# Patient Record
Sex: FEMALE | Race: WHITE | NOT HISPANIC OR LATINO | Employment: OTHER | URBAN - METROPOLITAN AREA
[De-identification: names, ages, dates, MRNs, and addresses within clinical notes are randomized per-mention and may not be internally consistent; named-entity substitution may affect disease eponyms.]

---

## 2017-01-26 ENCOUNTER — HOSPITAL ENCOUNTER (OUTPATIENT)
Dept: CT IMAGING | Facility: CLINIC | Age: 82
Discharge: HOME | End: 2017-01-26
Attending: INTERNAL MEDICINE | Admitting: INTERNAL MEDICINE
Payer: MEDICARE

## 2017-01-26 DIAGNOSIS — R91.1 PULMONARY NODULE: ICD-10-CM

## 2017-01-26 PROCEDURE — 71250 CT THORAX DX C-: CPT

## 2017-02-03 ENCOUNTER — TRANSFERRED RECORDS (OUTPATIENT)
Dept: HEALTH INFORMATION MANAGEMENT | Facility: CLINIC | Age: 82
End: 2017-02-03

## 2017-03-10 ENCOUNTER — OFFICE VISIT (OUTPATIENT)
Dept: INTERNAL MEDICINE | Facility: CLINIC | Age: 82
End: 2017-03-10
Payer: COMMERCIAL

## 2017-03-10 ENCOUNTER — TELEPHONE (OUTPATIENT)
Dept: INTERNAL MEDICINE | Facility: CLINIC | Age: 82
End: 2017-03-10

## 2017-03-10 VITALS
RESPIRATION RATE: 14 BRPM | TEMPERATURE: 98.9 F | OXYGEN SATURATION: 95 % | HEART RATE: 87 BPM | HEIGHT: 62 IN | SYSTOLIC BLOOD PRESSURE: 130 MMHG | DIASTOLIC BLOOD PRESSURE: 68 MMHG | WEIGHT: 147 LBS | BODY MASS INDEX: 27.05 KG/M2

## 2017-03-10 DIAGNOSIS — I10 ESSENTIAL HYPERTENSION: ICD-10-CM

## 2017-03-10 DIAGNOSIS — R39.15 URINARY URGENCY: ICD-10-CM

## 2017-03-10 DIAGNOSIS — E78.5 HYPERLIPIDEMIA LDL GOAL <130: ICD-10-CM

## 2017-03-10 DIAGNOSIS — Z00.00 ROUTINE GENERAL MEDICAL EXAMINATION AT A HEALTH CARE FACILITY: Primary | ICD-10-CM

## 2017-03-10 DIAGNOSIS — R32 URINARY INCONTINENCE, UNSPECIFIED TYPE: ICD-10-CM

## 2017-03-10 DIAGNOSIS — Z12.31 VISIT FOR SCREENING MAMMOGRAM: ICD-10-CM

## 2017-03-10 DIAGNOSIS — K27.9 PEPTIC ULCER: ICD-10-CM

## 2017-03-10 LAB
ALBUMIN UR-MCNC: NEGATIVE MG/DL
APPEARANCE UR: CLEAR
BILIRUB UR QL STRIP: NEGATIVE
COLOR UR AUTO: YELLOW
ERYTHROCYTE [DISTWIDTH] IN BLOOD BY AUTOMATED COUNT: 14.4 % (ref 10–15)
GLUCOSE UR STRIP-MCNC: NEGATIVE MG/DL
HCT VFR BLD AUTO: 44 % (ref 35–47)
HGB BLD-MCNC: 14.8 G/DL (ref 11.7–15.7)
HGB UR QL STRIP: NEGATIVE
KETONES UR STRIP-MCNC: NEGATIVE MG/DL
LEUKOCYTE ESTERASE UR QL STRIP: NEGATIVE
MCH RBC QN AUTO: 28.6 PG (ref 26.5–33)
MCHC RBC AUTO-ENTMCNC: 33.6 G/DL (ref 31.5–36.5)
MCV RBC AUTO: 85 FL (ref 78–100)
NITRATE UR QL: NEGATIVE
PH UR STRIP: 6.5 PH (ref 5–7)
PLATELET # BLD AUTO: 234 10E9/L (ref 150–450)
RBC # BLD AUTO: 5.18 10E12/L (ref 3.8–5.2)
SP GR UR STRIP: 1.01 (ref 1–1.03)
URN SPEC COLLECT METH UR: NORMAL
UROBILINOGEN UR STRIP-ACNC: 0.2 EU/DL (ref 0.2–1)
WBC # BLD AUTO: 5.5 10E9/L (ref 4–11)

## 2017-03-10 PROCEDURE — 85027 COMPLETE CBC AUTOMATED: CPT | Performed by: INTERNAL MEDICINE

## 2017-03-10 PROCEDURE — G0438 PPPS, INITIAL VISIT: HCPCS | Performed by: INTERNAL MEDICINE

## 2017-03-10 PROCEDURE — 80053 COMPREHEN METABOLIC PANEL: CPT | Performed by: INTERNAL MEDICINE

## 2017-03-10 PROCEDURE — 84443 ASSAY THYROID STIM HORMONE: CPT | Performed by: INTERNAL MEDICINE

## 2017-03-10 PROCEDURE — 36415 COLL VENOUS BLD VENIPUNCTURE: CPT | Performed by: INTERNAL MEDICINE

## 2017-03-10 PROCEDURE — 81003 URINALYSIS AUTO W/O SCOPE: CPT | Performed by: INTERNAL MEDICINE

## 2017-03-10 PROCEDURE — 80061 LIPID PANEL: CPT | Performed by: INTERNAL MEDICINE

## 2017-03-10 RX ORDER — AMLODIPINE BESYLATE 10 MG/1
10 TABLET ORAL DAILY
Qty: 90 TABLET | Refills: 3 | Status: SHIPPED | OUTPATIENT
Start: 2017-03-10 | End: 2017-04-03

## 2017-03-10 RX ORDER — SOLIFENACIN SUCCINATE 10 MG/1
10 TABLET, FILM COATED ORAL DAILY
Qty: 90 TABLET | Refills: 3 | Status: SHIPPED | OUTPATIENT
Start: 2017-03-10 | End: 2018-04-10

## 2017-03-10 RX ORDER — OMEPRAZOLE 40 MG/1
CAPSULE, DELAYED RELEASE ORAL
Qty: 90 CAPSULE | Refills: 3 | Status: SHIPPED | OUTPATIENT
Start: 2017-03-10 | End: 2017-09-06

## 2017-03-10 NOTE — PATIENT INSTRUCTIONS

## 2017-03-10 NOTE — MR AVS SNAPSHOT
After Visit Summary   3/10/2017    Gifty Villa    MRN: [id number]           Patient Information     Date Of Birth          1935        Visit Information        Provider Department      3/10/2017 8:20 AM Jun Tellez MD [facility]        Today's Diagnoses     Routine general medical examination at a health care facility    -  1    Essential hypertension        Hyperlipidemia LDL goal <130        Peptic ulcer        Urinary incontinence, unspecified type        Urinary urgency        Visit for screening mammogram          Care Instructions      Preventive Health Recommendations    Female Ages 65 +    Yearly exam:     See your health care provider every year in order to  o Review health changes.   o Discuss preventive care.    o Review your medicines if your doctor has prescribed any.      You no longer need a yearly Pap test unless you've had an abnormal Pap test in the past 10 years. If you have vaginal symptoms, such as bleeding or discharge, be sure to talk with your provider about a Pap test.      Every 1 to 2 years, have a mammogram.  If you are over 69, talk with your health care provider about whether or not you want to continue having screening mammograms.      Every 10 years, have a colonoscopy. Or, have a yearly FIT test (stool test). These exams will check for colon cancer.       Have a cholesterol test every 5 years, or more often if your doctor advises it.       Have a diabetes test (fasting glucose) every three years. If you are at risk for diabetes, you should have this test more often.       At age 65, have a bone density scan (DEXA) to check for osteoporosis (brittle bone disease).    Shots:    Get a flu shot each year.    Get a tetanus shot every 10 years.    Talk to your doctor about your pneumonia vaccines. There are now two you should receive - Pneumovax (PPSV 23) and Prevnar (PCV 13).    Talk to your doctor about the shingles vaccine.    Talk to your  doctor about the hepatitis B vaccine.    Nutrition:     Eat at least 5 servings of fruits and vegetables each day.      Eat whole-grain bread, whole-wheat pasta and brown rice instead of white grains and rice.      Talk to your provider about Calcium and Vitamin D.     Lifestyle    Exercise at least 150 minutes a week (30 minutes a day, 5 days a week). This will help you control your weight and prevent disease.      Limit alcohol to one drink per day.      No smoking.       Wear sunscreen to prevent skin cancer.       See your dentist twice a year for an exam and cleaning.      See your eye doctor every 1 to 2 years to screen for conditions such as glaucoma, macular degeneration and cataracts.      Everything looks fine!    Refills of medications have been faxed to your pharmacy.     I'll get back to you with lab results soon, especially if there is anything of concern.      See you in a year, sooner if problems.          Follow-ups after your visit        Future tests that were ordered for you today     Open Future Orders        Priority Expected Expires Ordered    MA Screening Digital Bilateral Routine  3/10/2018 3/10/2017            Who to contact     If you have questions or need follow up information about today's clinic visit or your schedule please contact [facility] directly at [phone].  Normal or non-critical lab and imaging results will be communicated to you by MyChart, letter or phone within 4 business days after the clinic has received the results. If you do not hear from us within 7 days, please contact the clinic through MyChart or phone. If you have a critical or abnormal lab result, we will notify you by phone as soon as possible.  Submit refill requests through [employer] or call your pharmacy and they will forward the refill request to us. Please allow 3 business days for your refill to be completed.          Additional Information About Your Visit        MyChart Information      "Science lets you send messages to your doctor, view your test results, renew your prescriptions, schedule appointments and more. To sign up, go to [email or web address] . Click on \"Log in\" on the left side of the screen, which will take you to the Welcome page. Then click on \"Sign up Now\" on the right side of the page.     You will be asked to enter the access code listed below, as well as some personal information. Please follow the directions to create your username and password.     Your access code is: [email or web address]  Expires: 2017  9:09 AM     Your access code will  in 90 days. If you need help or a new code, please call your [facility] clinic or [phone].        Care EveryWhere ID     This is your Care EveryWhere ID. This could be used by other organizations to access your [facility] medical records  [id number]        Your Vitals Were     Pulse Temperature Respirations Height Pulse Oximetry Breastfeeding?    87 98.9  F (37.2  C) (Oral) 14 5' 2\" (1.575 m) 95% No    BMI (Body Mass Index)                   26.89 kg/m2            Blood Pressure from Last 3 Encounters:   03/10/17 130/68   16 130/78   16 120/66    Weight from Last 3 Encounters:   03/10/17 147 lb (66.7 kg)   16 141 lb (64 kg)   16 138 lb (62.6 kg)              We Performed the Following     CBC with platelets     Comprehensive metabolic panel     Lipid panel reflex to direct LDL     TSH with free T4 reflex     UA reflex to Microscopic          Today's Medication Changes          These changes are accurate as of: 3/10/17  9:09 AM.  If you have any questions, ask your nurse or doctor.               Start taking these medicines.        Dose/Directions    solifenacin 10 MG tablet   Commonly known as:  VESICARE   Used for:  Urinary urgency   Started by:  Jun Tellez MD        Dose:  10 mg   Take 1 tablet (10 mg) by mouth daily   Quantity:  90 tablet   Refills:  3         These medicines have changed or have updated " prescriptions.        Dose/Directions    * aspirin 81 MG tablet   This may have changed:  Another medication with the same name was added. Make sure you understand how and when to take each.   Used for:  Unspecified essential hypertension   Changed by:  Jun Tellez MD        ONE DAILY   Quantity:  0   Refills:  0       * aspirin 81 MG tablet   This may have changed:  You were already taking a medication with the same name, and this prescription was added. Make sure you understand how and when to take each.   Used for:  Essential hypertension   Changed by:  Jun Tellez MD        Dose:  81 mg   Take 1 tablet (81 mg) by mouth daily   Quantity:  30 tablet   Refills:  0       * Notice:  This list has 2 medication(s) that are the same as other medications prescribed for you. Read the directions carefully, and ask your doctor or other care provider to review them with you.         Where to get your medicines      These medications were sent to [place] [address] [address] [facility] [facility] [address] [facility] [address]  [address] [facility] [address] [facility] [address]     Phone:  [phone]     amLODIPine 10 MG tablet    omeprazole 40 MG capsule    solifenacin 10 MG tablet         Some of these will need a paper prescription and others can be bought over the counter.  Ask your nurse if you have questions.     You don't need a prescription for these medications     aspirin 81 MG tablet                Primary Care Provider Office Phone # Fax #    Jun Tellez [phone]       [facility] [address] [facility] [address]  [facility] [address]        Thank you!     Thank you for choosing [facility]  for your care. Our goal is always to provide you with excellent care. Hearing back from our patients is one way we can continue to improve our services. Please take a few minutes to complete the written survey that you may receive in the mail after your visit with us. Thank you!             Your Updated  Medication List - Protect others around you: Learn how to safely use, store and throw away your medicines at www.disposemymeds.org.          This list is accurate as of: 3/10/17  9:09 AM.  Always use your most recent med list.                   Brand Name Dispense Instructions for use    albuterol 108 (90 BASE) MCG/ACT Inhaler    albuterol    1 Inhaler    Inhale 2 puffs into the lungs every 4 hours as needed for shortness of breath / dyspnea or wheezing       amLODIPine 10 MG tablet    NORVASC    90 tablet    Take 1 tablet (10 mg) by mouth daily       * aspirin 81 MG tablet     0    ONE DAILY       * aspirin 81 MG tablet     30 tablet    Take 1 tablet (81 mg) by mouth daily       DAILY MULTIVITAMIN PO      Reported on 3/10/2017       hydrOXYzine 25 MG tablet    ATARAX    60 tablet    Take 1-2 tablets (25-50 mg) by mouth every 6 hours as needed for itching       omeprazole 40 MG capsule    priLOSEC    90 capsule    Take 30-60 minutes before a meal.  Take twice daily for 4 weeks and then once daily       solifenacin 10 MG tablet    VESICARE    90 tablet    Take 1 tablet (10 mg) by mouth daily       * Notice:  This list has 2 medication(s) that are the same as other medications prescribed for you. Read the directions carefully, and ask your doctor or other care provider to review them with you.

## 2017-03-10 NOTE — NURSING NOTE
"Chief Complaint   Patient presents with     Medicare Visit       Initial /80 (BP Location: Right arm, Patient Position: Chair, Cuff Size: Adult Large)  Pulse 87  Temp 98.9  F (37.2  C) (Oral)  Resp 14  Ht 5' 2\" (1.575 m)  Wt 147 lb (66.7 kg)  SpO2 95%  Breastfeeding? No  BMI 26.89 kg/m2 Estimated body mass index is 26.89 kg/(m^2) as calculated from the following:    Height as of this encounter: 5' 2\" (1.575 m).    Weight as of this encounter: 147 lb (66.7 kg).  Medication Reconciliation: complete   Filiberto NOVAK      "

## 2017-03-10 NOTE — PROGRESS NOTES
SUBJECTIVE:                                                            Gifty Villa is a 82 year old female who presents for Preventive Visit.    Endoscopy:   Patient had endoscopy done about 9-10 months ago. 2 ulcers were found and she was given medication.     Medications;  She forgot to take her BP medication this A.M.     Musculoskeletal:   Patient c/o arthralgia in right hip. She has been going to orthology. Patient c/o occasional weakness in right leg. She notes the pain has improved and she is now able to walk without a cane.     Weight management:   Gifty has been following the medifast diet for the past year or so. She has successfully lost some weight on this diet.     :  Patient c/o leakage. She denies any dysuria,     Specialists:   Patient sees pulmonary specialist 1x/year.     Are you in the first 12 months of your Medicare Part B coverage?  No    Healthy Habits:    Do you get at least three servings of calcium containing foods daily (dairy, green leafy vegetables, etc.)? yes    Amount of exercise or daily activities, outside of work: daily activities    Problems taking medications regularly No    Medication side effects: No    Have you had an eye exam in the past two years? yes    Do you see a dentist twice per year? yes    Do you have sleep apnea, excessive snoring or daytime drowsiness?no    COGNITIVE SCREEN  1) Repeat 3 items (Banana, Sunrise, Chair)    2) Clock draw: NORMAL  3) 3 item recall:   Recalls 3 objects  Results: 3 items recalled: COGNITIVE IMPAIRMENT LESS LIKELY    Mini-CogTM Copyright S Carrie. Licensed by the author for use in [facility]; reprinted with permission (tree@.[facility]). All rights reserved.        Reviewed and updated as needed this visit by clinical staff  Tobacco  Allergies  Meds  Med Hx  Surg Hx  Fam Hx  Soc Hx        Reviewed and updated as needed this visit by Provider        Social History   Substance Use Topics     Smoking status: Former  Smoker     Quit date: 7/6/1988     Smokeless tobacco: Never Used      Comment: Quit in 1988     Alcohol use No       The patient does not drink >3 drinks per day nor >7 drinks per week.    Today's PHQ-2 Score:   PHQ-2 ( 1999 Pfizer) 9/30/2014 12/30/2013   Q1: Little interest or pleasure in doing things 0 0   Q2: Feeling down, depressed or hopeless 0 0   PHQ-2 Score 0 0       Do you feel safe in your environment - Yes    Do you have a Health Care Directive?: Yes: Advance Directive has been received and scanned.    Current providers sharing in care for this patient include:   Patient Care Team:  Jun Tellez MD as PCP - General  Devika Gray RN as       Hearing impairment: No    Ability to successfully perform activities of daily living: Yes, no assistance needed     Fall risk:  Fallen 2 or more times in the past year?: No  Any fall with injury in the past year?: No    Home safety:  none identified    The following health maintenance items are reviewed in Epic and correct as of today:  Health Maintenance   Topic Date Due     ADVANCE DIRECTIVE PLANNING Q5 YRS (NO INBASKET)  01/04/2017     FALL RISK ASSESSMENT  05/24/2017     TETANUS Q10 YR  08/17/2017     INFLUENZA VACCINE (SYSTEM ASSIGNED)  09/01/2017     DEXA SCAN SCREENING (SYSTEM ASSIGNED)  Completed     PNEUMOCOCCAL  Completed     Mammogram Screening: Patient over age 75, has elected to continue with mammography screening.     ROS:  C: NEGATIVE for fever, chills, difficulty sleeping, fatigue   I: NEGATIVE for worrisome rashes, moles or lesions  E: NEGATIVE for vision changes or irritation  E/M: NEGATIVE for ear, mouth and throat problems  R: NEGATIVE for significant cough or SOB  CV: NEGATIVE for chest pain, palpitations or peripheral edema  GI: NEGATIVE for hematochezia, abdominal pain, heartburn, or change in bowel habits  : POSITIVE for leakage NEGATIVE for frequency, dysuria, or hematuria  M: NEGATIVE for significant arthralgias or  "myalgia  N: NEGATIVE for weakness, dizziness or paresthesias, severe and/or frequent headaches, abrupt changes in speech  E: NEGATIVE for temperature intolerance, skin/hair changes, unusual thirstiness   H: NEGATIVE for bleeding or bruising problems  P: NEGATIVE for changes in mood or affect    Problem list, Medication list, Allergies, and Medical/Social/Surgical histories reviewed in [facility] and updated as appropriate.  BP Readings from Last 3 Encounters:   03/10/17 142/80   11/28/16 130/78   09/23/16 120/66    Wt Readings from Last 3 Encounters:   03/10/17 66.7 kg (147 lb)   11/28/16 64 kg (141 lb)   09/23/16 62.6 kg (138 lb)          Past/recent records reviewed and discussed for --   Colonoscopy- Patient does not need anymore  Mammogram- Needs to have one done this year. She has them done yearly.   Medications     OBJECTIVE:                                                            /68 (BP Location: Right arm, Patient Position: Chair, Cuff Size: Adult Large)  Pulse 87  Temp 98.9  F (37.2  C) (Oral)  Resp 14  Ht 1.575 m (5' 2\")  Wt 66.7 kg (147 lb)  SpO2 95%  Breastfeeding? No  BMI 26.89 kg/m2 Estimated body mass index is 26.89 kg/(m^2) as calculated from the following:    Height as of this encounter: 5' 2\" (1.575 m).    Weight as of this encounter: 147 lb (66.7 kg).  EXAM:   GENERAL APPEARANCE: healthy, alert and no distress  EYES: Eyes grossly normal to inspection, PERRL and conjunctivae and sclerae normal  HENT: ear canals and TM's normal, nose and mouth without ulcers or lesions, oropharynx clear and oral mucous membranes moist  NECK: no adenopathy, no asymmetry, masses, or scars and thyroid normal to palpation  RESP: lungs clear to auscultation - no rales, rhonchi or wheezes  CV: regular rate and rhythm, normal S1 S2, no S3 or S4, no murmur, click or rub, no peripheral edema and peripheral pulses strong  ABDOMEN: soft, nontender, no hepatosplenomegaly, no masses and bowel sounds normal  MS: no " musculoskeletal defects are noted and gait is age appropriate without ataxia  NEURO: Normal strength and tone, sensory exam grossly normal, mentation intact and speech normal  PSYCH: mentation appears normal and affect normal/bright    ASSESSMENT / PLAN:                                                            (Z00.00) Routine general medical examination at a health care facility  (primary encounter diagnosis)  Comment: Stable health. See epic orders.  Plan: Comprehensive metabolic panel, Lipid panel         reflex to direct LDL, TSH with free T4 reflex,         CBC with platelets, UA reflex to Microscopic            (I10) Essential hypertension  Comment: BP of 130/68 at target. Continue current meds.  Plan: amLODIPine (NORVASC) 10 MG tablet, aspirin 81         MG tablet            (E78.5) Hyperlipidemia LDL goal <130  Comment: Update lipids  Plan: Comprehensive metabolic panel, Lipid panel         reflex to direct LDL            (K27.9) Peptic ulcer  Comment: Controlled. Continue current meds  Plan: omeprazole (PRILOSEC) 40 MG capsule            (R32) Urinary incontinence, unspecified type  Plan: UA reflex to Microscopic            (R39.15) Urinary urgency  Comment: Take medication as directed  Plan: solifenacin (VESICARE) 10 MG tablet          (Z12.31) Visit for screening mammogram  Plan: MA Screening Digital Bilateral          Patient Instructions     Preventive Health Recommendations    Female Ages 65 +    Yearly exam:     See your health care provider every year in order to  o Review health changes.   o Discuss preventive care.    o Review your medicines if your doctor has prescribed any.      You no longer need a yearly Pap test unless you've had an abnormal Pap test in the past 10 years. If you have vaginal symptoms, such as bleeding or discharge, be sure to talk with your provider about a Pap test.      Every 1 to 2 years, have a mammogram.  If you are over 69, talk with your health care provider about  whether or not you want to continue having screening mammograms.      Every 10 years, have a colonoscopy. Or, have a yearly FIT test (stool test). These exams will check for colon cancer.       Have a cholesterol test every 5 years, or more often if your doctor advises it.       Have a diabetes test (fasting glucose) every three years. If you are at risk for diabetes, you should have this test more often.       At age 65, have a bone density scan (DEXA) to check for osteoporosis (brittle bone disease).    Shots:    Get a flu shot each year.    Get a tetanus shot every 10 years.    Talk to your doctor about your pneumonia vaccines. There are now two you should receive - Pneumovax (PPSV 23) and Prevnar (PCV 13).    Talk to your doctor about the shingles vaccine.    Talk to your doctor about the hepatitis B vaccine.    Nutrition:     Eat at least 5 servings of fruits and vegetables each day.      Eat whole-grain bread, whole-wheat pasta and brown rice instead of white grains and rice.      Talk to your provider about Calcium and Vitamin D.     Lifestyle    Exercise at least 150 minutes a week (30 minutes a day, 5 days a week). This will help you control your weight and prevent disease.      Limit alcohol to one drink per day.      No smoking.       Wear sunscreen to prevent skin cancer.       See your dentist twice a year for an exam and cleaning.      See your eye doctor every 1 to 2 years to screen for conditions such as glaucoma, macular degeneration and cataracts.    Everything looks fine!    Refills of medications have been faxed to your pharmacy.     I'll get back to you with lab results soon, especially if there is anything of concern.      See you in a year, sooner if problems.      End of Life Planning:  Patient currently has an advanced directive: Yes.  Practitioner is supportive of decision.    COUNSELING:  Reviewed preventive health counseling, as reflected in patient instructions       Regular exercise      "  Healthy diet/nutrition    Estimated body mass index is 26.89 kg/(m^2) as calculated from the following:    Height as of this encounter: 5' 2\" (1.575 m).    Weight as of this encounter: 147 lb (66.7 kg).     reports that she quit smoking about 28 years ago. She has never used smokeless tobacco.    Appropriate preventive services were discussed with this patient, including applicable screening as appropriate for cardiovascular disease, diabetes, osteopenia/osteoporosis, and glaucoma.  As appropriate for age/gender, discussed screening for colorectal cancer, prostate cancer, breast cancer, and cervical cancer. Checklist reviewing preventive services available has been given to the patient.    Reviewed patients plan of care and provided an AVS. The Basic Care Plan (routine screening as documented in Health Maintenance) for Gifty meets the Care Plan requirement. This Care Plan has been established and reviewed with the Patient.    Counseling Resources:  ATP IV Guidelines  Pooled Cohorts Equation Calculator  Breast Cancer Risk Calculator  FRAX Risk Assessment  ICSI Preventive Guidelines  Dietary Guidelines for Americans, 2010  [employer] MyPlate  ASA Prophylaxis  Lung CA Screening    Jun Tellez MD  [facility]    This document serves as a record of the services and decisions personally performed and made by Jun Tellez MD. It was created on their behalf by Karin Johnston, a trained medical scribe. The creation of this document is based the provider's statements to the medical scribe.  Karin Johnston March 10, 2017 8:52 AM    "

## 2017-03-11 LAB
ALBUMIN SERPL-MCNC: 4.1 G/DL (ref 3.4–5)
ALP SERPL-CCNC: 101 U/L (ref 40–150)
ALT SERPL W P-5'-P-CCNC: 33 U/L (ref 0–50)
ANION GAP SERPL CALCULATED.3IONS-SCNC: 8 MMOL/L (ref 3–14)
AST SERPL W P-5'-P-CCNC: 22 U/L (ref 0–45)
BILIRUB SERPL-MCNC: 0.3 MG/DL (ref 0.2–1.3)
BUN SERPL-MCNC: 15 MG/DL (ref 7–30)
CALCIUM SERPL-MCNC: 9.4 MG/DL (ref 8.5–10.1)
CHLORIDE SERPL-SCNC: 104 MMOL/L (ref 94–109)
CHOLEST SERPL-MCNC: 219 MG/DL
CO2 SERPL-SCNC: 28 MMOL/L (ref 20–32)
CREAT SERPL-MCNC: 0.66 MG/DL (ref 0.52–1.04)
GFR SERPL CREATININE-BSD FRML MDRD: 85 ML/MIN/1.7M2
GLUCOSE SERPL-MCNC: 91 MG/DL (ref 70–99)
HDLC SERPL-MCNC: 60 MG/DL
LDLC SERPL CALC-MCNC: 141 MG/DL
NONHDLC SERPL-MCNC: 159 MG/DL
POTASSIUM SERPL-SCNC: 4.5 MMOL/L (ref 3.4–5.3)
PROT SERPL-MCNC: 7.7 G/DL (ref 6.8–8.8)
SODIUM SERPL-SCNC: 140 MMOL/L (ref 133–144)
TRIGL SERPL-MCNC: 92 MG/DL
TSH SERPL DL<=0.005 MIU/L-ACNC: 2 MU/L (ref 0.4–4)

## 2017-03-13 ENCOUNTER — HOSPITAL ENCOUNTER (OUTPATIENT)
Dept: MAMMOGRAPHY | Facility: CLINIC | Age: 82
Discharge: HOME | End: 2017-03-13
Attending: INTERNAL MEDICINE | Admitting: INTERNAL MEDICINE
Payer: MEDICARE

## 2017-03-13 DIAGNOSIS — Z12.31 VISIT FOR SCREENING MAMMOGRAM: ICD-10-CM

## 2017-03-13 PROCEDURE — G0202 SCR MAMMO BI INCL CAD: HCPCS

## 2017-03-14 ENCOUNTER — HOSPITAL ENCOUNTER (EMERGENCY)
Facility: CLINIC | Age: 82
Discharge: HOME | End: 2017-03-14
Attending: EMERGENCY MEDICINE | Admitting: EMERGENCY MEDICINE
Payer: MEDICARE

## 2017-03-14 ENCOUNTER — APPOINTMENT (OUTPATIENT)
Dept: GENERAL RADIOLOGY | Facility: CLINIC | Age: 82
End: 2017-03-14
Attending: EMERGENCY MEDICINE
Payer: MEDICARE

## 2017-03-14 ENCOUNTER — APPOINTMENT (OUTPATIENT)
Dept: CT IMAGING | Facility: CLINIC | Age: 82
End: 2017-03-14
Attending: EMERGENCY MEDICINE
Payer: MEDICARE

## 2017-03-14 VITALS
OXYGEN SATURATION: 96 % | WEIGHT: 147.05 LBS | HEART RATE: 91 BPM | RESPIRATION RATE: 16 BRPM | TEMPERATURE: 98 F | SYSTOLIC BLOOD PRESSURE: 169 MMHG | DIASTOLIC BLOOD PRESSURE: 85 MMHG | BODY MASS INDEX: 26.9 KG/M2
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
## 2017-03-14 DIAGNOSIS — S20.211A BRUISED RIB, RIGHT, INITIAL ENCOUNTER: ICD-10-CM

## 2017-03-14 DIAGNOSIS — S09.90XA HEAD INJURY, INITIAL ENCOUNTER: ICD-10-CM

## 2017-03-14 PROCEDURE — 70450 CT HEAD/BRAIN W/O DYE: CPT

## 2017-03-14 PROCEDURE — 99284 EMERGENCY DEPT VISIT MOD MDM: CPT | Mod: 25

## 2017-03-14 PROCEDURE — 71101 X-RAY EXAM UNILAT RIBS/CHEST: CPT | Mod: RT

## 2017-03-14 RX ORDER — OXYCODONE HYDROCHLORIDE 5 MG/1
2.5-5 TABLET ORAL EVERY 6 HOURS PRN
Qty: 20 TABLET | Refills: 0 | Status: SHIPPED | OUTPATIENT
Start: 2017-03-14 | End: 2017-09-02

## 2017-03-14 ASSESSMENT — ENCOUNTER SYMPTOMS
CONFUSION: 0
HEADACHES: 0
BACK PAIN: 0
NECK PAIN: 0
VOMITING: 0
ABDOMINAL PAIN: 0

## 2017-03-14 NOTE — ED PROVIDER NOTES
History     Chief Complaint:  Fall    HPI   Gifty Villa is a 82 year old female with COPD, hypertension and hyperlipidemia who presents to the emergency department today for evaluation of a fall. The patient fell in her garage about 30 minutes ago. She notes that she stepped out of place and fell. She states that she hit her right chest first and then her head but her glasses did not come off or crack and she did not lose consciousness so she does not believe that she hit her head with much force. She is here in the ED mostly because her chest hurts when taking a deep breath. She is not on any blood thinners. She has no headache, neck pain, vision changes, vomiting or confusion. No pain in her abdominal pain, back pain or pain in her arms or legs. She has not taken any pain medications yet. Of note, patient states that she had a complete physical this past Thursday and everything looked good.  She is leaving out of town in two days.    Allergies:  No Known Drug Allergies     Medications:    Amlodipine  Omeprazole  Vesicare  Aspirin   Atarax  Albuterol   Multivitamin     Past Medical History:    Arthritis   Anxiety   Backache  COPD  Depression   Essential hypertension, benign   Frequency urgency syndrome  Hyperlipidemia  RLQ abdominal mass  Uncomplicated asthma    Past Surgical History:    C total hip arthroplasty, hip replacement, total, right (1995)   C spine fusion, anterior, 3 segments (1992)   C vaginal hysterectomy (1978)   Arthroplasty patello-femoral (knee) (2010)   Hysterectomy  Whick teeth   Excise ganglion wrist, left (2015)   Egd (2016 x2)   Colonoscopy (2003, 2014)    Family History:    Mother - Alzheimer Disease  Father - Alzheimer Disease, CAD  Brother - Alzheimer Disease   Maternal Aunt - Cancer    Social History:  The patient was accompanied to the ED by her daughter.  Smoking Status: Former Smoker, Quit in 1988  Smokeless Tobacco: Negative  Alcohol Use: Negative  Marital Status:    [5]     Review of Systems   Eyes: Negative for visual disturbance.   Gastrointestinal: Negative for abdominal pain and vomiting.   Musculoskeletal: Negative for back pain and neck pain.        Chest wall pain   Neurological: Negative for headaches.   Psychiatric/Behavioral: Negative for confusion.   All other systems reviewed and are negative.    Physical Exam   Vitals:  Patient Vitals for the past 24 hrs:   BP Temp Pulse Resp SpO2 Weight   03/14/17 1139 169/85 98  F (36.7  C) 91 16 96 % 66.7 kg (147 lb 0.8 oz)        Physical Exam  Head:  The scalp, face, and head appear normal without trauma  Eyes:  Sclera white; Pupils are equal and round  ENT:    External ears normal.  No hemotympanum.    Neck:  No midline tenderness or pain with full ROM.  CV:  Regular rate and rhythm  Chest Wall: Focal tenderness just under medial R breast.  No abrasion or eccymosis  Resp:  Breath sounds clear and equal bilaterally    Non-labored, no retractions or accessory muscle use  GI:  Abdomen is soft, non-tender, non-distended    No rebound tenderness or peritoneal features  Back:  No midline tenderness or step-off  MS:  No deformity    Normal motor assessment of all extremities.  Skin:  No rash or lesions noted.  Neuro:  Speech is normal and fluent. No apparent deficit.    Strength 5/5 x4.  Sensation intact x4.      Cranial nerves intact by examination.    GCS: 15      Emergency Department Course     Imaging:  Radiology findings were communicated with the patient who voiced understanding of the findings.    Ribs XR, unilat 3 views + PA chest, right:   IMPRESSION: Negative.  Reading per radiology    Head CT w/o contrast:   IMPRESSION: Diffuse cerebral volume loss and cerebral white matter  changes consistent with chronic small vessel ischemic disease. No  evidence for acute intracranial pathology.   Reading per radiology    Emergency Department Course:  Nursing notes and vitals reviewed.  I performed an exam of the patient as documented  above.   The patient was sent for an x-ray and CT while in the emergency department, results above.   At 1251 the patient was rechecked and was updated on the results of her imaging studies.   I discussed the treatment plan with the patient. They expressed understanding of this plan and consented to discharge. They will be discharged home with instructions for care and follow up. In addition, the patient will return to the emergency department if their symptoms persist, worsen, if new symptoms arise or if there is any concern.  All questions were answered.  I personally reviewed the iamging results with the patient and answered all related questions prior to discharge.    Impression & Plan      Medical Decision Making:  Gifty Villa is a 82 year old female who presents for evaluation after a mechanical fall with resulting head injury and chest injury. Differential includes bruise, broken rib, pneumothorax, pulmonary contusion, hemothorax and intracranial bleed due to age as her primary risk factor. Imaging was obtained with no acute abnormalities. We discussed management of bruised ribs as well as deep breathing with pain control. She feels like symptoms are controlled now and is hesitant to take narcotics due to the history of constipation. A limited quantity will be prescribed and she will preferentially be using Ibuprofen and Tylenol and will start over the counter stool softeners if needed.     Diagnosis:    ICD-10-CM    1. Head injury, initial encounter S09.90XA    2. Bruised rib, right, initial encounter S20.211A    3. Fall, initial encounter W19.XXXA      Disposition:   Discharge to home    Discharge Medications:  New Prescriptions    OXYCODONE (ROXICODONE) 5 MG IR TABLET    Take 0.5-1 tablets (2.5-5 mg) by mouth every 6 hours as needed for pain     Scribe Disclosure:  Venita DAVIS, am serving as a scribe at 11:33 AM on 3/14/2017 to document services personally performed by Tiera Griffiths,  MD, based on my observations and the provider's statements to me.    3/14/2017   [facility] EMERGENCY DEPARTMENT       Tiera Griffiths MD  03/16/17 0987

## 2017-03-14 NOTE — DISCHARGE INSTRUCTIONS
Prescription strength dosing instructions:  - 600mg ibuprofen (motrin, advil) every 8 hours and/or  - 650mg acetaminophen (tylenol) every 4 hours or 1000mg every 6 hours (maximum of 4000mg in 24 hours).  Acetaminophen is often in combination over the counter and prescription pain medications.  Be sure to include this in daily total.  - These work in different ways and are safe to take together    Discharge Instructions  Head Injury    You have been seen today for a head injury. You were checked for serious problems, like bleeding on the brain, but these problems cannot always be found right away.    Return to the Emergency Department if:    You are confused, have amnesia, or you are not acting right.    Your headache gets worse or you start to have a really bad headache even with your recommended treatment plan.    You vomit more than once.    You have a convulsion or seizure.    You have trouble walking.    You have weakness or paralysis in an arm or a leg.    You have blood or fluid coming from your ears or nose.    You have new symptoms or anything that worries you.    Sleeping:  It is okay for you to sleep, but someone should wake you up as instructed by your doctor, and someone should check on you at your usual time to wake up.     Activity:    Do not drive for at least 24 hours.    Do not drive if you have dizzy spells or trouble concentrating, or remembering things.    Do not return to any contact sports until cleared by your regular doctor.     Follow-up:  It is very important that you make an appointment with your clinic and go to the appointment.  If you do not follow-up with your regular doctor, it may result in missing an important development which could result in permanent injury or disability and/or lasting pain.  If there is any problem keeping your appointment, call your doctor or return to the Emergency Department.    MORE INFORMATION:    Concussion:  A concussion is a minor head injury that may  cause temporary problems with the way your brain works.  Some symptoms include:  confusion, amnesia, nausea and vomiting, dizziness, fatigue, memory or concentration problems, irritability and sleep problems.    CT Scans: Your evaluation today may have included a CT scan (CAT scan) to look for things like bleeding or a skull fracture (break).  CT scans involve radiation and too many CT scans can cause serious health problems like cancer, especially in children.  Because of this, your doctor may not have ordered a CT scan today if they think you are at low risk for a serious or life threatening problem.    If you were given a prescription for medicine here today, be sure to read all of the information (including the package insert) that comes with your prescription.  This will include important information about the medicine, its side effects, and any warnings that you need to know about.  The pharmacist who fills the prescription can provide more information and answer questions you may have about the medicine.  If you have questions or concerns that the pharmacist cannot address, please call or return to the Emergency Department.     Opioid Medication Information    Pain medications are among the most commonly prescribed medicines, so we are including this information for all our patients. If you did not receive pain medication or get a prescription for pain medicine, you can ignore it.     You may have been given a prescription for an opioid (narcotic) pain medicine and/or have received a pain medicine while here in the Emergency Department. These medicines can make you drowsy or impaired. You must not drive, operate dangerous equipment, or engage in any other dangerous activities while taking these medications. If you drive while taking these medications, you could be arrested for DUI, or driving under the influence. Do not drink any alcohol while you are taking these medications.     Opioid pain medications can  cause addiction. If you have a history of chemical dependency of any type, you are at a higher risk of becoming addicted to pain medications.  Only take these prescribed medications to treat your pain when all other options have been tried. Take it for as short a time and as few doses as possible. Store your pain pills in a secure place, as they are frequently stolen and provide a dangerous opportunity for children or visitors in your house to start abusing these powerful medications. We will not replace any lost or stolen medicine.  As soon as your pain is better, you should flush all your remaining medication.     Many prescription pain medications contain Tylenol  (acetaminophen), including Vicodin , Tylenol #3 , Norco , Lortab , and Percocet .  You should not take any extra pills of Tylenol  if you are using these prescription medications or you can get very sick.  Do not ever take more than 3000 mg of acetaminophen in any 24 hour period.    All opioids tend to cause constipation. Drink plenty of water and eat foods that have a lot of fiber, such as fruits, vegetables, prune juice, apple juice and high fiber cereal.  Take a laxative if you don t move your bowels at least every other day. Miralax , Milk of Magnesia, Colace , or Senna  can be used to keep you regular.      Remember that you can always come back to the Emergency Department if you are not able to see your regular doctor in the amount of time listed above, if you get any new symptoms, or if there is anything that worries you.

## 2017-03-14 NOTE — ED AVS SNAPSHOT
[facility] Emergency Department    [address]    [facility] [address]    Phone:  [phone]    Fax:  [phone]                                       Gifty Villa   MRN: [id number]    Department:  [facility] Emergency Department   Date of Visit:  3/14/2017           Patient Information     Date Of Birth          1935        Your diagnoses for this visit were:     Head injury, initial encounter     Bruised rib, right, initial encounter     Fall, initial encounter        You were seen by Tiera Griffiths MD.      Follow-up Information     Follow up with [facility] Emergency Department.    Specialty:  EMERGENCY MEDICINE    Why:  As needed, If symptoms worsen    Contact information:    [address]  [facility] [address] [phone]        Follow up with Jun Tellez MD.    Specialty:  Internal Medicine    Why:  2-4 weeks to recheck blood pressure    Contact information:    [facility]  [address]  [facility] [address]  [phone]          Discharge Instructions       Prescription strength dosing instructions:  - 600mg ibuprofen (motrin, advil) every 8 hours and/or  - 650mg acetaminophen (tylenol) every 4 hours or 1000mg every 6 hours (maximum of 4000mg in 24 hours).  Acetaminophen is often in combination over the counter and prescription pain medications.  Be sure to include this in daily total.  - These work in different ways and are safe to take together    Discharge Instructions  Head Injury    You have been seen today for a head injury. You were checked for serious problems, like bleeding on the brain, but these problems cannot always be found right away.    Return to the Emergency Department if:    You are confused, have amnesia, or you are not acting right.    Your headache gets worse or you start to have a really bad headache even with your recommended treatment plan.    You vomit more  than once.    You have a convulsion or seizure.    You have trouble walking.    You have weakness or paralysis in an arm or a leg.    You have blood or fluid coming from your ears or nose.    You have new symptoms or anything that worries you.    Sleeping:  It is okay for you to sleep, but someone should wake you up as instructed by your doctor, and someone should check on you at your usual time to wake up.     Activity:    Do not drive for at least 24 hours.    Do not drive if you have dizzy spells or trouble concentrating, or remembering things.    Do not return to any contact sports until cleared by your regular doctor.     Follow-up:  It is very important that you make an appointment with your clinic and go to the appointment.  If you do not follow-up with your regular doctor, it may result in missing an important development which could result in permanent injury or disability and/or lasting pain.  If there is any problem keeping your appointment, call your doctor or return to the Emergency Department.    MORE INFORMATION:    Concussion:  A concussion is a minor head injury that may cause temporary problems with the way your brain works.  Some symptoms include:  confusion, amnesia, nausea and vomiting, dizziness, fatigue, memory or concentration problems, irritability and sleep problems.    CT Scans: Your evaluation today may have included a CT scan (CAT scan) to look for things like bleeding or a skull fracture (break).  CT scans involve radiation and too many CT scans can cause serious health problems like cancer, especially in children.  Because of this, your doctor may not have ordered a CT scan today if they think you are at low risk for a serious or life threatening problem.    If you were given a prescription for medicine here today, be sure to read all of the information (including the package insert) that comes with your prescription.  This will include important information about the medicine, its  side effects, and any warnings that you need to know about.  The pharmacist who fills the prescription can provide more information and answer questions you may have about the medicine.  If you have questions or concerns that the pharmacist cannot address, please call or return to the Emergency Department.     Opioid Medication Information    Pain medications are among the most commonly prescribed medicines, so we are including this information for all our patients. If you did not receive pain medication or get a prescription for pain medicine, you can ignore it.     You may have been given a prescription for an opioid (narcotic) pain medicine and/or have received a pain medicine while here in the Emergency Department. These medicines can make you drowsy or impaired. You must not drive, operate dangerous equipment, or engage in any other dangerous activities while taking these medications. If you drive while taking these medications, you could be arrested for DUI, or driving under the influence. Do not drink any alcohol while you are taking these medications.     Opioid pain medications can cause addiction. If you have a history of chemical dependency of any type, you are at a higher risk of becoming addicted to pain medications.  Only take these prescribed medications to treat your pain when all other options have been tried. Take it for as short a time and as few doses as possible. Store your pain pills in a secure place, as they are frequently stolen and provide a dangerous opportunity for children or visitors in your house to start abusing these powerful medications. We will not replace any lost or stolen medicine.  As soon as your pain is better, you should flush all your remaining medication.     Many prescription pain medications contain Tylenol  (acetaminophen), including Vicodin , Tylenol #3 , Norco , Lortab , and Percocet .  You should not take any extra pills of Tylenol  if you are using these  prescription medications or you can get very sick.  Do not ever take more than 3000 mg of acetaminophen in any 24 hour period.    All opioids tend to cause constipation. Drink plenty of water and eat foods that have a lot of fiber, such as fruits, vegetables, prune juice, apple juice and high fiber cereal.  Take a laxative if you don t move your bowels at least every other day. Miralax , Milk of Magnesia, Colace , or Senna  can be used to keep you regular.      Remember that you can always come back to the Emergency Department if you are not able to see your regular doctor in the amount of time listed above, if you get any new symptoms, or if there is anything that worries you.            Discharge References/Attachments     RIB CONTUSION OR MINOR FRACTURE (ENGLISH)      24 Hour Appointment Hotline       To make an appointment at any [facility] clinic, call [phone] ([phone]). If you don't have a family doctor or clinic, we will help you find one. [facility] clinics are conveniently located to serve the needs of you and your family.             Review of your medicines      START taking        Dose / Directions Last dose taken    oxyCODONE 5 MG IR tablet   Commonly known as:  ROXICODONE   Dose:  2.5-5 mg   Quantity:  20 tablet        Take 0.5-1 tablets (2.5-5 mg) by mouth every 6 hours as needed for pain   Refills:  0          Our records show that you are taking the medicines listed below. If these are incorrect, please call your family doctor or clinic.        Dose / Directions Last dose taken    albuterol 108 (90 BASE) MCG/ACT Inhaler   Commonly known as:  albuterol   Dose:  2 puff   Quantity:  1 Inhaler        Inhale 2 puffs into the lungs every 4 hours as needed for shortness of breath / dyspnea or wheezing   Refills:  5        amLODIPine 10 MG tablet   Commonly known as:  NORVASC   Dose:  10 mg   Quantity:  90 tablet        Take 1 tablet (10 mg) by mouth daily   Refills:  3        * aspirin 81 MG  tablet   Quantity:  0        ONE DAILY   Refills:  0        * aspirin 81 MG tablet   Dose:  81 mg   Quantity:  30 tablet        Take 1 tablet (81 mg) by mouth daily   Refills:  0        DAILY MULTIVITAMIN PO        Reported on 3/10/2017   Refills:  0        hydrOXYzine 25 MG tablet   Commonly known as:  ATARAX   Dose:  25-50 mg   Quantity:  60 tablet        Take 1-2 tablets (25-50 mg) by mouth every 6 hours as needed for itching   Refills:  1        omeprazole 40 MG capsule   Commonly known as:  priLOSEC   Quantity:  90 capsule        Take 30-60 minutes before a meal.  Take twice daily for 4 weeks and then once daily   Refills:  3        solifenacin 10 MG tablet   Commonly known as:  VESICARE   Dose:  10 mg   Quantity:  90 tablet        Take 1 tablet (10 mg) by mouth daily   Refills:  3        * Notice:  This list has 2 medication(s) that are the same as other medications prescribed for you. Read the directions carefully, and ask your doctor or other care provider to review them with you.            Prescriptions were sent or printed at these locations (1 Prescription)                   Other Prescriptions                Printed at Department/Unit printer (1 of 1)         oxyCODONE (ROXICODONE) 5 MG IR tablet                Procedures and tests performed during your visit     Head CT w/o contrast    Ribs XR, unilat 3 views + PA chest, right      Orders Needing Specimen Collection     None      Pending Results     No orders found from 3/12/2017 to 3/15/2017.            Pending Culture Results     No orders found from 3/12/2017 to 3/15/2017.             Test Results from your hospital stay     3/14/2017  1:08 PM - Interface, Radiant Ib      Narrative     CT OF THE HEAD WITHOUT CONTRAST March 14, 2017 12:27 PM     HISTORY: Fall from standing, right frontal head injury.    TECHNIQUE: 5 mm thick axial CT images of the head were acquired  without IV contrast material. Radiation dose for this scan was reduced  using  automated exposure control, adjustment of the mA and/or kV  according to patient size, or iterative reconstruction technique.    COMPARISON: None.    FINDINGS:  There is mild diffuse cerebral volume loss. There are  subtle patchy areas of decreased density in the cerebral white matter  bilaterally that are consistent with sequela of chronic small vessel  ischemic disease.     The ventricles and basal cisterns are within normal limits in  configuration given the degree of cerebral volume loss. There is no  midline shift. There are no extra-axial fluid collections.     No intracranial hemorrhage, mass or recent infarct.    The visualized paranasal sinuses are well-aerated. There is no  mastoiditis. There are no fractures of the visualized bones.         Impression     IMPRESSION: Diffuse cerebral volume loss and cerebral white matter  changes consistent with chronic small vessel ischemic disease. No  evidence for acute intracranial pathology.           RANDY ADAMS MD         3/14/2017 12:43 PM - Interface, Radiant Ib      Narrative     XR RIBS & CHEST RT 3VW 3/14/2017 12:42 PM    HISTORY: pain after fall        Impression     IMPRESSION: Negative.    CANDIS HALL MD                Clinical Quality Measure: Blood Pressure Screening     Your blood pressure was checked while you were in the emergency department today. The last reading we obtained was  BP: 169/85 . Please read the guidelines below about what these numbers mean and what you should do about them.  If your systolic blood pressure (the top number) is less than 120 and your diastolic blood pressure (the bottom number) is less than 80, then your blood pressure is normal. There is nothing more that you need to do about it.  If your systolic blood pressure (the top number) is 120-139 or your diastolic blood pressure (the bottom number) is 80-89, your blood pressure may be higher than it should be. You should have your blood pressure rechecked within a year  "by a primary care provider.  If your systolic blood pressure (the top number) is 140 or greater or your diastolic blood pressure (the bottom number) is 90 or greater, you may have high blood pressure. High blood pressure is treatable, but if left untreated over time it can put you at risk for heart attack, stroke, or kidney failure. You should have your blood pressure rechecked by a primary care provider within the next 4 weeks.  If your provider in the emergency department today gave you specific instructions to follow-up with your doctor or provider even sooner than that, you should follow that instruction and not wait for up to 4 weeks for your follow-up visit.        Thank you for choosing [facility]       Thank you for choosing [facility] for your care. Our goal is always to provide you with excellent care. Hearing back from our patients is one way we can continue to improve our services. Please take a few minutes to complete the written survey that you may receive in the mail after you visit with us. Thank you!        [employer] Information     [employer] lets you send messages to your doctor, view your test results, renew your prescriptions, schedule appointments and more. To sign up, go to [email or web address] . Click on \"Log in\" on the left side of the screen, which will take you to the Welcome page. Then click on \"Sign up Now\" on the right side of the page.     You will be asked to enter the access code listed below, as well as some personal information. Please follow the directions to create your username and password.     Your access code is: [email or web address]  Expires: 2017 10:09 AM     Your access code will  in 90 days. If you need help or a new code, please call your [facility] clinic or [phone].        Care EveryWhere ID     This is your Care EveryWhere ID. This could be used by other organizations to access your [facility] medical records  [id number]        After Visit Summary       This is your " record. Keep this with you and show to your community pharmacist(s) and doctor(s) at your next visit.

## 2017-03-14 NOTE — ED AVS SNAPSHOT
[facility] Emergency Department    [address]    [facility] [address]    Phone:  [phone]    Fax:  [phone]                                       Gifty Villa   MRN: [id number]    Department:  [facility] Emergency Department   Date of Visit:  3/14/2017           After Visit Summary Signature Page     I have received my discharge instructions, and my questions have been answered. I have discussed any challenges I see with this plan with the nurse or doctor.    ..........................................................................................................................................  Patient/Patient Representative Signature      ..........................................................................................................................................  Patient Representative Print Name and Relationship to Patient    ..................................................               ................................................  Date                                            Time    ..........................................................................................................................................  Reviewed by Signature/Title    ...................................................              ..............................................  Date                                                            Time

## 2017-03-14 NOTE — ED NOTES
Patient arrives after a fall about 1100. Patient reports she mis stepped and fell in her garage striking the right side of her face and chest on the cement. No loss of consciousness. Rates pain at 8 in right breast. Hurts with deep breath.

## 2017-04-03 DIAGNOSIS — I10 ESSENTIAL HYPERTENSION: ICD-10-CM

## 2017-04-03 RX ORDER — AMLODIPINE BESYLATE 10 MG/1
10 TABLET ORAL DAILY
Qty: 90 TABLET | Refills: 0 | Status: SHIPPED | OUTPATIENT
Start: 2017-04-03 | End: 2017-12-19

## 2017-04-08 ENCOUNTER — APPOINTMENT (OUTPATIENT)
Dept: GENERAL RADIOLOGY | Facility: CLINIC | Age: 82
End: 2017-04-08
Attending: EMERGENCY MEDICINE
Payer: MEDICARE

## 2017-04-08 ENCOUNTER — HOSPITAL ENCOUNTER (EMERGENCY)
Facility: CLINIC | Age: 82
Discharge: HOME | End: 2017-04-08
Attending: EMERGENCY MEDICINE | Admitting: EMERGENCY MEDICINE
Payer: MEDICARE

## 2017-04-08 VITALS
TEMPERATURE: 97.7 F | HEART RATE: 86 BPM | HEIGHT: 62 IN | DIASTOLIC BLOOD PRESSURE: 96 MMHG | BODY MASS INDEX: 24.84 KG/M2 | RESPIRATION RATE: 18 BRPM | WEIGHT: 135 LBS | SYSTOLIC BLOOD PRESSURE: 172 MMHG | OXYGEN SATURATION: 95 %

## 2017-04-08 DIAGNOSIS — I10 ESSENTIAL HYPERTENSION: ICD-10-CM

## 2017-04-08 DIAGNOSIS — J44.1 COPD EXACERBATION (H): ICD-10-CM

## 2017-04-08 LAB
ANION GAP SERPL CALCULATED.3IONS-SCNC: 9 MMOL/L (ref 3–14)
BUN SERPL-MCNC: 9 MG/DL (ref 7–30)
CALCIUM SERPL-MCNC: 8.6 MG/DL (ref 8.5–10.1)
CHLORIDE SERPL-SCNC: 102 MMOL/L (ref 94–109)
CO2 SERPL-SCNC: 26 MMOL/L (ref 20–32)
CREAT SERPL-MCNC: 0.65 MG/DL (ref 0.52–1.04)
ERYTHROCYTE [DISTWIDTH] IN BLOOD BY AUTOMATED COUNT: 14 % (ref 10–15)
GFR SERPL CREATININE-BSD FRML MDRD: 88 ML/MIN/1.7M2
GLUCOSE SERPL-MCNC: 100 MG/DL (ref 70–99)
HCT VFR BLD AUTO: 44.1 % (ref 35–47)
HGB BLD-MCNC: 14.7 G/DL (ref 11.7–15.7)
MCH RBC QN AUTO: 28.3 PG (ref 26.5–33)
MCHC RBC AUTO-ENTMCNC: 33.3 G/DL (ref 31.5–36.5)
MCV RBC AUTO: 85 FL (ref 78–100)
PLATELET # BLD AUTO: 242 10E9/L (ref 150–450)
POTASSIUM SERPL-SCNC: 3.7 MMOL/L (ref 3.4–5.3)
RBC # BLD AUTO: 5.2 10E12/L (ref 3.8–5.2)
SODIUM SERPL-SCNC: 137 MMOL/L (ref 133–144)
TROPONIN I SERPL-MCNC: NORMAL UG/L (ref 0–0.04)
WBC # BLD AUTO: 5.6 10E9/L (ref 4–11)

## 2017-04-08 PROCEDURE — 99285 EMERGENCY DEPT VISIT HI MDM: CPT | Mod: 25

## 2017-04-08 PROCEDURE — 93005 ELECTROCARDIOGRAM TRACING: CPT

## 2017-04-08 PROCEDURE — 40000275 ZZH STATISTIC RCP TIME EA 10 MIN

## 2017-04-08 PROCEDURE — 94640 AIRWAY INHALATION TREATMENT: CPT

## 2017-04-08 PROCEDURE — 84484 ASSAY OF TROPONIN QUANT: CPT | Performed by: EMERGENCY MEDICINE

## 2017-04-08 PROCEDURE — 85027 COMPLETE CBC AUTOMATED: CPT | Performed by: EMERGENCY MEDICINE

## 2017-04-08 PROCEDURE — 25000125 ZZHC RX 250: Performed by: EMERGENCY MEDICINE

## 2017-04-08 PROCEDURE — 80048 BASIC METABOLIC PNL TOTAL CA: CPT | Performed by: EMERGENCY MEDICINE

## 2017-04-08 PROCEDURE — 71020 XR CHEST 2 VW: CPT

## 2017-04-08 RX ORDER — IPRATROPIUM BROMIDE AND ALBUTEROL SULFATE 2.5; .5 MG/3ML; MG/3ML
3 SOLUTION RESPIRATORY (INHALATION) ONCE
Status: COMPLETED | OUTPATIENT
Start: 2017-04-08 | End: 2017-04-08

## 2017-04-08 RX ORDER — DOXYCYCLINE 100 MG/1
100 CAPSULE ORAL 2 TIMES DAILY
Qty: 14 CAPSULE | Refills: 0 | Status: SHIPPED | OUTPATIENT
Start: 2017-04-08 | End: 2017-04-15

## 2017-04-08 RX ORDER — PREDNISONE 20 MG/1
TABLET ORAL
Qty: 10 TABLET | Refills: 0 | Status: SHIPPED | OUTPATIENT
Start: 2017-04-09 | End: 2017-09-02

## 2017-04-08 RX ORDER — PREDNISONE 20 MG/1
40 TABLET ORAL ONCE
Status: COMPLETED | OUTPATIENT
Start: 2017-04-08 | End: 2017-04-08

## 2017-04-08 RX ADMIN — PREDNISONE 40 MG: 20 TABLET ORAL at 12:24

## 2017-04-08 RX ADMIN — IPRATROPIUM BROMIDE AND ALBUTEROL SULFATE 3 ML: .5; 3 SOLUTION RESPIRATORY (INHALATION) at 11:13

## 2017-04-08 ASSESSMENT — ENCOUNTER SYMPTOMS
SHORTNESS OF BREATH: 1
RHINORRHEA: 0
COUGH: 1
DIARRHEA: 0
FEVER: 0
VOMITING: 0

## 2017-04-08 NOTE — ED PROVIDER NOTES
History     Chief Complaint:  Shortness of Breath    HPI   Gifty Villa is a 82 year old female with a history of COPD and asthma who presents to the emergency department today for evaluation of shortness of breath. The patient is having trouble breathing lately with a cough for the last few weeks. The cough is more dry. Her symptoms are worse at night. She notes stairs and other activities make the breathing worse. The patient is not on oxygen at home but has been using her inhaler. She has never been hospitalized for her COPD. She denies any chest pain or palpitations. The patient denies any leg swelling, vomiting, diarrhea, fever, rhinorrhea, or congestion. There is no history of heart disease.     Allergies:  No Known Drug Allergies      Medications:    Amlodipine  Omeprazole  Vesicare  Aspirin   Atarax  Albuterol   Multivitamin      Past Medical History:    Arthritis   Anxiety   Backache  COPD  Depression   Essential hypertension, benign   Frequency urgency syndrome  Hyperlipidemia  RLQ abdominal mass  Uncomplicated asthma     Past Surgical History:   C total hip arthroplasty, hip replacement, total, right (1995)   C spine fusion, anterior, 3 segments (1992)   C vaginal hysterectomy (1978)   Arthroplasty patello-femoral (knee) (2010)   Hysterectomy  [place] teeth   Excise ganglion wrist, left (2015)   Egd (2016 x2)   Colonoscopy (2003, 2014)     Family History:   Mother - Alzheimer Disease  Father - Alzheimer Disease, CAD  Brother - Alzheimer Disease   Maternal Aunt - Cancer     Social History:  The patient was accompanied to the ED by her daughter.  Smoking Status: Former Smoker, Quit in 1988  Smokeless Tobacco: Negative  Alcohol Use: Negative  Marital Status:  [5]     Review of Systems   Constitutional: Negative for fever.   HENT: Negative for congestion and rhinorrhea.    Respiratory: Positive for cough and shortness of breath.    Cardiovascular: Negative for leg swelling.   Gastrointestinal:  "Negative for diarrhea and vomiting.   All other systems reviewed and are negative.    Physical Exam   Vitals:  Patient Vitals for the past 24 hrs:   BP Temp Temp src Pulse Resp SpO2 Height Weight   04/08/17 1113 - - - - - 99 % - -   04/08/17 1050 (!) 207/100 97.7  F (36.5  C) Oral 86 18 98 % 1.575 m (5' 2\") 61.2 kg (135 lb)      Physical Exam    General:   Pleasant, age appropriate.  HEENT:    Oropharynx is moist, without lesions or trismus.  Eyes:    Conjunctiva normal  Neck:    Supple, no meningismus.     CV:     Regular rate and rhythm.      No murmurs, rubs or gallops.       No JVD or unilateral leg swelling.       2+ radial pulses bilateral.       No lower extremity edema.  PULM:    Mild expiratory wheezing at right > left base      No respiratory distress.      Good air exchange.     No rales     No stridor.  ABD:    Soft, non-tender, non-distended.       No pulsatile masses.       No rebound, guarding or rigidity.  MSK:     No gross deformity to all four extremities.   LYMPH:   No cervical lymphadenopathy.  NEURO:   Alert and oriented x 3.      Good muscle tone, no atrophy  Skin:    Warm, dry and intact.    Psych:    Mood is good and affect is appropriate.        Emergency Department Course     ECG:  ECG taken at 1144, ECG read at 1148  Normal sinus rhythm  Septal infarct, age undetermined  Abnormal ECG  Rate 76 bpm. LA interval 186. QRS duration 84. QT/QTc 416/468. P-R-T axes 63 72 70.     Imaging:  Radiology findings were communicated with the patient who voiced understanding of the findings.    Chest XR,  PA & LAT   IMPRESSION:  Unremarkable chest. I see no definite change since the   previous examination.       TAHIR DUGGAN MD     Laboratory:  Laboratory findings were communicated with the patient who voiced understanding of the findings.    CBC: WBC 5.6, HGB 14.7,   BMP: Glucose: 100 (H) o/w WNL (Creatinine 0.65)  Troponin (Collected 1114): <0.015     Interventions:  1113 Duoneb 3 ml " nebulization      Emergency Department Course:  Nursing notes and vitals reviewed.  I performed an exam of the patient as documented above.   IV was inserted and blood was drawn for laboratory testing, results above.  The patient was sent for imaging per above while in the emergency department, results above.    I discussed the treatment plan with the patient. They expressed understanding of this plan and consented to discharge. They will be discharged home with instructions for care and follow up. In addition, the patient will return to the emergency department if their symptoms persist, worsen, if new symptoms arise or if there is any concern.  All questions were answered.   I personally reviewed the laboratory and imaging results with the patient and answered all related questions prior to discharge.    Impression & Plan      Medical Decision Making:  Gifty Villa is a 82 year old female with a history of COPD who presents to the emergency department with a two week history of difficulty breathing. On examination, she appears well with no respiratory distress. She has mild bronchospasm which was resolved with bronchodilators and steroids in the ED. Clinical history and examination is consistent with COPD exacerbation. She has no evidence of ACS, myocardial infarction, heart failure, or clinical concerns of PE to count for her symptoms. Patient is safe for discharge to home. Short course of steroids and Doxycycline, continued use of bronchodilators. Follow up with PCP in 2-3 days to ensure improvement.      Diagnosis:    ICD-10-CM    1. COPD exacerbation (H) J44.1    2. Essential hypertension I10      Disposition:   Discharge    Discharge Medications:  New Prescriptions    DOXYCYCLINE (VIBRAMYCIN) 100 MG CAPSULE    Take 1 capsule (100 mg) by mouth 2 times daily for 7 days    PREDNISONE (DELTASONE) 20 MG TABLET    Take two tablets (= 40mg) each day for 5 (five) days     Scribe Disclosure:  Jono DAVIS, am  serving as a scribe at 11:02 AM on 4/8/2017 to document services personally performed by Garry Danielson MD, based on my observations and the provider's statements to me.   4/8/2017   [facility] EMERGENCY DEPARTMENT       Garry Danielson MD  04/08/17 1236

## 2017-04-08 NOTE — ED AVS SNAPSHOT
[facility] Emergency Department    [address]    [facility] [address]    Phone:  [phone]    Fax:  [phone]                                       Gifty Villa   MRN: [id number]    Department:  [facility] Emergency Department   Date of Visit:  4/8/2017           Patient Information     Date Of Birth          1935        Your diagnoses for this visit were:     COPD exacerbation (H)     Essential hypertension        You were seen by Garry Danielson MD.      Follow-up Information     Follow up with Jun Tellez MD. Schedule an appointment as soon as possible for a visit in 3 days.    Specialty:  Internal Medicine    Contact information:    [facility]  [address]  [facility] [address]  [phone]          Discharge Instructions       Discharge Instructions  Hypertension - High Blood Pressure    During you visit to the Emergency Department, your blood pressure was higher than the recommended blood pressure.  This may be related to stress, pain, medication or other temporary conditions. In these cases, your blood pressure may return to normal on its own. If you have a history of high blood pressure, you may need to have your doctor adjust your medications. Sometimes, your high measurement here may indicate that you have developed high blood pressure that will stay high unless it is treated. Sudden very high blood pressure can cause problems, but usually high blood pressure causes problems over months to years.      Blood pressure is almost never lowered in the Emergency Department, because studies have shown that lowering blood pressure too quickly is much more dangerous than leaving it alone.    You need to follow up with your doctor in 1-3 days to get your blood pressure rechecked.     Return to the Emergency Department if you start to have:    A severe headache.    Chest pain.    Shortness of breath.    Weakness or numbness  that affects one part of the body.    Confusion.    Vision changes.    Significant swelling of legs and/or eyes.    A reaction to any medication started in the Emergency Department.    What can I do to help myself?    Avoid alcohol.    Take any blood pressure medicine that you are prescribed.    Get a good night s sleep.    Lower your salt intake.    Exercise.    Lose weight.    Manage stress.    If blood pressure medication was started in the Emergency Department:    The medicine may not have an immediate effect. The body and brain determine what blood pressure you have. The medicine s job is to retrain the body s  thermostat  to a lower blood pressure.    You will need to follow up with your doctor to see how this medicine is working for you.  If you were given a prescription for medicine here today, be sure to read all of the information (including the package insert) that comes with your prescription.  This will include important information about the medicine, its side effects, and any warnings that you need to know about.  The pharmacist who fills the prescription can provide more information and answer questions you may have about the medicine.  If you have questions or concerns that the pharmacist cannot address, please call or return to the Emergency Department.       Discharge References/Attachments     CHRONIC OBSTRUCTIVE PULMONARY DISEASE (COPD), DISCHARGE INSTRUCTIONS (ENGLISH)      24 Hour Appointment Hotline       To make an appointment at any [facility] clinic, call [phone] ([phone]). If you don't have a family doctor or clinic, we will help you find one. [facility] clinics are conveniently located to serve the needs of you and your family.             Review of your medicines      START taking        Dose / Directions Last dose taken    doxycycline 100 MG capsule   Commonly known as:  VIBRAMYCIN   Dose:  100 mg   Quantity:  14 capsule        Take 1 capsule (100 mg) by mouth 2 times daily  for 7 days   Refills:  0        predniSONE 20 MG tablet   Commonly known as:  DELTASONE   Quantity:  10 tablet   Start taking on:  4/9/2017        Take two tablets (= 40mg) each day for 5 (five) days   Refills:  0          Our records show that you are taking the medicines listed below. If these are incorrect, please call your family doctor or clinic.        Dose / Directions Last dose taken    albuterol 108 (90 BASE) MCG/ACT Inhaler   Commonly known as:  albuterol   Dose:  2 puff   Quantity:  1 Inhaler        Inhale 2 puffs into the lungs every 4 hours as needed for shortness of breath / dyspnea or wheezing   Refills:  5        amLODIPine 10 MG tablet   Commonly known as:  NORVASC   Dose:  10 mg   Quantity:  90 tablet        Take 1 tablet (10 mg) by mouth daily   Refills:  0        * aspirin 81 MG tablet   Quantity:  0        ONE DAILY   Refills:  0        * aspirin 81 MG tablet   Dose:  81 mg   Quantity:  30 tablet        Take 1 tablet (81 mg) by mouth daily   Refills:  0        DAILY MULTIVITAMIN PO        Reported on 3/10/2017   Refills:  0        hydrOXYzine 25 MG tablet   Commonly known as:  ATARAX   Dose:  25-50 mg   Quantity:  60 tablet        Take 1-2 tablets (25-50 mg) by mouth every 6 hours as needed for itching   Refills:  1        omeprazole 40 MG capsule   Commonly known as:  priLOSEC   Quantity:  90 capsule        Take 30-60 minutes before a meal.  Take twice daily for 4 weeks and then once daily   Refills:  3        oxyCODONE 5 MG IR tablet   Commonly known as:  ROXICODONE   Dose:  2.5-5 mg   Quantity:  20 tablet        Take 0.5-1 tablets (2.5-5 mg) by mouth every 6 hours as needed for pain   Refills:  0        solifenacin 10 MG tablet   Commonly known as:  VESICARE   Dose:  10 mg   Quantity:  90 tablet        Take 1 tablet (10 mg) by mouth daily   Refills:  3        * Notice:  This list has 2 medication(s) that are the same as other medications prescribed for you. Read the directions carefully, and  ask your doctor or other care provider to review them with you.            Prescriptions were sent or printed at these locations (2 Prescriptions)                   Other Prescriptions                Printed at Department/Unit printer (2 of 2)         predniSONE (DELTASONE) 20 MG tablet               doxycycline (VIBRAMYCIN) 100 MG capsule                Procedures and tests performed during your visit     Basic metabolic panel (BMP)    CBC (platelets, no diff)    Chest XR,  PA & LAT    EKG 12 lead    Peripheral IV catheter    Troponin I (now)      Orders Needing Specimen Collection     None      Pending Results     No orders found from 4/6/2017 to 4/9/2017.            Pending Culture Results     No orders found from 4/6/2017 to 4/9/2017.            Test Results From Your Hospital Stay        4/8/2017 11:24 AM      Component Results     Component Value Ref Range & Units Status    WBC 5.6 4.0 - 11.0 10e9/L Final    RBC Count 5.20 3.8 - 5.2 10e12/L Final    Hemoglobin 14.7 11.7 - 15.7 g/dL Final    Hematocrit 44.1 35.0 - 47.0 % Final    MCV 85 78 - 100 fl Final    MCH 28.3 26.5 - 33.0 pg Final    MCHC 33.3 31.5 - 36.5 g/dL Final    RDW 14.0 10.0 - 15.0 % Final    Platelet Count 242 150 - 450 10e9/L Final         4/8/2017 11:43 AM      Component Results     Component Value Ref Range & Units Status    Sodium 137 133 - 144 mmol/L Final    Potassium 3.7 3.4 - 5.3 mmol/L Final    Chloride 102 94 - 109 mmol/L Final    Carbon Dioxide 26 20 - 32 mmol/L Final    Anion Gap 9 3 - 14 mmol/L Final    Glucose 100 (H) 70 - 99 mg/dL Final    Urea Nitrogen 9 7 - 30 mg/dL Final    Creatinine 0.65 0.52 - 1.04 mg/dL Final    GFR Estimate 88 >60 mL/min/1.7m2 Final    Non  GFR Calc    GFR Estimate If Black >90   GFR Calc   >60 mL/min/1.7m2 Final    Calcium 8.6 8.5 - 10.1 mg/dL Final         4/8/2017 11:43 AM      Component Results     Component Value Ref Range & Units Status    Troponin I ES  0.000 - 0.045  ug/L Final    <0.015  The 99th percentile for upper reference range is 0.045 ug/L.  Troponin values in   the range of 0.045 - 0.120 ug/L may be associated with risks of adverse   clinical events.           4/8/2017 11:53 AM      Narrative     CHEST TWO VIEWS   4/8/2017 11:39 AM    HISTORY:  Dyspnea.    COMPARISON:  3/14/2017    FINDINGS:  The heart size is normal. No mediastinal pathology is seen.  The lungs are clear. The pulmonary vasculature is normal. No  pneumothorax or pleural effusion is seen.         Impression     IMPRESSION:  Unremarkable chest. I see no definite change since the  previous examination.     TAHIR DUGGAN MD                Clinical Quality Measure: Blood Pressure Screening     Your blood pressure was checked while you were in the emergency department today. The last reading we obtained was  BP: (!) 207/100 . Please read the guidelines below about what these numbers mean and what you should do about them.  If your systolic blood pressure (the top number) is less than 120 and your diastolic blood pressure (the bottom number) is less than 80, then your blood pressure is normal. There is nothing more that you need to do about it.  If your systolic blood pressure (the top number) is 120-139 or your diastolic blood pressure (the bottom number) is 80-89, your blood pressure may be higher than it should be. You should have your blood pressure rechecked within a year by a primary care provider.  If your systolic blood pressure (the top number) is 140 or greater or your diastolic blood pressure (the bottom number) is 90 or greater, you may have high blood pressure. High blood pressure is treatable, but if left untreated over time it can put you at risk for heart attack, stroke, or kidney failure. You should have your blood pressure rechecked by a primary care provider within the next 4 weeks.  If your provider in the emergency department today gave you specific instructions to follow-up with  "your doctor or provider even sooner than that, you should follow that instruction and not wait for up to 4 weeks for your follow-up visit.        Thank you for choosing [facility]       Thank you for choosing [facility] for your care. Our goal is always to provide you with excellent care. Hearing back from our patients is one way we can continue to improve our services. Please take a few minutes to complete the written survey that you may receive in the mail after you visit with us. Thank you!        [facility] [employer] Information     [employer] lets you send messages to your doctor, view your test results, renew your prescriptions, schedule appointments and more. To sign up, go to [email or web address] . Click on \"Log in\" on the left side of the screen, which will take you to the Welcome page. Then click on \"Sign up Now\" on the right side of the page.     You will be asked to enter the access code listed below, as well as some personal information. Please follow the directions to create your username and password.     Your access code is: [email or web address]  Expires: 2017 10:09 AM     Your access code will  in 90 days. If you need help or a new code, please call your [facility] clinic or [phone].        Care EveryWhere ID     This is your Care EveryWhere ID. This could be used by other organizations to access your [facility] medical records  [id number]        After Visit Summary       This is your record. Keep this with you and show to your community pharmacist(s) and doctor(s) at your next visit.                  "

## 2017-04-08 NOTE — ED AVS SNAPSHOT
[facility] Emergency Department    [address]    [facility] [address]    Phone:  [phone]    Fax:  [phone]                                       Gifty Villa   MRN: [id number]    Department:  [facility] Emergency Department   Date of Visit:  4/8/2017           After Visit Summary Signature Page     I have received my discharge instructions, and my questions have been answered. I have discussed any challenges I see with this plan with the nurse or doctor.    ..........................................................................................................................................  Patient/Patient Representative Signature      ..........................................................................................................................................  Patient Representative Print Name and Relationship to Patient    ..................................................               ................................................  Date                                            Time    ..........................................................................................................................................  Reviewed by Signature/Title    ...................................................              ..............................................  Date                                                            Time

## 2017-04-09 LAB — INTERPRETATION ECG - MUSE: NORMAL

## 2017-04-11 ENCOUNTER — TELEPHONE (OUTPATIENT)
Dept: INTERNAL MEDICINE | Facility: CLINIC | Age: 82
End: 2017-04-11

## 2017-04-11 ENCOUNTER — OFFICE VISIT (OUTPATIENT)
Dept: INTERNAL MEDICINE | Facility: CLINIC | Age: 82
End: 2017-04-11
Payer: COMMERCIAL

## 2017-04-11 VITALS
BODY MASS INDEX: 26.05 KG/M2 | SYSTOLIC BLOOD PRESSURE: 144 MMHG | TEMPERATURE: 98.5 F | DIASTOLIC BLOOD PRESSURE: 74 MMHG | HEART RATE: 85 BPM | WEIGHT: 147 LBS | HEIGHT: 63 IN | RESPIRATION RATE: 14 BRPM | OXYGEN SATURATION: 95 %

## 2017-04-11 DIAGNOSIS — N39.41 URGE INCONTINENCE OF URINE: ICD-10-CM

## 2017-04-11 DIAGNOSIS — J44.9 CHRONIC OBSTRUCTIVE PULMONARY DISEASE, UNSPECIFIED COPD TYPE (H): ICD-10-CM

## 2017-04-11 DIAGNOSIS — I10 ESSENTIAL HYPERTENSION: ICD-10-CM

## 2017-04-11 DIAGNOSIS — R06.00 DYSPNEA, UNSPECIFIED TYPE: Primary | ICD-10-CM

## 2017-04-11 PROCEDURE — 99214 OFFICE O/P EST MOD 30 MIN: CPT | Performed by: INTERNAL MEDICINE

## 2017-04-11 RX ORDER — ALBUTEROL SULFATE 90 UG/1
2 AEROSOL, METERED RESPIRATORY (INHALATION) PRN
COMMUNITY
End: 2019-05-29

## 2017-04-11 RX ORDER — PREDNISONE 10 MG/1
TABLET ORAL
Qty: 30 TABLET | Refills: 1 | Status: SHIPPED | OUTPATIENT
Start: 2017-04-11 | End: 2017-09-02

## 2017-04-11 NOTE — TELEPHONE ENCOUNTER
Cindi with [facility] left voice message requesting clarification on prescription.     Contacted [facility]. They need more specific directions for Prednisone, current order states Take as directed. Reduce dose by 10 mg every 2-3 days until discontinued.     Visit note is not specific regarding dosing, but does appear pt was started on 40mg daily in ER. Sent to provider to review - please sent updated order to pharmacy.

## 2017-04-11 NOTE — PATIENT INSTRUCTIONS
I suspect that this was a flare of COPD, perhaps due to infection or allergies.   Okay to stop the prednisone after five days.   If breathing/coughing abruptly worsens again, may taper down on prednisone over several days (eg, 30 mg/day for 2 days, 20 mg/day for 2 days, 10 mg/day for 2 days, then stop).     Okay to take over the counter Claritin, Zyrtec or Allegra. (generic equivalents of any of these are less expensive).     Some options are provided for alternative pulmonary specialists/locations.     See me back in 2-3 months.

## 2017-04-11 NOTE — NURSING NOTE
"Chief Complaint   Patient presents with     ER F/U       Initial /74 (BP Location: Left arm, Patient Position: Chair, Cuff Size: Adult Regular)  Pulse 85  Temp 98.5  F (36.9  C) (Oral)  Resp 14  Ht 5' 3\" (1.6 m)  Wt 147 lb (66.7 kg)  SpO2 95%  Breastfeeding? No  BMI 26.04 kg/m2 Estimated body mass index is 26.04 kg/(m^2) as calculated from the following:    Height as of this encounter: 5' 3\" (1.6 m).    Weight as of this encounter: 147 lb (66.7 kg).  Medication Reconciliation: complete   Filiberto NOVAK      "

## 2017-04-11 NOTE — PROGRESS NOTES
SUBJECTIVE:                                                    Gifty Villa is a 82 year old female who presents to clinic today with her daughter in-law for the following health issues:  Dyspnea, cough, urinary urgency.     ED/UC Followup:    Facility:  [facility] ED  Date of visit: 04/08/2017  Reason for visit: COPD  Current Status: Improved     ER follow up:  Patient notes she has allergies to hayfever and other seasonal allergies.   She does not have a nebulizer at home     She notes improvement in symptoms since starting the prednisone.   She denies ever having symptoms of fever, rhinitis or sinus congestion. Patient adds her symptoms are exacerbated when lying down. A week ago she was having trouble sleeping at night but notes this has improved since.   Patient plans to start taking daily antihistamine.     Medications: Patient notes Vesicare is not covered with her insurance and costs about $375/month. She has tried other supplemental medications and experienced side effects or no effect in these. Vesicare works best for her and she experiences little to no side effects on this medication.   She has documented lack of efficacy and intolerance with past use of Detrol LA and with oxybutynin patch.     Problem list and histories reviewed & adjusted, as indicated.  Additional history: as documented    Reviewed and updated as needed this visit by clinical staff  Tobacco  Allergies  Meds  Med Hx  Surg Hx  Fam Hx  Soc Hx      Reviewed and updated as needed this visit by Provider    ROS:  C: NEGATIVE for fever   E/M: NEGATIVE for ear, mouth and throat problems  R: POSITIVE for significant cough and SOB (improving)   CV: NEGATIVE for chest pain, palpitations or peripheral edema  : POSITIVE for leakage NEGATIVE for frequency, dysuria, or hematuria  N: NEGATIVE for weakness, dizziness or paresthesias (no)    OBJECTIVE:                                                    /74 (BP Location: Left  "arm, Patient Position: Chair, Cuff Size: Adult Regular)  Pulse 85  Temp 98.5  F (36.9  C) (Oral)  Resp 14  Ht 1.6 m (5' 3\")  Wt 66.7 kg (147 lb)  SpO2 95%  Breastfeeding? No  BMI 26.04 kg/m2  Body mass index is 26.04 kg/(m^2).  GENERAL: healthy, alert and no distress  EYES: Eyes grossly normal to inspection, PERRL and conjunctivae and sclerae normal  RESP: faint expiratory wheezes, no crackles  CV: regular rate and rhythm, normal S1 S2, no S3 or S4, no murmur, click or rub, no peripheral edema and peripheral pulses strong  NEURO: Normal strength and tone, mentation intact and speech normal  PSYCH: mentation appears normal, affect normal/bright         ASSESSMENT/PLAN:                                                      (R06.00) Dyspnea, unspecified type  (primary encounter diagnosis)  Comment: Stable. Improved with recent antibiotics/prednisone. F/u with pulmonary as advised. Discussed extension of use of prednisone if breathing acutely worsens with abrupt discontinuation.   Plan: PULMONARY MEDICINE REFERRAL           (J44.9) Chronic obstructive pulmonary disease, unspecified COPD type (H)  Comment: Stable. F/u with pulmonary as advised. Take medication as directed  Plan: PULMONARY MEDICINE REFERRAL, predniSONE         (DELTASONE) 10 MG tablet            (N39.41) Urge incontinence of urine  Comment: solifenacin (VESICARE) 10 MG tablet  Plan: Will pursue PA for Vesicare.       (I10) Essential hypertension  BP somewhat elevated today. No medication changes. F/u in 2-3 months to recheck.     Patient Instructions   I suspect that this was a flare of COPD, perhaps due to infection or allergies.   Okay to stop the prednisone after five days.   If breathing/coughing abruptly worsens again, may taper down on prednisone over several days (eg, 30 mg/day for 2 days, 20 mg/day for 2 days, 10 mg/day for 2 days, then stop).     Okay to take over the counter Claritin, Zyrtec or Allegra. (generic equivalents of any of " these are less expensive).     Some options are provided for alternative pulmonary specialists/locations.     See me back in 2-3 months.     Jun Tellez MD  [facility]    This document serves as a record of the services and decisions personally performed and made by Jun Tellez MD. It was created on their behalf by Karin Johnston, a trained medical scribe. The creation of this document is based the provider's statements to the medical scribe.  Karin Johnston April 11, 2017 11:19 AM

## 2017-04-11 NOTE — MR AVS SNAPSHOT
After Visit Summary   4/11/2017    Gifty Villa    MRN: [id number]           Patient Information     Date Of Birth          1935        Visit Information        Provider Department      4/11/2017 11:00 AM Jun Tellez MD [facility]        Today's Diagnoses     Dyspnea, unspecified type    -  1    Chronic obstructive pulmonary disease, unspecified COPD type (H)        Urge incontinence of urine          Care Instructions    I suspect that this was a flare of COPD, perhaps due to infection or allergies.   Okay to stop the prednisone after five days.   If breathing/coughing abruptly worsens again, may taper down on prednisone over several days (eg, 30 mg/day for 2 days, 20 mg/day for 2 days, 10 mg/day for 2 days, then stop).     Okay to take over the counter Claritin, Zyrtec or Allegra. (generic equivalents of any of these are less expensive).     Some options are provided for alternative pulmonary specialists/locations.     See me back in 2-3 months.               Follow-ups after your visit        Additional Services     PULMONARY MEDICINE REFERRAL       Your provider has referred you to: [facility] [phone]   [email or web address]  [facility] [phone]   [email or web address]  [facility]: [facility] [phone]   [email or web address]  [facility] [phone]   [email or web address]    Please be aware that coverage of these services is subject to the terms and limitations of your health insurance plan.  Call member services at your health plan with any benefit or coverage questions.      Please bring the following with you to your appointment:    (1) Any X-Rays, CTs or MRIs which have been performed.  Contact the facility where they were done to arrange for   "prior to your scheduled appointment.    (2) List of current medications   (3) This referral request   (4) Any documents/labs given to you for this referral                  Who to contact     If you have questions or need follow up information about today's clinic visit or your schedule please contact [facility] directly at [phone].  Normal or non-critical lab and imaging results will be communicated to you by MyChart, letter or phone within 4 business days after the clinic has received the results. If you do not hear from us within 7 days, please contact the clinic through [employer] or phone. If you have a critical or abnormal lab result, we will notify you by phone as soon as possible.  Submit refill requests through [employer] or call your pharmacy and they will forward the refill request to us. Please allow 3 business days for your refill to be completed.          Additional Information About Your Visit        MyChart Information     [employer] lets you send messages to your doctor, view your test results, renew your prescriptions, schedule appointments and more. To sign up, go to [email or web address] . Click on \"Log in\" on the left side of the screen, which will take you to the Welcome page. Then click on \"Sign up Now\" on the right side of the page.     You will be asked to enter the access code listed below, as well as some personal information. Please follow the directions to create your username and password.     Your access code is: [email or web address]  Expires: 2017 10:09 AM     Your access code will  in 90 days. If you need help or a new code, please call your [facility] or [phone].        Care EveryWhere ID     This is your Care EveryWhere ID. This could be used by other organizations to access your [facility] medical records  [id number]        Your Vitals Were     Pulse Temperature Respirations Height Pulse Oximetry Breastfeeding?    85 98.5  F (36.9  C) (Oral) 14 5' 3\" " (1.6 m) 95% No    BMI (Body Mass Index)                   26.04 kg/m2            Blood Pressure from Last 3 Encounters:   04/11/17 144/74   04/08/17 (!) 172/96   03/14/17 169/85    Weight from Last 3 Encounters:   04/11/17 147 lb (66.7 kg)   04/08/17 135 lb (61.2 kg)   03/14/17 147 lb 0.8 oz (66.7 kg)              We Performed the Following     PULMONARY MEDICINE REFERRAL          Today's Medication Changes          These changes are accurate as of: 4/11/17 11:51 AM.  If you have any questions, ask your nurse or doctor.               These medicines have changed or have updated prescriptions.        Dose/Directions    * predniSONE 20 MG tablet   Commonly known as:  DELTASONE   This may have changed:  Another medication with the same name was added. Make sure you understand how and when to take each.        Take two tablets (= 40mg) each day for 5 (five) days   Quantity:  10 tablet   Refills:  0       * predniSONE 10 MG tablet   Commonly known as:  DELTASONE   This may have changed:  You were already taking a medication with the same name, and this prescription was added. Make sure you understand how and when to take each.   Used for:  Chronic obstructive pulmonary disease, unspecified COPD type (H)   Changed by:  Jun Tellez MD        Take as directed. Reduce dose by 10 mg every 2-3 days until discontinued.   Quantity:  30 tablet   Refills:  1       solifenacin 10 MG tablet   Commonly known as:  VESICARE   This may have changed:  additional instructions   Used for:  Urinary urgency        Dose:  10 mg   Take 1 tablet (10 mg) by mouth daily   Quantity:  90 tablet   Refills:  3       * Notice:  This list has 2 medication(s) that are the same as other medications prescribed for you. Read the directions carefully, and ask your doctor or other care provider to review them with you.         Where to get your medicines      These medications were sent to [facility] [address] [place] [address] [address] [facility]  [address] [facility] [address]     Phone:  [phone]     predniSONE 10 MG tablet                Primary Care Provider Office Phone # Fax #    Jun Tellez [phone]       [facility] [address]  [facility] [address]        Thank you!     Thank you for choosing [facility]  for your care. Our goal is always to provide you with excellent care. Hearing back from our patients is one way we can continue to improve our services. Please take a few minutes to complete the written survey that you may receive in the mail after your visit with us. Thank you!             Your Updated Medication List - Protect others around you: Learn how to safely use, store and throw away your medicines at www.disposemymeds.org.          This list is accurate as of: 4/11/17 11:51 AM.  Always use your most recent med list.                   Brand Name Dispense Instructions for use    albuterol 108 (90 BASE) MCG/ACT Inhaler   Generic drug:  albuterol      Inhale 2 puffs into the lungs as needed for shortness of breath / dyspnea or wheezing       amLODIPine 10 MG tablet    NORVASC    90 tablet    Take 1 tablet (10 mg) by mouth daily       aspirin 81 MG tablet     0    ONE DAILY       DAILY MULTIVITAMIN PO      Reported on 3/10/2017       doxycycline 100 MG capsule    VIBRAMYCIN    14 capsule    Take 1 capsule (100 mg) by mouth 2 times daily for 7 days       hydrOXYzine 25 MG tablet    ATARAX    60 tablet    Take 1-2 tablets (25-50 mg) by mouth every 6 hours as needed for itching       omeprazole 40 MG capsule    priLOSEC    90 capsule    Take 30-60 minutes before a meal.  Take twice daily for 4 weeks and then once daily       oxyCODONE 5 MG IR tablet    ROXICODONE    20 tablet    Take 0.5-1 tablets (2.5-5 mg) by mouth every 6 hours as needed for pain       * predniSONE 20 MG tablet    DELTASONE    10 tablet    Take two tablets (= 40mg) each day for 5 (five) days       * predniSONE  10 MG tablet    DELTASONE    30 tablet    Take as directed. Reduce dose by 10 mg every 2-3 days until discontinued.       solifenacin 10 MG tablet    VESICARE    90 tablet    Take 1 tablet (10 mg) by mouth daily       STIOLTO RESPIMAT IN      Inhale into the lungs daily       * Notice:  This list has 2 medication(s) that are the same as other medications prescribed for you. Read the directions carefully, and ask your doctor or other care provider to review them with you.

## 2017-04-12 NOTE — TELEPHONE ENCOUNTER
The prednisone 10 mg pills do not need to be taken if breathing remains fine after completion of the short course of prednisone prescribed in the ER.   This new prescription and directions were only to be used if she notes significantly worsening dyspnea with abrupt discontinuation of the recent short course of prednisone.   If she is doing well, she does not need to take this new prescription at all.     Please advise pharmacist.

## 2017-04-12 NOTE — TELEPHONE ENCOUNTER
Called [facility] and spoke with Liya. informed of message from provider below.

## 2017-04-27 ENCOUNTER — TRANSFERRED RECORDS (OUTPATIENT)
Dept: HEALTH INFORMATION MANAGEMENT | Facility: CLINIC | Age: 82
End: 2017-04-27

## 2017-05-01 ENCOUNTER — TELEPHONE (OUTPATIENT)
Dept: INTERNAL MEDICINE | Facility: CLINIC | Age: 82
End: 2017-05-01

## 2017-05-01 NOTE — TELEPHONE ENCOUNTER
Please reference Prior Auth request encounter dated 3/10/17.     I called patient and asked if she was able to check with insurance to find a formulary since we RCVD another PA request for her Vesicare 10mg tabs. Patient said her Daughter-in-Law was the one who took care of this and she will have her call us back with an update. I asked she call and ask to speak to 's nurse.    I will keep PA info at my desk until we hear back from Daughter-in-Law.

## 2017-05-02 NOTE — TELEPHONE ENCOUNTER
"Daughter in law calling back.  Did not check with insur for alternative coverage.  Asking PA be initiated.    States pt has tried an OTC patch--unsure of name (Oxytrol?) but didn't work and made pt feel \"weird\".  And has tried Tolterodine 4mg which wasn't effective; and Detrol LA (per OV dictation 6-24-10--\"She requests a refill of Detrol LA, but also wonders whether something else might work better\").    PA call [phone]  Pt id [id number]    Spoke with Crystal DE LA PAZ @ [employer], will submit PA request and should hear response within 24-48 hrs.  "

## 2017-05-03 NOTE — TELEPHONE ENCOUNTER
Approval faxed over. Effective 02/01/2017 through 05/02/2018.    Left message w/ pharmacy ([facility]) to let them know approval status and to contact patient.     Approval Sent for scanning.

## 2017-06-12 ENCOUNTER — HOSPITAL ENCOUNTER (OUTPATIENT)
Dept: CARDIAC REHAB | Facility: CLINIC | Age: 82
End: 2017-06-12
Attending: INTERNAL MEDICINE
Payer: MEDICARE

## 2017-06-12 ASSESSMENT — PULMONARY FUNCTION TESTS
FEV1 (%PREDICTED): 58
FVC: 2.28
FEV1: 1
FEV1/FVC: 74
FVC_PERCENT_PREDICTED: 99

## 2017-06-12 ASSESSMENT — 6 MINUTE WALK TEST (6MWT)
GENDER SELECTION: FEMALE
TOTAL DISTANCE WALKED: 310.97
FEMALE CALC: 380.59
MALE CALC: 360.82

## 2017-06-12 ASSESSMENT — ACTIVITIES OF DAILY LIVING (ADL): ADL_LIMITATIONS: STAIR CLIMBING

## 2017-06-12 ASSESSMENT — DUKE ACTIVITY SCORE INDEX (DASI)
DASI METS SCORE: 5.07
VO2_PEAK: 17.75

## 2017-06-13 VITALS — BODY MASS INDEX: 26.61 KG/M2 | WEIGHT: 144.6 LBS | HEIGHT: 62 IN

## 2017-06-13 PROCEDURE — 40000244 ZZH STATISTIC VISIT PULM REHAB

## 2017-06-13 PROCEDURE — G0424 PULMONARY REHAB W EXER: HCPCS

## 2017-06-13 NOTE — PROGRESS NOTES
Gifty Villa  82 year old  1935  I have reviewed initial evaluation outcomes including patient's response to initial activity assessment, and agree with exercise prescription for pulmonary rehabilitation for this patient.  Physician Signature: _____________________________  Date: _______   Time: ______ 06/12/17 0900   Session   Session Initial Evaluation and Exercise Prescription   Certified through this date 07/12/17   Type Initial   General Information   Treatment Diagnosis COPD   Classification of COPD Stage 2 (Moderate)   Onset Date 06/13/16   Current Signs and Symptoms SOB;Fatigue   Outpatient Pulmonary Rehab Start Date 06/12/17   Primary Physician Jun Tellez MD   Pulmonolgist JOSE CRUZ Irwin MD   Medical History/Comorbidities   Medical History/Comorbidities Osteoarthritis   Medical History Comments Past Medical History:   Diagnosis Date     Arthritis      Backache, unspecified      Essential hypertension, benign      Frequency-urgency sydrome      Hyperlipidemia      RLQ abdominal mass      Uncomplicated asthma       Sputum   Sputum Production Amount small   Tobacco History   Tobacco Former smoker   Tobacco Habit Cigarettes   Years Smoked 15   Average Packs Per Day .05   Quit Date or Planned Quit Date 06/13/92   Medications   Long-Acting Beta Agonist Prescribed, taking as prescribed  (Stiolto Respimat)   Short-Acting Beta Agonist Prescribed, taking as prescribed   Long-Acting Anticholinergic Prescribed, taking as prescribed  (Stiolto Respimat)   Short-Acting Anticholinergic Not prescribed   Inhaled Corticosteroid Not prescribed   Oral Corticosteroid Not prescribed   Medications Reconciled By Patient;Medical record   Preventative Vaccinations   Influenza Vaccination Yes   Pneumonia Vaccination Yes   Pain   Patient Currently in Pain No   Falls Screen   Have you fallen two or more times in the past year? No   Have you fallen and had an injury in the past year? No   Living and Work Status   Living Arrangements  "house   Support System Live alone   Environmental Factors No concerns   ADL Limitations Stair climbing   Initial Duke Activity Status Index (DASI) score. A measure of functional capacity. The goal is to have a pre-program raw score of 9.95 (~4 METs) or above 18.95   Initial DASI VO2 Peak (ml*kg-1*min-1) 17.75   Initial DASI MET Level 5.07   Return to Employment Retired   Physical Assessments   Edema None   Left Lung Sounds diminished   Right Lung Sounds diminished   Pulmonary Function Test (PFTs)   PFT Results Available   Date Completed 04/27/17   FVC Actual 2.28   % Predicted FVC 99   FEV1 Actual 1   % Predicted FEV1 58   FEV1/FEV Ratio 74   FRC Actual 44   Pre/Post Bronchodilator Pre   Individualized Treatment Plan   Sessions Scheduled 18   Sessions Attended 1   Type Aerobic exercise;Resistance training   Oxygen Use   Supplemental Oxygen Needed No   Exercise Prescription   Mode Treadmill;Nustep;Weights   Frequency 2 days/week   Duration/Time Intermittent bouts   THR (85% of age predicted max heart rate)  118.15   Effort Rating (0-10) 4-6/10   Progression of Exercise Start with bouts to 15\" then add 0.25 mets/1-2 week as OMNI/SOB 4-6/10.   Oxygen Titration with Exercise NA   Exercise Assessment   6 Minute Walk Predicted - Gender Selection Female   6 Minute Walk Predicted (Female) 380.59   6 Minute Walk Distance (Initial) 310.97 Meters   Resting HR 94   Exercise    Resting /78   Exercise /80   SpO2 94   Exercise SpO2 91   Exercise Tolerance fair   Normal Limits Discussed Yes   Current Symptoms at Home Dyspnea;Fatigue   Nutrition Management   Age 82   Height 1.575 m (5' 2\")   Weight 65.6 kg (144 lb 9.6 oz)   BMI (Calculated) 26.5   Assessment Normal   Interventions Planned NA   Psychosocial   Initial Patient Health Questionnaire -9 (PHQ-9) for depression. To notify physician if pre-score >9. 3   Initial [facility] COOP Survey score. A quality of life measure. To re evaluate if total score is > 25. " Also consider PHQ-9 and DASI to determine if patient should be referred back to physician. 22   Initial Shortness of Breath Questionnaire (SOBQ) score. The goal is to reduce the score pre to post program. 39   Intervention Planned Use breathing techniques to control dyspnea cycle   Psychosocial Comments 6/12/17 Pt lives alone and has son 3 miles away who helps her in a large way  Pt has a happy outlook on life.   Stages of Change   Aerobic Exercise Contemplation   Physical Activity Contemplation   Recommended diet Maintenance   Stress Maintenance   Smoking Cessation Maintenance   Oxygen Usage NA   Current Home Exercise   Type of Exercise None   Recommended Home Exercise Prescription   Type of Exercise Walking   Frequency (Days per week) 1-2   Duration (minutes per session) 15-30 min   Effort Rating Recommended (0-10) Scale  4-6/10   Recommended Exercise Heart Rate Range 118   30 Day Exercise Plan 6/12/17 Start home walking shortly after starting MN.. Use step counter.   Learning Assessment   Learner Patient   Primary Language English   Patient Education/Referrals   Education Recommended Activities of Daily Living;Breathing Techniques;Energy Conservation;Exercise Principles   Follow-up/On-going Support   Provider follow-up needed on the following No follow-up needed   Pulmonary Rehab Goals   Pulmonary Rehab Goals 1;2;3   Goal 1   Goal Pt to learn breathing techniques to better manage her resp symptoms when working out in the yard.   Target Date 08/13/17   Goal 2   Goal Pt to learn stairclimbing technique to be no more SOB at the top of the flight by demonstrating back at %   Target Date 08/13/17   Goal 3   Goal Pt to start a home exercise program to increase strength and endurance  to be carried out after MN ends by attending exercise principles class.   Target Date 08/13/17   Assessment   Assessment Pt is a 81 yo with stage II COPD. Also watching a pulmonary nodual and had an abdominal mass removed in March.  SHe would like to get into her best physical shape possible to build strength and endurance to continue to live alone with family help.     The patient was assessed to be stable and appropriate to begin exercise.  The patient's functional capacity and exercise prescription were determined by the completion of the 6 minute walk test. See results above.  The patient was orientated to the program and the equipment. Pulmonary response to exercise was assessed and WNL. No symptoms, complaints or pain were reported.   Goals and objectives were discussed. Good prognosis for reaching goals.   Skilled therapy is necessary to monitor pulmonary response to exercise, provide education, and provide behavior change counseling to achieve patient's goals.

## 2017-06-26 ENCOUNTER — HOSPITAL ENCOUNTER (OUTPATIENT)
Dept: CARDIAC REHAB | Facility: CLINIC | Age: 82
End: 2017-06-26
Attending: INTERNAL MEDICINE
Payer: MEDICARE

## 2017-06-26 VITALS — WEIGHT: 144 LBS | HEIGHT: 62 IN | BODY MASS INDEX: 26.5 KG/M2

## 2017-06-26 PROCEDURE — 40000244 ZZH STATISTIC VISIT PULM REHAB

## 2017-06-26 PROCEDURE — G0424 PULMONARY REHAB W EXER: HCPCS

## 2017-06-26 ASSESSMENT — 6 MINUTE WALK TEST (6MWT)
TOTAL DISTANCE WALKED: 310.97
MALE CALC: 361.46
FEMALE CALC: 381.25
GENDER SELECTION: FEMALE

## 2017-06-26 ASSESSMENT — DUKE ACTIVITY SCORE INDEX (DASI)
VO2_PEAK: 17.75
DASI METS SCORE: 5.07

## 2017-06-26 ASSESSMENT — PULMONARY FUNCTION TESTS
FVC_PERCENT_PREDICTED: 99
FVC: 2.28
FEV1: 1
FEV1/FVC: 74
FEV1 (%PREDICTED): 58

## 2017-06-26 ASSESSMENT — ACTIVITIES OF DAILY LIVING (ADL): ADL_LIMITATIONS: STAIR CLIMBING

## 2017-06-26 NOTE — PROGRESS NOTES
Gifty Villa  82 year old  1935  I have reviewed and agree with this patient s individual treatment plan and exercise prescription for pulmonary rehabilitation.  Please see  individual treatment plan for details of progress and plan. 06/26/17 1000   Session   Session 30 Day Individualized Treatment Plan   Certified through this date 08/05/17   Type Initial   General Information   Treatment Diagnosis COPD   Classification of COPD Stage 2 (Moderate)   Onset Date 06/13/16   Current Signs and Symptoms None   Outpatient Pulmonary Rehab Start Date 06/12/17   Primary Physician Jun Tellez MD   Pulmonolgist JOSE CRUZ Irwin MD   Medical History/Comorbidities   Medical History/Comorbidities Osteoarthritis   Medical History Comments Past Medical History:   Diagnosis Date     Arthritis      Backache, unspecified      Essential hypertension, benign      Frequency-urgency sydrome      Hyperlipidemia      RLQ abdominal mass      Uncomplicated asthma       Sputum   Sputum Production Amount small   Tobacco History   Tobacco Former smoker   Tobacco Habit Cigarettes   Years Smoked 15   Average Packs Per Day .05   Quit Date or Planned Quit Date 06/13/92   Medications   Long-Acting Beta Agonist Prescribed, taking as prescribed  (Stiolto Respimat)   Short-Acting Beta Agonist Prescribed, taking as prescribed   Long-Acting Anticholinergic Prescribed, taking as prescribed  (Stiolto Respimat)   Short-Acting Anticholinergic Not prescribed   Inhaled Corticosteroid Not prescribed   Oral Corticosteroid Not prescribed   Medications Reconciled By Patient;Medical record   Preventative Vaccinations   Influenza Vaccination Yes   Pneumonia Vaccination Yes   Pain   Patient Currently in Pain No   Falls Screen   Have you fallen two or more times in the past year? No   Have you fallen and had an injury in the past year? No   Living and Work Status   Living Arrangements house   Support System Live alone   Environmental Factors No concerns   ADL  "Limitations Stair climbing   Initial Duke Activity Status Index (DASI) score. A measure of functional capacity. The goal is to have a pre-program raw score of 9.95 (~4 METs) or above 18.95   Initial DASI VO2 Peak (ml*kg-1*min-1) 17.75   Initial DASI MET Level 5.07   Return to Employment Retired   Physical Assessments   Edema None   Left Lung Sounds diminished   Right Lung Sounds diminished   Pulmonary Function Test (PFTs)   PFT Results Available   Date Completed 04/27/17   FVC Actual 2.28   % Predicted FVC 99   FEV1 Actual 1   % Predicted FEV1 58   FEV1/FEV Ratio 74   FRC Actual 44   Pre/Post Bronchodilator Pre   Individualized Treatment Plan   Sessions Scheduled 18   Oxygen Use   Supplemental Oxygen Needed No   Exercise Prescription   Mode Treadmill;Nustep;Weights   Frequency 2 days/week   Duration/Time Intermittent bouts   THR (85% of age predicted max heart rate)  118.15   Effort Rating (0-10) 4-6/10   Progression of Exercise Start with bouts to 15\" then add 0.25 mets/1-2 week as OMNI/SOB 4-6/10.   6/26/17 Pt is here for her 2nd session. Repeat this goal.   Oxygen Titration with Exercise NA   Exercise Assessment   6 Minute Walk Predicted - Gender Selection Female   6 Minute Walk Predicted (Female) 381.25   6 Minute Walk Predicted (Male) 361.46   6 Minute Walk Distance (Initial) 310.97 Meters   Resting HR 94   Resting /78   Exercise /80   SpO2 94   Exercise SpO2 91   Exercise Tolerance fair   Normal Limits Discussed Yes   Current Symptoms at Home Dyspnea;Fatigue   Nutrition Management   Age 82   Height 1.575 m (5' 2.01\")   Weight 65.3 kg (144 lb)   BMI (Calculated) 26.39   Assessment Normal   Interventions Planned NA   Psychosocial   Initial Patient Health Questionnaire -9 (PHQ-9) for depression. To notify physician if pre-score >9. 3   Initial [facility] Survey score. A quality of life measure. To re evaluate if total score is > 25. Also consider PHQ-9 and DASI to determine if patient should be " referred back to physician. 22   Initial Shortness of Breath Questionnaire (SOBQ) score. The goal is to reduce the score pre to post program. 39   Intervention Planned Use breathing techniques to control dyspnea cycle   Psychosocial Comments 6/12/17 Pt lives alone and has son 3 miles away who helps her in a large way  Pt has a happy outlook on life.6/26/17  No change at this point. Have only seen pt twice so far.   Stages of Change   Aerobic Exercise Contemplation   Physical Activity Contemplation   Recommended diet Maintenance   Stress Maintenance   Smoking Cessation Maintenance   Oxygen Usage NA   Current Home Exercise   Type of Exercise None   Recommended Home Exercise Prescription   Type of Exercise Walking   Frequency (Days per week) 1-2   Duration (minutes per session) 15-30 min   Effort Rating Recommended (0-10) Scale  4-6/10   30 Day Exercise Plan 6/12/17 Start home walking shortly after starting IA.. Use step counter. 6/26/17 Pt was given a stepometer to keep track of her steps and report back.. Pt to supplement 1-2 days aerobic at home following IA guildlines.   Learning Assessment   Learner Patient   Primary Language English   Patient Education/Referrals   Education Recommended Activities of Daily Living;Breathing Techniques;Energy Conservation;Exercise Principles   Education Attended Breathing Techniques   Follow-up/On-going Support   Provider follow-up needed on the following No follow-up needed   Pulmonary Rehab Goals   Pulmonary Rehab Goals 1;2;3   Goal 1   Goal Pt to learn breathing techniques to better manage her resp symptoms when working out in the yard.   Target Date 08/13/17   Progress Towards Goal 6/26/17 Handout on abdominal breathing and asked her to practice   Goal 2   Goal Pt to learn stairclimbing technique to be no more SOB at the top of the flight by demonstrating back at %   Target Date 08/13/17   Date Met 06/26/17   Progress Towards Goal 6/26/17 Pt demonstrated this goal at 100%  today   Goal 3   Goal Pt to start a home exercise program to increase strength and endurance  to be carried out after MN ends by attending exercise principles class.   Target Date 08/13/17   Progress Towards Goal 6/26/17 Stepometer given to count steps.   Assessment   Assessment Pt is a 81 yo with stage II COPD. Also watching a pulmonary nodual and had an abdominal mass removed in March. SHe would like to get into her best physical shape possible to build strength and endurance to continue to live alone with family help. 6/26/17 ITP updated today. Pt is her for her 2nd session and orientating to equipment. Skilled care needed to meet her personal goals as well encourage home exercise and overall education on how to live with COPD.

## 2017-07-03 ENCOUNTER — HOSPITAL ENCOUNTER (OUTPATIENT)
Dept: CARDIAC REHAB | Facility: CLINIC | Age: 82
End: 2017-07-03
Attending: INTERNAL MEDICINE
Payer: MEDICARE

## 2017-07-03 VITALS — WEIGHT: 146.4 LBS | BODY MASS INDEX: 26.77 KG/M2

## 2017-07-03 PROCEDURE — 40000244 ZZH STATISTIC VISIT PULM REHAB

## 2017-07-03 PROCEDURE — G0424 PULMONARY REHAB W EXER: HCPCS

## 2017-07-07 ENCOUNTER — HOSPITAL ENCOUNTER (OUTPATIENT)
Dept: CARDIAC REHAB | Facility: CLINIC | Age: 82
End: 2017-07-07
Attending: INTERNAL MEDICINE
Payer: MEDICARE

## 2017-07-07 VITALS — WEIGHT: 149 LBS | BODY MASS INDEX: 27.25 KG/M2

## 2017-07-07 PROCEDURE — 40000244 ZZH STATISTIC VISIT PULM REHAB

## 2017-07-07 PROCEDURE — G0424 PULMONARY REHAB W EXER: HCPCS

## 2017-07-07 NOTE — PROGRESS NOTES
07/07/17 1500   Sessions   Session number 4   Sessions Scheduled 18   Resting Vital Signs   Resting Heart Rate 97   Resting /60   Dyspnea at Rest? no   SpO2 95   FiO2 RA   Weight 67.6 kg (149 lb)   Warm Up   Duration 5   Exercise Vital Signs   Modalities Nu Step Q      SPO2 96   FiO2 rA   Effort Rating 0-10 4   Shortness of Breath Rating 0-10 1   Pain Assessment   Does patient have pain? No   Exercise Modalities   Quick Picks Treadmill;Nu Step Q   Treadmill   Duration 15 Minutes   Speed 1.4   Grade 0.5   Workload (METS) 2.17   Nustep   Exercise Duration 15   Resistance/Level 4   Workload (METS) 3.0   Cool Down   Duration 5   Assessment   Current Symptoms in Rehab Fatigue;Dyspnea   Current Complaints/Pain fatigue, dyspnea   Assessment / Progress Patient progressed to 3 METS on nustep   Tolerance Exercise goals met today   Plan repeat changes made today   Comments/1:1 Intervention patient met face to face with medical director   Supervising Physician Dr. Dasilva   Total Session Time (Minutes) 60 minutes of group exercise   I have personally seen this patient today. The patient reports she is walking at home for exercise. MD encouraged patient to do similar workouts at home as she does in rehab as patient is only coming to rehab 1 day per week.   The patient is currently participating in Pulmonary Rehabilitation, and demonstrates stable pulmonary response to exercise, making appropriate progress toward goals.  I agree with this individual treatment plan and exercise prescription  See individual treatment plan for details of progress and plan.

## 2017-07-11 ENCOUNTER — HOSPITAL ENCOUNTER (OUTPATIENT)
Dept: CARDIAC REHAB | Facility: CLINIC | Age: 82
End: 2017-07-11
Attending: INTERNAL MEDICINE
Payer: MEDICARE

## 2017-07-11 VITALS — BODY MASS INDEX: 27.25 KG/M2 | WEIGHT: 149 LBS

## 2017-07-11 PROCEDURE — G0424 PULMONARY REHAB W EXER: HCPCS

## 2017-07-11 PROCEDURE — 40000244 ZZH STATISTIC VISIT PULM REHAB

## 2017-07-13 ENCOUNTER — HOSPITAL ENCOUNTER (OUTPATIENT)
Dept: CARDIAC REHAB | Facility: CLINIC | Age: 82
End: 2017-07-13
Attending: INTERNAL MEDICINE
Payer: MEDICARE

## 2017-07-13 VITALS — WEIGHT: 148 LBS | BODY MASS INDEX: 27.06 KG/M2

## 2017-07-13 PROCEDURE — G0424 PULMONARY REHAB W EXER: HCPCS

## 2017-07-13 PROCEDURE — 40000244 ZZH STATISTIC VISIT PULM REHAB

## 2017-07-18 ENCOUNTER — HOSPITAL ENCOUNTER (OUTPATIENT)
Dept: CARDIAC REHAB | Facility: CLINIC | Age: 82
End: 2017-07-18
Attending: INTERNAL MEDICINE
Payer: MEDICARE

## 2017-07-18 VITALS — BODY MASS INDEX: 27.25 KG/M2 | WEIGHT: 149 LBS

## 2017-07-18 PROCEDURE — 40000244 ZZH STATISTIC VISIT PULM REHAB

## 2017-07-18 PROCEDURE — G0424 PULMONARY REHAB W EXER: HCPCS

## 2017-07-20 ENCOUNTER — HOSPITAL ENCOUNTER (OUTPATIENT)
Dept: CARDIAC REHAB | Facility: CLINIC | Age: 82
End: 2017-07-20
Attending: INTERNAL MEDICINE
Payer: MEDICARE

## 2017-07-20 VITALS — HEIGHT: 62 IN | WEIGHT: 149 LBS | BODY MASS INDEX: 27.42 KG/M2

## 2017-07-20 PROCEDURE — 40000244 ZZH STATISTIC VISIT PULM REHAB

## 2017-07-20 PROCEDURE — G0424 PULMONARY REHAB W EXER: HCPCS

## 2017-07-20 ASSESSMENT — DUKE ACTIVITY SCORE INDEX (DASI)
DASI METS SCORE: 5.07
VO2_PEAK: 17.75

## 2017-07-20 ASSESSMENT — PULMONARY FUNCTION TESTS
FEV1 (%PREDICTED): 58
FEV1/FVC: 74
FVC_PERCENT_PREDICTED: 99
FEV1: 1
FVC: 2.28

## 2017-07-20 ASSESSMENT — 6 MINUTE WALK TEST (6MWT)
TOTAL DISTANCE WALKED: 310.97
GENDER SELECTION: FEMALE
FEMALE CALC: 376.05
MALE CALC: 357.46

## 2017-07-20 ASSESSMENT — ACTIVITIES OF DAILY LIVING (ADL): ADL_LIMITATIONS: STAIR CLIMBING

## 2017-07-20 NOTE — PROGRESS NOTES
Gifty Villa  82 year old  1935  I have reviewed and agree with this patient s individual treatment plan and exercise prescription for pulmonary rehabilitation.  Please see  individual treatment plan for details of progress and plan. 07/20/17 1100   Session   Session 60 Day Individualized Treatment Plan   Certified through this date 09/02/17   Type Reassessment   General Information   Treatment Diagnosis COPD   Classification of COPD Stage 2 (Moderate)   Onset Date 06/13/16   Current Signs and Symptoms Fatigue;SOB   Outpatient Pulmonary Rehab Start Date 06/12/17   Primary Physician Jun Tellez MD   Pulmonolgist JOSE CRUZ Irwin MD   Medical History/Comorbidities   Medical History/Comorbidities Osteoarthritis   Medical History Comments Past Medical History:   Diagnosis Date     Arthritis      Backache, unspecified      Essential hypertension, benign      Frequency-urgency sydrome      Hyperlipidemia      RLQ abdominal mass      Uncomplicated asthma       Untoward Events/Exacerbations/Hospitalizations   Untoward Events/Exacerbations/Hospitalizations None   Sputum   Sputum Production Amount small   Tobacco History   Tobacco Former smoker   Tobacco Habit Cigarettes   Years Smoked 15   Average Packs Per Day .05   Quit Date or Planned Quit Date 06/13/92   Medications   Long-Acting Beta Agonist Prescribed, taking as prescribed  (Stiolto Respimat)   Short-Acting Beta Agonist Prescribed, taking as prescribed   Long-Acting Anticholinergic Prescribed, taking as prescribed  (Stiolto Respimat)   Short-Acting Anticholinergic Not prescribed   Inhaled Corticosteroid Not prescribed   Oral Corticosteroid Not prescribed   Medications Reconciled By Patient;Medical record   Preventative Vaccinations   Influenza Vaccination Yes   Pneumonia Vaccination Yes   Pain   Patient Currently in Pain No   Falls Screen   Have you fallen two or more times in the past year? No   Have you fallen and had an injury in the past year? No   Living and  "Work Status   Living Arrangements house   Support System Live alone   Environmental Factors No concerns   ADL Limitations Stair climbing   Initial Duke Activity Status Index (DASI) score. A measure of functional capacity. The goal is to have a pre-program raw score of 9.95 (~4 METs) or above 18.95   Initial DASI VO2 Peak (ml*kg-1*min-1) 17.75   Initial DASI MET Level 5.07   Return to Employment Retired   Physical Assessments   Edema None   Left Lung Sounds diminished   Right Lung Sounds diminished   Pulmonary Function Test (PFTs)   PFT Results Available   Date Completed 04/27/17   FVC Actual 2.28   % Predicted FVC 99   FEV1 Actual 1   % Predicted FEV1 58   FEV1/FEV Ratio 74   FRC Actual 44   Pre/Post Bronchodilator Pre   Individualized Treatment Plan   Sessions Scheduled 18   Sessions Attended 8   Type Aerobic exercise;Resistance training   Oxygen Use   Supplemental Oxygen Needed No   Exercise Prescription   Mode Treadmill;Nustep;Weights   Frequency 2 days/week   Duration/Time Intermittent bouts   THR (85% of age predicted max heart rate)  118.15   Effort Rating (0-10) 4-6/10   Progression of Exercise Start with bouts to 15\" then add 0.25 mets/1-2 week as OMNI/SOB 4-6/10.   6/26/17 Pt is here for her 2nd session. Repeat this goal. 7/20/17 Pt to continue at 15\" bouts increasing  at 0.25 mets   Oxygen Titration with Exercise NA   Exercise Assessment   6 Minute Walk Predicted - Gender Selection Female   6 Minute Walk Predicted (Female) 376.05   6 Minute Walk Predicted (Male) 357.46   6 Minute Walk Distance (Initial) 310.97 Meters   Resting HR 94   Resting /78   Exercise /80   SpO2 94   Exercise SpO2 91   Exercise Tolerance fair   Normal Limits Discussed Yes   Current Symptoms at Home Dyspnea;Fatigue   Current Symptoms in Rehab Dyspnea;Fatigue   Nutrition Management   Age 82   Height 1.575 m (5' 2.01\")   Weight 67.6 kg (149 lb)   BMI (Calculated) 27.3   Assessment Normal   Goal weight (not discussed) "   Interventions Planned NA   Psychosocial   Initial Patient Health Questionnaire -9 (PHQ-9) for depression. To notify physician if pre-score >9. 3   Initial [facility] Survey score. A quality of life measure. To re evaluate if total score is > 25. Also consider PHQ-9 and DASI to determine if patient should be referred back to physician. 22   Initial Shortness of Breath Questionnaire (SOBQ) score. The goal is to reduce the score pre to post program. 39   Intervention Planned Use breathing techniques to control dyspnea cycle   Interventions Completed Demonstrated ability to apply breathing techniques to control dyspnea cycle   Psychosocial Comments 6/12/17 Pt lives alone and has son 3 miles away who helps her in a large way  Pt has a happy outlook on life.6/26/17  No change at this point. Have only seen pt twice so far. 7/20/17 Pt loves dog and loves to flower garden and water the plants. No intervention for depression/anxiety needed.   Stages of Change   Aerobic Exercise Contemplation   Physical Activity Contemplation   Recommended diet Maintenance   Stress Maintenance   Smoking Cessation Maintenance   Oxygen Usage NA   Current Home Exercise   Type of Exercise None   Recommended Home Exercise Prescription   Type of Exercise Walking   Frequency (Days per week) 1-2   Duration (minutes per session) 15-30 min   Effort Rating Recommended (0-10) Scale  4-6/10   30 Day Exercise Plan 6/12/17 Start home walking shortly after starting [place].. Use step counter. 6/26/17 Pt was given a stepometer to keep track of her steps and report back.. Pt to supplement 1-2 days aerobic at home following [place] guildlines. 7/20/17 Pt is tracking her steps but the only exercise she is getting is walking 4/10th mile to mailbox and back. Pt to start TM at [facility] 20-30 min continuous.   Learning Assessment   Learner Patient   Primary Language English   Patient Education/Referrals   Education Recommended Activities of Daily Living;Breathing  Techniques;Energy Conservation;Exercise Principles   Education Attended Breathing Techniques;Activities of Daily Living;Exercise Principles   Referral(s) Recommended None   Follow-up/On-going Support   Provider follow-up needed on the following No follow-up needed   Pulmonary Rehab Goals   Pulmonary Rehab Goals 1;2;3   Goal 1   Goal Pt to learn breathing techniques to better manage her resp symptoms when working out in the yard.   Target Date 08/13/17   Progress Towards Goal 6/26/17 Handout on abdominal breathing and asked her to practice 7/20/17 Pt has practiced and relates that the diaphramatic breathing is feeling backwards. Dose PLB PRN.   Goal 2   Goal Pt to learn stairclimbing technique to be no more SOB at the top of the flight by demonstrating back at %   Target Date 08/13/17   Date Met 06/26/17   Progress Towards Goal 6/26/17 Pt demonstrated this goal at 100% today   Goal 3   Goal Pt to start a home exercise program to increase strength and endurance  to be carried out after [place] ends by attending exercise principles class.   Target Date 08/13/17   Progress Towards Goal 6/26/17 Stepometer given to count steps. 7/20/17 Pt walks to the mailbox and back for 4/10th of a mile. Recommended she TM at her sons house 1-2 times/week. She just had the exercise principles class 2 days ago.   Assessment   Assessment Pt is a 81 yo with stage II COPD. Also watching a pulmonary nodual and had an abdominal mass removed in March. SHe would like to get into her best physical shape possible to build strength and endurance to continue to live alone with family help. 6/26/17 ITP updated today. Pt is her for her 2nd session and orientating to equipment. Skilled care needed to meet her personal goals as well encourage home exercise and overall education on how to live with COPD. 7/20/17 ITP updated today. Skilled care will continue to provide encoragement to start home exercise program before [place] has ended. Not currently doing  aerobic. Reviewed what that may look like and decided she could TM at her sons house. Also, continue with education classes to cover anatomy and nutrition.

## 2017-07-25 ENCOUNTER — HOSPITAL ENCOUNTER (OUTPATIENT)
Dept: CARDIAC REHAB | Facility: CLINIC | Age: 82
End: 2017-07-25
Attending: INTERNAL MEDICINE
Payer: MEDICARE

## 2017-07-25 VITALS — WEIGHT: 149 LBS | BODY MASS INDEX: 27.25 KG/M2

## 2017-07-25 PROCEDURE — 40000244 ZZH STATISTIC VISIT PULM REHAB

## 2017-07-25 PROCEDURE — G0424 PULMONARY REHAB W EXER: HCPCS

## 2017-07-27 ENCOUNTER — HOSPITAL ENCOUNTER (OUTPATIENT)
Dept: CARDIAC REHAB | Facility: CLINIC | Age: 82
End: 2017-07-27
Attending: INTERNAL MEDICINE
Payer: MEDICARE

## 2017-07-27 VITALS — BODY MASS INDEX: 26.7 KG/M2 | WEIGHT: 146 LBS

## 2017-07-27 PROCEDURE — 40000244 ZZH STATISTIC VISIT PULM REHAB

## 2017-07-27 PROCEDURE — G0424 PULMONARY REHAB W EXER: HCPCS

## 2017-08-01 ENCOUNTER — HOSPITAL ENCOUNTER (OUTPATIENT)
Dept: CARDIAC REHAB | Facility: CLINIC | Age: 82
End: 2017-08-01
Attending: INTERNAL MEDICINE
Payer: MEDICARE

## 2017-08-01 VITALS — WEIGHT: 149 LBS | BODY MASS INDEX: 27.25 KG/M2

## 2017-08-01 PROCEDURE — 40000244 ZZH STATISTIC VISIT PULM REHAB

## 2017-08-01 PROCEDURE — G0424 PULMONARY REHAB W EXER: HCPCS

## 2017-08-04 ENCOUNTER — HOSPITAL ENCOUNTER (OUTPATIENT)
Dept: CARDIAC REHAB | Facility: CLINIC | Age: 82
End: 2017-08-04
Attending: INTERNAL MEDICINE
Payer: MEDICARE

## 2017-08-04 PROCEDURE — G0424 PULMONARY REHAB W EXER: HCPCS | Performed by: REHABILITATION PRACTITIONER

## 2017-08-04 PROCEDURE — 40000244 ZZH STATISTIC VISIT PULM REHAB: Performed by: REHABILITATION PRACTITIONER

## 2017-08-04 NOTE — PROGRESS NOTES
08/04/17 1100   Sessions   Session number 12   Sessions Scheduled 18   Resting Vital Signs   Resting Heart Rate 96   Resting /78   Dyspnea at Rest? no   SpO2 96   FiO2 RA   Warm Up   Duration 5   Exercise Vital Signs   Modalities Nu Step Q      SPO2 95   FiO2 RA   Effort Rating 0-10 6/7   Shortness of Breath Rating 0-10 6   Pain Assessment   Does patient have pain? Yes  (Hip pain)   Exercise Modalities   Quick Picks Nu Step Q;Treadmill   Treadmill   Duration 20 Minutes   Speed 2.1   Grade 2   Workload (METS) 3.19   Nustep   Exercise Duration 10   Resistance/Level 6   Workload (METS) 3.4   Assessment   Current Symptoms in Rehab Fatigue;Dyspnea   Current Complaints/Pain Back pain and allergies    Assessment / Progress 1:1 with medical director, Dr. Dasilva    Tolerance Exercise goals met today   Plan Repeat    Supervising Physician Dr. Dasilva    Total Session Time (Minutes) 60 minutes of group exercise   I have personally seen this patient today. The patient reports that she is feeling more SOB.  She feels as thought she might be affected by hay fever. She has a home treadmill and is able to use this once rehab is done.  Encouraged to exercise on off days even if it is going to a store and pushing a cart. The patient is currently participating in Pulmonary Rehabilitation, and demonstrates stable pulmonary response to exercise, making appropriate progress toward goals.  I agree with this individual treatment plan and exercise prescription  See individual treatment plan for details of progress and plan.

## 2017-08-08 ENCOUNTER — HOSPITAL ENCOUNTER (OUTPATIENT)
Dept: CARDIAC REHAB | Facility: CLINIC | Age: 82
End: 2017-08-08
Attending: INTERNAL MEDICINE
Payer: MEDICARE

## 2017-08-08 VITALS — BODY MASS INDEX: 27.23 KG/M2 | WEIGHT: 148 LBS | HEIGHT: 62 IN

## 2017-08-08 PROCEDURE — 40000244 ZZH STATISTIC VISIT PULM REHAB

## 2017-08-08 PROCEDURE — G0424 PULMONARY REHAB W EXER: HCPCS

## 2017-08-08 ASSESSMENT — 6 MINUTE WALK TEST (6MWT)
FEMALE CALC: 377.05
GENDER SELECTION: FEMALE
TOTAL DISTANCE WALKED: 310.97
MALE CALC: 358.1

## 2017-08-08 ASSESSMENT — PULMONARY FUNCTION TESTS
FVC: 2.28
FEV1/FVC: 74
FEV1 (%PREDICTED): 58
FVC_PERCENT_PREDICTED: 99
FEV1: 1

## 2017-08-08 ASSESSMENT — DUKE ACTIVITY SCORE INDEX (DASI)
VO2_PEAK: 17.75
DASI METS SCORE: 5.07

## 2017-08-08 ASSESSMENT — ACTIVITIES OF DAILY LIVING (ADL): ADL_LIMITATIONS: STAIR CLIMBING

## 2017-08-10 ENCOUNTER — HOSPITAL ENCOUNTER (OUTPATIENT)
Dept: CARDIAC REHAB | Facility: CLINIC | Age: 82
End: 2017-08-10
Attending: INTERNAL MEDICINE
Payer: MEDICARE

## 2017-08-10 VITALS — WEIGHT: 148 LBS | BODY MASS INDEX: 27.07 KG/M2

## 2017-08-10 PROCEDURE — G0424 PULMONARY REHAB W EXER: HCPCS

## 2017-08-10 PROCEDURE — 40000244 ZZH STATISTIC VISIT PULM REHAB

## 2017-08-15 ENCOUNTER — HOSPITAL ENCOUNTER (OUTPATIENT)
Dept: CARDIAC REHAB | Facility: CLINIC | Age: 82
End: 2017-08-15
Attending: INTERNAL MEDICINE
Payer: MEDICARE

## 2017-08-15 VITALS — WEIGHT: 148 LBS | BODY MASS INDEX: 27.07 KG/M2

## 2017-08-15 PROCEDURE — 40000244 ZZH STATISTIC VISIT PULM REHAB

## 2017-08-15 PROCEDURE — G0424 PULMONARY REHAB W EXER: HCPCS

## 2017-08-17 ENCOUNTER — HOSPITAL ENCOUNTER (OUTPATIENT)
Dept: CARDIAC REHAB | Facility: CLINIC | Age: 82
End: 2017-08-17
Attending: INTERNAL MEDICINE
Payer: MEDICARE

## 2017-08-17 VITALS — BODY MASS INDEX: 27.44 KG/M2 | WEIGHT: 150 LBS

## 2017-08-17 PROCEDURE — 40000244 ZZH STATISTIC VISIT PULM REHAB

## 2017-08-17 PROCEDURE — G0424 PULMONARY REHAB W EXER: HCPCS

## 2017-08-22 ASSESSMENT — 6 MINUTE WALK TEST (6MWT)
TOTAL DISTANCE WALKED: 310.97
GENDER SELECTION: FEMALE

## 2017-08-22 ASSESSMENT — DUKE ACTIVITY SCORE INDEX (DASI)
VO2_PEAK: 17.75
DASI METS SCORE: 5.07

## 2017-08-22 ASSESSMENT — PULMONARY FUNCTION TESTS
FEV1 (%PREDICTED): 58
FEV1: 1
FVC: 2.28
FEV1/FVC: 74
FVC_PERCENT_PREDICTED: 99

## 2017-08-22 ASSESSMENT — ACTIVITIES OF DAILY LIVING (ADL): ADL_LIMITATIONS: STAIR CLIMBING

## 2017-08-22 NOTE — ADDENDUM NOTE
Encounter addended by: Lory Steven on: 8/22/2017  3:38 PM<BR>     Actions taken: Flowsheet data copied forward, Sign clinical note, Flowsheet accepted

## 2017-08-22 NOTE — PROGRESS NOTES
Gifty Villa  1935  82 year old  COPDII 08/22/17 1500   Physician cosignature/electronic signature indicates approval of this ITP document. I have established, reviewed and made necessary changes to the individualized treatment plan and exercise prescription for this patient.    Session   Session 120 Day Individualized Treatment Plan  (ITP Forwarded, Pt is discharging 8/28)   Certified through this date 09/23/17   Type Reassessment   General Information   Treatment Diagnosis COPD   Classification of COPD Stage 2 (Moderate)   Onset Date 06/13/16   Outpatient Pulmonary Rehab Start Date 06/12/17   Primary Physician Jun Tellez MD   Pulmonolgist JOSE CRUZ Irwin MD   Medical History/Comorbidities   Medical History/Comorbidities Osteoarthritis   Medical History Comments .m   Untoward Events/Exacerbations/Hospitalizations   Untoward Events/Exacerbations/Hospitalizations None   Sputum   Sputum Production Amount small   Tobacco History   Tobacco Former smoker   Tobacco Habit Cigarettes   Years Smoked 15   Average Packs Per Day .05   Quit Date or Planned Quit Date 06/13/92   Medications   Long-Acting Beta Agonist Prescribed, taking as prescribed  (Stiolto Respimat)   Short-Acting Beta Agonist Prescribed, taking as prescribed   Long-Acting Anticholinergic Prescribed, taking as prescribed  (Stiolto Respimat)   Short-Acting Anticholinergic Not prescribed   Inhaled Corticosteroid Not prescribed   Oral Corticosteroid Not prescribed   Medications Reconciled By Patient;Medical record   Pain   Patient Currently in Pain No   Falls Screen   Have you fallen two or more times in the past year? No   Have you fallen and had an injury in the past year? No   Living and Work Status   Living Arrangements house   Support System Live alone   Environmental Factors No concerns   ADL Limitations Stair climbing   Initial Duke Activity Status Index (DASI) score. A measure of functional capacity. The goal is to have a pre-program raw score of 9.95  "(~4 METs) or above 18.95   Initial DASI VO2 Peak (ml*kg-1*min-1) 17.75   Initial DASI MET Level 5.07   Return to Employment Retired   Physical Assessments   Edema None   Left Lung Sounds diminished   Right Lung Sounds diminished   Pulmonary Function Test (PFTs)   PFT Results Available   Date Completed 04/27/17   FVC Actual 2.28   % Predicted FVC 99   FEV1 Actual 1   % Predicted FEV1 58   FEV1/FEV Ratio 74   FRC Actual 44   Pre/Post Bronchodilator Pre   Individualized Treatment Plan   Sessions Scheduled 18   Oxygen Use   Supplemental Oxygen Needed No   Exercise Prescription   Mode Treadmill;Nustep;Weights   Frequency 2 days/week   Duration/Time 15-30 min   THR (85% of age predicted max heart rate)  118.15   Effort Rating (0-10) 4-6/10   Progression of Exercise Start with bouts to 15\" then add 0.25 mets/1-2 week as OMNI/SOB 4-6/10.   6/26/17 Pt is here for her 2nd session. Repeat this goal. 7/20/17 Pt to continue at 15\" bouts increasing  at 0.25 mets 8/8 Continue with current progression for 30 min total of aerobic exercise.    Oxygen Titration with Exercise NA   Exercise Assessment   6 Minute Walk Predicted - Gender Selection Female   6 Minute Walk Distance (Initial) 310.97 Meters   Resting HR 96   Resting /78   SpO2 96   Exercise SpO2 93   Exercise Tolerance fair   Normal Limits Discussed Yes   Current Symptoms at Home Dyspnea;Fatigue   Current Symptoms in Rehab Dyspnea;Fatigue;Joint pain   Nutrition Management   Age 82   Assessment Normal   Goal weight (not discussed)   Interventions Planned NA   Psychosocial   Initial Patient Health Questionnaire -9 (PHQ-9) for depression. To notify physician if pre-score >9. 3   Initial [facility] COOP Survey score. A quality of life measure. To re evaluate if total score is > 25. Also consider PHQ-9 and DASI to determine if patient should be referred back to physician. 22   Initial Shortness of Breath Questionnaire (SOBQ) score. The goal is to reduce the score pre to post " program. 39   Intervention Planned Use breathing techniques to control dyspnea cycle   Interventions Completed Demonstrated ability to apply breathing techniques to control dyspnea cycle   Psychosocial Comments 6/12/17 Pt lives alone and has son 3 miles away who helps her in a large way  Pt has a happy outlook on life.6/26/17  No change at this point. Have only seen pt twice so far. 7/20/17 Pt loves dog and loves to flower garden and water the plants. No intervention for depression/anxiety needed.   Stages of Change   Aerobic Exercise Contemplation   Physical Activity Contemplation   Recommended diet Maintenance   Stress Maintenance   Smoking Cessation Maintenance   Oxygen Usage NA   Current Home Exercise   Type of Exercise None   Recommended Home Exercise Prescription   Type of Exercise Walking   Frequency (Days per week) 1-2   Duration (minutes per session) 15-30 min   Effort Rating Recommended (0-10) Scale  4-6/10   30 Day Exercise Plan 6/12/17 Start home walking shortly after starting [place].. Use step counter. 6/26/17 Pt was given a stepometer to keep track of her steps and report back.. Pt to supplement 1-2 days aerobic at home following [place] guildlines. 7/20/17 Pt is tracking her steps but the only exercise she is getting is walking 4/10th mile to mailbox and back. Pt to start TM at [facility] 20-30 min continuous.8/8 Pt to continue with current 30 day goal and think about transitioning to WEL program upon completion of [place] for continued exercise adherence.   Learning Assessment   Learner Patient   Primary Language English   Preferred Learning Style Listening;Reading;Demonstration;Pictures/Video   Barriers to Learning No barriers noted   Patient Education/Referrals   Education Recommended Activities of Daily Living;Breathing Techniques;Energy Conservation;Exercise Principles   Education Attended Breathing Techniques;Activities of Daily Living;Exercise Principles   Referral(s) Recommended None    Follow-up/On-going Support   Provider follow-up needed on the following No follow-up needed   Pulmonary Rehab Goals   Pulmonary Rehab Goals 1;2;3   Goal 1   Goal Pt to learn breathing techniques to better manage her resp symptoms when working out in the yard.   Target Date 08/13/17   Progress Towards Goal 6/26/17 Handout on abdominal breathing and asked her to practice 7/20/17 Pt has practiced and relates that the diaphramatic breathing is feeling backwards. Dose PLB PRN. 8/8 Pt admits she has not yet practiced these techniques at home. She states they are difficult to remember to do when your body isn't used to doing them. Encouraged her to practice the techniques when bending over and going upstairs.    Goal 2   Goal Pt to learn stairclimbing technique to be no more SOB at the top of the flight by demonstrating back at %   Target Date 08/13/17   Date Met 06/26/17   Progress Towards Goal 6/26/17 Pt demonstrated this goal at 100% today   Goal 3   Goal Pt to start a home exercise program to increase strength and endurance  to be carried out after [place] ends by attending exercise principles class.   Target Date 08/13/17   Progress Towards Goal 6/26/17 Stepometer given to count steps. 7/20/17 Pt walks to the mailbox and back for 4/10th of a mile. Recommended she TM at her sons house 1-2 times/week. She just had the exercise principles class 2 days ago. 8/8 Information given today on Pt continuing to exercise in WEL program. She has not been able to find motivation to exercise at her sons house or to consistently walk outside. Pt would benefit from having an exercise program long term in a temperature controlled safe environment.    Assessment   Assessment Pt is a 83 yo with stage II COPD. Also watching a pulmonary nodual and had an abdominal mass removed in March. SHe would like to get into her best physical shape possible to build strength and endurance to continue to live alone with family help. 6/26/17 ITP  updated today. Pt is her for her 2nd session and orientating to equipment. Skilled care needed to meet her personal goals as well encourage home exercise and overall education on how to live with COPD. 7/20/17 ITP updated today. Skilled care will continue to provide encoragement to start home exercise program before [place] has ended. Not currently doing aerobic. Reviewed what that may look like and decided she could TM at her sons house. Also, continue with education classes to cover anatomy and nutrition.8/8 Pt has been progressing well with MET level and endurance in [place]. She has increased her MET level by 1.3 METs over the course of rehab thus far. She attended nutrition class today. Pt given information on continuing to exercise in WEL program upon completion of [place]. She will let therapists know if she is interested. Pt to continue [place] for continued exercise progression, continued reiteration of proper breathing techniques with ADLs and exercise, and education on exercise progression.

## 2017-08-28 ENCOUNTER — HOSPITAL ENCOUNTER (OUTPATIENT)
Dept: CARDIAC REHAB | Facility: CLINIC | Age: 82
End: 2017-08-28
Attending: INTERNAL MEDICINE
Payer: MEDICARE

## 2017-08-28 VITALS — BODY MASS INDEX: 27.23 KG/M2 | HEIGHT: 62 IN | WEIGHT: 148 LBS

## 2017-08-28 PROCEDURE — G0424 PULMONARY REHAB W EXER: HCPCS

## 2017-08-28 PROCEDURE — 40000244 ZZH STATISTIC VISIT PULM REHAB

## 2017-08-28 ASSESSMENT — ACTIVITIES OF DAILY LIVING (ADL): ADL_LIMITATIONS: STAIR CLIMBING

## 2017-08-28 ASSESSMENT — PULMONARY FUNCTION TESTS
FEV1 (%PREDICTED): 58
FEV1: 1
FVC_PERCENT_PREDICTED: 99
FEV1/FVC: 74
FVC: 2.28

## 2017-08-28 ASSESSMENT — 6 MINUTE WALK TEST (6MWT)
MALE CALC: 358.26
GENDER SELECTION: FEMALE
FEMALE CALC: 377.09
TOTAL DISTANCE WALKED: 310.97
TOTAL DISTANCE WALKED: 365.85

## 2017-08-28 ASSESSMENT — DUKE ACTIVITY SCORE INDEX (DASI)
DASI METS SCORE: 5.07
VO2_PEAK: 21.19
VO2_PEAK: 17.75
DASI METS SCORE: 6.05
TOTAL_SCORE: 26.95

## 2017-08-28 NOTE — PROGRESS NOTES
Gifty Villa  82 year old  1935  I have reviewed this patient s outcomes and self report of symptoms.  The patient has completed Pulmonary Rehabilitation and has made appropriate progress towards goals.  Please see individual treatment plan for details of attainment, discharge plan and independent exercise prescription. 08/28/17 1000   Session   Session Discharge Note   Type Discharge/Follow-up   General Information   Treatment Diagnosis COPD   Classification of COPD Stage 2 (Moderate)   Onset Date 06/13/16   Current Signs and Symptoms SOB   Outpatient Pulmonary Rehab Start Date 06/12/17   Primary Physician Jun Tellez MD   Pulmonolgist JOSE CRUZ Irwin MD   Medical History/Comorbidities   Medical History/Comorbidities Osteoarthritis   Medical History Comments Past Medical History:   Diagnosis Date     Arthritis      Backache, unspecified      Essential hypertension, benign      Frequency-urgency sydrome      Hyperlipidemia      RLQ abdominal mass      Uncomplicated asthma       Untoward Events/Exacerbations/Hospitalizations   Untoward Events/Exacerbations/Hospitalizations None   Sputum   Sputum Production Amount small   Tobacco History   Tobacco Former smoker   Tobacco Habit Cigarettes   Years Smoked 15   Average Packs Per Day .05   Quit Date or Planned Quit Date 06/13/92   Interventions Planned None: Patient is in maintenance   Medications   Long-Acting Beta Agonist Prescribed, taking as prescribed  (Stiolto Respimat)   Short-Acting Beta Agonist Prescribed, taking as prescribed   Long-Acting Anticholinergic Prescribed, taking as prescribed  (Stiolto Respimat)   Short-Acting Anticholinergic Not prescribed   Inhaled Corticosteroid Not prescribed   Oral Corticosteroid Not prescribed   Medications Reconciled By Patient;Medical record   Preventative Vaccinations   Influenza Vaccination Yes   Pneumonia Vaccination Yes   Pain   Patient Currently in Pain No   Falls Screen   Have you fallen two or more times in the past  "year? No   Have you fallen and had an injury in the past year? No   Living and Work Status   Living Arrangements house   Support System Live alone   Environmental Factors No concerns   ADL Limitations Stair climbing   Initial Duke Activity Status Index (DASI) score. A measure of functional capacity. The goal is to have a pre-program raw score of 9.95 (~4 METs) or above 18.95   Initial DASI VO2 Peak (ml*kg-1*min-1) 17.75   Initial DASI MET Level 5.07   Discharge DASI score 26.95   Discharge DASI VO2 Peak 21.19   Discharge DASI MET Level 6.05   Return to Employment Retired   Physical Assessments   Edema None   Left Lung Sounds diminished   Right Lung Sounds diminished   Pulmonary Function Test (PFTs)   PFT Results Available   Date Completed 04/27/17   FVC Actual 2.28   % Predicted FVC 99   FEV1 Actual 1   % Predicted FEV1 58   FEV1/FEV Ratio 74   FRC Actual 44   Pre/Post Bronchodilator Pre   Individualized Treatment Plan   Sessions Scheduled 18   Sessions Attended 18   Type Aerobic exercise   Oxygen Use   Supplemental Oxygen Needed No   Exercise Prescription   Mode Treadmill;Nustep;Weights   Frequency 2 days/week   Duration/Time 15-30 min   THR (85% of age predicted max heart rate)  118.15   Effort Rating (0-10) 4-6/10   Progression of Exercise Start with bouts to 15\" then add 0.25 mets/1-2 week as OMNI/SOB 4-6/10.   6/26/17 Pt is here for her 2nd session. Repeat this goal. 7/20/17 Pt to continue at 15\" bouts increasing  at 0.25 mets 8/8 Continue with current progression for 30 min total of aerobic exercise.    Oxygen Titration with Exercise NA   Exercise Assessment   6 Minute Walk Predicted - Gender Selection Female   6 Minute Walk Predicted (Female) 377.09   6 Minute Walk Predicted (Male) 358.26   6 Minute Walk Distance (Initial) 310.97 Meters   6-min Walk Distance (Discharge) 365.85 Meters   Resting HR 90   Exercise    Resting /65   Exercise /60   SpO2 93   Exercise SpO2 89   Current MET level 3.5 " "  Exercise Tolerance good   Normal Limits Discussed Yes   Current Symptoms at Home Dyspnea;Fatigue   Current Symptoms in Rehab Dyspnea;Fatigue;Joint pain   Limitations none   Nutrition Management   Age 82   Height 1.575 m (5' 2.01\")   Weight 67.1 kg (148 lb)   BMI (Calculated) 27.12   Assessment Normal   Goal weight (not discussed)   Interventions Planned NA   Psychosocial   Initial Patient Health Questionnaire -9 (PHQ-9) for depression. To notify physician if pre-score >9. 3   Initial [employer] COOP Survey score. A quality of life measure. To re evaluate if total score is > 25. Also consider PHQ-9 and DASI to determine if patient should be referred back to physician. 22   Initial Shortness of Breath Questionnaire (SOBQ) score. The goal is to reduce the score pre to post program. 39   Discharge PHQ-9 for depression 1   Discharge [employer] COOP Survey Score 15   Discharge SOBQ Score 31   Intervention Planned Use breathing techniques to control dyspnea cycle;Introduce relaxation techniques to assist with decreased anxiety   Interventions Completed Demonstrated ability to apply breathing techniques to control dyspnea cycle;Introduced to Relaxation Techniques to assist with decreased anxiety   Psychosocial Comments 6/12/17 Pt lives alone and has son 3 miles away who helps her in a large way  Pt has a happy outlook on life.6/26/17  No change at this point. Have only seen pt twice so far. 7/20/17 Pt loves dog and loves to flower garden and water the plants. No intervention for depression/anxiety needed. 8/28/17 at discharge her son is her supporting her and encouraging her to keep the activities up.   Stages of Change   Aerobic Exercise Action   Physical Activity Action   Recommended diet Maintenance   Stress Maintenance   Smoking Cessation Maintenance   Oxygen Usage NA   Current Home Exercise   Type of Exercise Walking   Frequency (days per week) 3   Duration (minutes per session) 10\"   Recommended Home Exercise " Prescription   Type of Exercise Walking   Frequency (Days per week) 1-2   Duration (minutes per session) 15-30 min   Effort Rating Recommended (0-10) Scale  4-6/10   30 Day Exercise Plan 6/12/17 Start home walking shortly after starting [place].. Use step counter. 6/26/17 Pt was given a stepometer to keep track of her steps and report back.. Pt to supplement 1-2 days aerobic at home following [place] guildlines. 7/20/17 Pt is tracking her steps but the only exercise she is getting is walking 4/10th mile to mailbox and back. Pt to start TM at [employer] 20-30 min continuous.8/8 Pt to continue with current 30 day goal and think about transitioning to WEL program upon completion of [place] for continued exercise adherence.   Learning Assessment   Learner Patient   Primary Language English   Preferred Learning Style Listening;Reading;Demonstration;Pictures/Video   Barriers to Learning No barriers noted   Patient Education/Referrals   Education Recommended Activities of Daily Living;Breathing Techniques;Energy Conservation;Exercise Principles   Education Attended Breathing Techniques;Activities of Daily Living;Exercise Principles   Referral(s) Recommended None   Follow-up/On-going Support   Provider follow-up needed on the following No follow-up needed   Pulmonary Rehab Goals   Pulmonary Rehab Goals 1;2;3   Goal 1   Goal Pt to learn breathing techniques to better manage her resp symptoms when working out in the yard.   Target Date 08/13/17   Date Met 08/28/17   Progress Towards Goal 6/26/17 Handout on abdominal breathing and asked her to practice 7/20/17 Pt has practiced and relates that the diaphramatic breathing is feeling backwards. Dose PLB PRN. 8/8 Pt admits she has not yet practiced these techniques at home. She states they are difficult to remember to do when your body isn't used to doing them. Encouraged her to practice the techniques when bending over and going upstairs. 8/28/17 Pt is using PLB independently during 6 min  walk test today. Knows about abd breathing but does not automatically use unless reminded.   Goal 2   Goal Pt to learn stairclimbing technique to be no more SOB at the top of the flight by demonstrating back at %   Target Date 08/13/17   Date Met 06/26/17   Progress Towards Goal 6/26/17 Pt demonstrated this goal at 100% today   Goal 3   Goal Pt to start a home exercise program to increase strength and endurance  to be carried out after FL ends by attending exercise principles class.   Target Date 08/13/17   Date Met 08/28/17   Progress Towards Goal 6/26/17 Stepometer given to count steps. 7/20/17 Pt walks to the mailbox and back for 4/10th of a mile. Recommended she TM at her sons house 1-2 times/week. She just had the exercise principles class 2 days ago. 8/8 Information given today on Pt continuing to exercise in WEL program. She has not been able to find motivation to exercise at her [employer] house or to consistently walk outside. Pt would benefit from having an exercise program long term in a temperature controlled safe environment. 8/28/17 Son is here with pt and he would like us to generate an order for the WEL program for his mother. She continues to be active but will need controlled enviroment for fall and winter.   Assessment   Assessment Pt is a 81 yo with stage II COPD. Also watching a pulmonary nodual and had an abdominal mass removed in March. SHe would like to get into her best physical shape possible to build strength and endurance to continue to live alone with family help. 6/26/17 ITP updated today. Pt is her for her 2nd session and orientating to equipment. Skilled care needed to meet her personal goals as well encourage home exercise and overall education on how to live with COPD. 7/20/17 ITP updated today. Skilled care will continue to provide encoragement to start home exercise program before FL has ended. Not currently doing aerobic. Reviewed what that may look like and decided she could TM  "at her sons house. Also, continue with education classes to cover anatomy and nutrition.8/8 Pt has been progressing well with MET level and endurance in [place]. She has increased her MET level by 1.3 METs over the course of rehab thus far. She attended nutrition class today. Pt given information on continuing to exercise in WEL program upon completion of [place]. She will let therapists know if she is interested. Pt to continue KS for continued exercise progression, continued reiteration of proper breathing techniques with ADLs and exercise, and education on exercise progression. 8/28/17 Pt is discharging today. She has made significant gains in exercise tolerance. Initially she tolerated intermittent minutes at 2.1 mets, now tolerates 30\" at 3.5 mets. Pt also increased her 6 min walk test [id number]' to 1200'  , an increase of 180ft. The pt was given information on intensity, frequency, duration as well as conditioning exercises and streches. Pt plans to do the WEL program and continue gardening, walking to the mailbox and starting Yoga.    Spent 60\" 1:1 with pt for 6 min walk, discharge eval and surveys.     "

## 2017-08-31 ENCOUNTER — TELEPHONE (OUTPATIENT)
Dept: INTERNAL MEDICINE | Facility: CLINIC | Age: 82
End: 2017-08-31

## 2017-08-31 NOTE — TELEPHONE ENCOUNTER
[facility]  Wellness and exercise program  Permission to participate  G's yellow folder  Fax back

## 2017-09-02 ENCOUNTER — APPOINTMENT (OUTPATIENT)
Dept: GENERAL RADIOLOGY | Facility: CLINIC | Age: 82
End: 2017-09-02
Attending: EMERGENCY MEDICINE
Payer: MEDICARE

## 2017-09-02 ENCOUNTER — HOSPITAL ENCOUNTER (EMERGENCY)
Facility: CLINIC | Age: 82
Discharge: HOME | End: 2017-09-02
Attending: EMERGENCY MEDICINE | Admitting: EMERGENCY MEDICINE
Payer: MEDICARE

## 2017-09-02 ENCOUNTER — APPOINTMENT (OUTPATIENT)
Dept: CT IMAGING | Facility: CLINIC | Age: 82
End: 2017-09-02
Attending: EMERGENCY MEDICINE
Payer: MEDICARE

## 2017-09-02 VITALS
OXYGEN SATURATION: 91 % | HEIGHT: 64 IN | DIASTOLIC BLOOD PRESSURE: 81 MMHG | RESPIRATION RATE: 16 BRPM | WEIGHT: 140 LBS | BODY MASS INDEX: 23.9 KG/M2 | SYSTOLIC BLOOD PRESSURE: 144 MMHG | TEMPERATURE: 98.3 F

## 2017-09-02 DIAGNOSIS — S16.1XXA STRAIN OF NECK MUSCLE, INITIAL ENCOUNTER: ICD-10-CM

## 2017-09-02 LAB
ALBUMIN UR-MCNC: NEGATIVE MG/DL
ANION GAP SERPL CALCULATED.3IONS-SCNC: 10 MMOL/L (ref 3–14)
APPEARANCE UR: CLEAR
BASOPHILS # BLD AUTO: 0 10E9/L (ref 0–0.2)
BASOPHILS NFR BLD AUTO: 0.2 %
BILIRUB UR QL STRIP: NEGATIVE
BUN SERPL-MCNC: 13 MG/DL (ref 7–30)
CALCIUM SERPL-MCNC: 8.9 MG/DL (ref 8.5–10.1)
CHLORIDE SERPL-SCNC: 101 MMOL/L (ref 94–109)
CO2 SERPL-SCNC: 24 MMOL/L (ref 20–32)
COLOR UR AUTO: YELLOW
CREAT SERPL-MCNC: 0.63 MG/DL (ref 0.52–1.04)
DIFFERENTIAL METHOD BLD: ABNORMAL
EOSINOPHIL # BLD AUTO: 0.1 10E9/L (ref 0–0.7)
EOSINOPHIL NFR BLD AUTO: 0.6 %
ERYTHROCYTE [DISTWIDTH] IN BLOOD BY AUTOMATED COUNT: 13.6 % (ref 10–15)
GFR SERPL CREATININE-BSD FRML MDRD: >90 ML/MIN/1.7M2
GLUCOSE SERPL-MCNC: 115 MG/DL (ref 70–99)
GLUCOSE UR STRIP-MCNC: NEGATIVE MG/DL
HCT VFR BLD AUTO: 40 % (ref 35–47)
HGB BLD-MCNC: 13.5 G/DL (ref 11.7–15.7)
HGB UR QL STRIP: ABNORMAL
IMM GRANULOCYTES # BLD: 0.1 10E9/L (ref 0–0.4)
IMM GRANULOCYTES NFR BLD: 0.6 %
KETONES UR STRIP-MCNC: 20 MG/DL
LACTATE SERPL-SCNC: 0.8 MMOL/L (ref 0.4–2)
LEUKOCYTE ESTERASE UR QL STRIP: ABNORMAL
LYMPHOCYTES # BLD AUTO: 0.8 10E9/L (ref 0.8–5.3)
LYMPHOCYTES NFR BLD AUTO: 5.6 %
MCH RBC QN AUTO: 28.6 PG (ref 26.5–33)
MCHC RBC AUTO-ENTMCNC: 33.8 G/DL (ref 31.5–36.5)
MCV RBC AUTO: 85 FL (ref 78–100)
MONOCYTES # BLD AUTO: 1 10E9/L (ref 0–1.3)
MONOCYTES NFR BLD AUTO: 6.9 %
MUCOUS THREADS #/AREA URNS LPF: PRESENT /LPF
NEUTROPHILS # BLD AUTO: 12.4 10E9/L (ref 1.6–8.3)
NEUTROPHILS NFR BLD AUTO: 86.1 %
NITRATE UR QL: NEGATIVE
NRBC # BLD AUTO: 0 10*3/UL
NRBC BLD AUTO-RTO: 0 /100
PH UR STRIP: 6 PH (ref 5–7)
PLATELET # BLD AUTO: 237 10E9/L (ref 150–450)
POTASSIUM SERPL-SCNC: 4 MMOL/L (ref 3.4–5.3)
RBC # BLD AUTO: 4.72 10E12/L (ref 3.8–5.2)
RBC #/AREA URNS AUTO: <1 /HPF (ref 0–2)
SODIUM SERPL-SCNC: 135 MMOL/L (ref 133–144)
SOURCE: ABNORMAL
SP GR UR STRIP: 1.01 (ref 1–1.03)
SQUAMOUS #/AREA URNS AUTO: 2 /HPF (ref 0–1)
UROBILINOGEN UR STRIP-MCNC: 0 MG/DL (ref 0–2)
WBC # BLD AUTO: 14.4 10E9/L (ref 4–11)
WBC #/AREA URNS AUTO: 1 /HPF (ref 0–2)

## 2017-09-02 PROCEDURE — 25000128 H RX IP 250 OP 636: Performed by: EMERGENCY MEDICINE

## 2017-09-02 PROCEDURE — 70491 CT SOFT TISSUE NECK W/DYE: CPT

## 2017-09-02 PROCEDURE — 71020 XR CHEST 2 VW: CPT

## 2017-09-02 PROCEDURE — 36415 COLL VENOUS BLD VENIPUNCTURE: CPT | Performed by: EMERGENCY MEDICINE

## 2017-09-02 PROCEDURE — 85025 COMPLETE CBC W/AUTO DIFF WBC: CPT | Performed by: EMERGENCY MEDICINE

## 2017-09-02 PROCEDURE — 96375 TX/PRO/DX INJ NEW DRUG ADDON: CPT

## 2017-09-02 PROCEDURE — 99285 EMERGENCY DEPT VISIT HI MDM: CPT | Mod: 25

## 2017-09-02 PROCEDURE — 80048 BASIC METABOLIC PNL TOTAL CA: CPT | Performed by: EMERGENCY MEDICINE

## 2017-09-02 PROCEDURE — 83605 ASSAY OF LACTIC ACID: CPT | Performed by: EMERGENCY MEDICINE

## 2017-09-02 PROCEDURE — 25000132 ZZH RX MED GY IP 250 OP 250 PS 637: Mod: GY | Performed by: EMERGENCY MEDICINE

## 2017-09-02 PROCEDURE — 81001 URINALYSIS AUTO W/SCOPE: CPT | Performed by: EMERGENCY MEDICINE

## 2017-09-02 PROCEDURE — A9270 NON-COVERED ITEM OR SERVICE: HCPCS | Mod: GY | Performed by: EMERGENCY MEDICINE

## 2017-09-02 PROCEDURE — 96374 THER/PROPH/DIAG INJ IV PUSH: CPT | Mod: 59

## 2017-09-02 PROCEDURE — 96361 HYDRATE IV INFUSION ADD-ON: CPT

## 2017-09-02 RX ORDER — DIAZEPAM 2 MG
2 TABLET ORAL EVERY 12 HOURS PRN
Qty: 8 TABLET | Refills: 0 | Status: SHIPPED | OUTPATIENT
Start: 2017-09-02 | End: 2018-03-13

## 2017-09-02 RX ORDER — KETOROLAC TROMETHAMINE 15 MG/ML
15 INJECTION, SOLUTION INTRAMUSCULAR; INTRAVENOUS ONCE
Status: COMPLETED | OUTPATIENT
Start: 2017-09-02 | End: 2017-09-02

## 2017-09-02 RX ORDER — IOPAMIDOL 755 MG/ML
500 INJECTION, SOLUTION INTRAVASCULAR ONCE
Status: COMPLETED | OUTPATIENT
Start: 2017-09-02 | End: 2017-09-02

## 2017-09-02 RX ORDER — DIAZEPAM 2 MG
2 TABLET ORAL ONCE
Status: COMPLETED | OUTPATIENT
Start: 2017-09-02 | End: 2017-09-02

## 2017-09-02 RX ADMIN — IOPAMIDOL 80 ML: 755 INJECTION, SOLUTION INTRAVENOUS at 09:02

## 2017-09-02 RX ADMIN — SODIUM CHLORIDE 65 ML: 9 INJECTION, SOLUTION INTRAVENOUS at 09:02

## 2017-09-02 RX ADMIN — DIAZEPAM 2 MG: 2 TABLET ORAL at 08:04

## 2017-09-02 RX ADMIN — SODIUM CHLORIDE 1000 ML: 9 INJECTION, SOLUTION INTRAVENOUS at 08:13

## 2017-09-02 RX ADMIN — KETOROLAC TROMETHAMINE 15 MG: 15 INJECTION, SOLUTION INTRAMUSCULAR; INTRAVENOUS at 08:15

## 2017-09-02 ASSESSMENT — ENCOUNTER SYMPTOMS
CONSTIPATION: 0
FREQUENCY: 0
VOMITING: 0
HEADACHES: 0
CHILLS: 0
DIAPHORESIS: 0
DYSURIA: 0
ABDOMINAL PAIN: 0
BLOOD IN STOOL: 0
BACK PAIN: 0
WEAKNESS: 0
DIARRHEA: 0
SHORTNESS OF BREATH: 0
FEVER: 0
NUMBNESS: 0
NAUSEA: 0
APPETITE CHANGE: 0
NECK PAIN: 1

## 2017-09-02 NOTE — ED PROVIDER NOTES
History     Chief Complaint:  Neck Pain      HPI   Gifty Villa is a 82 year old female who presents to the emergency department today for evaluation of neck pain. The patient reports going to bed feeling fine but waking up at approximately 0200 this morning with bilateral neck pain. Due to worsening symptoms, she presents to the emergency department for further evaluation. The patient reports pain is 8/10 in severity, nonradiating and achy, but sharp/worse with movement. It is worse in the left side. She denies chest pain, shortness of breath, abdominal pain, headache, vision changes, numbness, weakness, nausea, vomiting, falls, appetite change, urinary or bowel irregularities, or rash.    Allergies:  No Known Drug Allergies     Medications:    Tiotropium Bromide-Olodaterol (STIOLTO RESPIMAT IN)  albuterol (ALBUTEROL) 108 (90 BASE) MCG/ACT Inhaler  amLODIPine (NORVASC) 10 MG tablet  omeprazole (PRILOSEC) 40 MG capsule  solifenacin (VESICARE) 10 MG tablet  ASPIRIN 81 MG OR TABS    Past Medical History:    Arthritis  Backache  Ht  Frequency-urgency syndrome  Hyperlipidemia  RLQ abdominal mass  Asthma    Family History:    Alzheimer Disease  CAD  Cancer    Social History:  The patient was accompanied to the ED by her son.  She lives alone in a house but 3 miles from her son.    Smoking Status: Former  Smokeless Tobacco: Never  Alcohol Use: No  Marital Status:       Review of Systems   Constitutional: Negative for appetite change, chills, diaphoresis and fever.   Eyes: Negative for visual disturbance.   Respiratory: Negative for shortness of breath.    Cardiovascular: Negative for chest pain.   Gastrointestinal: Negative for abdominal pain, blood in stool, constipation, diarrhea, nausea and vomiting.   Genitourinary: Negative for dysuria and frequency.   Musculoskeletal: Positive for neck pain (bilateral). Negative for back pain.   Skin: Negative for rash.   Neurological: Negative for weakness, numbness  "and headaches.   All other systems reviewed and are negative.    Physical Exam   First Vitals:  BP: 154/80  Heart Rate: 108  Temp: 98.3  F (36.8  C)  Resp: 16  Height: 162.6 cm (5' 4\")  Weight: 63.5 kg (140 lb)  SpO2: 96 %      Physical Exam  Constitutional: Well developed, nontox appearance  Head: Atraumatic.   Mouth/Throat: Oropharynx is clear and moist.   Neck: Full ROM w/ pain to bilat trapezius muscles, no stridor, tenderness to left trapezius, no midline C spine tenderness  Eyes: EOMI, PERRL, no scleral icterus  Cardiovascular: Regular tachycardia, no m/r/g, intact bilat radial pulses  Pulmonary/Chest: nml resp effort, Clear BS bilat  Abdominal: ND, soft, NT, no rebound or guarding   Ext: WWP, no edema  Neurological: A&Ox3, symmetric facies, 5/5 strength throughout upper and lower ext, symmetric; sensation grossly intact  Skin: Skin is warm and dry.   Psychiatric: Behavior is normal. Thought content normal.   Nursing note and vitals reviewed.    Emergency Department Course     Imaging:  Imaging findings were communicated with the patient who voiced understanding of the findings.  XR Chest 2 Views  IMPRESSION: No active disease.  Report per radiology     Soft tissue neck CT w contrast  IMPRESSION: No evidence for abscess or any acute neck pathology.  Report per radiology     Laboratory:  Laboratory findings were communicated with the patient who voiced understanding of the findings.  CBC: WBC 14.4 (H) o/w WNL. (HGB 13.5, )   BMP: Glucose 115 (H) o/w WNL (Creatinine 0.63)  UA with Microscopic: Urine blood small, Leukocyte Esterase urine small, Mucous Urine Present, Squamous Epithelial/HPF Urine 2 (H), Urineketon 20, o/w WNL.  Lactic Acid: 0.8    Interventions:  0804 Valium 2 mg PO  0813 NS Bolus 1,000mL IV  0815 Toradol 15 mg IV     Emergency Department Course:  Nursing notes and vitals reviewed.  IV was inserted and blood was drawn for laboratory testing, results above.  The patient provided a urine " sample here in the emergency department. This was sent for laboratory testing, findings above.  The patient was sent for a XR Chest 2 Views and Soft tissue neck CT w contrast while in the emergency department, results above.   0751: I performed an exam of the patient as documented above.   1000: Patient rechecked and updated.   I discussed the treatment plan with the patient. They expressed understanding of this plan and consented to discharge. They will be discharged home with instructions for care and follow up. In addition, the patient will return to the emergency department if their symptoms persist, worsen, if new symptoms arise or if there is any concern.  All questions were answered.  I personally reviewed the laboratory and imaging results with the Patient and answered all related questions prior to discharge.    Impression & Plan      Medical Decision Making:  Gifty Villa is a 82 year old female presenting with neck pain, left greater than right.    Patient's presentation consistent with muscular strain vs spasm. Patient likely slept in a position that caused her pain to begin during the night. Symptoms and exam not indicative of infection, trauma and no C spine tenderness indicating bony abnormality or discitis. Patient with regular tachycardia upon initial exam which prompted baseline blood work-up which yielded a subsequent leukocytosis. Given leukocytosis and tachycardia an infectious workup was initiated with a CT neck soft tissue, Chest XR and UA all of which returned unremarkable. Patient's tachycardia improved after Toradol, valium and IV fluids. Patient reports feeling significantly improved. Given no noted infectious etiology on exam or in workup I feel patient is safe to discharge with recommendations given regarding follow up with primary care physician this coming week as well as return to emergency department as needed for worsening symptoms. Patient understanding and agreeable to plan;  discharged in improved condition.    Diagnosis:    ICD-10-CM    1. Strain of neck muscle, initial encounter S16.1XXA        Disposition:  Discharged to home    Discharge Medications:  New Prescriptions    DIAZEPAM (VALIUM) 2 MG TABLET    Take 1 tablet (2 mg) by mouth every 12 hours as needed for muscle spasms     Scribe Disclosure:  SUSAN Alicia Soria, am serving as a scribe at 7:55 AM on 9/2/2017 to document services personally performed by Aldo Shannon MD based on my observations and the provider's statements to me.   9/2/2017   [facility] EMERGENCY DEPARTMENT       Aldo Shannon MD  09/02/17 1037

## 2017-09-02 NOTE — ED AVS SNAPSHOT
[facility] Emergency Department    [address]    [facility] [address]    Phone:  [phone]    Fax:  [phone]                                       Gifty Villa   MRN: [id number]    Department:  [facility] Emergency Department   Date of Visit:  9/2/2017           After Visit Summary Signature Page     I have received my discharge instructions, and my questions have been answered. I have discussed any challenges I see with this plan with the nurse or doctor.    ..........................................................................................................................................  Patient/Patient Representative Signature      ..........................................................................................................................................  Patient Representative Print Name and Relationship to Patient    ..................................................               ................................................  Date                                            Time    ..........................................................................................................................................  Reviewed by Signature/Title    ...................................................              ..............................................  Date                                                            Time

## 2017-09-02 NOTE — ED NOTES
Patient arrives here for evaluation neck that started around 2:00 AM this morning. Notes is bilateral lateral neck pain. Denies recent injury or previous surgery. AOX4, ABC's intact.

## 2017-09-02 NOTE — DISCHARGE INSTRUCTIONS
Take the below medications as prescribed.  Please do not miss any doses.    New Prescriptions    DIAZEPAM (VALIUM) 2 MG TABLET    Take 1 tablet (2 mg) by mouth every 12 hours as needed for muscle spasms     1. -Take acetaminophen 500 to 1000 mg by mouth every 4 to 6 hours as needed for pain or fever.  Do not take more than 4000 mg in 24 hours.  Do not take within 6 hours of another acetaminophen containing medication such as norco (vicodin) or percocet.  - Take ibuprofen 600 mg by mouth every 6 to 8 hours as needed for pain or fever  2. Take valium sparingly as needed if ibuprofen and tylenol are not improving your neck pain.  3. Please follow-up with your primary care doctor this week for recheck.  4. Please return to the ED as needed for new or worsening symptoms such as vomiting and unable to keep anything down, fever greater than 100.4 F , worsening cough or shortness of breath, headache, changes in urination such as burning or increased frequency, any other concerning symptoms.

## 2017-09-02 NOTE — ED AVS SNAPSHOT
[facility] Emergency Department    [address]    [facility] [address]    Phone:  [phone]    Fax:  [phone]                                       Gifty Villa   MRN: [id number]    Department:  [facility] Emergency Department   Date of Visit:  9/2/2017           Patient Information     Date Of Birth          1935        Your diagnoses for this visit were:     Strain of neck muscle, initial encounter        You were seen by Aldo Shannon MD.      Follow-up Information     Follow up with Jun eTllez MD In 1 week.    Specialty:  Internal Medicine    Contact information:    [address] [facility] [address]  [facility] [address]  [phone]          Follow up with [facility] Emergency Department.    Specialty:  EMERGENCY MEDICINE    Why:  As needed    Contact information:    [address]  [facility] [address] [phone]        Discharge Instructions       Take the below medications as prescribed.  Please do not miss any doses.    New Prescriptions    DIAZEPAM (VALIUM) 2 MG TABLET    Take 1 tablet (2 mg) by mouth every 12 hours as needed for muscle spasms     1. -Take acetaminophen 500 to 1000 mg by mouth every 4 to 6 hours as needed for pain or fever.  Do not take more than 4000 mg in 24 hours.  Do not take within 6 hours of another acetaminophen containing medication such as norco (vicodin) or percocet.  - Take ibuprofen 600 mg by mouth every 6 to 8 hours as needed for pain or fever  2. Take valium sparingly as needed if ibuprofen and tylenol are not improving your neck pain.  3. Please follow-up with your primary care doctor this week for recheck.  4. Please return to the ED as needed for new or worsening symptoms such as vomiting and unable to keep anything down, fever greater than 100.4 F , worsening cough or shortness of breath, headache, changes in urination such as burning or increased frequency, any other  concerning symptoms.    24 Hour Appointment Hotline       To make an appointment at any [facility], call [phone] ([phone]). If you don't have a family doctor or clinic, we will help you find one. [place] clinics are conveniently located to serve the needs of you and your family.             Review of your medicines      START taking        Dose / Directions Last dose taken    diazepam 2 MG tablet   Commonly known as:  VALIUM   Dose:  2 mg   Quantity:  8 tablet        Take 1 tablet (2 mg) by mouth every 12 hours as needed for muscle spasms   Refills:  0          Our records show that you are taking the medicines listed below. If these are incorrect, please call your family doctor or clinic.        Dose / Directions Last dose taken    amLODIPine 10 MG tablet   Commonly known as:  NORVASC   Dose:  10 mg   Quantity:  90 tablet        Take 1 tablet (10 mg) by mouth daily   Refills:  0        aspirin 81 MG tablet   Quantity:  0        ONE DAILY   Refills:  0        DAILY MULTIVITAMIN PO        Reported on 3/10/2017   Refills:  0        omeprazole 40 MG capsule   Commonly known as:  priLOSEC   Quantity:  90 capsule        Take 30-60 minutes before a meal.  Take twice daily for 4 weeks and then once daily   Refills:  3        PROAIR  (90 BASE) MCG/ACT Inhaler   Dose:  2 puff   Generic drug:  albuterol        Inhale 2 puffs into the lungs as needed for shortness of breath / dyspnea or wheezing   Refills:  0        solifenacin 10 MG tablet   Commonly known as:  VESICARE   Dose:  10 mg   Quantity:  90 tablet        Take 1 tablet (10 mg) by mouth daily   Refills:  3        STIOLTO RESPIMAT IN        Inhale into the lungs daily   Refills:  0                Prescriptions were sent or printed at these locations (1 Prescription)                   Other Prescriptions                Printed at Department/Unit printer (1 of 1)         diazepam (VALIUM) 2 MG tablet                Procedures and tests  performed during your visit     Basic metabolic panel    CBC with platelets differential    Lactic acid    Soft tissue neck CT w contrast    UA with Microscopic reflex to Culture    XR Chest 2 Views      Orders Needing Specimen Collection     None      Pending Results     No orders found from 8/31/2017 to 9/3/2017.            Pending Culture Results     No orders found from 8/31/2017 to 9/3/2017.            Pending Results Instructions     If you had any lab results that were not finalized at the time of your Discharge, you can call the ED Lab Result RN at [phone]. You will be contacted by this team for any positive Lab results or changes in treatment. The nurses are available 7 days a week from 10A to 6:30P.  You can leave a message 24 hours per day and they will return your call.        Test Results From Your Hospital Stay        9/2/2017  8:25 AM      Component Results     Component Value Ref Range & Units Status    WBC 14.4 (H) 4.0 - 11.0 10e9/L Final    RBC Count 4.72 3.8 - 5.2 10e12/L Final    Hemoglobin 13.5 11.7 - 15.7 g/dL Final    Hematocrit 40.0 35.0 - 47.0 % Final    MCV 85 78 - 100 fl Final    MCH 28.6 26.5 - 33.0 pg Final    MCHC 33.8 31.5 - 36.5 g/dL Final    RDW 13.6 10.0 - 15.0 % Final    Platelet Count 237 150 - 450 10e9/L Final    Diff Method Automated Method  Final    % Neutrophils 86.1 % Final    % Lymphocytes 5.6 % Final    % Monocytes 6.9 % Final    % Eosinophils 0.6 % Final    % Basophils 0.2 % Final    % Immature Granulocytes 0.6 % Final    Nucleated RBCs 0 0 /100 Final    Absolute Neutrophil 12.4 (H) 1.6 - 8.3 10e9/L Final    Absolute Lymphocytes 0.8 0.8 - 5.3 10e9/L Final    Absolute Monocytes 1.0 0.0 - 1.3 10e9/L Final    Absolute Eosinophils 0.1 0.0 - 0.7 10e9/L Final    Absolute Basophils 0.0 0.0 - 0.2 10e9/L Final    Abs Immature Granulocytes 0.1 0 - 0.4 10e9/L Final    Absolute Nucleated RBC 0.0  Final         9/2/2017  8:39 AM      Component Results     Component Value Ref Range  & Units Status    Sodium 135 133 - 144 mmol/L Final    Potassium 4.0 3.4 - 5.3 mmol/L Final    Chloride 101 94 - 109 mmol/L Final    Carbon Dioxide 24 20 - 32 mmol/L Final    Anion Gap 10 3 - 14 mmol/L Final    Glucose 115 (H) 70 - 99 mg/dL Final    Urea Nitrogen 13 7 - 30 mg/dL Final    Creatinine 0.63 0.52 - 1.04 mg/dL Final    GFR Estimate >90 >60 mL/min/1.7m2 Final    Non  GFR Calc    GFR Estimate If Black >90 >60 mL/min/1.7m2 Final    African American GFR Calc    Calcium 8.9 8.5 - 10.1 mg/dL Final         9/2/2017 10:08 AM      Narrative     CHEST TWO VIEWS  9/2/2017 9:14 AM     HISTORY: Tachycardia, leukocytosis.    COMPARISON: 4/8/2017        Impression     IMPRESSION: No active disease.    SUE PEDRAZA MD         9/2/2017  9:18 AM      Component Results     Component Value Ref Range & Units Status    Color Urine Yellow  Final    Appearance Urine Clear  Final    Glucose Urine Negative NEG^Negative mg/dL Final    Bilirubin Urine Negative NEG^Negative Final    Ketones Urine 20 (A) NEG^Negative mg/dL Final    Specific Gravity Urine 1.015 1.003 - 1.035 Final    Blood Urine Small (A) NEG^Negative Final    pH Urine 6.0 5.0 - 7.0 pH Final    Protein Albumin Urine Negative NEG^Negative mg/dL Final    Urobilinogen mg/dL 0.0 0.0 - 2.0 mg/dL Final    Nitrite Urine Negative NEG^Negative Final    Leukocyte Esterase Urine Small (A) NEG^Negative Final    Source Midstream Urine  Final    WBC Urine 1 0 - 2 /HPF Final    RBC Urine <1 0 - 2 /HPF Final    Squamous Epithelial /HPF Urine 2 (H) 0 - 1 /HPF Final    Mucous Urine Present (A) NEG^Negative /LPF Final         9/2/2017  9:59 AM      Component Results     Component Value Ref Range & Units Status    Lactic Acid 0.8 0.4 - 2.0 mmol/L Final         9/2/2017  9:25 AM      Narrative     CT SCAN OF THE NECK WITH CONTRAST  9/2/2017 9:07 AM     HISTORY: Bilateral neck pain, left worse than right. Tachycardia,  leukocytosis. Evaluate soft tissue  infection.    TECHNIQUE:  Axial images and coronal reformations. 80 mL Isovue-370  IV. Radiation dose for this scan was reduced using automated exposure  control, adjustment of the mA and/or kV according to patient size, or  iterative reconstruction technique.    COMPARISON: None.    FINDINGS:   Visualized sinuses, nasopharynx and orbits: Normal.      Tongue, oral cavity and oropharynx:  Normal.      Hypopharynx: Normal.      Larynx and trachea: Normal.      Thyroid: Normal.    Submandibular glands: Normal.      Parotid glands: Normal.        Lymph nodes: Normal.      Vasculature: Mild vascular calcifications noted in carotid  bifurcations and at the skull base.    Upper mediastinum and lungs: Normal.      Bones: Mild degenerative changes are seen in the spine.        Impression     IMPRESSION: No evidence for abscess or any acute neck pathology.    SUE PEDRAZA MD                Clinical Quality Measure: Blood Pressure Screening     Your blood pressure was checked while you were in the emergency department today. The last reading we obtained was  BP: 144/81 . Please read the guidelines below about what these numbers mean and what you should do about them.  If your systolic blood pressure (the top number) is less than 120 and your diastolic blood pressure (the bottom number) is less than 80, then your blood pressure is normal. There is nothing more that you need to do about it.  If your systolic blood pressure (the top number) is 120-139 or your diastolic blood pressure (the bottom number) is 80-89, your blood pressure may be higher than it should be. You should have your blood pressure rechecked within a year by a primary care provider.  If your systolic blood pressure (the top number) is 140 or greater or your diastolic blood pressure (the bottom number) is 90 or greater, you may have high blood pressure. High blood pressure is treatable, but if left untreated over time it can put you at risk for heart attack,  "stroke, or kidney failure. You should have your blood pressure rechecked by a primary care provider within the next 4 weeks.  If your provider in the emergency department today gave you specific instructions to follow-up with your doctor or provider even sooner than that, you should follow that instruction and not wait for up to 4 weeks for your follow-up visit.        Thank you for choosing [facility]       Thank you for choosing [facility] for your care. Our goal is always to provide you with excellent care. Hearing back from our patients is one way we can continue to improve our services. Please take a few minutes to complete the written survey that you may receive in the mail after you visit with us. Thank you!        Geoshohart Information     RemitDATA lets you send messages to your doctor, view your test results, renew your prescriptions, schedule appointments and more. To sign up, go to [email or web address] . Click on \"Log in\" on the left side of the screen, which will take you to the Welcome page. Then click on \"Sign up Now\" on the right side of the page.     You will be asked to enter the access code listed below, as well as some personal information. Please follow the directions to create your username and password.     Your access code is: [email or web address]  Expires: 2017 10:50 AM     Your access code will  in 90 days. If you need help or a new code, please call your [facility] clinic or [phone].        Care EveryWhere ID     This is your Care EveryWhere ID. This could be used by other organizations to access your [facility] medical records  [id number]        Equal Access to Services     TASNEEM GRACIA : Hadpetros fountain Sokristen, waaxda luqadaha, qaybta kaalmada jaymie, cathy samuel. So [facility] [phone].    ATENCIÓN: Si habla español, tiene a robertson disposición servicios gratuitos de asistencia lingüística. Llame al [phone].    We comply with applicable federal " civil rights laws and [place] laws. We do not discriminate on the basis of race, color, national origin, age, disability sex, sexual orientation or gender identity.            After Visit Summary       This is your record. Keep this with you and show to your community pharmacist(s) and doctor(s) at your next visit.

## 2017-09-05 ENCOUNTER — MEDICAL CORRESPONDENCE (OUTPATIENT)
Dept: HEALTH INFORMATION MANAGEMENT | Facility: CLINIC | Age: 82
End: 2017-09-05

## 2017-09-06 ENCOUNTER — RADIANT APPOINTMENT (OUTPATIENT)
Dept: GENERAL RADIOLOGY | Facility: CLINIC | Age: 82
End: 2017-09-06
Attending: NURSE PRACTITIONER
Payer: COMMERCIAL

## 2017-09-06 ENCOUNTER — OFFICE VISIT (OUTPATIENT)
Dept: INTERNAL MEDICINE | Facility: CLINIC | Age: 82
End: 2017-09-06
Payer: COMMERCIAL

## 2017-09-06 VITALS
OXYGEN SATURATION: 95 % | TEMPERATURE: 98.7 F | HEIGHT: 64 IN | DIASTOLIC BLOOD PRESSURE: 60 MMHG | HEART RATE: 86 BPM | WEIGHT: 146 LBS | RESPIRATION RATE: 12 BRPM | BODY MASS INDEX: 24.92 KG/M2 | SYSTOLIC BLOOD PRESSURE: 126 MMHG

## 2017-09-06 DIAGNOSIS — I10 ESSENTIAL HYPERTENSION: ICD-10-CM

## 2017-09-06 DIAGNOSIS — Z23 NEED FOR PROPHYLACTIC VACCINATION AND INOCULATION AGAINST INFLUENZA: ICD-10-CM

## 2017-09-06 DIAGNOSIS — J44.1 CHRONIC OBSTRUCTIVE PULMONARY DISEASE WITH ACUTE EXACERBATION (H): Primary | ICD-10-CM

## 2017-09-06 DIAGNOSIS — M54.2 CERVICALGIA: ICD-10-CM

## 2017-09-06 PROCEDURE — 99496 TRANSJ CARE MGMT HIGH F2F 7D: CPT | Performed by: NURSE PRACTITIONER

## 2017-09-06 PROCEDURE — 90662 IIV NO PRSV INCREASED AG IM: CPT | Performed by: NURSE PRACTITIONER

## 2017-09-06 PROCEDURE — G0008 ADMIN INFLUENZA VIRUS VAC: HCPCS | Performed by: NURSE PRACTITIONER

## 2017-09-06 PROCEDURE — A4627 SPACER BAG/RESERVOIR: HCPCS | Performed by: NURSE PRACTITIONER

## 2017-09-06 PROCEDURE — 71020 XR CHEST 2 VW: CPT

## 2017-09-06 RX ORDER — PREDNISONE 20 MG/1
20 TABLET ORAL DAILY
Qty: 5 TABLET | Refills: 0 | Status: SHIPPED | OUTPATIENT
Start: 2017-09-06 | End: 2018-01-24

## 2017-09-06 RX ORDER — CEFDINIR 300 MG/1
300 CAPSULE ORAL 2 TIMES DAILY
Qty: 20 CAPSULE | Refills: 0 | Status: SHIPPED | OUTPATIENT
Start: 2017-09-06 | End: 2018-01-24

## 2017-09-06 NOTE — PROGRESS NOTES
SUBJECTIVE:   Gifty Villa is a 82 year old female who presents to clinic today for the following health issues:      ED/UC Followup:    Facility:  [facility] ED  Date of visit: 9/2/17  Reason for visit: Neck Pain  Current Status: this is improved - she used the valium but no longer needs it      Cough for past 2 weeks with green secretions   History COPD  Just completed pulmonary rehab- feels she is able to be more active since taking that class  No fever   No ear pain or sore throat  Able to sleep laying down   Wakes with cough at times - not sleeping well due to cough   Taking inhalers   Does not feel albuterol helps her much - will try spacer  Offered to try a neb today, but she declined                Problem list and histories reviewed & adjusted, as indicated.  Additional history: as documented    Patient Active Problem List   Diagnosis     Backache     Urge incontinence     Essential hypertension     Aftercare following joint replacement     Knee joint replacement by other means     HYPERLIPIDEMIA LDL GOAL <130     Urinary incontinence     Urgency-frequency syndrome     Advanced directives, counseling/discussion     RLQ abdominal mass     Lumbago     Pain in joint, pelvic region and thigh     Anxiety     COPD (chronic obstructive pulmonary disease) (H)     Major depressive disorder, recurrent episode, in full remission (H)     Chronic obstructive pulmonary disease, unspecified COPD type (H)     Atopic dermatitis, unspecified type     Past Surgical History:   Procedure Laterality Date     ARTHROPLASTY PATELLO-FEMORAL (KNEE)  8/2010    Left TKA     C SPINE FUSION,ANTER,3 SGMTS  1992 1992     C TOTAL HIP ARTHROPLASTY  1995    Hip Replacement, Total (Right)     C VAGINAL HYSTERECTOMY  1978    Hysterectomy, Vaginal w anterior repair     COLONOSCOPY  ~2003    No polyps     COLONOSCOPY N/A 10/10/2014    Procedure: COLONOSCOPY;  Surgeon: Chavez Ward MD;  Location:  GI     ESOPHAGOSCOPY, GASTROSCOPY,  "DUODENOSCOPY (EGD), COMBINED  2016    Dr. Starla PARKS     ESOPHAGOSCOPY, GASTROSCOPY, DUODENOSCOPY (EGD), COMBINED N/A 2016    Procedure: COMBINED ESOPHAGOSCOPY, GASTROSCOPY, DUODENOSCOPY (EGD), BIOPSY SINGLE OR MULTIPLE;  Surgeon: Jada Cha MD;  Location: [facility] GI     EXCISE GANGLION WRIST Left 2015    Procedure: EXCISE GANGLION WRIST;  Surgeon: Justin Lyons MD;  Location: [facility] OR     HYSTERECTOMY, PAP NO LONGER INDICATED       wisdom teeth         Social History   Substance Use Topics     Smoking status: Former Smoker     Quit date: 1988     Smokeless tobacco: Never Used      Comment: Quit in      Alcohol use No     Family History   Problem Relation Age of Onset     Alzheimer Disease Mother       at 98,  of old age     Alzheimer Disease Father       at 78     C.A.D. Father      CANCER Other 90     unknown cancer     Alzheimer Disease Brother       age 82      Family History Negative Brother      Born 1931     CANCER Maternal Aunt      x'2 w Ca unknown etiology     Colon Cancer No family hx of              Reviewed and updated as needed this visit by clinical staff  Tobacco  Allergies  Meds  Soc Hx      Reviewed and updated as needed this visit by Provider         ROS:  Constitutional, HEENT, cardiovascular, pulmonary, GI, , musculoskeletal, neuro, skin, endocrine and psych systems are negative, except as otherwise noted.      OBJECTIVE:   /60 (BP Location: Left arm, Patient Position: Chair, Cuff Size: Adult Large)  Pulse 86  Temp 98.7  F (37.1  C) (Oral)  Resp 12  Ht 5' 4\" (1.626 m)  Wt 146 lb (66.2 kg)  SpO2 95%  BMI 25.06 kg/m2  Body mass index is 25.06 kg/(m^2).  GENERAL:  alert and MILD  Distress with cough - son with patient in room   Patient verbalizes her memory is poor so son provides some of history   RESP: lungs with decreased breath sounds RLL - no wheezing or rhonchi heard   CV: regular rate and rhythm  PSYCH: mentation appears normal- " states is forgetful, affect normal/bright    Diagnostic Test Results:  Chest x ray   Reviewed ER notes     ASSESSMENT/PLAN:        Diagnosis Comments   1. Chronic obstructive pulmonary disease with acute exacerbation (H)  SPACER BAG/RESERVOIR, cefdinir (OMNICEF) 300 MG capsule, predniSONE (DELTASONE) 20 MG tablet, tiotropium-olodaterol (STIOLTO RESPIMAT) 2.5-2.5 MCG/ACT AERS, XR Chest 2 Views    2. Essential hypertension  IN GOOD RANGE ; NO CHANGE    3. Cervicalgia  RESOLVED     RESOLVED              Patient Instructions   Chest x ray in [address]    Prednisone once daily for 5 days     Omnicef twice daily for 10 days     Follow up with any questions or concerns.             WILBERTO [facility]

## 2017-09-06 NOTE — PATIENT INSTRUCTIONS
Chest x ray in [address]    Prednisone once daily for 5 days     Omnicef twice daily for 10 days     Follow up with any questions or concerns.

## 2017-09-06 NOTE — MR AVS SNAPSHOT
"              After Visit Summary   9/6/2017    Gifty Villa    MRN: [id number]           Patient Information     Date Of Birth          1935        Visit Information        Provider Department      9/6/2017 7:00 AM Kylah Schwartz APRN CNP [facility]        Today's Diagnoses     Chronic obstructive pulmonary disease with acute exacerbation (H)    -  1    Essential hypertension          Care Instructions    Chest x ray in [address]    Prednisone once daily for 5 days     Omnicef twice daily for 10 days     Follow up with any questions or concerns.                 Follow-ups after your visit        Who to contact     If you have questions or need follow up information about today's clinic visit or your schedule please contact [facility] directly at [phone].  Normal or non-critical lab and imaging results will be communicated to you by RecoVendhart, letter or phone within 4 business days after the clinic has received the results. If you do not hear from us within 7 days, please contact the clinic through RecoVendhart or phone. If you have a critical or abnormal lab result, we will notify you by phone as soon as possible.  Submit refill requests through Inside Jobs or call your pharmacy and they will forward the refill request to us. Please allow 3 business days for your refill to be completed.          Additional Information About Your Visit        RecoVendhart Information     Inside Jobs lets you send messages to your doctor, view your test results, renew your prescriptions, schedule appointments and more. To sign up, go to [email or web address] . Click on \"Log in\" on the left side of the screen, which will take you to the Welcome page. Then click on \"Sign up Now\" on the right side of the page.     You will be asked to enter the access code listed below, as well as some personal information. Please follow the directions to create your username and password.     Your access code is: " [email or web address]  Expires: 2017 10:50 AM     Your access code will  in 90 days. If you need help or a new code, please call your [place] clinic or [phone].        Care EveryWhere ID     This is your Care EveryWhere ID. This could be used by other organizations to access your [place] medical records  [id number]        Your Vitals Were     Pulse Temperature Respirations Height Pulse Oximetry BMI (Body Mass Index)    86 98.7  F (37.1  C) (Oral) 12 5' 4\" (1.626 m) 95% 25.06 kg/m2       Blood Pressure from Last 3 Encounters:   17 126/60   17 144/81   17 144/74    Weight from Last 3 Encounters:   17 146 lb (66.2 kg)   17 140 lb (63.5 kg)   17 148 lb (67.1 kg)              We Performed the Following     SPACER BAG/RESERVOIR     XR Chest 2 Views          Today's Medication Changes          These changes are accurate as of: 17  7:38 AM.  If you have any questions, ask your nurse or doctor.               Start taking these medicines.        Dose/Directions    cefdinir 300 MG capsule   Commonly known as:  OMNICEF   Used for:  Chronic obstructive pulmonary disease with acute exacerbation (H)   Started by:  Kylah Schwartz APRN CNP        Dose:  300 mg   Take 1 capsule (300 mg) by mouth 2 times daily   Quantity:  20 capsule   Refills:  0       predniSONE 20 MG tablet   Commonly known as:  DELTASONE   Used for:  Chronic obstructive pulmonary disease with acute exacerbation (H)   Started by:  Kylah Schwartz APRN CNP        Dose:  20 mg   Take 1 tablet (20 mg) by mouth daily   Quantity:  5 tablet   Refills:  0         These medicines have changed or have updated prescriptions.        Dose/Directions    solifenacin 10 MG tablet   Commonly known as:  VESICARE   This may have changed:  additional instructions   Used for:  Urinary urgency        Dose:  10 mg   Take 1 tablet (10 mg) by mouth daily   Quantity:  90 tablet   Refills:  3       tiotropium-olodaterol 2.5-2.5 " MCG/ACT Aers   Commonly known as:  STIOLTO RESPIMAT   This may have changed:    - medication strength  - how much to take   Used for:  Chronic obstructive pulmonary disease with acute exacerbation (H)   Changed by:  Kylah Schwartz APRN CNP        Dose:  2 puff   Inhale 2 puffs into the lungs daily   Quantity:  4 g   Refills:  3            Where to get your medicines      These medications were [place] [facility] [address] [place] [address]  [address], [place] [address]     Phone:  [phone]     cefdinir 300 MG capsule    predniSONE 20 MG tablet    tiotropium-olodaterol 2.5-2.5 MCG/ACT Aers                Primary Care Provider Office Phone # Fax #    Jun Tellez [phone]       [address] [facility] [address]  [facility] [address]        Equal Access to Services     [facility]: Hadii aad ku hadasho Soomaali, waaxda luqadaha, qaybta kaalmada adeegyada, waxay tonyin hayaan adefareed moran . So [facility] [phone].    ATENCIÓN: Si habla español, tiene a robertson disposición servicios gratuitos de asistencia lingüística. Llame al [phone].    We comply with applicable federal civil rights laws and [place] laws. We do not discriminate on the basis of race, color, national origin, age, disability sex, sexual orientation or gender identity.            Thank you!     Thank you for choosing [facility]  for your care. Our goal is always to provide you with excellent care. Hearing back from our patients is one way we can continue to improve our services. Please take a few minutes to complete the written survey that you may receive in the mail after your visit with us. Thank you!             Your Updated Medication List - Protect others around you: Learn how to safely use, store and throw away your medicines at www.disposemymeds.org.          This list is accurate as of: 9/6/17  7:38 AM.  Always use your most recent med list.                   Brand Name  Dispense Instructions for use Diagnosis    amLODIPine 10 MG tablet    NORVASC    90 tablet    Take 1 tablet (10 mg) by mouth daily    Essential hypertension       aspirin 81 MG tablet     0    ONE DAILY    Unspecified essential hypertension       cefdinir 300 MG capsule    OMNICEF    20 capsule    Take 1 capsule (300 mg) by mouth 2 times daily    Chronic obstructive pulmonary disease with acute exacerbation (H)       DAILY MULTIVITAMIN PO      Reported on 3/10/2017        diazepam 2 MG tablet    VALIUM    8 tablet    Take 1 tablet (2 mg) by mouth every 12 hours as needed for muscle spasms        predniSONE 20 MG tablet    DELTASONE    5 tablet    Take 1 tablet (20 mg) by mouth daily    Chronic obstructive pulmonary disease with acute exacerbation (H)       PROAIR  (90 BASE) MCG/ACT Inhaler   Generic drug:  albuterol      Inhale 2 puffs into the lungs as needed for shortness of breath / dyspnea or wheezing        solifenacin 10 MG tablet    VESICARE    90 tablet    Take 1 tablet (10 mg) by mouth daily    Urinary urgency       tiotropium-olodaterol 2.5-2.5 MCG/ACT Aers    STIOLTO RESPIMAT    4 g    Inhale 2 puffs into the lungs daily    Chronic obstructive pulmonary disease with acute exacerbation (H)

## 2017-12-19 DIAGNOSIS — I10 ESSENTIAL HYPERTENSION: ICD-10-CM

## 2017-12-19 NOTE — TELEPHONE ENCOUNTER
[facility] calls. Pt requested refill for Amlodipine, they sent this to Dr. Sherman, but she sent response to send this to PCP.     Amlodipine      Last Written Prescription Date:  4/3/17  Last Fill Quantity: 90,   # refills: 0  Last Office Visit: 9/6/17   Future Office visit:       BP Readings from Last 3 Encounters:   09/06/17 126/60   09/02/17 144/81   04/11/17 144/74       Routing refill request to provider for review/approval because:  First time being ordered by Dr. Tellez.

## 2017-12-20 RX ORDER — AMLODIPINE BESYLATE 10 MG/1
10 TABLET ORAL DAILY
Qty: 90 TABLET | Refills: 0 | Status: SHIPPED | OUTPATIENT
Start: 2017-12-20 | End: 2018-03-13

## 2018-01-24 ENCOUNTER — OFFICE VISIT (OUTPATIENT)
Dept: FAMILY MEDICINE | Facility: CLINIC | Age: 83
End: 2018-01-24
Payer: COMMERCIAL

## 2018-01-24 VITALS
SYSTOLIC BLOOD PRESSURE: 122 MMHG | HEIGHT: 64 IN | HEART RATE: 108 BPM | TEMPERATURE: 96.8 F | WEIGHT: 146.13 LBS | OXYGEN SATURATION: 96 % | DIASTOLIC BLOOD PRESSURE: 62 MMHG | BODY MASS INDEX: 24.95 KG/M2

## 2018-01-24 DIAGNOSIS — R41.89 COGNITIVE CHANGES: ICD-10-CM

## 2018-01-24 DIAGNOSIS — R53.83 OTHER FATIGUE: Primary | ICD-10-CM

## 2018-01-24 DIAGNOSIS — E67.2 HYPERVITAMINOSIS B6: ICD-10-CM

## 2018-01-24 LAB
ALBUMIN UR-MCNC: NEGATIVE MG/DL
APPEARANCE UR: CLEAR
BILIRUB UR QL STRIP: NEGATIVE
COLOR UR AUTO: YELLOW
ERYTHROCYTE [DISTWIDTH] IN BLOOD BY AUTOMATED COUNT: 14.6 % (ref 10–15)
GLUCOSE UR STRIP-MCNC: NEGATIVE MG/DL
HCT VFR BLD AUTO: 45.1 % (ref 35–47)
HGB BLD-MCNC: 15.6 G/DL (ref 11.7–15.7)
HGB UR QL STRIP: NEGATIVE
KETONES UR STRIP-MCNC: NEGATIVE MG/DL
LEUKOCYTE ESTERASE UR QL STRIP: ABNORMAL
MCH RBC QN AUTO: 28.7 PG (ref 26.5–33)
MCHC RBC AUTO-ENTMCNC: 34.6 G/DL (ref 31.5–36.5)
MCV RBC AUTO: 83 FL (ref 78–100)
NITRATE UR QL: NEGATIVE
PH UR STRIP: 6 PH (ref 5–7)
PLATELET # BLD AUTO: 270 10E9/L (ref 150–450)
RBC # BLD AUTO: 5.43 10E12/L (ref 3.8–5.2)
RBC #/AREA URNS AUTO: ABNORMAL /HPF
SOURCE: ABNORMAL
SP GR UR STRIP: 1.01 (ref 1–1.03)
UROBILINOGEN UR STRIP-ACNC: 0.2 EU/DL (ref 0.2–1)
WBC # BLD AUTO: 9.1 10E9/L (ref 4–11)
WBC #/AREA URNS AUTO: ABNORMAL /HPF

## 2018-01-24 PROCEDURE — 81001 URINALYSIS AUTO W/SCOPE: CPT | Performed by: PHYSICIAN ASSISTANT

## 2018-01-24 PROCEDURE — 87086 URINE CULTURE/COLONY COUNT: CPT | Performed by: PHYSICIAN ASSISTANT

## 2018-01-24 PROCEDURE — 84207 ASSAY OF VITAMIN B-6: CPT | Mod: 90 | Performed by: PHYSICIAN ASSISTANT

## 2018-01-24 PROCEDURE — 85027 COMPLETE CBC AUTOMATED: CPT | Performed by: PHYSICIAN ASSISTANT

## 2018-01-24 PROCEDURE — 82728 ASSAY OF FERRITIN: CPT | Performed by: PHYSICIAN ASSISTANT

## 2018-01-24 PROCEDURE — 99214 OFFICE O/P EST MOD 30 MIN: CPT | Performed by: PHYSICIAN ASSISTANT

## 2018-01-24 PROCEDURE — 36415 COLL VENOUS BLD VENIPUNCTURE: CPT | Performed by: PHYSICIAN ASSISTANT

## 2018-01-24 PROCEDURE — 84439 ASSAY OF FREE THYROXINE: CPT | Performed by: PHYSICIAN ASSISTANT

## 2018-01-24 PROCEDURE — 99000 SPECIMEN HANDLING OFFICE-LAB: CPT | Performed by: PHYSICIAN ASSISTANT

## 2018-01-24 PROCEDURE — 84443 ASSAY THYROID STIM HORMONE: CPT | Performed by: PHYSICIAN ASSISTANT

## 2018-01-24 PROCEDURE — 82306 VITAMIN D 25 HYDROXY: CPT | Performed by: PHYSICIAN ASSISTANT

## 2018-01-24 PROCEDURE — 84481 FREE ASSAY (FT-3): CPT | Performed by: PHYSICIAN ASSISTANT

## 2018-01-24 PROCEDURE — 82607 VITAMIN B-12: CPT | Performed by: PHYSICIAN ASSISTANT

## 2018-01-24 RX ORDER — GUAIFENESIN AND PSEUDOEPHEDRINE HCL 1200; 120 MG/1; MG/1
TABLET, EXTENDED RELEASE ORAL
COMMUNITY
End: 2018-12-21

## 2018-01-24 RX ORDER — OMEPRAZOLE 40 MG/1
CAPSULE, DELAYED RELEASE ORAL
Refills: 1 | COMMUNITY
Start: 2017-11-27 | End: 2018-03-13

## 2018-01-24 NOTE — MR AVS SNAPSHOT
"              After Visit Summary   1/24/2018    Gifty Villa    MRN: [id number]           Patient Information     Date Of Birth          1935        Visit Information        Provider Department      1/24/2018 1:40 PM Leann Gordon PA-C [facility]        Today's Diagnoses     Other fatigue    -  1    Cognitive changes           Follow-ups after your visit        Who to contact     If you have questions or need follow up information about today's clinic visit or your schedule please contact [facility] directly at [phone].  Normal or non-critical lab and imaging results will be communicated to you by Smart GPS Backpackhart, letter or phone within 4 business days after the clinic has received the results. If you do not hear from us within 7 days, please contact the clinic through Jumper Networkst or phone. If you have a critical or abnormal lab result, we will notify you by phone as soon as possible.  Submit refill requests through My Ad Box or call your pharmacy and they will forward the refill request to us. Please allow 3 business days for your refill to be completed.          Additional Information About Your Visit        MyChart Information     My Ad Box gives you secure access to your electronic health record. If you see a primary care provider, you can also send messages to your care team and make appointments. If you have questions, please call your primary care clinic.  If you do not have a primary care provider, please call [phone] and they will assist you.        Care EveryWhere ID     This is your Care EveryWhere ID. This could be used by other organizations to access your [facility] medical records  [id number]        Your Vitals Were     Pulse Temperature Height Pulse Oximetry BMI (Body Mass Index)       108 96.8  F (36  C) (Tympanic) 5' 4\" (1.626 m) 96% 25.08 kg/m2        Blood Pressure from Last 3 Encounters:   01/24/18 122/62   09/06/17 126/60   09/02/17 144/81    Weight " from Last 3 Encounters:   01/24/18 146 lb 2 oz (66.3 kg)   09/06/17 146 lb (66.2 kg)   09/02/17 140 lb (63.5 kg)              We Performed the Following     CBC with platelets     Ferritin     T3, Free     T4, free     TSH     Vitamin B12     Vitamin B6     Vitamin D Deficiency          Today's Medication Changes          These changes are accurate as of 1/24/18  2:23 PM.  If you have any questions, ask your nurse or doctor.               These medicines have changed or have updated prescriptions.        Dose/Directions    solifenacin 10 MG tablet   Commonly known as:  VESICARE   This may have changed:  additional instructions   Used for:  Urinary urgency        Dose:  10 mg   Take 1 tablet (10 mg) by mouth daily   Quantity:  90 tablet   Refills:  3                Primary Care Provider Office Phone # Fax #    Jun Tellez [phone]       [address] [facility] [address]  [facility] [address]        Equal Access to Services     [facility]: Hadii faith farias hadasho Soomaali, waaxda luqadaha, qaybta kaalmada adeegyada, cathy concepcion haysherri moran . So [facility] [phone].    ATENCIÓN: Si habla español, tiene a robertson disposición servicios gratuitos de asistencia lingüística. Llame al [phone].    We comply with applicable federal civil rights laws and [place] laws. We do not discriminate on the basis of race, color, national origin, age, disability, sex, sexual orientation, or gender identity.            Thank you!     Thank you for choosing [facility]  for your care. Our goal is always to provide you with excellent care. Hearing back from our patients is one way we can continue to improve our services. Please take a few minutes to complete the written survey that you may receive in the mail after your visit with us. Thank you!             Your Updated Medication List - Protect others around you: Learn how to safely use, store and throw away your medicines at  www.disposemymeds.org.          This list is accurate as of 1/24/18  2:23 PM.  Always use your most recent med list.                   Brand Name Dispense Instructions for use Diagnosis    amLODIPine 10 MG tablet    NORVASC    90 tablet    Take 1 tablet (10 mg) by mouth daily    Essential hypertension       aspirin 81 MG tablet     0    ONE DAILY    Unspecified essential hypertension       DAILY MULTIVITAMIN PO      Reported on 3/10/2017        diazepam 2 MG tablet    VALIUM    8 tablet    Take 1 tablet (2 mg) by mouth every 12 hours as needed for muscle spasms        MUCINEX D MAX STRENGTH 120-1200 MG Tb12   Generic drug:  PseudoePHEDrine-guaiFENesin       Other fatigue, Cognitive changes       omeprazole 40 MG capsule    priLOSEC     TAKE ONE CAPSULE BY MOUTH TWICE A DAY FOR 4 WEEKS, THEN ONCE DAILY THEREAFTER    Other fatigue, Cognitive changes       PROAIR  (90 BASE) MCG/ACT Inhaler   Generic drug:  albuterol      Inhale 2 puffs into the lungs as needed for shortness of breath / dyspnea or wheezing        solifenacin 10 MG tablet    VESICARE    90 tablet    Take 1 tablet (10 mg) by mouth daily    Urinary urgency       tiotropium-olodaterol 2.5-2.5 MCG/ACT Aers    STIOLTO RESPIMAT    4 g    Inhale 2 puffs into the lungs daily    Chronic obstructive pulmonary disease with acute exacerbation (H)

## 2018-01-24 NOTE — NURSING NOTE
"Chief Complaint   Patient presents with     Establish Care       Initial /62  Pulse 108  Temp 96.8  F (36  C) (Tympanic)  Ht 5' 4\" (1.626 m)  Wt 146 lb 2 oz (66.3 kg)  SpO2 96%  BMI 25.08 kg/m2 Estimated body mass index is 25.08 kg/(m^2) as calculated from the following:    Height as of this encounter: 5' 4\" (1.626 m).    Weight as of this encounter: 146 lb 2 oz (66.3 kg).  Medication Reconciliation: complete    "

## 2018-01-24 NOTE — PROGRESS NOTES
SUBJECTIVE:   Gifty Villa is a 82 year old female who presents to clinic today for the following health issues:    Fatigue  Gifty presents to clinic reporting increased fatigue/lack of energy that has been ongoing for approximately the last 6 months. She states that the change in weather might be influencing her energy levels somewhat but she is normally more active than she normally is. In the past she has been very active with volunteering, choir, and playing pool. She states that she is getting adequate sleep and is able to sleep through the night. She has had a normal sleep study a few years ago and it was not indicative of apnea or restless leg.     ANA Warren also has a PMH significant for COPD which she is attributing to her lack of energy. She is prescribed tiotropium-olodaterol and albuterol inhalers to treat her symptoms. She reports that she has been compliant with the medication and has been using the rescue inhaler a few times per day. However, she reports that she does not get efficient relief from the inhaler. She often will get SOB with increased exertion. She was last seen by pulmonology in May of 2017 which is when she had her last PFT. She completed pulmonary rehab over the summer which was helpful but she does not know how much it has helped her long term.    Cognitive Impairment  In addition, Gifty reports that she has been having some increasing problems with short term memory in the last year. She denies any head trauma, significant alcohol use, or drug use.     Six Item Cognitive Impairment Test   (6CIT):      What year is it?                               Incorrect - 4 points    What month is it?                               Correct - 0 points      Give the patient an address to remember with five components:   [place] ( first and last name - 2 components)   [address]  (number and name of street - 2 components)   [place] ( city - 1 component)      About what time is  it (within the hour)? Correct - 0 points    Count backwards from 20 to 1:   Correct - 0 points    Say the months of the year in reverse: Correct - 0 points    Repeat the address phrase:   All wrong - 10 points forgot it     Total 6CIT Score:      14/28    Interpretation: The 6CIT uses an inverse score and questions are weighted to produce a total out of 28. Scores of 0-7 are considered normal and 8 or more significant.    Advantages The test has high sensitivity without compromising specificity even in mild dementia. It is easy to translate linguistically and culturally.  Disadvantages The main disadvantage is in the scoring and weighting of the test, which is initially confusing, however computer models have simplified this greatly.    Probability Statistics: At the 7/8 cut off: Overall figures sensitivity 90% specificity 100%, in mild dementia sensitivity = 78% , specificity = 100%    Copyright 2000 [facility], [facility]. Courtesy of Dr. Bhavesh Vazquez    Problem list and histories reviewed & adjusted, as indicated.  Additional history: as documented    Patient Active Problem List   Diagnosis     Backache     Urge incontinence     Essential hypertension     Aftercare following joint replacement     Knee joint replacement by other means     HYPERLIPIDEMIA LDL GOAL <130     Urinary incontinence     Urgency-frequency syndrome     Advanced directives, counseling/discussion     RLQ abdominal mass     Lumbago     Pain in joint, pelvic region and thigh     Anxiety     COPD (chronic obstructive pulmonary disease) (H)     Major depressive disorder, recurrent episode, in full remission (H)     Chronic obstructive pulmonary disease, unspecified COPD type (H)     Atopic dermatitis, unspecified type     Past Surgical History:   Procedure Laterality Date     ARTHROPLASTY PATELLO-FEMORAL (KNEE)  8/2010    Left TKA     C SPINE FUSION,ANTER,3 SGMTS  1992 1992     C TOTAL HIP ARTHROPLASTY  1995    Hip  Replacement, Total (Right)     C VAGINAL HYSTERECTOMY      Hysterectomy, Vaginal w anterior repair     COLONOSCOPY  ~    No polyps     COLONOSCOPY N/A 10/10/2014    Procedure: COLONOSCOPY;  Surgeon: Chavez Ward MD;  Location: [facility]     ESOPHAGOSCOPY, GASTROSCOPY, DUODENOSCOPY (EGD), COMBINED  2016    Dr. Cha [facility]     ESOPHAGOSCOPY, GASTROSCOPY, DUODENOSCOPY (EGD), COMBINED N/A 2016    Procedure: COMBINED ESOPHAGOSCOPY, GASTROSCOPY, DUODENOSCOPY (EGD), BIOPSY SINGLE OR MULTIPLE;  Surgeon: Jada Cha MD;  Location: [facility]     EXCISE GANGLION WRIST Left 2015    Procedure: EXCISE GANGLION WRIST;  Surgeon: Justin Lyons MD;  Location: [facility]     HYSTERECTOMY, PAP NO LONGER INDICATED       wisdom teeth         Social History   Substance Use Topics     Smoking status: Former Smoker     Quit date: 1988     Smokeless tobacco: Never Used      Comment: Quit in      Alcohol use No     Family History   Problem Relation Age of Onset     Alzheimer Disease Mother       at 98,  of old age     Alzheimer Disease Father       at 78     C.A.D. Father      CANCER Other 90     unknown cancer     Alzheimer Disease Brother       age 82      Family History Negative Brother      Born 1931     CANCER Maternal Aunt      x'2 w Ca unknown etiology     Colon Cancer No family hx of          Current Outpatient Prescriptions   Medication Sig Dispense Refill     omeprazole (PRILOSEC) 40 MG capsule TAKE ONE CAPSULE BY MOUTH TWICE A DAY FOR 4 WEEKS, THEN ONCE DAILY THEREAFTER  1     PseudoePHEDrine-guaiFENesin (MUCINEX D MAX STRENGTH) 120-1200 MG TB12        amLODIPine (NORVASC) 10 MG tablet Take 1 tablet (10 mg) by mouth daily 90 tablet 0     tiotropium-olodaterol (STIOLTO RESPIMAT) 2.5-2.5 MCG/ACT AERS Inhale 2 puffs into the lungs daily 4 g 3     albuterol (ALBUTEROL) 108 (90 BASE) MCG/ACT Inhaler Inhale 2 puffs into the lungs as needed for shortness of breath / dyspnea or wheezing    "    solifenacin (VESICARE) 10 MG tablet Take 1 tablet (10 mg) by mouth daily (Patient taking differently: Take 10 mg by mouth daily Patient is taking every other day) 90 tablet 3     Multiple Vitamin (DAILY MULTIVITAMIN PO) Reported on 3/10/2017       ASPIRIN 81 MG OR TABS ONE DAILY 0 0     diazepam (VALIUM) 2 MG tablet Take 1 tablet (2 mg) by mouth every 12 hours as needed for muscle spasms (Patient not taking: Reported on 1/24/2018) 8 tablet 0     No Known Allergies    Reviewed and updated as needed this visit by clinical staff  Tobacco  Allergies  Meds  Problems  Med Hx  Surg Hx  Fam Hx  Soc Hx        Reviewed and updated as needed this visit by Provider  Tobacco  Allergies  Meds  Problems  Med Hx  Surg Hx  Fam Hx  Soc Hx          ROS:  Constitutional, HEENT, cardiovascular, pulmonary, GI, , musculoskeletal, neuro, skin, endocrine and psych systems are negative, except as otherwise noted.    This document serves as a record of the services and decisions personally performed and made by Leann Gordon PA-C. It was created on her behalf by James Cook, a trained medical scribe. The creation of this document is based on the provider's statements to the medical scribe.  James Cook 2:00 PM January 24, 2018    OBJECTIVE:   /62  Pulse 108  Temp 96.8  F (36  C) (Tympanic)  Ht 5' 4\" (1.626 m)  Wt 146 lb 2 oz (66.3 kg)  SpO2 96%  BMI 25.08 kg/m2  Body mass index is 25.08 kg/(m^2).  GENERAL: healthy, alert and no distress  RESP: lungs clear to auscultation - no rales, rhonchi or wheezes  CV: regular rate and rhythm, normal S1 S2, no S3 or S4, no murmur, click or rub, no peripheral edema and peripheral pulses strong  SKIN: no suspicious lesions or rashes  PSYCH: mentation appears normal, affect normal/bright    Diagnostic Test Results:  Results for orders placed or performed in visit on 01/24/18 (from the past 24 hour(s))   CBC with platelets   Result Value Ref Range    WBC 9.1 " 4.0 - 11.0 10e9/L    RBC Count 5.43 (H) 3.8 - 5.2 10e12/L    Hemoglobin 15.6 11.7 - 15.7 g/dL    Hematocrit 45.1 35.0 - 47.0 %    MCV 83 78 - 100 fl    MCH 28.7 26.5 - 33.0 pg    MCHC 34.6 31.5 - 36.5 g/dL    RDW 14.6 10.0 - 15.0 %    Platelet Count 270 150 - 450 10e9/L   *UA reflex to Microscopic and Culture ([place] [facility] (except [place] and [place])   Result Value Ref Range    Color Urine Yellow     Appearance Urine Clear     Glucose Urine Negative NEG^Negative mg/dL    Bilirubin Urine Negative NEG^Negative    Ketones Urine Negative NEG^Negative mg/dL    Specific Gravity Urine 1.015 1.003 - 1.035    Blood Urine Negative NEG^Negative    pH Urine 6.0 5.0 - 7.0 pH    Protein Albumin Urine Negative NEG^Negative mg/dL    Urobilinogen Urine 0.2 0.2 - 1.0 EU/dL    Nitrite Urine Negative NEG^Negative    Leukocyte Esterase Urine Small (A) NEG^Negative    Source Midstream Urine    Urine Microscopic   Result Value Ref Range    WBC Urine 2-5 (A) OTO2^O - 2 /HPF    RBC Urine O - 2 OTO2^O - 2 /HPF       ASSESSMENT/PLAN:   Gifty was seen today for establish care.    Diagnoses and all orders for this visit:    Other fatigue, Cognitive changes  Discussed with patient to follow up with pulmonology for reevaluation of COPD which could be contributing to fatigue as well as reevaluation of pulmonary nodule, patient voiced agreement. Also discussed lab draws for possible indication of cognitive decline. Will update patient with results as the become available. Advised that if all lab work returns unremarkable that formal neuropsych evaluation will be ordered vs. Consideration of antidepressant, patient voiced agreement. Encouraged stepwise implementation of previous activities in order to get back to previous activity level. Follow up  Pending lab results  -     Vitamin B12  -     Vitamin B6  -     Vitamin D Deficiency  -     TSH  -     T4, free  -     T3, Free  -     Ferritin  -     CBC with platelets  -     *UA  reflex to Microscopic and Culture ([place] and [facility] (except [place] and [place])    The information in this document, created by the medical scribe for me, accurately reflects the services I personally performed and the decisions made by me. I have reviewed and approved this document for accuracy prior to leaving the patient care area.  January 24, 2018 2:00 PM    Leann Gordon PA-C  [facility] [place]

## 2018-01-25 LAB
BACTERIA SPEC CULT: NORMAL
DEPRECATED CALCIDIOL+CALCIFEROL SERPL-MC: 40 UG/L (ref 20–75)
FERRITIN SERPL-MCNC: 54 NG/ML (ref 8–252)
SPECIMEN SOURCE: NORMAL
T3FREE SERPL-MCNC: 2.5 PG/ML (ref 2.3–4.2)
T4 FREE SERPL-MCNC: 1.06 NG/DL (ref 0.76–1.46)
TSH SERPL DL<=0.005 MIU/L-ACNC: 2.02 MU/L (ref 0.4–4)
VIT B12 SERPL-MCNC: 879 PG/ML (ref 193–986)

## 2018-01-28 LAB — VIT B6 SERPL-MCNC: 193.1 NMOL/L (ref 20–125)

## 2018-01-29 ENCOUNTER — HOSPITAL ENCOUNTER (OUTPATIENT)
Dept: CT IMAGING | Facility: CLINIC | Age: 83
Discharge: HOME | End: 2018-01-29
Attending: INTERNAL MEDICINE | Admitting: INTERNAL MEDICINE
Payer: MEDICARE

## 2018-01-29 DIAGNOSIS — R91.1 SOLITARY PULMONARY NODULE: ICD-10-CM

## 2018-01-29 PROBLEM — E67.2 HYPERVITAMINOSIS B6: Status: ACTIVE | Noted: 2018-01-29

## 2018-01-29 PROCEDURE — 71250 CT THORAX DX C-: CPT

## 2018-01-31 ENCOUNTER — OFFICE VISIT (OUTPATIENT)
Dept: FAMILY MEDICINE | Facility: CLINIC | Age: 83
End: 2018-01-31
Payer: COMMERCIAL

## 2018-01-31 VITALS
TEMPERATURE: 98.4 F | DIASTOLIC BLOOD PRESSURE: 68 MMHG | BODY MASS INDEX: 25.13 KG/M2 | SYSTOLIC BLOOD PRESSURE: 140 MMHG | HEART RATE: 85 BPM | OXYGEN SATURATION: 100 % | WEIGHT: 146.4 LBS

## 2018-01-31 DIAGNOSIS — R41.89 COGNITIVE DECLINE: ICD-10-CM

## 2018-01-31 DIAGNOSIS — R79.0 LOW FERRITIN: ICD-10-CM

## 2018-01-31 DIAGNOSIS — R91.8 PULMONARY NODULES: ICD-10-CM

## 2018-01-31 DIAGNOSIS — Z71.89 ADVANCED DIRECTIVES, COUNSELING/DISCUSSION: ICD-10-CM

## 2018-01-31 DIAGNOSIS — J44.9 CHRONIC OBSTRUCTIVE PULMONARY DISEASE, UNSPECIFIED COPD TYPE (H): ICD-10-CM

## 2018-01-31 DIAGNOSIS — G25.81 RESTLESS LEGS SYNDROME (RLS): ICD-10-CM

## 2018-01-31 DIAGNOSIS — Z91.81 RISK FOR FALLS: ICD-10-CM

## 2018-01-31 DIAGNOSIS — F43.21 ADJUSTMENT DISORDER WITH DEPRESSED MOOD: ICD-10-CM

## 2018-01-31 DIAGNOSIS — N95.9 POST MENOPAUSAL PROBLEMS: ICD-10-CM

## 2018-01-31 DIAGNOSIS — R53.83 OTHER FATIGUE: ICD-10-CM

## 2018-01-31 DIAGNOSIS — E67.2 HYPERVITAMINOSIS B6: Primary | ICD-10-CM

## 2018-01-31 PROCEDURE — 99214 OFFICE O/P EST MOD 30 MIN: CPT | Performed by: PHYSICIAN ASSISTANT

## 2018-01-31 RX ORDER — BACILLUS COAGULANS 1B CELL
1 CAPSULE ORAL
Qty: 100 TABLET | Refills: 0 | COMMUNITY
Start: 2018-01-31 | End: 2018-03-13

## 2018-01-31 RX ORDER — DULOXETIN HYDROCHLORIDE 20 MG/1
CAPSULE, DELAYED RELEASE ORAL
Qty: 60 CAPSULE | Refills: 1 | Status: SHIPPED | OUTPATIENT
Start: 2018-01-31 | End: 2018-03-13

## 2018-01-31 NOTE — MR AVS SNAPSHOT
After Visit Summary   1/31/2018    Gifty Villa    MRN: [id number]           Patient Information     Date Of Birth          1935        Visit Information        Provider Department      1/31/2018 9:40 AM Leann Gordon PA-C [facility]        Today's Diagnoses     Hypervitaminosis B6    -  1    Cognitive decline        Other fatigue        Chronic obstructive pulmonary disease, unspecified COPD type (H)        Pulmonary nodules        Post menopausal problems        Restless legs syndrome (RLS)        Low ferritin        Adjustment disorder with depressed mood           Follow-ups after your visit        Additional Services     MENTAL HEALTH REFERRAL  - Adult; Assessments and Testing; Neuropsychological Testing; Other: [facility] [phone]; We will contact you to schedule the appointment or please call with any questions       All scheduling is subject to the client's specific insurance plan & benefits, provider/location availability, and provider clinical specialities.  Please arrive 15 minutes early for your first appointment and bring your completed paperwork.    Please be aware that coverage of these services is subject to the terms and limitations of your health insurance plan.  Call member services at your health plan with any benefit or coverage questions.                            Who to contact     If you have questions or need follow up information about today's clinic visit or your schedule please contact [facility] directly at [phone].  Normal or non-critical lab and imaging results will be communicated to you by MyChart, letter or phone within 4 business days after the clinic has received the results. If you do not hear from us within 7 days, please contact the clinic through MyChart or phone. If you have a critical or abnormal lab result, we will notify you by phone as soon as possible.  Submit refill  requests through [employer] or call your pharmacy and they will forward the refill request to us. Please allow 3 business days for your refill to be completed.          Additional Information About Your Visit        [facility] Information     [facility] gives you secure access to your electronic health record. If you see a primary care provider, you can also send messages to your care team and make appointments. If you have questions, please call your primary care clinic.  If you do not have a primary care provider, please call [phone] and they will assist you.        Care EveryWhere ID     This is your Care EveryWhere ID. This could be used by other organizations to access your [place] medical records  [id number]        Your Vitals Were     Pulse Temperature Pulse Oximetry BMI (Body Mass Index)          85 98.4  F (36.9  C) (Oral) 100% 25.13 kg/m2         Blood Pressure from Last 3 Encounters:   01/31/18 140/68   01/24/18 122/62   09/06/17 126/60    Weight from Last 3 Encounters:   01/31/18 146 lb 6.4 oz (66.4 kg)   01/24/18 146 lb 2 oz (66.3 kg)   09/06/17 146 lb (66.2 kg)              We Performed the Following     MENTAL HEALTH REFERRAL  - Adult; Assessments and Testing; Neuropsychological Testing; Other: [facility] [phone]; We will contact you to schedule the appointment or please call with any questions          Today's Medication Changes          These changes are accurate as of 1/31/18 10:06 AM.  If you have any questions, ask your nurse or doctor.               Start taking these medicines.        Dose/Directions    calcium carbonate-vitamin D 500-400 MG-UNIT Tabs per tablet   Used for:  Post menopausal problems   Started by:  Leann Gordon PA-C        Dose:  1 tablet   Take 1 tablet by mouth 2 times daily (with meals)   Quantity:  180 tablet   Refills:  3       DULoxetine 20 MG EC capsule   Commonly known as:  CYMBALTA   Used for:  Other fatigue, Adjustment disorder with  depressed mood   Started by:  Leann Gordon PA-C        Take 1 tablet daily for 1 week then increase to 1 tablet twice daily therafter   Quantity:  60 capsule   Refills:  1       VITRON-C  MG Tabs tablet   Used for:  Restless legs syndrome (RLS), Low ferritin   Generic drug:  ferrous fumarate 65 mg ([place]. FE)-Vitamin C 125 mg   Started by:  Leann Gordon PA-C        Dose:  1 tablet   Take 1 tablet by mouth daily (with breakfast)   Quantity:  100 tablet   Refills:  0         These medicines have changed or have updated prescriptions.        Dose/Directions    solifenacin 10 MG tablet   Commonly known as:  VESICARE   This may have changed:  additional instructions   Used for:  Urinary urgency        Dose:  10 mg   Take 1 tablet (10 mg) by mouth daily   Quantity:  90 tablet   Refills:  3            Where to get your medicines      These medications were sent to [facility] [address] [place] [address]  [address] [facility] [facility] [address]     Phone:  [phone]     DULoxetine 20 MG EC capsule         Some of these will need a paper prescription and others can be bought over the counter.  Ask your nurse if you have questions.     You don't need a prescription for these medications     calcium carbonate-vitamin D 500-400 MG-UNIT Tabs per tablet    VITRON-C  MG Tabs tablet                Primary Care Provider Office Phone # Fax #    Jun Tellez [phone]       [address]  [facility] [address]        Equal Access to Services     CRISTO GRACIA : Hadii faith farias hadasho Soomaali, waaxda luqadaha, qaybta kaalmada adeegyashadia, cathy samuel. So [facility] [phone].    ATENCIÓN: Si habla español, tiene a robertson disposición servicios gratuitos de asistencia lingüística. Morgan al [phone].    We comply with applicable federal civil rights laws and [place] laws. We do not discriminate on the basis of race, color, national  origin, age, disability, sex, sexual orientation, or gender identity.            Thank you!     Thank you for choosing [facility]  for your care. Our goal is always to provide you with excellent care. Hearing back from our patients is one way we can continue to improve our services. Please take a few minutes to complete the written survey that you may receive in the mail after your visit with us. Thank you!             Your Updated Medication List - Protect others around you: Learn how to safely use, store and throw away your medicines at www.disposemymeds.org.          This list is accurate as of 1/31/18 10:06 AM.  Always use your most recent med list.                   Brand Name Dispense Instructions for use Diagnosis    amLODIPine 10 MG tablet    NORVASC    90 tablet    Take 1 tablet (10 mg) by mouth daily    Essential hypertension       aspirin 81 MG tablet     0    ONE DAILY    Unspecified essential hypertension       calcium carbonate-vitamin D 500-400 MG-UNIT Tabs per tablet     180 tablet    Take 1 tablet by mouth 2 times daily (with meals)    Post menopausal problems       diazepam 2 MG tablet    VALIUM    8 tablet    Take 1 tablet (2 mg) by mouth every 12 hours as needed for muscle spasms        DULoxetine 20 MG EC capsule    CYMBALTA    60 capsule    Take 1 tablet daily for 1 week then increase to 1 tablet twice daily therafter    Other fatigue, Adjustment disorder with depressed mood       MUCINEX D MAX STRENGTH 120-1200 MG Tb12   Generic drug:  PseudoePHEDrine-guaiFENesin       Other fatigue, Cognitive changes       omeprazole 40 MG capsule    priLOSEC     TAKE ONE CAPSULE BY MOUTH TWICE A DAY FOR 4 WEEKS, THEN ONCE DAILY THEREAFTER    Other fatigue, Cognitive changes       PROAIR  (90 BASE) MCG/ACT Inhaler   Generic drug:  albuterol      Inhale 2 puffs into the lungs as needed for shortness of breath / dyspnea or wheezing        solifenacin 10 MG tablet    VESICARE    90 tablet     Take 1 tablet (10 mg) by mouth daily    Urinary urgency       tiotropium-olodaterol 2.5-2.5 MCG/ACT Aers    STIOLTO RESPIMAT    4 g    Inhale 2 puffs into the lungs daily    Chronic obstructive pulmonary disease with acute exacerbation (H)       VITRON-C  MG Tabs tablet   Generic drug:  ferrous fumarate 65 mg ([place] FE)-Vitamin C 125 mg     100 tablet    Take 1 tablet by mouth daily (with breakfast)    Restless legs syndrome (RLS), Low ferritin
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
## 2018-02-14 ENCOUNTER — TRANSFERRED RECORDS (OUTPATIENT)
Dept: HEALTH INFORMATION MANAGEMENT | Facility: CLINIC | Age: 83
End: 2018-02-14

## 2018-03-12 ENCOUNTER — TELEPHONE (OUTPATIENT)
Dept: FAMILY MEDICINE | Facility: CLINIC | Age: 83
End: 2018-03-12

## 2018-03-12 NOTE — TELEPHONE ENCOUNTER
Araceli with [facility] - states patient has an appointment with PATEL ESTRADA tomorrow, 03/13/2018, and she wanted to ensure that we received the records that they sent over regarding the patient.     Spoke with NATALIE Balderas MA - records have been received    Lola Hansen RN  [place] [facility]

## 2018-03-13 ENCOUNTER — OFFICE VISIT (OUTPATIENT)
Dept: FAMILY MEDICINE | Facility: CLINIC | Age: 83
End: 2018-03-13
Payer: COMMERCIAL

## 2018-03-13 VITALS
HEIGHT: 64 IN | BODY MASS INDEX: 25.16 KG/M2 | WEIGHT: 147.38 LBS | TEMPERATURE: 97.2 F | DIASTOLIC BLOOD PRESSURE: 70 MMHG | SYSTOLIC BLOOD PRESSURE: 128 MMHG | OXYGEN SATURATION: 95 % | HEART RATE: 101 BPM

## 2018-03-13 DIAGNOSIS — G25.81 RESTLESS LEGS SYNDROME (RLS): ICD-10-CM

## 2018-03-13 DIAGNOSIS — R53.83 OTHER FATIGUE: ICD-10-CM

## 2018-03-13 DIAGNOSIS — G30.0 EARLY ONSET ALZHEIMER'S DEMENTIA WITHOUT BEHAVIORAL DISTURBANCE (H): Primary | ICD-10-CM

## 2018-03-13 DIAGNOSIS — R41.89 COGNITIVE CHANGES: ICD-10-CM

## 2018-03-13 DIAGNOSIS — F02.80 EARLY ONSET ALZHEIMER'S DEMENTIA WITHOUT BEHAVIORAL DISTURBANCE (H): Primary | ICD-10-CM

## 2018-03-13 DIAGNOSIS — I10 ESSENTIAL HYPERTENSION: ICD-10-CM

## 2018-03-13 DIAGNOSIS — F43.21 ADJUSTMENT DISORDER WITH DEPRESSED MOOD: ICD-10-CM

## 2018-03-13 DIAGNOSIS — K27.9 PEPTIC ULCER: ICD-10-CM

## 2018-03-13 DIAGNOSIS — R79.0 LOW FERRITIN: ICD-10-CM

## 2018-03-13 PROCEDURE — 99215 OFFICE O/P EST HI 40 MIN: CPT | Performed by: PHYSICIAN ASSISTANT

## 2018-03-13 RX ORDER — OMEPRAZOLE 40 MG/1
CAPSULE, DELAYED RELEASE ORAL
Qty: 90 CAPSULE | Refills: 1 | Status: SHIPPED | OUTPATIENT
Start: 2018-03-13 | End: 2018-03-13

## 2018-03-13 RX ORDER — DULOXETIN HYDROCHLORIDE 20 MG/1
20 CAPSULE, DELAYED RELEASE ORAL 2 TIMES DAILY
Qty: 60 CAPSULE | Refills: 1 | Status: SHIPPED | OUTPATIENT
Start: 2018-03-13 | End: 2018-05-10

## 2018-03-13 RX ORDER — OMEPRAZOLE 40 MG/1
40 CAPSULE, DELAYED RELEASE ORAL DAILY
Qty: 90 CAPSULE | Refills: 1 | Status: SHIPPED | OUTPATIENT
Start: 2018-03-13 | End: 2018-09-12

## 2018-03-13 RX ORDER — DONEPEZIL HYDROCHLORIDE 5 MG/1
5 TABLET, FILM COATED ORAL AT BEDTIME
Qty: 60 TABLET | Refills: 1 | Status: SHIPPED | OUTPATIENT
Start: 2018-03-13 | End: 2018-05-24

## 2018-03-13 RX ORDER — AMLODIPINE BESYLATE 10 MG/1
10 TABLET ORAL DAILY
Qty: 90 TABLET | Refills: 1 | Status: SHIPPED | OUTPATIENT
Start: 2018-03-13 | End: 2018-09-12

## 2018-03-13 RX ORDER — BACILLUS COAGULANS 1B CELL
1 CAPSULE ORAL 2 TIMES DAILY WITH MEALS
Qty: 100 TABLET | Refills: 0 | COMMUNITY
Start: 2018-03-13 | End: 2023-06-13

## 2018-03-13 ASSESSMENT — PATIENT HEALTH QUESTIONNAIRE - PHQ9: 5. POOR APPETITE OR OVEREATING: SEVERAL DAYS

## 2018-03-13 ASSESSMENT — ANXIETY QUESTIONNAIRES
5. BEING SO RESTLESS THAT IT IS HARD TO SIT STILL: NOT AT ALL
7. FEELING AFRAID AS IF SOMETHING AWFUL MIGHT HAPPEN: NOT AT ALL
6. BECOMING EASILY ANNOYED OR IRRITABLE: NOT AT ALL
1. FEELING NERVOUS, ANXIOUS, OR ON EDGE: SEVERAL DAYS

## 2018-03-13 NOTE — PATIENT INSTRUCTIONS
If Dr. Irwin does not think Stiolto inhaler is contributing to fatigue please call clinic and we will consider decreasing amlodipine to 1/2 tablet daily.    Follow up in 2 months to see if Aricept improves memory and if mood improved, sooner if issues.

## 2018-03-13 NOTE — MR AVS SNAPSHOT
After Visit Summary   3/13/2018    Gifty Villa    MRN: [id number]           Patient Information     Date Of Birth          1935        Visit Information        Provider Department      3/13/2018 1:20 PM Leann Gordon PA-C [facility]        Today's Diagnoses     Early onset Alzheimer's dementia without behavioral disturbance    -  1    Restless legs syndrome (RLS)        Low ferritin        Other fatigue        Cognitive changes        Essential hypertension          Care Instructions    If Dr. Irwin does not think Stiolto inhaler is contributing to fatigue please call clinic and we will consider decreasing amlodipine to 1/2 tablet daily.    Follow up in 2 months to see if Aricept improves memory and if mood improved, sooner if issues.              Follow-ups after your visit        Who to contact     If you have questions or need follow up information about today's clinic visit or your schedule please contact [facility] directly at [phone].  Normal or non-critical lab and imaging results will be communicated to you by USConnecthart, letter or phone within 4 business days after the clinic has received the results. If you do not hear from us within 7 days, please contact the clinic through Talkspacet or phone. If you have a critical or abnormal lab result, we will notify you by phone as soon as possible.  Submit refill requests through TravelPi or call your pharmacy and they will forward the refill request to us. Please allow 3 business days for your refill to be completed.          Additional Information About Your Visit        MyChart Information     TravelPi gives you secure access to your electronic health record. If you see a primary care provider, you can also send messages to your care team and make appointments. If you have questions, please call your primary care clinic.  If you do not have a primary care provider, please call [phone] and they  "will assist you.        Care EveryWhere ID     This is your Care EveryWhere ID. This could be used by other organizations to access your [place] medical records  [id number]        Your Vitals Were     Pulse Temperature Height Pulse Oximetry BMI (Body Mass Index)       101 97.2  F (36.2  C) (Tympanic) 5' 4\" (1.626 m) 95% 25.3 kg/m2        Blood Pressure from Last 3 Encounters:   03/13/18 128/70   01/31/18 140/68   01/24/18 122/62    Weight from Last 3 Encounters:   03/13/18 147 lb 6 oz (66.8 kg)   01/31/18 146 lb 6.4 oz (66.4 kg)   01/24/18 146 lb 2 oz (66.3 kg)              Today, you had the following     No orders found for display         Today's Medication Changes          These changes are accurate as of 3/13/18  2:00 PM.  If you have any questions, ask your nurse or doctor.               Start taking these medicines.        Dose/Directions    cholecalciferol 5000 UNITS Tabs tablet   Commonly known as:  vitamin D3   Used for:  Other fatigue   Started by:  Leann Gordon PA-C        Dose:  5000 Units   Take 1 tablet (5,000 Units) by mouth daily   Quantity:  100 tablet   Refills:  1       donepezil 5 MG tablet   Commonly known as:  ARICEPT   Used for:  Early onset Alzheimer's dementia without behavioral disturbance   Started by:  Leann Gordon PA-C        Dose:  5 mg   Take 1 tablet (5 mg) by mouth At Bedtime Increase to 10 mg nightly after 1 month   Quantity:  60 tablet   Refills:  1         These medicines have changed or have updated prescriptions.        Dose/Directions    omeprazole 40 MG capsule   Commonly known as:  priLOSEC   This may have changed:  See the new instructions.   Used for:  Other fatigue, Cognitive changes   Changed by:  Leann Gordon PA-C        TAKE ONE CAPSULE BY MOUTH TWICE A DAY FOR 4 WEEKS, THEN ONCE DAILY THEREAFTER   Quantity:  90 capsule   Refills:  1       solifenacin 10 MG tablet   Commonly known as:  VESICARE   This may have changed:  additional " instructions   Used for:  Urinary urgency        Dose:  10 mg   Take 1 tablet (10 mg) by mouth daily   Quantity:  90 tablet   Refills:  3       VITRON-C  MG Tabs tablet   This may have changed:  when to take this   Used for:  Restless legs syndrome (RLS), Low ferritin   Generic drug:  ferrous fumarate 65 mg (Platinum. FE)-Vitamin C 125 mg   Changed by:  Leann Gordon PA-C        Dose:  1 tablet   Take 1 tablet by mouth 2 times daily (with meals)   Quantity:  100 tablet   Refills:  0            Where to get your medicines      These medications were sent to [facility] [address] [address]  [address], [place] [address]     Phone:  [phone]     amLODIPine 10 MG tablet    cholecalciferol 5000 UNITS Tabs tablet    donepezil 5 MG tablet    omeprazole 40 MG capsule         Some of these will need a paper prescription and others can be bought over the counter.  Ask your nurse if you have questions.     You don't need a prescription for these medications     VITRON-C  MG Tabs tablet                Primary Care Provider Office Phone # Fax #    Jun Tellez [phone]       [address]  [facility] [id number]        Equal Access to Services     CRISTO GRACIA AH: Hadii faith ku hadasho Soomaali, waaxda luqadaha, qaybta kaalmada adeegyada, waxay carlene haysherri samuel. So [facility] [phone].    ATENCIÓN: Si habla español, tiene a robertson disposición servicios gratuitos de asistencia lingüística. Llame al [phone].    We comply with applicable federal civil rights laws and [place] laws. We do not discriminate on the basis of race, color, national origin, age, disability, sex, sexual orientation, or gender identity.            Thank you!     Thank you for choosing [facility]  for your care. Our goal is always to provide you with excellent care. Hearing back from our patients is one way we can continue to improve our  services. Please take a few minutes to complete the written survey that you may receive in the mail after your visit with us. Thank you!             Your Updated Medication List - Protect others around you: Learn how to safely use, store and throw away your medicines at www.disposemymeds.org.          This list is accurate as of 3/13/18  2:00 PM.  Always use your most recent med list.                   Brand Name Dispense Instructions for use Diagnosis    amLODIPine 10 MG tablet    NORVASC    90 tablet    Take 1 tablet (10 mg) by mouth daily    Essential hypertension       aspirin 81 MG tablet     0    ONE DAILY    Unspecified essential hypertension       calcium carbonate-vitamin D 500-400 MG-UNIT Tabs per tablet     180 tablet    Take 1 tablet by mouth 2 times daily (with meals)    Post menopausal problems       cholecalciferol 5000 UNITS Tabs tablet    vitamin D3    100 tablet    Take 1 tablet (5,000 Units) by mouth daily    Other fatigue       donepezil 5 MG tablet    ARICEPT    60 tablet    Take 1 tablet (5 mg) by mouth At Bedtime Increase to 10 mg nightly after 1 month    Early onset Alzheimer's dementia without behavioral disturbance       DULoxetine 20 MG EC capsule    CYMBALTA    60 capsule    Take 1 tablet daily for 1 week then increase to 1 tablet twice daily therafter    Other fatigue, Adjustment disorder with depressed mood       MUCINEX D MAX STRENGTH 120-1200 MG Tb12   Generic drug:  PseudoePHEDrine-guaiFENesin       Other fatigue, Cognitive changes       omeprazole 40 MG capsule    priLOSEC    90 capsule    TAKE ONE CAPSULE BY MOUTH TWICE A DAY FOR 4 WEEKS, THEN ONCE DAILY THEREAFTER    Other fatigue, Cognitive changes       PROAIR  (90 BASE) MCG/ACT Inhaler   Generic drug:  albuterol      Inhale 2 puffs into the lungs as needed for shortness of breath / dyspnea or wheezing        solifenacin 10 MG tablet    VESICARE    90 tablet    Take 1 tablet (10 mg) by mouth daily    Urinary urgency        tiotropium-olodaterol 2.5-2.5 MCG/ACT Aers    STIOLTO RESPIMAT    4 g    Inhale 2 puffs into the lungs daily    Chronic obstructive pulmonary disease with acute exacerbation (H)       VITRON-C  MG Tabs tablet   Generic drug:  ferrous fumarate 65 mg ([place]. FE)-Vitamin C 125 mg     100 tablet    Take 1 tablet by mouth 2 times daily (with meals)    Restless legs syndrome (RLS), Low ferritin

## 2018-03-13 NOTE — PROGRESS NOTES
SUBJECTIVE:   Gifty Villa is a 83 year old female who presents to clinic today for the following health issues:    Fatigue    Onset: 1 year   How long have you felt fatigued: 1 year                                                     Description:  Description of activities and lifestyle: Not very active. Trying to get out for short walks.   Schedule and responsibilities: Take care of dogs.   How much sleep are you gettin hours at a time. Short naps throughout the day about 6.   Daily exercise: Short walks 1/8 mile.   Are there episodes of normal energy levels: NO    Accompanying Signs & Symptoms:  Falling asleep during the day: YES  Snoring: YES  Do you stop breathing while sleeping: no                 Night sweats: no  Chest Pain: no  History of Alcohol/drug abuse:no  History of Depression: YES  Any new anxiety/stressors:YES- bothers her that she forgets things.   Abdominal pain: no   Change in appetite: no                 Weight gain/loss: no   Dark or bloody stools: no    Therapies tried and outcome: Cymbalta with no relief    Follow up cymbalta 20 mg 2x per day - doesn't think it has been helping and hasn't noticed a different.         Fatigue    Patient has not noted any improvement in her fatigue since starting calcium plus vitamin D and daily iron supplements.  She is not exercising routinely.  She does walk to the mailbox daily but reports that this is only 2/10 of a mile.  Her vitamin D level was within normal limits at 40 on 2018.  She was noted to have elevated B6 levels and subsequently stopped her daily multivitamin.  Her thyroid function was found to be within normal limits.  She was not found to be anemic.  Her last echocardiogram was performed on 3/1/2016 was within normal limits.  She is a former smoker but quit in .  She has known stage II COPD and follows with Dr. Irwin at [place] [facility].  She started cardiac rehab on 2017 and followed this treatment plan religiously  until she was discharged on 8/28/2017.  Her discharge summary does report that she increased her MET  level by 1.3 METs over the course of rehab.  Her therapist also mentioned that she had made significant gains in exercise tolerance.  She was discharged with a plan to do [place]'s wellness and exercise program as well as encouraged to continue gardening, walking to the mailbox and starting yoga.  Unfortunately she has not continued in the majority of those programs.  She is currently taking a daily controller inhaler called Stiolto Respimat 2 puffs once daily.  She feels as though this inhaler was added approximately 1 year ago and may coincide with when her fatigue symptoms began.  She uses her albuterol as needed and notes that she uses this very seldom.    The patient did have a comprehensive evaluation by cardiology in 2011 which was initiated prophylactically as she was planning to start to work out at the gym.  She was noted to have an abnormal exercise stress test with post stress test abnormalities in the mid anterior lateral and distal anteroseptal and hilda-apical walls.  Based on these findings Dr. Klein proceeded with a cardiac catheterization which was normal and showed no coronary artery disease.  She was noted to be hypertensive during the entire cardiac catheterization as well as having tortuous coronary arteries consistent with hypertensive heart disease.  Most recently she had a exercise stress echocardiogram in January 2016 that was normal and a transthoracic echocardiogram in 3/2016 which was also normal       Cognitive decline    The patient has been becoming increasingly frustrated with her cognitive decline she had a formal neuropsychological consultation by Ramonita Finch, phD, LP, ABPP at [facility] in [place] on 2/7/2018 and 2/14/2018.  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  She was advised to follow-up with me to discuss regular exercise including possibly starting silver sneakers or water aerobics.  This would help both the social and physical aspect of her life.  The duloxetine medication that was started in January was advised to be good addition.  It was recommended that she be closely supervised by her supportive family members in an effort to keep her in her home.  Although it was mentioned that she may benefit from the increased structure and social support in a senior living center.    There were concerns regarding her driving and possibility of getting lost.  The patient denies any motor vehicle accidents and any history of getting lost or turned around.  She generally keeps her drives to short distances.  She avoids driving in rush hour traffic.    Her family has implemented numerous safety devices including timers to turn off her stove and possibly getting a two-way pendant for her to wear in the case that she falls or becomes lost.    Lastly it was advised that Hermes return to [facility] in 1 year to be reevaluated to determine if any decline was noted from the 2/7/2018 evaluation      Hypertension Follow-up      Outpatient blood pressures are not being checked.    Low Salt Diet: no added salt      Problem list and histories reviewed & adjusted, as indicated.  Additional history: as documented    Patient Active Problem List   Diagnosis     Backache     Urge incontinence     Essential hypertension     Aftercare following joint replacement     Knee joint replacement by other means     HYPERLIPIDEMIA LDL GOAL <130     Urinary incontinence     Urgency-frequency syndrome     Advanced directives, counseling/discussion     RLQ abdominal mass     Lumbago     Pain in joint, pelvic region and thigh     Anxiety     COPD (chronic obstructive pulmonary  disease) (H)     Major depressive disorder, recurrent episode, in full remission (H)     Chronic obstructive pulmonary disease, unspecified COPD type (H)     Atopic dermatitis, unspecified type     Hypervitaminosis B6     Early onset Alzheimer's dementia without behavioral disturbance     Past Surgical History:   Procedure Laterality Date     ARTHROPLASTY PATELLO-FEMORAL (KNEE)  2010    Left TKA     C SPINE FUSION,ANTER,3 SGMTS  1992     C TOTAL HIP ARTHROPLASTY      Hip Replacement, Total (Right)     C VAGINAL HYSTERECTOMY      Hysterectomy, Vaginal w anterior repair     COLONOSCOPY  ~    No polyps     COLONOSCOPY N/A 10/10/2014    Procedure: COLONOSCOPY;  Surgeon: Chavez Ward MD;  Location: [facility]     ESOPHAGOSCOPY, GASTROSCOPY, DUODENOSCOPY (EGD), COMBINED  2016    Dr. Cha [facility]     ESOPHAGOSCOPY, GASTROSCOPY, DUODENOSCOPY (EGD), COMBINED N/A 2016    Procedure: COMBINED ESOPHAGOSCOPY, GASTROSCOPY, DUODENOSCOPY (EGD), BIOPSY SINGLE OR MULTIPLE;  Surgeon: Jada Cha MD;  Location: [facility]     EXCISE GANGLION WRIST Left 2015    Procedure: EXCISE GANGLION WRIST;  Surgeon: Justin Lyons MD;  Location: [facility]     HYSTERECTOMY, PAP NO LONGER INDICATED       wisdom teeth         Social History   Substance Use Topics     Smoking status: Former Smoker     Quit date: 1988     Smokeless tobacco: Never Used      Comment: Quit in      Alcohol use No     Family History   Problem Relation Age of Onset     Alzheimer Disease Mother       at 98,  of old age     Alzheimer Disease Father       at 78     C.A.D. Father      CANCER Other 90     unknown cancer     Alzheimer Disease Brother       age 82      Family History Negative Brother      Born 1931     CANCER Maternal Aunt      x'2 w Ca unknown etiology     Colon Cancer No family hx of            Reviewed and updated as needed this visit by clinical staff  Tobacco  Allergies  Meds  Problems  Med Hx   "Surg Hx  Fam Hx  Soc Hx        Reviewed and updated as needed this visit by Provider  Tobacco  Allergies  Meds  Problems  Med Hx  Surg Hx  Fam Hx  Soc Hx          ROS:  Constitutional, HEENT, cardiovascular, pulmonary, GI, , musculoskeletal, neuro, skin, endocrine and psych systems are negative, except as otherwise noted.    OBJECTIVE:     /70  Pulse 101  Temp 97.2  F (36.2  C) (Tympanic)  Ht 5' 4\" (1.626 m)  Wt 147 lb 6 oz (66.8 kg)  SpO2 95%  BMI 25.3 kg/m2  Body mass index is 25.3 kg/(m^2).  GENERAL: healthy, alert and no distress, frequent licking of lips during examination.  RESP: lungs clear to auscultation - no rales, rhonchi or wheezes  CV: regular rate and rhythm, normal S1 S2, no S3 or S4, no murmur, click or rub, no peripheral edema and peripheral pulses strong  MS: no gross musculoskeletal defects noted, no edema  SKIN: no suspicious lesions or rashes  NEURO: Normal strength and tone, mentation intact and speech normal  PSYCH: mentation appears normal, affect normal/bright    Diagnostic Test Results:  none     ASSESSMENT/PLAN:     Gifty was seen today for fatigue.    Diagnoses and all orders for this visit:    Early onset Alzheimer's dementia without behavioral disturbance  Start upward taper of Aricept.  Follow-up in 2 months if tolerating well.  -     donepezil (ARICEPT) 5 MG tablet; Take 1 tablet (5 mg) by mouth At Bedtime Increase to 10 mg nightly after 1 month    Restless legs syndrome (RLS), Low ferritin, Other fatigue  Increase iron supplementation to twice daily to aid with daily fatigue.  Will have patient check with pulmonology to see if her current inhaler could be contributing to fatigue as it seems to fit the timeline of when her fatigue became more pronounced.  If not contributing consider decreasing dose of hypertension medication and or repeating stress test/echocardiogram.  A sleep study could also be considered as her last one was performed in 2009 that revealed " snoring and periodic leg movements.  -     VITRON-C  MG TABS tablet; Take 1 tablet by mouth 2 times daily (with meals)  -     cholecalciferol (VITAMIN D3) 5000 UNITS TABS tablet; Take 1 tablet (5,000 Units) by mouth daily  -     DULoxetine (CYMBALTA) 20 MG EC capsule; Take 1 capsule (20 mg) by mouth 2 times daily    Peptic ulcer  Well-controlled.  Continue omeprazole.  -     omeprazole (PRILOSEC) 40 MG capsule; TAKE ONE CAPSULE BY MOUTH daily 30 minutes before first meal.    Essential hypertension  Well-controlled.  Continue amlodipine.  If pulmonology does not feel that inhaler is contributing to fatigue consider decreasing amlodipine to 5 mg once daily.  Will still be aggressive in hypertensive management due to underlying hypertensive heart disease.  -     amLODIPine (NORVASC) 10 MG tablet; Take 1 tablet (10 mg) by mouth daily    Adjustment disorder with depressed mood  Continue current duloxetine.  Symptoms likely exacerbated by memory and cognitive concerns.  We will continue this medication in light of starting Aricept so as to avoid any confusing adverse effects.  However, no significant improvement to date.  If both family and patient still noting no significant improvement upon follow-up in 2 months consider changing to mirtazapine 7.5 mg.  -     DULoxetine (CYMBALTA) 20 MG EC capsule; Take 1 capsule (20 mg) by mouth 2 times daily        Follow-up in 2 months or sooner if noting side effects of medications.  Patient is scheduled to follow-up with pulmonology on 4/11/2018.    Greater than 45 minutes were spent with the patient. The majority of this time was coordinating care and counseling regarding the above diagnosis .      Leann Gordon PA-C  [facility]

## 2018-03-13 NOTE — NURSING NOTE
"Chief Complaint   Patient presents with     Fatigue       Initial /70  Pulse 101  Temp 97.2  F (36.2  C) (Tympanic)  Ht 5' 4\" (1.626 m)  Wt 147 lb 6 oz (66.8 kg)  SpO2 95%  BMI 25.3 kg/m2 Estimated body mass index is 25.3 kg/(m^2) as calculated from the following:    Height as of this encounter: 5' 4\" (1.626 m).    Weight as of this encounter: 147 lb 6 oz (66.8 kg).  Medication Reconciliation: complete    "

## 2018-03-14 ENCOUNTER — MYC MEDICAL ADVICE (OUTPATIENT)
Dept: FAMILY MEDICINE | Facility: CLINIC | Age: 83
End: 2018-03-14

## 2018-03-14 DIAGNOSIS — F02.80 ALZHEIMER'S DEMENTIA WITHOUT BEHAVIORAL DISTURBANCE, UNSPECIFIED TIMING OF DEMENTIA ONSET: Primary | ICD-10-CM

## 2018-03-14 DIAGNOSIS — G30.9 ALZHEIMER'S DEMENTIA WITHOUT BEHAVIORAL DISTURBANCE, UNSPECIFIED TIMING OF DEMENTIA ONSET: Primary | ICD-10-CM

## 2018-03-14 ASSESSMENT — PATIENT HEALTH QUESTIONNAIRE - PHQ9: SUM OF ALL RESPONSES TO PHQ QUESTIONS 1-9: 8

## 2018-03-15 DIAGNOSIS — I10 ESSENTIAL HYPERTENSION: ICD-10-CM

## 2018-03-15 PROBLEM — G30.0 EARLY ONSET ALZHEIMER'S DEMENTIA WITHOUT BEHAVIORAL DISTURBANCE (H): Status: RESOLVED | Noted: 2018-03-13 | Resolved: 2018-03-15

## 2018-03-15 PROBLEM — F02.80 EARLY ONSET ALZHEIMER'S DEMENTIA WITHOUT BEHAVIORAL DISTURBANCE (H): Status: RESOLVED | Noted: 2018-03-13 | Resolved: 2018-03-15

## 2018-03-19 RX ORDER — AMLODIPINE BESYLATE 10 MG/1
TABLET ORAL
Qty: 90 TABLET | Refills: 0 | OUTPATIENT
Start: 2018-03-19

## 2018-03-19 NOTE — TELEPHONE ENCOUNTER
Duplicate, filled 3/13/18

## 2018-03-29 ENCOUNTER — OFFICE VISIT (OUTPATIENT)
Dept: FAMILY MEDICINE | Facility: CLINIC | Age: 83
End: 2018-03-29
Payer: COMMERCIAL

## 2018-03-29 VITALS
SYSTOLIC BLOOD PRESSURE: 122 MMHG | WEIGHT: 141.5 LBS | TEMPERATURE: 97 F | BODY MASS INDEX: 24.16 KG/M2 | HEIGHT: 64 IN | DIASTOLIC BLOOD PRESSURE: 72 MMHG | HEART RATE: 102 BPM | OXYGEN SATURATION: 98 %

## 2018-03-29 DIAGNOSIS — D17.30 LIPOMA OF SKIN AND SUBCUTANEOUS TISSUE: ICD-10-CM

## 2018-03-29 DIAGNOSIS — R19.5 DARK STOOLS: Primary | ICD-10-CM

## 2018-03-29 DIAGNOSIS — R79.0 LOW FERRITIN: ICD-10-CM

## 2018-03-29 PROCEDURE — 99214 OFFICE O/P EST MOD 30 MIN: CPT | Performed by: PHYSICIAN ASSISTANT

## 2018-03-29 NOTE — NURSING NOTE
"Chief Complaint   Patient presents with     other     Black stool yesterday morning. Doubled iron recently. Cyst on back?        Initial /72  Pulse 102  Temp 97  F (36.1  C) (Tympanic)  Ht 5' 4\" (1.626 m)  Wt 141 lb 8 oz (64.2 kg)  SpO2 98%  BMI 24.29 kg/m2 Estimated body mass index is 24.29 kg/(m^2) as calculated from the following:    Height as of this encounter: 5' 4\" (1.626 m).    Weight as of this encounter: 141 lb 8 oz (64.2 kg).  Medication Reconciliation: complete    "

## 2018-03-29 NOTE — MR AVS SNAPSHOT
After Visit Summary   3/29/2018    Gifty Villa    MRN: [id number]           Patient Information     Date Of Birth          1935        Visit Information        Provider Department      3/29/2018 11:20 AM Leann Gordon PA-C [facility]        Today's Diagnoses     Dark stools    -  1    Low ferritin        Lipoma of skin and subcutaneous tissue           Follow-ups after your visit        Future tests that were ordered for you today     Open Future Orders        Priority Expected Expires Ordered    Fecal colorectal cancer screen (FIT) Routine 4/19/2018 6/21/2018 3/29/2018            Who to contact     If you have questions or need follow up information about today's clinic visit or your schedule please contact [facility] directly at [phone].  Normal or non-critical lab and imaging results will be communicated to you by MyChart, letter or phone within 4 business days after the clinic has received the results. If you do not hear from us within 7 days, please contact the clinic through Enflickhart or phone. If you have a critical or abnormal lab result, we will notify you by phone as soon as possible.  Submit refill requests through VALOREM or call your pharmacy and they will forward the refill request to us. Please allow 3 business days for your refill to be completed.          Additional Information About Your Visit        MyChart Information     VALOREM gives you secure access to your electronic health record. If you see a primary care provider, you can also send messages to your care team and make appointments. If you have questions, please call your primary care clinic.  If you do not have a primary care provider, please call [phone] and they will assist you.        Care EveryWhere ID     This is your Care EveryWhere ID. This could be used by other organizations to access your [place] medical records  [id number]        Your Vitals Were      "Pulse Temperature Height Pulse Oximetry BMI (Body Mass Index)       102 97  F (36.1  C) (Tympanic) 5' 4\" (1.626 m) 98% 24.29 kg/m2        Blood Pressure from Last 3 Encounters:   03/29/18 122/72   03/13/18 128/70   01/31/18 140/68    Weight from Last 3 Encounters:   03/29/18 141 lb 8 oz (64.2 kg)   03/13/18 147 lb 6 oz (66.8 kg)   01/31/18 146 lb 6.4 oz (66.4 kg)                 Today's Medication Changes          These changes are accurate as of 3/29/18 11:39 AM.  If you have any questions, ask your nurse or doctor.               These medicines have changed or have updated prescriptions.        Dose/Directions    solifenacin 10 MG tablet   Commonly known as:  VESICARE   This may have changed:  additional instructions   Used for:  Urinary urgency        Dose:  10 mg   Take 1 tablet (10 mg) by mouth daily   Quantity:  90 tablet   Refills:  3                Primary Care Provider Office Phone # Fax #    [facility] [phone]       [address] [place] [address]        Equal Access to Services     TASNEEM GRACIA AH: Hadii faith heberto Sokristen, waaxda luqadaha, qaybta kaalmada adeegyada, cathy moran . So [facility] [phone].    ATENCIÓN: Si habla español, tiene a robertson disposición servicios gratuitos de asistencia lingüística. [facility] [phone].    We comply with applicable federal civil rights laws and [place] laws. We do not discriminate on the basis of race, color, national origin, age, disability, sex, sexual orientation, or gender identity.            Thank you!     Thank you for choosing [facility]  for your care. Our goal is always to provide you with excellent care. Hearing back from our patients is one way we can continue to improve our services. Please take a few minutes to complete the written survey that you may receive in the mail after your visit with us. Thank you!             Your Updated Medication List - " Protect others around you: Learn how to safely use, store and throw away your medicines at www.disposemymeds.org.          This list is accurate as of 3/29/18 11:39 AM.  Always use your most recent med list.                   Brand Name Dispense Instructions for use Diagnosis    amLODIPine 10 MG tablet    NORVASC    90 tablet    Take 1 tablet (10 mg) by mouth daily    Essential hypertension       aspirin 81 MG tablet     0    ONE DAILY    Unspecified essential hypertension       calcium carbonate-vitamin D 500-400 MG-UNIT Tabs per tablet     180 tablet    Take 1 tablet by mouth 2 times daily (with meals)    Post menopausal problems       cholecalciferol 5000 UNITS Tabs tablet    vitamin D3    100 tablet    Take 1 tablet (5,000 Units) by mouth daily    Other fatigue       donepezil 5 MG tablet    ARICEPT    60 tablet    Take 1 tablet (5 mg) by mouth At Bedtime Increase to 10 mg nightly after 1 month    Early onset Alzheimer's dementia without behavioral disturbance       DULoxetine 20 MG EC capsule    CYMBALTA    60 capsule    Take 1 capsule (20 mg) by mouth 2 times daily    Other fatigue, Adjustment disorder with depressed mood       MUCINEX D MAX STRENGTH 120-1200 MG Tb12   Generic drug:  PseudoePHEDrine-guaiFENesin       Other fatigue, Cognitive changes       omeprazole 40 MG capsule    priLOSEC    90 capsule    Take 1 capsule (40 mg) by mouth daily 30 minutes before breakfast    Peptic ulcer       PROAIR  (90 BASE) MCG/ACT Inhaler   Generic drug:  albuterol      Inhale 2 puffs into the lungs as needed for shortness of breath / dyspnea or wheezing        solifenacin 10 MG tablet    VESICARE    90 tablet    Take 1 tablet (10 mg) by mouth daily    Urinary urgency       tiotropium-olodaterol 2.5-2.5 MCG/ACT Aers    STIOLTO RESPIMAT    4 g    Inhale 2 puffs into the lungs daily    Chronic obstructive pulmonary disease with acute exacerbation (H)       VITRON-C  MG Tabs tablet   Generic drug:  ferrous  fumarate 65 mg ([place]. FE)-Vitamin C 125 mg     100 tablet    Take 1 tablet by mouth 2 times daily (with meals)    Restless legs syndrome (RLS), Low ferritin

## 2018-03-29 NOTE — PROGRESS NOTES
"  SUBJECTIVE:   Gifty Villa is a 83 year old female who presents to clinic today for the following health issues:    Abnormal Stools - The patient has noticed that her stools have been darker for the past day. Her iron dose was doubled one week ago and her energy levels have increased since the medication change. She had a colonoscopy in 2014 which was normal. She has a history of hemorrhoids. She denies pain and recent pepto bismal use.     Skin Problem - The patient's care taker noticed a movable lump in the center of her back. She has pain when pressing on the site.       Problem list and histories reviewed & adjusted, as indicated.  Additional history: as documented      Reviewed and updated as needed this visit by clinical staff  Tobacco  Allergies  Meds  Problems  Med Hx  Surg Hx  Fam Hx  Soc Hx        Reviewed and updated as needed this visit by Provider  Tobacco  Allergies  Meds  Problems  Med Hx  Surg Hx  Fam Hx  Soc Hx          ROS:  Constitutional, HEENT, cardiovascular, pulmonary, GI, , musculoskeletal, neuro, skin, endocrine and psych systems are negative, except as otherwise noted.    This document serves as a record of the services and decisions personally performed and made by Leann Gordon PA-C. It was created on his behalf by Estela Quiroga, a trained medical scribe. The creation of this document is based on the provider's statements to the medical scribe.  Estela Quiroga 11:36 AM 3/29/2018  OBJECTIVE:   /72  Pulse 102  Temp 97  F (36.1  C) (Tympanic)  Ht 5' 4\" (1.626 m)  Wt 141 lb 8 oz (64.2 kg)  SpO2 98%  BMI 24.29 kg/m2  Body mass index is 24.29 kg/(m^2).  GENERAL: healthy, alert and no distress  SKIN: well defined 7-mm movable soft mass on mid back, otherwise no suspicious lesions or rashes    Diagnostic Test Results:  none   ASSESSMENT/PLAN:   Gifty was seen today for other.    Diagnoses and all orders for this visit:    Dark stools - Most likely related " to recent increase in iron dose. Patient will do FIT test to check for blood in the stool. If positive, she will followup with colonoscopy.    -     Fecal colorectal cancer screen (FIT); Future    Low ferritin - continue with medication.    Lipoma of skin and subcutaneous tissue - continue to monitor.  Reassurance      The information in this document, created by a scribe for me, accurately reflects the services I personally performed and the decisions made by me. I have reviewed and approved this document for accuracy.    Leann Gordon PA-C  [facility] [place]

## 2018-04-10 DIAGNOSIS — R39.15 URINARY URGENCY: ICD-10-CM

## 2018-04-10 DIAGNOSIS — R19.5 DARK STOOLS: ICD-10-CM

## 2018-04-10 LAB — HEMOCCULT STL QL IA: NEGATIVE

## 2018-04-10 PROCEDURE — 82274 ASSAY TEST FOR BLOOD FECAL: CPT | Performed by: PHYSICIAN ASSISTANT

## 2018-04-10 RX ORDER — SOLIFENACIN SUCCINATE 10 MG/1
10 TABLET, FILM COATED ORAL EVERY OTHER DAY
Qty: 45 TABLET | Refills: 3 | Status: SHIPPED | OUTPATIENT
Start: 2018-04-10 | End: 2018-10-08

## 2018-04-10 NOTE — TELEPHONE ENCOUNTER
"[facility], [place] calls requesting refill for Vesicare. Pt's family requesting to pick this up tomorrow.    Note on Vesicare states pt takes every other day instead of daily. Contacted pt, she confirms she is taking the every other day. Order pended with directions to take every other day.     Routing refill request to provider for review/approval because:  Taking differently than ordered.      Requested Prescriptions   Pending Prescriptions Disp Refills     solifenacin (VESICARE) 10 MG tablet  Last Written Prescription Date:  3/10/17  Last Fill Quantity: 90,  # refills: 3   Last office visit: 9/6/2017   Future Office Visit:    90 tablet 3     Sig: Take 1 tablet (10 mg) by mouth daily    Muscarinic Antagonists (Urinary Incontinence Agents) Passed    4/10/2018  8:59 AM       Passed - Recent (12 mo) or future (30 days) visit within the authorizing provider's specialty    Patient had office visit in the last 12 months or has a visit in the next 30 days with authorizing provider or within the authorizing provider's specialty.  See \"Patient Info\" tab in inbasket, or \"Choose Columns\" in Meds & Orders section of the refill encounter.           Passed - Patient does not have a diagnosis of glaucoma on the problem list    If glaucoma diagnosis is new, refer refill to physician.         Passed - Patient is 18 years of age or older         "

## 2018-04-11 ENCOUNTER — TRANSFERRED RECORDS (OUTPATIENT)
Dept: HEALTH INFORMATION MANAGEMENT | Facility: CLINIC | Age: 83
End: 2018-04-11

## 2018-04-11 ENCOUNTER — CARE COORDINATION (OUTPATIENT)
Dept: CARDIOLOGY | Facility: CLINIC | Age: 83
End: 2018-04-11

## 2018-05-02 ENCOUNTER — OFFICE VISIT (OUTPATIENT)
Dept: FAMILY MEDICINE | Facility: CLINIC | Age: 83
End: 2018-05-02
Payer: COMMERCIAL

## 2018-05-02 VITALS
DIASTOLIC BLOOD PRESSURE: 72 MMHG | SYSTOLIC BLOOD PRESSURE: 118 MMHG | OXYGEN SATURATION: 96 % | HEART RATE: 83 BPM | WEIGHT: 134.25 LBS | HEIGHT: 64 IN | TEMPERATURE: 96.1 F | BODY MASS INDEX: 22.92 KG/M2

## 2018-05-02 DIAGNOSIS — L30.9 ECZEMA, UNSPECIFIED TYPE: Primary | ICD-10-CM

## 2018-05-02 PROCEDURE — 99213 OFFICE O/P EST LOW 20 MIN: CPT | Performed by: PHYSICIAN ASSISTANT

## 2018-05-02 RX ORDER — TRIAMCINOLONE ACETONIDE 1 MG/G
CREAM TOPICAL
Qty: 30 G | Refills: 0 | Status: SHIPPED | OUTPATIENT
Start: 2018-05-02 | End: 2018-05-29

## 2018-05-02 NOTE — NURSING NOTE
"Chief Complaint   Patient presents with     Derm Problem     rash on bilateral lower legs        Initial /72  Pulse 83  Temp 96.1  F (35.6  C) (Tympanic)  Ht 5' 4\" (1.626 m)  Wt 134 lb 4 oz (60.9 kg)  SpO2 96%  BMI 23.04 kg/m2 Estimated body mass index is 23.04 kg/(m^2) as calculated from the following:    Height as of this encounter: 5' 4\" (1.626 m).    Weight as of this encounter: 134 lb 4 oz (60.9 kg).  Medication Reconciliation: complete    "

## 2018-05-02 NOTE — MR AVS SNAPSHOT
After Visit Summary   5/2/2018    Gifty Villa    MRN: [id number]           Patient Information     Date Of Birth          1935        Visit Information        Provider Department      5/2/2018 10:20 AM Leann Gordon PA-C [facility] [place]        Today's Diagnoses     Eczema, unspecified type    -  1      Care Instructions      Use Vanicream or CereVe lotion after showers    Managing Atopic Dermatitis (Eczema)     After bathing, gently pat your skin dry (don t rub). Apply moisturizer while your skin is still damp.   To manage your symptoms and help reduce the severity and frequency, try these self-care tips:  Caring for your skin    Use a gentle, fragrance-free cleanser (or nonsoap cleanser) for bathing. Rinse well. Pat skin dry.    Take warm, not hot, baths or showers. Try to limit them to no more that 10 to 15 minutes.     Use moisturizer liberally right after you bathe, while your skin is still damp.    Avoid scratching because it will cause more damage to your skin.     Topical, over-the-counter hydrocortisone cream may help control mild symptoms.   Controlling your environment    Avoid extreme heat or cold.    Avoid very humid or very dry air.    If your home or office air is very dry, use a humidifier.    Avoid allergens, such as dust, that may be present in bedding, carpets, plush toys, or rugs.    Know that pet hair and dander can cause flare-ups.  Seeking medical treatment  Another way to keep symptoms under control is to seek medical treatment. Talk with your healthcare provider about the type of treatment that may work best for you. Your provider may prescribe treatments such as the following:    Topical treatments to put on the skin daily    Medicines taken by mouth (oral medicines), such as antihistamines, antibiotics, or corticosteroids    In severe cases shots (injections) may be needed to control the symptoms. You may even need antibiotics if skin infections  occur.  Treatments don t work the same way for every person. So if your symptoms continue or get worse, ask your healthcare provider about other treatments.  Making lifestyle choices    Manage the stress in your life.    Wear loose-fitting cotton clothing that does not bind or rub your skin.    Avoid contact with wool or other scratchy fabrics.    Use fragrance-free products.  Getting good results  Now that you know more about atopic dermatitis, the next step is up to you. Follow your healthcare provider s treatment plan and your self-care routine. This will help bring atopic dermatitis under control. If your symptoms persist, be sure to let your health care provider know.   Date Last Reviewed: 2/1/2017 2000-2017 [employer]. [address] [place] [address], [place] [address]. All rights reserved. This information is not intended as a substitute for professional medical care. Always follow your healthcare professional's instructions.                Follow-ups after your visit        Follow-up notes from your care team     Return if symptoms worsen or fail to improve.      Your next 10 appointments already scheduled     May 14, 2018  3:15 PM CDT   New Patient Visit with Ralph Magana MD   [facility] ([facility])    [address] [facility]  [address] [place] [id number]   [phone]            Jun 20, 2018  9:45 AM CDT   MA SCREENING DIGITAL BILATERAL with RVMA1   [facility]    [address] [place] [id number] [phone]           Do not use any powder, lotion or deodorant under your arms or on your breast. If you do, we will ask you to remove it before your exam.  Wear comfortable, two-piece clothing.  If you have any allergies, tell your care team.  Bring any previous mammograms from other facilities or have them mailed to the breast center.              Who to contact     If you have questions or  "need follow up information about today's clinic visit or your schedule please contact [facility] directly at [phone].  Normal or non-critical lab and imaging results will be communicated to you by MobileWebsiteshart, letter or phone within 4 business days after the clinic has received the results. If you do not hear from us within 7 days, please contact the clinic through Critical Linkst or phone. If you have a critical or abnormal lab result, we will notify you by phone as soon as possible.  Submit refill requests through Insticator or call your pharmacy and they will forward the refill request to us. Please allow 3 business days for your refill to be completed.          Additional Information About Your Visit        Insticator Information     Insticator gives you secure access to your electronic health record. If you see a primary care provider, you can also send messages to your care team and make appointments. If you have questions, please call your primary care clinic.  If you do not have a primary care provider, please call [phone] and they will assist you.        Care EveryWhere ID     This is your Care EveryWhere ID. This could be used by other organizations to access your [place] medical records  [id number]        Your Vitals Were     Pulse Temperature Height Pulse Oximetry BMI (Body Mass Index)       83 96.1  F (35.6  C) (Tympanic) 5' 4\" (1.626 m) 96% 23.04 kg/m2        Blood Pressure from Last 3 Encounters:   05/02/18 118/72   03/29/18 122/72   03/13/18 128/70    Weight from Last 3 Encounters:   05/02/18 134 lb 4 oz (60.9 kg)   03/29/18 141 lb 8 oz (64.2 kg)   03/13/18 147 lb 6 oz (66.8 kg)              Today, you had the following     No orders found for display         Today's Medication Changes          These changes are accurate as of 5/2/18 10:48 AM.  If you have any questions, ask your nurse or doctor.               Start taking these medicines.        Dose/Directions    triamcinolone 0.1 % cream "   Commonly known as:  KENALOG   Used for:  Eczema, unspecified type   Started by:  Leann Gordon PA-C        Apply sparingly to affected area once daily   Quantity:  30 g   Refills:  0            Where to get your medicines      These medications were [place] [facility] [address] [place] [address] [facility]  [address] [facility] [address]     Phone:  [phone]     triamcinolone 0.1 % cream                Primary Care Provider Office Phone # Fax #    Leann Gordon PA-C [phone]       [facility] [address] [place] [address]        Equal Access to Services     CRISTO GRACIA : Hadii faith farias hadasho Soomaali, waaxda luqadaha, qaybta kaalmada adeegyada, cathy samuel. So [facility] [phone].    ATENCIÓN: Si habla español, tiene a robertson disposición servicios gratuitos de asistencia lingüística. Llame al [phone].    We comply with applicable federal civil rights laws and [place] laws. We do not discriminate on the basis of race, color, national origin, age, disability, sex, sexual orientation, or gender identity.            Thank you!     Thank you for choosing [facility]  for your care. Our goal is always to provide you with excellent care. Hearing back from our patients is one way we can continue to improve our services. Please take a few minutes to complete the written survey that you may receive in the mail after your visit with us. Thank you!             Your Updated Medication List - Protect others around you: Learn how to safely use, store and throw away your medicines at www.disposemymeds.org.          This list is accurate as of 5/2/18 10:48 AM.  Always use your most recent med list.                   Brand Name Dispense Instructions for use Diagnosis    amLODIPine 10 MG tablet    NORVASC    90 tablet    Take 1 tablet (10 mg) by mouth daily    Essential hypertension       aspirin 81 MG tablet      0    ONE DAILY    Unspecified essential hypertension       calcium carbonate-vitamin D 500-400 MG-UNIT Tabs per tablet     180 tablet    Take 1 tablet by mouth 2 times daily (with meals)    Post menopausal problems       cholecalciferol 5000 units Tabs tablet    vitamin D3    100 tablet    Take 1 tablet (5,000 Units) by mouth daily    Other fatigue       donepezil 5 MG tablet    ARICEPT    60 tablet    Take 1 tablet (5 mg) by mouth At Bedtime Increase to 10 mg nightly after 1 month    Early onset Alzheimer's dementia without behavioral disturbance       DULoxetine 20 MG EC capsule    CYMBALTA    60 capsule    Take 1 capsule (20 mg) by mouth 2 times daily    Other fatigue, Adjustment disorder with depressed mood       MUCINEX D MAX STRENGTH 120-1200 MG Tb12   Generic drug:  PseudoePHEDrine-guaiFENesin       Other fatigue, Cognitive changes       omeprazole 40 MG capsule    priLOSEC    90 capsule    Take 1 capsule (40 mg) by mouth daily 30 minutes before breakfast    Peptic ulcer       PROAIR  (90 Base) MCG/ACT Inhaler   Generic drug:  albuterol      Inhale 2 puffs into the lungs as needed for shortness of breath / dyspnea or wheezing        solifenacin 10 MG tablet    VESICARE    45 tablet    Take 1 tablet (10 mg) by mouth every other day    Urinary urgency       tiotropium-olodaterol 2.5-2.5 MCG/ACT Aers    STIOLTO RESPIMAT    4 g    Inhale 2 puffs into the lungs daily    Chronic obstructive pulmonary disease with acute exacerbation (H)       triamcinolone 0.1 % cream    KENALOG    30 g    Apply sparingly to affected area once daily    Eczema, unspecified type       VITRON-C  MG Tabs tablet   Generic drug:  ferrous fumarate 65 mg ([place]. FE)-Vitamin C 125 mg     100 tablet    Take 1 tablet by mouth 2 times daily (with meals)    Restless legs syndrome (RLS), Low ferritin

## 2018-05-02 NOTE — PROGRESS NOTES
SUBJECTIVE:   Gifty Villa is a 83 year old female who presents to clinic today for the following health issues:    Rash  Gifty presents to clinic reporting symptoms of a rash on her bilateral lower extremities that onset 2 weeks ago. She states that the rash started as a small red area but has grown and become more pruritic. She admits to scratching the area at night and has caused some disruption of the skin due to the scratching. She though the rash was due to wearing new socks but it has continued to grow. She has treated the area with lotion without success.     Problem list and histories reviewed & adjusted, as indicated.  Additional history: as documented    Patient Active Problem List   Diagnosis     Backache     Urge incontinence     Essential hypertension     Aftercare following joint replacement     Knee joint replacement by other means     HYPERLIPIDEMIA LDL GOAL <130     Urinary incontinence     Urgency-frequency syndrome     Advanced directives, counseling/discussion     RLQ abdominal mass     Lumbago     Pain in joint, pelvic region and thigh     Anxiety     COPD (chronic obstructive pulmonary disease) (H)     Major depressive disorder, recurrent episode, in full remission (H)     Chronic obstructive pulmonary disease, unspecified COPD type (H) - followed by Dr. Alida PICHARDO [facility]     Atopic dermatitis, unspecified type     Hypervitaminosis B6     Peptic ulcer     Alzheimer's dementia without behavioral disturbance, unspecified timing of dementia onset     Low ferritin     Past Surgical History:   Procedure Laterality Date     ARTHROPLASTY PATELLO-FEMORAL (KNEE)  8/2010    Left TKA     C SPINE FUSION,ANTER,3 SGMTS  1992 1992     C TOTAL HIP ARTHROPLASTY  1995    Hip Replacement, Total (Right)     C VAGINAL HYSTERECTOMY  1978    Hysterectomy, Vaginal w anterior repair     CARDIAC CATH - HIM SCAN  2011    normal.  Done d/t abnormal stress test (false positive) - patient noted to have hypertensive  heart disease     COLONOSCOPY  ~    No polyps     COLONOSCOPY N/A 10/10/2014    Procedure: COLONOSCOPY;  Surgeon: Chavez Ward MD;  Location: [facility]     ESOPHAGOSCOPY, GASTROSCOPY, DUODENOSCOPY (EGD), COMBINED  2016    Dr. Starla PARKS     ESOPHAGOSCOPY, GASTROSCOPY, DUODENOSCOPY (EGD), COMBINED N/A 2016    Procedure: COMBINED ESOPHAGOSCOPY, GASTROSCOPY, DUODENOSCOPY (EGD), BIOPSY SINGLE OR MULTIPLE;  Surgeon: Jada Cha MD;  Location: [facility]     EXCISE GANGLION WRIST Left 2015    Procedure: EXCISE GANGLION WRIST;  Surgeon: Justin Lyons MD;  Location: [facility]     HYSTERECTOMY, PAP NO LONGER INDICATED       wisdom teeth         Social History   Substance Use Topics     Smoking status: Former Smoker     Quit date: 1988     Smokeless tobacco: Never Used      Comment: Quit in      Alcohol use No     Family History   Problem Relation Age of Onset     Alzheimer Disease Mother       at 98,  of old age     Alzheimer Disease Father       at 78     C.A.D. Father      CANCER Other 90     unknown cancer     Alzheimer Disease Brother       age 82      Family History Negative Brother      Born 1931     CANCER Maternal Aunt      x'2 w Ca unknown etiology     Colon Cancer No family hx of          Current Outpatient Prescriptions   Medication Sig Dispense Refill     albuterol (ALBUTEROL) 108 (90 BASE) MCG/ACT Inhaler Inhale 2 puffs into the lungs as needed for shortness of breath / dyspnea or wheezing       amLODIPine (NORVASC) 10 MG tablet Take 1 tablet (10 mg) by mouth daily 90 tablet 1     ASPIRIN 81 MG OR TABS ONE DAILY 0 0     calcium carbonate-vitamin D 500-400 MG-UNIT TABS per tablet Take 1 tablet by mouth 2 times daily (with meals) 180 tablet 3     cholecalciferol (VITAMIN D3) 5000 UNITS TABS tablet Take 1 tablet (5,000 Units) by mouth daily 100 tablet 1     donepezil (ARICEPT) 5 MG tablet Take 1 tablet (5 mg) by mouth At Bedtime Increase to 10 mg nightly after 1  "month 60 tablet 1     DULoxetine (CYMBALTA) 20 MG EC capsule Take 1 capsule (20 mg) by mouth 2 times daily 60 capsule 1     omeprazole (PRILOSEC) 40 MG capsule Take 1 capsule (40 mg) by mouth daily 30 minutes before breakfast 90 capsule 1     PseudoePHEDrine-guaiFENesin (MUCINEX D MAX STRENGTH) 120-1200 MG TB12        solifenacin (VESICARE) 10 MG tablet Take 1 tablet (10 mg) by mouth every other day 45 tablet 3     tiotropium-olodaterol (STIOLTO RESPIMAT) 2.5-2.5 MCG/ACT AERS Inhale 2 puffs into the lungs daily 4 g 3     triamcinolone (KENALOG) 0.1 % cream Apply sparingly to affected area once daily 30 g 0     VITRON-C  MG TABS tablet Take 1 tablet by mouth 2 times daily (with meals) 100 tablet 0     No Known Allergies    Reviewed and updated as needed this visit by clinical staff  Tobacco  Allergies  Meds  Problems  Med Hx  Surg Hx  Fam Hx  Soc Hx        Reviewed and updated as needed this visit by Provider  Tobacco  Allergies  Meds  Problems  Med Hx  Surg Hx  Fam Hx  Soc Hx          ROS:  Constitutional, HEENT, cardiovascular, pulmonary, GI, , musculoskeletal, neuro, skin, endocrine and psych systems are negative, except as otherwise noted.    This document serves as a record of the services and decisions personally performed and made by Leann Gordon PA-C. It was created on her behalf by James Cook, a trained medical scribe. The creation of this document is based on the provider's statements to the medical scribe.  James Cook 10:38 AM May 2, 2018    OBJECTIVE:   /72  Pulse 83  Temp 96.1  F (35.6  C) (Tympanic)  Ht 5' 4\" (1.626 m)  Wt 134 lb 4 oz (60.9 kg)  SpO2 96%  BMI 23.04 kg/m2  Body mass index is 23.04 kg/(m^2).  GENERAL: healthy, alert and no distress  SKIN: scattered scab like lesions on right anterior shin and left lateral shin with full thickness disruption in areas of excoriations, otherwise no suspicious lesions or rashes  PSYCH: mentation appears " normal, affect normal/bright    Diagnostic Test Results:  none     ASSESSMENT/PLAN:   Gifty was seen today for derm problem.    Diagnoses and all orders for this visit:    Eczema, unspecified type  Start triamcinolone cream to treat eczema on bilateral lower extremities. Advised patient to continue with use of lotion or Aquaphor after showering to aid in hydration of her legs. Recommended patient not to take long hot showers and to use Vanicream or CereVe to moisturize regularly.  Follow up if symptoms persist or worsen.  Unlikely to be MRSA infection, however, pt and daughter advised that this is in the differential.  -     triamcinolone (KENALOG) 0.1 % cream; Apply sparingly to affected area once daily    The information in this document, created by the medical scribe for me, accurately reflects the services I personally performed and the decisions made by me. I have reviewed and approved this document for accuracy prior to leaving the patient care area.  May 2, 2018 10:39 AM    Leann Gordon PA-C  [facility]

## 2018-05-02 NOTE — PATIENT INSTRUCTIONS
Use Vanicream or CereVe lotion after showers    Managing Atopic Dermatitis (Eczema)     After bathing, gently pat your skin dry (don t rub). Apply moisturizer while your skin is still damp.   To manage your symptoms and help reduce the severity and frequency, try these self-care tips:  Caring for your skin    Use a gentle, fragrance-free cleanser (or nonsoap cleanser) for bathing. Rinse well. Pat skin dry.    Take warm, not hot, baths or showers. Try to limit them to no more that 10 to 15 minutes.     Use moisturizer liberally right after you bathe, while your skin is still damp.    Avoid scratching because it will cause more damage to your skin.     Topical, over-the-counter hydrocortisone cream may help control mild symptoms.   Controlling your environment    Avoid extreme heat or cold.    Avoid very humid or very dry air.    If your home or office air is very dry, use a humidifier.    Avoid allergens, such as dust, that may be present in bedding, carpets, plush toys, or rugs.    Know that pet hair and dander can cause flare-ups.  Seeking medical treatment  Another way to keep symptoms under control is to seek medical treatment. Talk with your healthcare provider about the type of treatment that may work best for you. Your provider may prescribe treatments such as the following:    Topical treatments to put on the skin daily    Medicines taken by mouth (oral medicines), such as antihistamines, antibiotics, or corticosteroids    In severe cases shots (injections) may be needed to control the symptoms. You may even need antibiotics if skin infections occur.  Treatments don t work the same way for every person. So if your symptoms continue or get worse, ask your healthcare provider about other treatments.  Making lifestyle choices    Manage the stress in your life.    Wear loose-fitting cotton clothing that does not bind or rub your skin.    Avoid contact with wool or other scratchy fabrics.    Use fragrance-free  products.  Getting good results  Now that you know more about atopic dermatitis, the next step is up to you. Follow your healthcare provider s treatment plan and your self-care routine. This will help bring atopic dermatitis under control. If your symptoms persist, be sure to let your health care provider know.   Date Last Reviewed: 2/1/2017 2000-2017 [employer]. [address] [place] [address], [place] [address]. All rights reserved. This information is not intended as a substitute for professional medical care. Always follow your healthcare professional's instructions.

## 2018-05-10 DIAGNOSIS — R53.83 OTHER FATIGUE: ICD-10-CM

## 2018-05-10 DIAGNOSIS — F43.21 ADJUSTMENT DISORDER WITH DEPRESSED MOOD: ICD-10-CM

## 2018-05-10 RX ORDER — DULOXETIN HYDROCHLORIDE 20 MG/1
20 CAPSULE, DELAYED RELEASE ORAL 2 TIMES DAILY
Qty: 60 CAPSULE | Refills: 2 | Status: SHIPPED | OUTPATIENT
Start: 2018-05-10 | End: 2018-08-10

## 2018-05-10 NOTE — TELEPHONE ENCOUNTER
"Requested Prescriptions   Pending Prescriptions Disp Refills     DULoxetine (CYMBALTA) 20 MG EC capsule  Last Written Prescription Date:  3/13/18  Last Fill Quantity: 60 CAPSULE,  # refills: 1   Last office visit: 5/2/2018 with prescribing provider:  SERGIO   Future Office Visit:     60 capsule 1     Sig: Take 1 capsule (20 mg) by mouth 2 times daily    Serotonin-Norepinephrine Reuptake Inhibitors  Passed    5/10/2018 12:08 PM       Passed - Blood pressure under 140/90 in past 12 months    BP Readings from Last 3 Encounters:   05/02/18 118/72   03/29/18 122/72   03/13/18 128/70                Passed - Recent (12 mo) or future (30 days) visit within the authorizing provider's specialty    Patient had office visit in the last 12 months or has a visit in the next 30 days with authorizing provider or within the authorizing provider's specialty.  See \"Patient Info\" tab in inbasket, or \"Choose Columns\" in Meds & Orders section of the refill encounter.           Passed - Patient is age 18 or older       Passed - No active pregnancy on record       Passed - No positive pregnancy test in past 12 months          "

## 2018-05-10 NOTE — TELEPHONE ENCOUNTER
PHQ-9 SCORE 3/25/2015 1/7/2016 3/13/2018   Total Score 2 - -   Total Score - 3 8     FERNY-7 SCORE 1/3/2013   Total Score 7       Need updated PHQ9/GAD7  MyChart Message sent    Lola Hansen RN  [place] [facility]

## 2018-05-10 NOTE — TELEPHONE ENCOUNTER
PHQ-9 SCORE 3/25/2015 1/7/2016 3/13/2018   Total Score 2 - -   Total Score - 3 8     FERNY-7 SCORE 1/3/2013   Total Score 7       Routing refill request to provider for review/approval because:  Please review recent score and advise.  Matilda Evans RN  [place] [facility]

## 2018-05-14 ENCOUNTER — OFFICE VISIT (OUTPATIENT)
Dept: NEUROLOGY | Facility: CLINIC | Age: 83
End: 2018-05-14
Payer: COMMERCIAL

## 2018-05-14 VITALS
HEART RATE: 78 BPM | TEMPERATURE: 98 F | SYSTOLIC BLOOD PRESSURE: 138 MMHG | DIASTOLIC BLOOD PRESSURE: 66 MMHG | WEIGHT: 131 LBS | HEIGHT: 62 IN | BODY MASS INDEX: 24.11 KG/M2

## 2018-05-14 DIAGNOSIS — G30.9 ALZHEIMER'S DEMENTIA WITHOUT BEHAVIORAL DISTURBANCE, UNSPECIFIED TIMING OF DEMENTIA ONSET: Primary | ICD-10-CM

## 2018-05-14 DIAGNOSIS — F02.80 ALZHEIMER'S DEMENTIA WITHOUT BEHAVIORAL DISTURBANCE, UNSPECIFIED TIMING OF DEMENTIA ONSET: Primary | ICD-10-CM

## 2018-05-14 ASSESSMENT — PAIN SCALES - GENERAL: PAINLEVEL: NO PAIN (0)

## 2018-05-14 NOTE — MR AVS SNAPSHOT
After Visit Summary   5/14/2018    Gifty Villa    MRN: [id number]           Patient Information     Date Of Birth          1935        Visit Information        Provider Department      5/14/2018 3:15 PM Ralph Magana MD [facility]UROS[facility]CIAL[facility]        Today's Diagnoses     Alzheimer's dementia without behavioral disturbance, unspecified timing of dementia onset    -  1       Follow-ups after your visit        Follow-up notes from your care team     Return if symptoms worsen or fail to improve.      Your next 10 appointments already scheduled     Jun 20, 2018  9:45 AM CDT   MA SCREENING DIGITAL BILATERAL with RVMA1   [facility] ([facility]    [address] [place] [id number] [phone]           Do not use any powder, lotion or deodorant under your arms or on your breast. If you do, we will ask you to remove it before your exam.  Wear comfortable, two-piece clothing.  If you have any allergies, tell your care team.  Bring any previous mammograms from other facilities or have them mailed to the breast center.              Who to contact     Please call your clinic at [phone] to:    Ask questions about your health    Make or cancel appointments    Discuss your medicines    Learn about your test results    Speak to your doctor            Additional Information About Your Visit        [employer] Information     [employer] gives you secure access to your electronic health record. If you see a primary care provider, you can also send messages to your care team and make appointments. If you have questions, please call your primary care clinic.  If you do not have a primary care provider, please call [phone] and they will assist you.      [employer] is an electronic gateway that provides easy, online access to your medical records. With [employer], you can request a clinic appointment, read your test results, renew a prescription or  "communicate with your care team.     To access your existing account, please contact your [facility] or call [phone] for assistance.        Care EveryWhere ID     This is your Care EveryWhere ID. This could be used by other organizations to access your [place] medical records  [id number]        Your Vitals Were     Pulse Temperature Height BMI (Body Mass Index)          78 98  F (36.7  C) (Temporal) 5' 2\" (157.5 cm) 23.96 kg/m2         Blood Pressure from Last 3 Encounters:   05/14/18 138/66   05/02/18 118/72   03/29/18 122/72    Weight from Last 3 Encounters:   05/14/18 131 lb (59.4 kg)   05/02/18 134 lb 4 oz (60.9 kg)   03/29/18 141 lb 8 oz (64.2 kg)              Today, you had the following     No orders found for display         Today's Medication Changes          These changes are accurate as of 5/14/18  4:07 PM.  If you have any questions, ask your nurse or doctor.               These medicines have changed or have updated prescriptions.        Dose/Directions    donepezil 5 MG tablet   Commonly known as:  ARICEPT   This may have changed:    - how much to take  - additional instructions   Used for:  Early onset Alzheimer's dementia without behavioral disturbance        Dose:  5 mg   Take 1 tablet (5 mg) by mouth At Bedtime Increase to 10 mg nightly after 1 month   Quantity:  60 tablet   Refills:  1                Primary Care Provider Office Phone # Fax #    Leann Gordon PA-C [phone]       [address] [place] [address]        Equal Access to Services     CRISTO GRACIA : Hadii faith heberto Sokristen, waaxda luqadaha, qaybta kaalmada jaymie, cathy samuel. So [facility] [phone].    ATENCIÓN: Si habla español, tiene a robertson disposición servicios gratuitos de asistencia lingüística. Llame al [phone].    We comply with applicable federal civil rights laws and [place] laws. We do not discriminate on the " basis of race, color, national origin, age, disability, sex, sexual orientation, or gender identity.            Thank you!     Thank you for choosing [facility] NEUROSPECIALTIES  for your care. Our goal is always to provide you with excellent care. Hearing back from our patients is one way we can continue to improve our services. Please take a few minutes to complete the written survey that you may receive in the mail after your visit with us. Thank you!             Your Updated Medication List - Protect others around you: Learn how to safely use, store and throw away your medicines at www.disposemymeds.org.          This list is accurate as of 5/14/18  4:07 PM.  Always use your most recent med list.                   Brand Name Dispense Instructions for use Diagnosis    amLODIPine 10 MG tablet    NORVASC    90 tablet    Take 1 tablet (10 mg) by mouth daily    Essential hypertension       aspirin 81 MG tablet     0    ONE DAILY    Unspecified essential hypertension       calcium carbonate-vitamin D 500-400 MG-UNIT Tabs per tablet     180 tablet    Take 1 tablet by mouth 2 times daily (with meals)    Post menopausal problems       cholecalciferol 5000 units Tabs tablet    vitamin D3    100 tablet    Take 1 tablet (5,000 Units) by mouth daily    Other fatigue       donepezil 5 MG tablet    ARICEPT    60 tablet    Take 1 tablet (5 mg) by mouth At Bedtime Increase to 10 mg nightly after 1 month    Early onset Alzheimer's dementia without behavioral disturbance       DULoxetine 20 MG EC capsule    CYMBALTA    60 capsule    Take 1 capsule (20 mg) by mouth 2 times daily    Other fatigue, Adjustment disorder with depressed mood       MUCINEX D MAX STRENGTH 120-1200 MG Tb12   Generic drug:  PseudoePHEDrine-guaiFENesin       Other fatigue, Cognitive changes       omeprazole 40 MG capsule    priLOSEC    90 capsule    Take 1 capsule (40 mg) by mouth daily 30 minutes before breakfast    Peptic ulcer       PROAIR  (90 Base)  MCG/ACT Inhaler   Generic drug:  albuterol      Inhale 2 puffs into the lungs as needed for shortness of breath / dyspnea or wheezing        solifenacin 10 MG tablet    VESICARE    45 tablet    Take 1 tablet (10 mg) by mouth every other day    Urinary urgency       tiotropium-olodaterol 2.5-2.5 MCG/ACT Aers    STIOLTO RESPIMAT    4 g    Inhale 2 puffs into the lungs daily    Chronic obstructive pulmonary disease with acute exacerbation (H)       triamcinolone 0.1 % cream    KENALOG    30 g    Apply sparingly to affected area once daily    Eczema, unspecified type       VITRON-C  MG Tabs tablet   Generic drug:  ferrous fumarate 65 mg ([place]. FE)-Vitamin C 125 mg     100 tablet    Take 1 tablet by mouth 2 times daily (with meals)    Restless legs syndrome (RLS), Low ferritin
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
## 2018-05-24 DIAGNOSIS — G30.0 EARLY ONSET ALZHEIMER'S DEMENTIA WITHOUT BEHAVIORAL DISTURBANCE (H): ICD-10-CM

## 2018-05-24 DIAGNOSIS — F02.80 EARLY ONSET ALZHEIMER'S DEMENTIA WITHOUT BEHAVIORAL DISTURBANCE (H): ICD-10-CM

## 2018-05-24 NOTE — TELEPHONE ENCOUNTER
donepezil (ARICEPT) 5 MG tablet    Last Written Prescription Date:  3.13.18  Last Fill Quantity: 60,  # refills: 1   Last Office Visit: 5/2/2018   Future Office Visit:

## 2018-05-25 RX ORDER — DONEPEZIL HYDROCHLORIDE 5 MG/1
10 TABLET, FILM COATED ORAL AT BEDTIME
Qty: 60 TABLET | Refills: 1 | Status: SHIPPED | OUTPATIENT
Start: 2018-05-25 | End: 2018-07-16

## 2018-05-25 NOTE — TELEPHONE ENCOUNTER
"Requested Prescriptions   Pending Prescriptions Disp Refills     donepezil (ARICEPT) 5 MG tablet 60 tablet 1     Sig: Take 1 tablet (5 mg) by mouth At Bedtime Increase to 10 mg nightly after 1 month    Miscellaneous Dementia Agents Passed    5/24/2018  4:09 PM       Passed - Recent (12 mo) or future (30 days) visit within the authorizing provider's specialty    Patient had office visit in the last 12 months or has a visit in the next 30 days with authorizing provider or within the authorizing provider's specialty.  See \"Patient Info\" tab in inbasket, or \"Choose Columns\" in Meds & Orders section of the refill encounter.           Passed - Patient is 18 years of age or older        Prescription approved per [facility] Refill Protocol.  Matilda Evans RN  [facility]    "

## 2018-05-29 DIAGNOSIS — L30.9 ECZEMA, UNSPECIFIED TYPE: ICD-10-CM

## 2018-05-29 NOTE — TELEPHONE ENCOUNTER
triamcinolone (KENALOG) 0.1 % cream    Last Written Prescription Date:  5.2.18  Last Fill Quantity: 30g,  # refills: 0   Last Office Visit: 5/2/2018   Future Office Visit:

## 2018-05-31 RX ORDER — TRIAMCINOLONE ACETONIDE 1 MG/G
CREAM TOPICAL
Qty: 30 G | Refills: 0 | Status: SHIPPED | OUTPATIENT
Start: 2018-05-31 | End: 2018-07-31

## 2018-05-31 NOTE — TELEPHONE ENCOUNTER
"Requested Prescriptions   Pending Prescriptions Disp Refills     triamcinolone (KENALOG) 0.1 % cream 30 g 0     Sig: Apply sparingly to affected area once daily    Topical Steroid Protocol Passed    5/29/2018  2:14 PM       Passed - Patient is age 6 or older       Passed - Authorizing prescriber's most recent note related to this medication read.       Passed - High potency steroid not ordered       Passed - Recent (12 mo) or future (30 days) visit within the authorizing provider's specialty    Patient had office visit in the last 12 months or has a visit in the next 30 days with authorizing provider or within the authorizing provider's specialty.  See \"Patient Info\" tab in inbasket, or \"Choose Columns\" in Meds & Orders section of the refill encounter.            Prescription approved per [facility] Refill Protocol.  Matilda Evans RN  [place] [facility]    "

## 2018-07-13 ENCOUNTER — TELEPHONE (OUTPATIENT)
Dept: INTERNAL MEDICINE | Facility: CLINIC | Age: 83
End: 2018-07-13

## 2018-07-13 NOTE — TELEPHONE ENCOUNTER
Prior Authorization Specialty Medication Request    Medication/Dose: Vesicare 10mg   ICD code (if different than what is on RX):  Unknown  Previously Tried and Failed:  Unknown    Important Lab Values: Unknown  Rationale: Unknown    Insurance Name: [facility]  Insurance ID: [id number]  Insurance Phone Number: Unknown    Pharmacy Information (if different than what is on RX)  Name:  [facility]  Phone:  [phone]

## 2018-07-16 DIAGNOSIS — G30.0 EARLY ONSET ALZHEIMER'S DEMENTIA WITHOUT BEHAVIORAL DISTURBANCE (H): ICD-10-CM

## 2018-07-16 DIAGNOSIS — F02.80 EARLY ONSET ALZHEIMER'S DEMENTIA WITHOUT BEHAVIORAL DISTURBANCE (H): ICD-10-CM

## 2018-07-16 RX ORDER — DONEPEZIL HYDROCHLORIDE 5 MG/1
10 TABLET, FILM COATED ORAL AT BEDTIME
Qty: 60 TABLET | Refills: 1 | Status: SHIPPED | OUTPATIENT
Start: 2018-07-16 | End: 2018-09-20

## 2018-07-16 NOTE — TELEPHONE ENCOUNTER
Central Prior Authorization Team   Phone: [phone]      PA Initiation    Medication: Vesicare 10mg-Initiated  Insurance Company: Other (see comments)Comment:  [employer] [phone]  Pharmacy Filling the Rx: [facility] [address] [place] [address]  Filling Pharmacy Phone: [phone]  Filling Pharmacy Fax:    Start Date: 7/16/2018

## 2018-07-16 NOTE — TELEPHONE ENCOUNTER
"Requested Prescriptions   Pending Prescriptions Disp Refills     donepezil (ARICEPT) 5 MG tablet 60 tablet 1     Sig: Take 2 tablets (10 mg) by mouth At Bedtime    Miscellaneous Dementia Agents Passed    7/16/2018  8:58 AM       Passed - Recent (12 mo) or future (30 days) visit within the authorizing provider's specialty    Patient had office visit in the last 12 months or has a visit in the next 30 days with authorizing provider or within the authorizing provider's specialty.  See \"Patient Info\" tab in inbasket, or \"Choose Columns\" in Meds & Orders section of the refill encounter.           Passed - Patient is 18 years of age or older        Disp Refills Start End FABIANA    donepezil (ARICEPT) 5 MG tablet 60 tablet 1 5/25/2018  No   Sig - Route: Take 2 tablets (10 mg) by mouth At Bedtime - Oral   Class: E-Prescribe   Order: [id number]   E-Prescribing Status: Receipt confirmed by pharmacy (5/25/2018  1:21 PM CDT)       Future Office Visit:  N/a    LOV 5/2/18 with LP.    Prescription approved per [facility] Refill Protocol.  Matilda Evans RN  [place] [facility]      "

## 2018-07-16 NOTE — TELEPHONE ENCOUNTER
Prior Authorization Approval    Authorization Effective Date: 5/3/2018  Authorization Expiration Date: 7/16/2019  Medication: Vesicare 10mg-APPROVED  Approved Dose/Quantity:   Reference #:     Insurance Company: Other (see comments)Comment:  [employer] [phone]  Expected CoPay:       CoPay Card Available:      Foundation Assistance Needed:    Which Pharmacy is filling the prescription (Not needed for infusion/clinic administered): [facility] [address] [place] [facility] [address]  Pharmacy Notified: Yes  Patient Notified: No    Pharmacy will notify patient when medication is ready.

## 2018-07-31 ENCOUNTER — RADIANT APPOINTMENT (OUTPATIENT)
Dept: GENERAL RADIOLOGY | Facility: CLINIC | Age: 83
End: 2018-07-31
Attending: PHYSICIAN ASSISTANT
Payer: COMMERCIAL

## 2018-07-31 ENCOUNTER — OFFICE VISIT (OUTPATIENT)
Dept: FAMILY MEDICINE | Facility: CLINIC | Age: 83
End: 2018-07-31
Payer: COMMERCIAL

## 2018-07-31 VITALS
DIASTOLIC BLOOD PRESSURE: 63 MMHG | HEART RATE: 89 BPM | HEIGHT: 62 IN | OXYGEN SATURATION: 95 % | BODY MASS INDEX: 23 KG/M2 | WEIGHT: 125 LBS | SYSTOLIC BLOOD PRESSURE: 118 MMHG

## 2018-07-31 DIAGNOSIS — R53.83 OTHER FATIGUE: ICD-10-CM

## 2018-07-31 DIAGNOSIS — F33.42 MAJOR DEPRESSIVE DISORDER, RECURRENT EPISODE, IN FULL REMISSION (H): ICD-10-CM

## 2018-07-31 DIAGNOSIS — M54.2 CERVICALGIA: ICD-10-CM

## 2018-07-31 DIAGNOSIS — M54.2 CERVICALGIA: Primary | ICD-10-CM

## 2018-07-31 PROCEDURE — 72040 X-RAY EXAM NECK SPINE 2-3 VW: CPT | Mod: FY

## 2018-07-31 PROCEDURE — 99214 OFFICE O/P EST MOD 30 MIN: CPT | Mod: 25 | Performed by: PHYSICIAN ASSISTANT

## 2018-07-31 PROCEDURE — 96372 THER/PROPH/DIAG INJ SC/IM: CPT | Performed by: PHYSICIAN ASSISTANT

## 2018-07-31 RX ORDER — PREDNISONE 20 MG/1
TABLET ORAL
Qty: 20 TABLET | Refills: 0 | Status: SHIPPED | OUTPATIENT
Start: 2018-07-31 | End: 2018-08-12

## 2018-07-31 RX ORDER — KETOROLAC TROMETHAMINE 30 MG/ML
30 INJECTION, SOLUTION INTRAMUSCULAR; INTRAVENOUS ONCE
Qty: 1 ML | Refills: 0 | OUTPATIENT
Start: 2018-07-31 | End: 2018-07-31

## 2018-07-31 RX ORDER — METHOCARBAMOL 500 MG/1
500-1000 TABLET, FILM COATED ORAL 3 TIMES DAILY PRN
Qty: 30 TABLET | Refills: 0 | Status: SHIPPED | OUTPATIENT
Start: 2018-07-31 | End: 2018-10-08

## 2018-07-31 NOTE — PATIENT INSTRUCTIONS
Neck Strain             What is neck strain?   A strain is a tear of a muscle or tendon. Your neck is surrounded by small muscles that are close to the vertebrae, and larger muscles that make up the visible muscles of the neck.   How does it occur?   Neck strains most often happen when the head and neck are forcibly moved, such as in a whiplash injury or from contact in sports. Sometimes strains happen from an awkward position during sleep or poor posture while working at a computer.   What are the symptoms?   Symptoms include pain in your neck. When the neck muscles go into spasm you feel hard, tight muscles in your neck that are very tender to the touch. You have pain when you move your head to the side or when you try to move your head up or down. The spasming muscles can cause headaches.   The pain may start right after an injury or may take a few hours or days to develop. Other symptoms may include neck stiffness, dizziness, or unusual sensations, such as burning or a pins-and-needles feeling.   How is it diagnosed?   Your healthcare provider will examine your neck. You may have X-rays to make sure the vertebrae are not injured.   How is it treated?   Right after the injury, put an ice pack, gel pack, or package of frozen vegetables, wrapped in a cloth on the area every 3 to 4 hours, for up to 20 minutes at a time.   If you still have neck pain several days after the injury and after using ice, your healthcare provider may recommend using moist heat on your neck. You can buy a moist-heat pad or make your own by soaking towels in hot water. Put moist heat on your neck for up to 20 minutes at a time every 3 or 4 hours until the pain goes away. You may find that it helps to alternate putting heat and ice on your neck.   Your healthcare provider may prescribe an anti-inflammatory medicine and a neck collar to support your neck and prevent further injury. Nonsteroidal anti-inflammatory medicines  (NSAIDs) may cause stomach bleeding and other problems. These risks increase with age. Read the label and take as directed. Unless recommended by your healthcare provider, do not take for more than 10 days.   Follow your provider's instructions for doing exercises to help you recover.   How long will the effects last?   How long it takes to recover depends on your age, health, and if you have had a previous neck injury. Recovery time also depends on the severity of the injury. A mild injury may recover within a few weeks, whereas a severe injury may take 6 weeks or longer to recover. Ask your healthcare provider when you can return to your normal activities.   How can I prevent neck strain?   Neck strain is best prevented by having strong and supple neck muscles. If you have a job that requires you to be in one position all day (for example, work at a computer all day), it is very important to take breaks and stretch your neck muscles. Your provider will give you exercises to do while taking breaks from work.     Published by [employer].  This content is reviewed periodically and is subject to change as new health information becomes available. The information is intended to inform and educate and is not a replacement for medical evaluation, advice, diagnosis or treatment by a healthcare professional.   Written by Vasyl Maria MD, for [employer].   ? 2010 [employer] and/or its affiliates. All Rights Reserved.   Copyright   [employer] 2011  Adult Health Advisor                    Neck Strain Rehabilitation Exercises                Do these exercises only if you do not have pain or numbness running down your arm or into your hand. The first 6 exercises are meant to help your neck remain flexible. Do not do any exercises that make your neck pain worse.   Active neck rotation: Sit in a chair, keeping your neck, shoulders, and trunk straight. First, turn your head slowly to the right. Move it gently  to the point of pain. Move it back to the forward position. Relax. Then move it to the left. Repeat 10 times.   Active neck side bend: Sit in a chair, keeping your neck, shoulders, and trunk straight. Tilt your head so that your right ear moves toward your right shoulder. Move it to the point of pain. Then tilt your head so your left ear moves toward your left shoulder. Make sure you do not rotate your head while tilting or raise your shoulder toward your head. Repeat this exercise 10 times in each direction.   Neck flexion: Sit in a chair, keeping your neck, shoulders, and trunk straight. Bend your head forward, reaching your chin toward your chest. Hold for 5 seconds. Repeat 10 times.   Neck extension: Sit in a chair, keeping your neck, shoulders, and trunk straight. Bring your head back so that your chin is pointing toward the ceiling. Repeat 10 times.   Chin tuck: Place your fingertips on your chin and gently push your head straight back as if you are trying to make a double chin. Keep looking forward as your head moves back. Hold 5 seconds and repeat 5 times.   Scalene stretch: Sit or stand and clasp both hands behind your back. Lower your left shoulder and tilt your head toward the right until you feel a stretch. Hold this position for 15 to 30 seconds and then come back to the starting position. Then lower your right shoulder and tilt your head toward the left. Hold for 15 to 30 seconds. Repeat 3 times on each side.   Isometric neck flexion: Sit tall, eyes straight ahead, and chin level. Place your palm against your forehead and gently push your forehead into your palm. Hold for 5 seconds and release. Do 3 sets of 5.   Isometric neck extension: Sit tall, eyes straight ahead, and chin level. Clasp your hands together and place them behind your head. Press the back of your head into your palms. Hold 5 seconds and release. Do 3 sets of 5.   Isometric neck side bend: Sit tall, eyes straight ahead, and chin level.  Place the palm of your hand at the side of your temple and press your temple into the palm of your hand. Hold 5 seconds and release. Do 3 sets of 5 on each side.   Head lift: Neck curl: Lie on your back with your knees bent and your feet flat on the floor. Tuck your chin and lift your head toward your chest, keeping your shoulders on the floor. Hold for 5 seconds. Repeat 10 times.   Head lift: Neck side bend: Lie on your right side with your right arm lying straight out. Rest your head on your arm, then lift your head slowly toward your left shoulder. Hold for 5 seconds. Repeat 10 times. Switch to your left side and repeat the exercise, lifting your head toward your right shoulder.   Neck extension on hands and knees: Get on your hands and knees and look down at the floor. Keep your back straight and let your head slowly drop toward your chest. Then tuck your chin slightly and lift your head up until your neck is level with your back. Hold this position for 5 seconds. Repeat 10 times.   Scapular squeeze: While sitting or standing with your arms by your sides, squeeze your shoulder blades together and hold for 5 seconds. Do 3 sets of 10.   Published by [employer].  This content is reviewed periodically and is subject to change as new health information becomes available. The information is intended to inform and educate and is not a replacement for medical evaluation, advice, diagnosis or treatment by a healthcare professional.   Written by Aneta Dash, MS, PT, and Samantha Anderson PT, [facility], [facility], for [employer].   ? 2010 [employer] [facility] [facility] and/or its affiliates. All Rights Reserved.   Copyright   [employer] Reference Systems 2011  Adult Health Advisor

## 2018-07-31 NOTE — MR AVS SNAPSHOT
After Visit Summary   7/31/2018    Gifty Villa    MRN: [id number]           Patient Information     Date Of Birth          1935        Visit Information        Provider Department      7/31/2018 1:20 PM Leann Gordon PA-C [facility]        Today's Diagnoses     Cervicalgia    -  1    Major depressive disorder, recurrent episode, in full remission (H)        Other fatigue          Care Instructions                    Neck Strain             What is neck strain?   A strain is a tear of a muscle or tendon. Your neck is surrounded by small muscles that are close to the vertebrae, and larger muscles that make up the visible muscles of the neck.   How does it occur?   Neck strains most often happen when the head and neck are forcibly moved, such as in a whiplash injury or from contact in sports. Sometimes strains happen from an awkward position during sleep or poor posture while working at a computer.   What are the symptoms?   Symptoms include pain in your neck. When the neck muscles go into spasm you feel hard, tight muscles in your neck that are very tender to the touch. You have pain when you move your head to the side or when you try to move your head up or down. The spasming muscles can cause headaches.   The pain may start right after an injury or may take a few hours or days to develop. Other symptoms may include neck stiffness, dizziness, or unusual sensations, such as burning or a pins-and-needles feeling.   How is it diagnosed?   Your healthcare provider will examine your neck. You may have X-rays to make sure the vertebrae are not injured.   How is it treated?   Right after the injury, put an ice pack, gel pack, or package of frozen vegetables, wrapped in a cloth on the area every 3 to 4 hours, for up to 20 minutes at a time.   If you still have neck pain several days after the injury and after using ice, your healthcare provider may recommend using moist heat  on your neck. You can buy a moist-heat pad or make your own by soaking towels in hot water. Put moist heat on your neck for up to 20 minutes at a time every 3 or 4 hours until the pain goes away. You may find that it helps to alternate putting heat and ice on your neck.   Your healthcare provider may prescribe an anti-inflammatory medicine and a neck collar to support your neck and prevent further injury. Nonsteroidal anti-inflammatory medicines (NSAIDs) may cause stomach bleeding and other problems. These risks increase with age. Read the label and take as directed. Unless recommended by your healthcare provider, do not take for more than 10 days.   Follow your provider's instructions for doing exercises to help you recover.   How long will the effects last?   How long it takes to recover depends on your age, health, and if you have had a previous neck injury. Recovery time also depends on the severity of the injury. A mild injury may recover within a few weeks, whereas a severe injury may take 6 weeks or longer to recover. Ask your healthcare provider when you can return to your normal activities.   How can I prevent neck strain?   Neck strain is best prevented by having strong and supple neck muscles. If you have a job that requires you to be in one position all day (for example, work at a computer all day), it is very important to take breaks and stretch your neck muscles. Your provider will give you exercises to do while taking breaks from work.     Published by [employer].  This content is reviewed periodically and is subject to change as new health information becomes available. The information is intended to inform and educate and is not a replacement for medical evaluation, advice, diagnosis or treatment by a healthcare professional.   Written by Vasyl Maria MD, for [employer].   ? 2010 [employer] and/or its affiliates. All Rights Reserved.   Copyright   [employer] Reference Systems 2011  Adult Health  Advisor                    Neck Strain Rehabilitation Exercises                Do these exercises only if you do not have pain or numbness running down your arm or into your hand. The first 6 exercises are meant to help your neck remain flexible. Do not do any exercises that make your neck pain worse.   Active neck rotation: Sit in a chair, keeping your neck, shoulders, and trunk straight. First, turn your head slowly to the right. Move it gently to the point of pain. Move it back to the forward position. Relax. Then move it to the left. Repeat 10 times.   Active neck side bend: Sit in a chair, keeping your neck, shoulders, and trunk straight. Tilt your head so that your right ear moves toward your right shoulder. Move it to the point of pain. Then tilt your head so your left ear moves toward your left shoulder. Make sure you do not rotate your head while tilting or raise your shoulder toward your head. Repeat this exercise 10 times in each direction.   Neck flexion: Sit in a chair, keeping your neck, shoulders, and trunk straight. Bend your head forward, reaching your chin toward your chest. Hold for 5 seconds. Repeat 10 times.   Neck extension: Sit in a chair, keeping your neck, shoulders, and trunk straight. Bring your head back so that your chin is pointing toward the ceiling. Repeat 10 times.   Chin tuck: Place your fingertips on your chin and gently push your head straight back as if you are trying to make a double chin. Keep looking forward as your head moves back. Hold 5 seconds and repeat 5 times.   Scalene stretch: Sit or stand and clasp both hands behind your back. Lower your left shoulder and tilt your head toward the right until you feel a stretch. Hold this position for 15 to 30 seconds and then come back to the starting position. Then lower your right shoulder and tilt your head toward the left. Hold for 15 to 30 seconds. Repeat 3 times on each side.   Isometric neck flexion: Sit tall, eyes straight  ahead, and chin level. Place your palm against your forehead and gently push your forehead into your palm. Hold for 5 seconds and release. Do 3 sets of 5.   Isometric neck extension: Sit tall, eyes straight ahead, and chin level. Clasp your hands together and place them behind your head. Press the back of your head into your palms. Hold 5 seconds and release. Do 3 sets of 5.   Isometric neck side bend: Sit tall, eyes straight ahead, and chin level. Place the palm of your hand at the side of your temple and press your temple into the palm of your hand. Hold 5 seconds and release. Do 3 sets of 5 on each side.   Head lift: Neck curl: Lie on your back with your knees bent and your feet flat on the floor. Tuck your chin and lift your head toward your chest, keeping your shoulders on the floor. Hold for 5 seconds. Repeat 10 times.   Head lift: Neck side bend: Lie on your right side with your right arm lying straight out. Rest your head on your arm, then lift your head slowly toward your left shoulder. Hold for 5 seconds. Repeat 10 times. Switch to your left side and repeat the exercise, lifting your head toward your right shoulder.   Neck extension on hands and knees: Get on your hands and knees and look down at the floor. Keep your back straight and let your head slowly drop toward your chest. Then tuck your chin slightly and lift your head up until your neck is level with your back. Hold this position for 5 seconds. Repeat 10 times.   Scapular squeeze: While sitting or standing with your arms by your sides, squeeze your shoulder blades together and hold for 5 seconds. Do 3 sets of 10.   Published by [employer].  This content is reviewed periodically and is subject to change as new health information becomes available. The information is intended to inform and educate and is not a replacement for medical evaluation, advice, diagnosis or treatment by a healthcare professional.   Written by Aneta Dash, MS, PT, and  "Samantha Anderson, PT, [facility], [facility], for [employer].   ? 2010 [facility] [facility] and/or its affiliates. All Rights Reserved.   Copyright   Clinical Reference Systems 2011  Adult Health Advisor                               Follow-ups after your visit        Follow-up notes from your care team     Return in about 2 months (around 9/30/2018).      Who to contact     If you have questions or need follow up information about today's clinic visit or your schedule please contact [facility] directly at [phone].  Normal or non-critical lab and imaging results will be communicated to you by [employer], letter or phone within 4 business days after the clinic has received the results. If you do not hear from us within 7 days, please contact the clinic through INPHIt or phone. If you have a critical or abnormal lab result, we will notify you by phone as soon as possible.  Submit refill requests through [employer] or call your pharmacy and they will forward the refill request to us. Please allow 3 business days for your refill to be completed.          Additional Information About Your Visit        [employer] Information     [employer] gives you secure access to your electronic health record. If you see a primary care provider, you can also send messages to your care team and make appointments. If you have questions, please call your primary care clinic.  If you do not have a primary care provider, please call [phone] and they will assist you.        Care EveryWhere ID     This is your Care EveryWhere ID. This could be used by other organizations to access your [facility] medical records  [id number]        Your Vitals Were     Pulse Height Pulse Oximetry Breastfeeding? BMI (Body Mass Index)       89 5' 2\" (1.575 m) 95% No 22.86 kg/m2        Blood Pressure from Last 3 Encounters:   07/31/18 118/63   05/14/18 138/66   05/02/18 118/72    Weight from Last 3 Encounters:   07/31/18 125 lb (56.7 kg)   05/14/18 131 lb (59.4 kg) "   05/02/18 134 lb 4 oz (60.9 kg)              We Performed the Following     ADMIN 1st VACCINE          Today's Medication Changes          These changes are accurate as of 7/31/18  2:13 PM.  If you have any questions, ask your nurse or doctor.               Start taking these medicines.        Dose/Directions    ketorolac 30 MG/ML injection   Commonly known as:  TORADOL   Used for:  Cervicalgia   Started by:  Leann Gordon PA-C        Dose:  30 mg   Inject 1 mL (30 mg) into the muscle once for 1 dose   Quantity:  1 mL   Refills:  0       methocarbamol 500 MG tablet   Commonly known as:  ROBAXIN   Used for:  Cervicalgia   Started by:  Leann Gordon PA-C        Dose:  500-1000 mg   Take 1-2 tablets (500-1,000 mg) by mouth 3 times daily as needed for muscle spasms   Quantity:  30 tablet   Refills:  0       predniSONE 20 MG tablet   Commonly known as:  DELTASONE   Used for:  Cervicalgia   Started by:  Leann Gordon PA-C        Take 60 mg daily for 3 days, then 40 mg daily for 3 days, then 20 mg daily for 3 days, then 10 mg for 4 days   Quantity:  20 tablet   Refills:  0            Where to get your medicines      These medications were sent to [facility] [address] [place] - [address]  [address] [facility] [address]     Phone:  [phone]     methocarbamol 500 MG tablet    predniSONE 20 MG tablet         Some of these will need a paper prescription and others can be bought over the counter.  Ask your nurse if you have questions.     You don't need a prescription for these medications     ketorolac 30 MG/ML injection                Primary Care Provider Office Phone # Fax #    Leann Gordon PA-C [phone]       [facility] [facility] [address]        Equal Access to Services     CRISTO GRACIA AH: Juani Alston, sangeetha mccarty, eric coon, cathy samuel. So [facility]  [phone].    ATENCIÓN: Si katya scott, tiene a robertson disposición servicios gratuitos de asistencia lingüística. Morgan rose [phone].    We comply with applicable federal civil rights laws and [place] laws. We do not discriminate on the basis of race, color, national origin, age, disability, sex, sexual orientation, or gender identity.            Thank you!     Thank you for choosing [facility]  for your care. Our goal is always to provide you with excellent care. Hearing back from our patients is one way we can continue to improve our services. Please take a few minutes to complete the written survey that you may receive in the mail after your visit with us. Thank you!             Your Updated Medication List - Protect others around you: Learn how to safely use, store and throw away your medicines at www.disposemymeds.org.          This list is accurate as of 7/31/18  2:13 PM.  Always use your most recent med list.                   Brand Name Dispense Instructions for use Diagnosis    amLODIPine 10 MG tablet    NORVASC    90 tablet    Take 1 tablet (10 mg) by mouth daily    Essential hypertension       aspirin 81 MG tablet     0    ONE DAILY    Unspecified essential hypertension       calcium carbonate-vitamin D 500-400 MG-UNIT Tabs per tablet     180 tablet    Take 1 tablet by mouth 2 times daily (with meals)    Post menopausal problems       cholecalciferol 5000 units Tabs tablet    vitamin D3    100 tablet    Take 1 tablet (5,000 Units) by mouth daily    Other fatigue       donepezil 5 MG tablet    ARICEPT    60 tablet    Take 2 tablets (10 mg) by mouth At Bedtime    Early onset Alzheimer's dementia without behavioral disturbance       DULoxetine 20 MG EC capsule    CYMBALTA    60 capsule    Take 1 capsule (20 mg) by mouth 2 times daily    Other fatigue, Adjustment disorder with depressed mood       ketorolac 30 MG/ML injection    TORADOL    1 mL    Inject 1 mL (30 mg) into the muscle  once for 1 dose    Cervicalgia       methocarbamol 500 MG tablet    ROBAXIN    30 tablet    Take 1-2 tablets (500-1,000 mg) by mouth 3 times daily as needed for muscle spasms    Cervicalgia       MUCINEX D MAX STRENGTH 120-1200 MG Tb12   Generic drug:  PseudoePHEDrine-guaiFENesin       Other fatigue, Cognitive changes       omeprazole 40 MG capsule    priLOSEC    90 capsule    Take 1 capsule (40 mg) by mouth daily 30 minutes before breakfast    Peptic ulcer       predniSONE 20 MG tablet    DELTASONE    20 tablet    Take 60 mg daily for 3 days, then 40 mg daily for 3 days, then 20 mg daily for 3 days, then 10 mg for 4 days    Cervicalgia       PROAIR  (90 Base) MCG/ACT Inhaler   Generic drug:  albuterol      Inhale 2 puffs into the lungs as needed for shortness of breath / dyspnea or wheezing        solifenacin 10 MG tablet    VESICARE    45 tablet    Take 1 tablet (10 mg) by mouth every other day    Urinary urgency       tiotropium-olodaterol 2.5-2.5 MCG/ACT Aers    STIOLTO RESPIMAT    4 g    Inhale 2 puffs into the lungs daily    Chronic obstructive pulmonary disease with acute exacerbation (H)       VITRON-C  MG Tabs tablet   Generic drug:  ferrous fumarate 65 mg ([place]. FE)-Vitamin C 125 mg     100 tablet    Take 1 tablet by mouth 2 times daily (with meals)    Restless legs syndrome (RLS), Low ferritin

## 2018-07-31 NOTE — PROGRESS NOTES
SUBJECTIVE:   Gifty Villa is a 83 year old female who presents to clinic today for the following health issues:    Patient request: to discuss with provider about her medication list     Joint Pain    Onset: x 1 week     Description:   Location: Neck and right shoulder   Character: Dull ache    Intensity: 5/10    Progression of Symptoms: same    Accompanying Signs & Symptoms:  Other symptoms: none    History:   Previous similar pain: YES, stiff neck, however not this severe    Precipitating factors:   Trauma or overuse: no     Alleviating factors:  Improved by: nothing    Therapies Tried and outcome: Orthology Friday and this morning ( deep tissue massage) - shows mild improvement.  Gifty has tried Tylenol with minimal improvement.  Of note, she feels as though she was improved on Sunday and then reverted on Monday.    She does have a history of gastric ulcers.  Would like to avoid NSAID use if possible.    Her current discomfort is prohibiting her from sleeping.  Her son and daughter-in-law are present with her today and reports that overall her mood and energy level are better with the addition of Cymbalta and the recommended vitamins but they feel as though the last 1-2 weeks her motivation and energy has decreased.  They do think that this could be correlated to her current neck pain and lack of sleep.      Problem list and histories reviewed & adjusted, as indicated.  Additional history: as documented    Patient Active Problem List   Diagnosis     Backache     Urge incontinence     Essential hypertension     Aftercare following joint replacement     Knee joint replacement by other means     HYPERLIPIDEMIA LDL GOAL <130     Urinary incontinence     Urgency-frequency syndrome     Advanced directives, counseling/discussion     RLQ abdominal mass     Lumbago     Pain in joint, pelvic region and thigh     Anxiety     COPD (chronic obstructive pulmonary disease) (H)     Major depressive disorder, recurrent  episode, in full remission (H)     Chronic obstructive pulmonary disease, unspecified COPD type (H) - followed by Dr. Alida PICHARDO [facility]     Atopic dermatitis, unspecified type     Hypervitaminosis B6     Peptic ulcer     Alzheimer's dementia without behavioral disturbance, unspecified timing of dementia onset     Low ferritin     Past Surgical History:   Procedure Laterality Date     ARTHROPLASTY PATELLO-FEMORAL (KNEE)  2010    Left TKA     C SPINE FUSION,ANTER,3 SGMTS  1992     C TOTAL HIP ARTHROPLASTY      Hip Replacement, Total (Right)     C VAGINAL HYSTERECTOMY      Hysterectomy, Vaginal w anterior repair     CARDIAC CATH - HIM SCAN      normal.  Done d/t abnormal stress test (false positive) - patient noted to have hypertensive heart disease     COLONOSCOPY  ~    No polyps     COLONOSCOPY N/A 10/10/2014    Procedure: COLONOSCOPY;  Surgeon: Chavez Ward MD;  Location: [facility]     ESOPHAGOSCOPY, GASTROSCOPY, DUODENOSCOPY (EGD), COMBINED  2016    Dr. Cha [facility]     ESOPHAGOSCOPY, GASTROSCOPY, DUODENOSCOPY (EGD), COMBINED N/A 2016    Procedure: COMBINED ESOPHAGOSCOPY, GASTROSCOPY, DUODENOSCOPY (EGD), BIOPSY SINGLE OR MULTIPLE;  Surgeon: Jada Cha MD;  Location: [facility]     EXCISE GANGLION WRIST Left 2015    Procedure: EXCISE GANGLION WRIST;  Surgeon: Justin Lyons MD;  Location: [facility]     HYSTERECTOMY, PAP NO LONGER INDICATED       wisdom teeth         Social History   Substance Use Topics     Smoking status: Former Smoker     Quit date: 1988     Smokeless tobacco: Never Used      Comment: Quit in      Alcohol use No      Comment: none     Family History   Problem Relation Age of Onset     Alzheimer Disease Mother       at 98,  of old age     Alzheimer Disease Father       at 78     C.A.D. Father      Cancer Other 90     unknown cancer     Alzheimer Disease Brother       age 82      Family History Negative Brother      Born 1931      Cancer Maternal Aunt      x'2 w Ca unknown etiology     Colon Cancer No family hx of          Current Outpatient Prescriptions   Medication Sig Dispense Refill     albuterol (ALBUTEROL) 108 (90 BASE) MCG/ACT Inhaler Inhale 2 puffs into the lungs as needed for shortness of breath / dyspnea or wheezing       amLODIPine (NORVASC) 10 MG tablet Take 1 tablet (10 mg) by mouth daily 90 tablet 1     ASPIRIN 81 MG OR TABS ONE DAILY 0 0     calcium carbonate-vitamin D 500-400 MG-UNIT TABS per tablet Take 1 tablet by mouth 2 times daily (with meals) 180 tablet 3     cholecalciferol (VITAMIN D3) 5000 UNITS TABS tablet Take 1 tablet (5,000 Units) by mouth daily 100 tablet 1     donepezil (ARICEPT) 5 MG tablet Take 2 tablets (10 mg) by mouth At Bedtime 60 tablet 1     DULoxetine (CYMBALTA) 20 MG EC capsule Take 1 capsule (20 mg) by mouth 2 times daily 60 capsule 2     methocarbamol (ROBAXIN) 500 MG tablet Take 1-2 tablets (500-1,000 mg) by mouth 3 times daily as needed for muscle spasms 30 tablet 0     omeprazole (PRILOSEC) 40 MG capsule Take 1 capsule (40 mg) by mouth daily 30 minutes before breakfast 90 capsule 1     predniSONE (DELTASONE) 20 MG tablet Take 60 mg daily for 3 days, then 40 mg daily for 3 days, then 20 mg daily for 3 days, then 10 mg for 4 days 20 tablet 0     PseudoePHEDrine-guaiFENesin (MUCINEX D MAX STRENGTH) 120-1200 MG TB12        solifenacin (VESICARE) 10 MG tablet Take 1 tablet (10 mg) by mouth every other day 45 tablet 3     tiotropium-olodaterol (STIOLTO RESPIMAT) 2.5-2.5 MCG/ACT AERS Inhale 2 puffs into the lungs daily 4 g 3     VITRON-C  MG TABS tablet Take 1 tablet by mouth 2 times daily (with meals) 100 tablet 0     No Known Allergies    Reviewed and updated as needed this visit by clinical staff  Tobacco  Allergies  Meds  Problems  Med Hx  Surg Hx  Fam Hx  Soc Hx        Reviewed and updated as needed this visit by Provider  Tobacco  Allergies  Meds  Problems  Med Hx  Surg Hx  Fam  "Hx  Soc Hx          ROS:  Constitutional, HEENT, cardiovascular, pulmonary, GI, , musculoskeletal, neuro, skin, endocrine and psych systems are negative, except as otherwise noted.    OBJECTIVE:     /63 (BP Location: Left arm, Patient Position: Chair, Cuff Size: Adult Regular)  Pulse 89  Ht 5' 2\" (1.575 m)  Wt 125 lb (56.7 kg)  SpO2 95%  Breastfeeding? No  BMI 22.86 kg/m2  Body mass index is 22.86 kg/(m^2).  GENERAL: healthy, alert and no distress  EYES: Eyes grossly normal to inspection, PERRL and conjunctivae and sclerae normal  HENT: ear canals and TM's normal, nose and mouth without ulcers or lesions  NECK: no adenopathy, no asymmetry, masses, or scars and thyroid normal to palpation  RESP: lungs clear to auscultation - no rales, rhonchi or wheezes  CV: regular rate and rhythm, normal S1 S2, no S3 or S4, no murmur, click or rub, no peripheral edema and peripheral pulses strong  MS: no gross musculoskeletal defects noted, no edema, significant limitation in the rotation of the cervical spine when looking to the right.  Limitation in cervical flexion.  Shoulder examination reveals pain in all planes upon range of motion testing.  Tenderness to the superior aspect of the right acromion.  All of these symptoms were improved after the administration of Toradol 30 mg IM.  5 out of 5 rotator cuff strength  SKIN: no suspicious lesions or rashes  NEURO: Normal strength and tone, mentation intact and speech normal  PSYCH: mentation appears normal, affect normal/bright    Diagnostic Test Results:  Recent Results (from the past 744 hour(s))   XR Cervical Spine 2/3 Views    Narrative    CERVICAL SPINE 2/3 VIEWS   7/31/2018 2:02 PM     HISTORY: Cervicalgia.    COMPARISON: MRI 10/30/2006    FINDINGS: Alignment is unchanged with persistent straightening.  Degenerative disc disease is present, most severe at C5-6 and C6-7. No  fractures are identified. Facet arthropathy is present most severely  involving the mid " and upper cervical spine. Paraspinal soft tissues  are unremarkable.      Impression    IMPRESSION: Degenerative change, slightly progressed from 2006.    JAYCEE OLIVARES MD         ASSESSMENT/PLAN:     Gifty was seen today for musculoskeletal problem and patient request.    Diagnoses and all orders for this visit:    Cervicalgia  Toradol injection administered in the clinic today.  Methocarbamol as needed.  Prednisone taper starting tomorrow.  Home exercise given today.  Likely her symptoms are all related to cervical strain.  If new symptoms are developing or current symptoms are not improving patient to return to the clinic.  -     predniSONE (DELTASONE) 20 MG tablet; Take 60 mg daily for 3 days, then 40 mg daily for 3 days, then 20 mg daily for 3 days, then 10 mg for 4 days  -     methocarbamol (ROBAXIN) 500 MG tablet; Take 1-2 tablets (500-1,000 mg) by mouth 3 times daily as needed for muscle spasms  -     XR Cervical Spine 2/3 Views; Future  -     ketorolac (TORADOL) 30 MG/ML injection; Inject 1 mL (30 mg) into the muscle once for 1 dose  -     ADMIN 1st VACCINE    Major depressive disorder, recurrent episode, in full remission (H)  Other fatigue  Overall doing very well.  Current setback is likely due to the current cervical strain.  If not improving after the cervical strain resolves will consider medication adjustment in the way of an increase of the Cymbalta to 30 mg twice daily.  Patient and family agree with this plan.      Return in about 2 months (around 9/30/2018).      Leann Gordon PA-C  [facility]

## 2018-08-01 ENCOUNTER — TELEPHONE (OUTPATIENT)
Dept: FAMILY MEDICINE | Facility: CLINIC | Age: 83
End: 2018-08-01

## 2018-08-01 NOTE — TELEPHONE ENCOUNTER
Prior Authorization Retail Medication Request    Medication/Dose: Methocarbamol 500 mg. Take 1-2 tablets by mouth 3 times daily as needed   ICD code (if different than what is on RX):    Previously Tried and Failed:    Rationale:      Insurance Name:    Insurance ID:        Pharmacy Information (if different than what is on RX)  Name:    Phone:

## 2018-08-01 NOTE — TELEPHONE ENCOUNTER
Central Prior Authorization Team   Phone: [phone]      PA Initiation    Medication: Methocarbamol  Insurance Company: [facility] - Phone [phone] Fax [phone]  Pharmacy Filling the Rx: -[facility] [place] [address]  Filling Pharmacy Phone: [phone]  Filling Pharmacy Fax:    Start Date: 8/1/2018

## 2018-08-01 NOTE — PROGRESS NOTES
Results discussed directly with patient while patient was present. Any further details documented in the note.   Leann Gordon PA-C

## 2018-08-01 NOTE — TELEPHONE ENCOUNTER
Prior Authorization Approval    Authorization Effective Date: 5/3/2018  Authorization Expiration Date: 8/1/2019  Medication: Methocarbamol  Approved Dose/Quantity:    Reference #: [id number]   Insurance Company: [employer] [place] - Phone [phone] Fax [phone]  Expected CoPay:       CoPay Card Available:      Foundation Assistance Needed:    Which Pharmacy is filling the prescription (Not needed for infusion/clinic administered): [facility] [address] [facility] [place], [place] - [address] [facility]  Pharmacy Notified: Yes  Patient Notified: Yes

## 2018-08-02 NOTE — TELEPHONE ENCOUNTER
PA approved.  Effective date: 5/3/2018-8/1/2019  PA reference #: [id number]  Pt. notified:   Letter sent by insurance company and letter forwarded to [employer] .

## 2018-08-10 DIAGNOSIS — R53.83 OTHER FATIGUE: ICD-10-CM

## 2018-08-10 DIAGNOSIS — F43.21 ADJUSTMENT DISORDER WITH DEPRESSED MOOD: ICD-10-CM

## 2018-08-10 NOTE — TELEPHONE ENCOUNTER
DULoxetine (CYMBALTA) 20 MG EC capsule    Last Written Prescription Date:  5.10.18  Last Fill Quantity: 60,  # refills: 2   Last Office Visit: 7/31/2018   Future Office Visit:

## 2018-08-12 ENCOUNTER — APPOINTMENT (OUTPATIENT)
Dept: GENERAL RADIOLOGY | Facility: CLINIC | Age: 83
End: 2018-08-12
Attending: EMERGENCY MEDICINE
Payer: MEDICARE

## 2018-08-12 ENCOUNTER — HOSPITAL ENCOUNTER (EMERGENCY)
Facility: CLINIC | Age: 83
Discharge: HOME | End: 2018-08-12
Attending: EMERGENCY MEDICINE | Admitting: EMERGENCY MEDICINE
Payer: MEDICARE

## 2018-08-12 VITALS
RESPIRATION RATE: 18 BRPM | DIASTOLIC BLOOD PRESSURE: 62 MMHG | BODY MASS INDEX: 21.26 KG/M2 | OXYGEN SATURATION: 93 % | SYSTOLIC BLOOD PRESSURE: 142 MMHG | WEIGHT: 120 LBS | HEIGHT: 63 IN | TEMPERATURE: 98.7 F

## 2018-08-12 DIAGNOSIS — J44.1 COPD EXACERBATION (H): ICD-10-CM

## 2018-08-12 LAB
ALBUMIN SERPL-MCNC: 3.6 G/DL (ref 3.4–5)
ALP SERPL-CCNC: 106 U/L (ref 40–150)
ALT SERPL W P-5'-P-CCNC: 51 U/L (ref 0–50)
ANION GAP SERPL CALCULATED.3IONS-SCNC: 8 MMOL/L (ref 3–14)
AST SERPL W P-5'-P-CCNC: 26 U/L (ref 0–45)
BASOPHILS # BLD AUTO: 0 10E9/L (ref 0–0.2)
BASOPHILS NFR BLD AUTO: 0.4 %
BILIRUB DIRECT SERPL-MCNC: 0.1 MG/DL (ref 0–0.2)
BILIRUB SERPL-MCNC: 0.4 MG/DL (ref 0.2–1.3)
BUN SERPL-MCNC: 18 MG/DL (ref 7–30)
CALCIUM SERPL-MCNC: 8.7 MG/DL (ref 8.5–10.1)
CHLORIDE SERPL-SCNC: 102 MMOL/L (ref 94–109)
CO2 BLDCOV-SCNC: 26 MMOL/L (ref 21–28)
CO2 SERPL-SCNC: 28 MMOL/L (ref 20–32)
CREAT SERPL-MCNC: 0.75 MG/DL (ref 0.52–1.04)
DIFFERENTIAL METHOD BLD: ABNORMAL
EOSINOPHIL # BLD AUTO: 0.3 10E9/L (ref 0–0.7)
EOSINOPHIL NFR BLD AUTO: 2.6 %
ERYTHROCYTE [DISTWIDTH] IN BLOOD BY AUTOMATED COUNT: 15.5 % (ref 10–15)
GFR SERPL CREATININE-BSD FRML MDRD: 74 ML/MIN/1.7M2
GLUCOSE SERPL-MCNC: 101 MG/DL (ref 70–99)
HCT VFR BLD AUTO: 41.7 % (ref 35–47)
HGB BLD-MCNC: 13.8 G/DL (ref 11.7–15.7)
IMM GRANULOCYTES # BLD: 0.1 10E9/L (ref 0–0.4)
IMM GRANULOCYTES NFR BLD: 0.4 %
LACTATE BLD-SCNC: 2.6 MMOL/L (ref 0.7–2.1)
LYMPHOCYTES # BLD AUTO: 2.5 10E9/L (ref 0.8–5.3)
LYMPHOCYTES NFR BLD AUTO: 22.7 %
MCH RBC QN AUTO: 29.9 PG (ref 26.5–33)
MCHC RBC AUTO-ENTMCNC: 33.1 G/DL (ref 31.5–36.5)
MCV RBC AUTO: 91 FL (ref 78–100)
MONOCYTES # BLD AUTO: 1 10E9/L (ref 0–1.3)
MONOCYTES NFR BLD AUTO: 9 %
NEUTROPHILS # BLD AUTO: 7.3 10E9/L (ref 1.6–8.3)
NEUTROPHILS NFR BLD AUTO: 64.9 %
NRBC # BLD AUTO: 0 10*3/UL
NRBC BLD AUTO-RTO: 0 /100
PCO2 BLDV: 41 MM HG (ref 40–50)
PH BLDV: 7.41 PH (ref 7.32–7.43)
PLATELET # BLD AUTO: 347 10E9/L (ref 150–450)
PO2 BLDV: 44 MM HG (ref 25–47)
POTASSIUM SERPL-SCNC: 4.1 MMOL/L (ref 3.4–5.3)
PROT SERPL-MCNC: 7.2 G/DL (ref 6.8–8.8)
RBC # BLD AUTO: 4.61 10E12/L (ref 3.8–5.2)
SAO2 % BLDV FROM PO2: 80 %
SODIUM SERPL-SCNC: 138 MMOL/L (ref 133–144)
TROPONIN I SERPL-MCNC: <0.015 UG/L (ref 0–0.04)
WBC # BLD AUTO: 11.2 10E9/L (ref 4–11)

## 2018-08-12 PROCEDURE — 80076 HEPATIC FUNCTION PANEL: CPT | Performed by: EMERGENCY MEDICINE

## 2018-08-12 PROCEDURE — 99285 EMERGENCY DEPT VISIT HI MDM: CPT | Mod: 25

## 2018-08-12 PROCEDURE — 96375 TX/PRO/DX INJ NEW DRUG ADDON: CPT

## 2018-08-12 PROCEDURE — 71046 X-RAY EXAM CHEST 2 VIEWS: CPT

## 2018-08-12 PROCEDURE — 40000275 ZZH STATISTIC RCP TIME EA 10 MIN

## 2018-08-12 PROCEDURE — 85025 COMPLETE CBC W/AUTO DIFF WBC: CPT | Performed by: EMERGENCY MEDICINE

## 2018-08-12 PROCEDURE — 94640 AIRWAY INHALATION TREATMENT: CPT

## 2018-08-12 PROCEDURE — 83605 ASSAY OF LACTIC ACID: CPT

## 2018-08-12 PROCEDURE — 25000128 H RX IP 250 OP 636: Performed by: EMERGENCY MEDICINE

## 2018-08-12 PROCEDURE — 96365 THER/PROPH/DIAG IV INF INIT: CPT

## 2018-08-12 PROCEDURE — 93005 ELECTROCARDIOGRAM TRACING: CPT

## 2018-08-12 PROCEDURE — 25000125 ZZHC RX 250: Performed by: EMERGENCY MEDICINE

## 2018-08-12 PROCEDURE — 84484 ASSAY OF TROPONIN QUANT: CPT | Performed by: EMERGENCY MEDICINE

## 2018-08-12 PROCEDURE — 80048 BASIC METABOLIC PNL TOTAL CA: CPT | Performed by: EMERGENCY MEDICINE

## 2018-08-12 PROCEDURE — 82803 BLOOD GASES ANY COMBINATION: CPT

## 2018-08-12 RX ORDER — METHYLPREDNISOLONE SODIUM SUCCINATE 125 MG/2ML
125 INJECTION, POWDER, LYOPHILIZED, FOR SOLUTION INTRAMUSCULAR; INTRAVENOUS ONCE
Status: COMPLETED | OUTPATIENT
Start: 2018-08-12 | End: 2018-08-12

## 2018-08-12 RX ORDER — IPRATROPIUM BROMIDE AND ALBUTEROL SULFATE 2.5; .5 MG/3ML; MG/3ML
3 SOLUTION RESPIRATORY (INHALATION)
Status: COMPLETED | OUTPATIENT
Start: 2018-08-12 | End: 2018-08-12

## 2018-08-12 RX ORDER — PREDNISONE 20 MG/1
50 TABLET ORAL DAILY
Qty: 13 TABLET | Refills: 0 | Status: SHIPPED | OUTPATIENT
Start: 2018-08-12 | End: 2018-08-17

## 2018-08-12 RX ORDER — MAGNESIUM SULFATE HEPTAHYDRATE 40 MG/ML
2 INJECTION, SOLUTION INTRAVENOUS ONCE
Status: COMPLETED | OUTPATIENT
Start: 2018-08-12 | End: 2018-08-12

## 2018-08-12 RX ADMIN — METHYLPREDNISOLONE SODIUM SUCCINATE 125 MG: 125 INJECTION, POWDER, FOR SOLUTION INTRAMUSCULAR; INTRAVENOUS at 21:32

## 2018-08-12 RX ADMIN — IPRATROPIUM BROMIDE AND ALBUTEROL SULFATE 3 ML: .5; 3 SOLUTION RESPIRATORY (INHALATION) at 21:32

## 2018-08-12 RX ADMIN — MAGNESIUM SULFATE HEPTAHYDRATE 2 G: 40 INJECTION, SOLUTION INTRAVENOUS at 21:38

## 2018-08-12 RX ADMIN — IPRATROPIUM BROMIDE AND ALBUTEROL SULFATE 3 ML: .5; 3 SOLUTION RESPIRATORY (INHALATION) at 21:39

## 2018-08-12 RX ADMIN — IPRATROPIUM BROMIDE AND ALBUTEROL SULFATE 3 ML: .5; 3 SOLUTION RESPIRATORY (INHALATION) at 21:41

## 2018-08-12 ASSESSMENT — ENCOUNTER SYMPTOMS
CHILLS: 0
FEVER: 0
COUGH: 0
SHORTNESS OF BREATH: 1

## 2018-08-12 NOTE — ED AVS SNAPSHOT
[facility]    [address]    [facility] [address]    Phone:  [phone]    Fax:  [phone]                                       Gifty Villa   MRN: [id number]    Department:  [facility]   Date of Visit:  8/12/2018           After Visit Summary Signature Page     I have received my discharge instructions, and my questions have been answered. I have discussed any challenges I see with this plan with the nurse or doctor.    ..........................................................................................................................................  Patient/Patient Representative Signature      ..........................................................................................................................................  Patient Representative Print Name and Relationship to Patient    ..................................................               ................................................  Date                                            Time    ..........................................................................................................................................  Reviewed by Signature/Title    ...................................................              ..............................................  Date                                                            Time

## 2018-08-12 NOTE — ED AVS SNAPSHOT
[facility] Emergency Department    [address]    [facility] [address]    Phone:  [phone]    Fax:  [phone]                                       Gifty Villa   MRN: [id number]    Department:  [facility] Emergency Department   Date of Visit:  8/12/2018           Patient Information     Date Of Birth          1935        Your diagnoses for this visit were:     COPD exacerbation (H)        You were seen by Ganga Garza MD.      Follow-up Information     Follow up with Leann Gordon PA-C In 3 days.    Specialty:  Physician Assistant    Contact information:    [address] [place] [id number]  [phone]          Discharge Instructions         USE YOUR ALBUTEROL MDI WITH SPACER EVERY 4 HOURS OVER THE NEXT 2 DAYS, THEN EVERY 6 HOURS THERE-AFTER, START PREDNISONE TOMORROW AND TAKE DAILY FOR 5 DAYS.  FOLLOW UP WITH YOUR PRIMARY MD FOR RECHECK THIS WEEK.  RETURN FOR WORSENING OR NEW SYMPTOMS.    COPD Flare    You have had a flare-up of your COPD.  COPD, or chronic obstructive pulmonary disease, is a common lung disease. It causes your airways to become irritated and narrower. This makes it harder for you to breathe. Emphysema and chronic bronchitis are both types of COPD. This is a chronic condition, which means you always have it. Sometimes it gets worse. When this happens, it is called a flare-up.  Symptoms of COPD  People with COPD may have symptoms most of the time. In a flare-up, your symptoms get worse. These symptoms may mean you are having a flare-up:    Shortness of breath, shallow or rapid breathing, or wheezing that gets worse    Lung infection    Cough that gets worse    More mucus, thicker mucus or mucus of a different color    Tiredness, decreased energy, or trouble doing your usual activities    Fever    Chest tightness    Your symptoms don t get better even when you use your usual medicines, inhalers, and nebulizer    Trouble  talking    You feel confused  Causes of flare-ups  Unfortunately, a flare-up can happen even though you did everything right, and you followed your doctor s instructions. Some causes of flare-ups are:    Smoking or secondhand smoke    Colds, the flu, or respiratory infections    Air pollution    Sudden change in the weather    Dust, irritating chemicals, or strong fumes    Not taking your medicines as prescribed  Home care  Here are some things you can do at home to treat a flare-up:    Try not to panic. This makes it harder to breathe, and keeps you from doing the right things.    Don t smoke or be around others who are smoking.    Try to drink more fluids than usual during a flare-up, unless your doctor has told you not to because of heart and kidney problems. More fluids can help loosen the mucus.    Use your inhalers and nebulizer, if you have one, as you have been told to.    If you were given antibiotics, take them until they are used up or your doctor tells you to stop. It s important to finish the antibiotics, even though you feel better. This will make sure the infection has cleared.    If you were given prednisone or another steroid, finish it even if you feel better.  Preventing a flare-up  Even though flare-ups happen, the best way to treat one is to prevent it before it starts. Here are some pointers:    Don t smoke or be around others who are smoking.    Take your medicines as you have been told.    Talk with your doctor about getting a flu shot every year. Also find out if you need a pneumonia shot.    If there is a weather advisory warning to stay indoors, try to stay inside when possible.    Try to eat healthy and get plenty of sleep.    Try to avoid things that usually set you off, like dust, chemical fumes, hairsprays, or strong perfumes.  Follow-up care  Follow up with your healthcare provider, or as advised.  If a culture was done, you will be told if your treatment needs to be changed. You can  call as directed for the results.  If X-rays were done, you will be notified of any new findings that may affect your care.  Call 911  Call 911 if any of these occur:    You have trouble breathing    You feel confused or it s difficult to wake you up    You faint or lose consciousness    You have a rapid heart rate    You have new pain in your chest, arm, shoulder, neck or upper back  When to seek medical advice  Call your healthcare provider right away if any of these occur:    Wheezing or shortness of breath gets worse    You need to use your inhalers more often than usual without relief    Fever of 100.4 F (38 C) or higher, or as directed by your healthcare provider    Coughing up lots of dark-colored or bloody mucus (sputum)    Chest pain with each breath    You do not start to get better within 24 hours    Swelling of your ankles gets worse    Dizziness or weakness  Date Last Reviewed: 9/1/2016 2000-2017 [employer]. [address] [place] [address]. All rights reserved. This information is not intended as a substitute for professional medical care. Always follow your healthcare professional's instructions.          24 Hour Appointment Hotline       To make an appointment at any [facility], call [phone] ([phone]). If you don't have a family doctor or clinic, we will help you find one. [facility] clinics are conveniently located to serve the needs of you and your family.          ED Discharge Orders     Nebulizer admin set                    Review of your medicines      Our records show that you are taking the medicines listed below. If these are incorrect, please call your family doctor or clinic.        Dose / Directions Last dose taken    amLODIPine 10 MG tablet   Commonly known as:  NORVASC   Dose:  10 mg   Quantity:  90 tablet        Take 1 tablet (10 mg) by mouth daily   Refills:  1        aspirin 81 MG tablet   Quantity:  0        ONE DAILY   Refills:  0         calcium carbonate-vitamin D 500-400 MG-UNIT Tabs per tablet   Dose:  1 tablet   Quantity:  180 tablet        Take 1 tablet by mouth 2 times daily (with meals)   Refills:  3        cholecalciferol 5000 units Tabs tablet   Commonly known as:  vitamin D3   Dose:  5000 Units   Quantity:  100 tablet        Take 1 tablet (5,000 Units) by mouth daily   Refills:  1        donepezil 5 MG tablet   Commonly known as:  ARICEPT   Dose:  10 mg   Quantity:  60 tablet        Take 2 tablets (10 mg) by mouth At Bedtime   Refills:  1        DULoxetine 20 MG EC capsule   Commonly known as:  CYMBALTA   Dose:  20 mg   Quantity:  60 capsule        Take 1 capsule (20 mg) by mouth 2 times daily   Refills:  2        methocarbamol 500 MG tablet   Commonly known as:  ROBAXIN   Dose:  500-1000 mg   Quantity:  30 tablet        Take 1-2 tablets (500-1,000 mg) by mouth 3 times daily as needed for muscle spasms   Refills:  0        MUCINEX D MAX STRENGTH 120-1200 MG Tb12   Generic drug:  PseudoePHEDrine-guaiFENesin        Refills:  0        omeprazole 40 MG capsule   Commonly known as:  priLOSEC   Dose:  40 mg   Quantity:  90 capsule        Take 1 capsule (40 mg) by mouth daily 30 minutes before breakfast   Refills:  1        PROAIR  (90 Base) MCG/ACT inhaler   Dose:  2 puff   Generic drug:  albuterol        Inhale 2 puffs into the lungs as needed for shortness of breath / dyspnea or wheezing   Refills:  0        solifenacin 10 MG tablet   Commonly known as:  VESICARE   Dose:  10 mg   Quantity:  45 tablet        Take 1 tablet (10 mg) by mouth every other day   Refills:  3        tiotropium-olodaterol 2.5-2.5 MCG/ACT Aers   Commonly known as:  STIOLTO RESPIMAT   Dose:  2 puff   Quantity:  4 g        Inhale 2 puffs into the lungs daily   Refills:  3        VITRON-C  MG Tabs tablet   Dose:  1 tablet   Quantity:  100 tablet   Generic drug:  ferrous fumarate 65 mg ([place]. FE)-Vitamin C 125 mg        Take 1 tablet by mouth 2 times daily (with  meals)   Refills:  0                Procedures and tests performed during your visit     Basic metabolic panel    CBC with platelets differential    EKG 12 lead    Hepatic panel    ISTAT CG4 gases lactate manisha nursing POCT    ISTAT gases lactate manisha POCT    Pulse oximetry nursing    Respiratory Care IP Consult    Troponin I    XR Chest 2 Views      Orders Needing Specimen Collection     None      Pending Results     Date and Time Order Name Status Description    8/12/2018 2042 EKG 12 lead Preliminary             Pending Culture Results     No orders found from 8/10/2018 to 8/13/2018.            Pending Results Instructions     If you had any lab results that were not finalized at the time of your Discharge, you can call the ED Lab Result RN at [phone]. You will be contacted by this team for any positive Lab results or changes in treatment. The nurses are available 7 days a week from 10A to 6:30P.  You can leave a message 24 hours per day and they will return your call.        Test Results From Your Hospital Stay        8/12/2018  8:53 PM      Component Results     Component Value Ref Range & Units Status    WBC 11.2 (H) 4.0 - 11.0 10e9/L Final    RBC Count 4.61 3.8 - 5.2 10e12/L Final    Hemoglobin 13.8 11.7 - 15.7 g/dL Final    Hematocrit 41.7 35.0 - 47.0 % Final    MCV 91 78 - 100 fl Final    MCH 29.9 26.5 - 33.0 pg Final    MCHC 33.1 31.5 - 36.5 g/dL Final    RDW 15.5 (H) 10.0 - 15.0 % Final    Platelet Count 347 150 - 450 10e9/L Final    Diff Method Automated Method  Final    % Neutrophils 64.9 % Final    % Lymphocytes 22.7 % Final    % Monocytes 9.0 % Final    % Eosinophils 2.6 % Final    % Basophils 0.4 % Final    % Immature Granulocytes 0.4 % Final    Nucleated RBCs 0 0 /100 Final    Absolute Neutrophil 7.3 1.6 - 8.3 10e9/L Final    Absolute Lymphocytes 2.5 0.8 - 5.3 10e9/L Final    Absolute Monocytes 1.0 0.0 - 1.3 10e9/L Final    Absolute Eosinophils 0.3 0.0 - 0.7 10e9/L Final    Absolute Basophils 0.0  0.0 - 0.2 10e9/L Final    Abs Immature Granulocytes 0.1 0 - 0.4 10e9/L Final    Absolute Nucleated RBC 0.0  Final         8/12/2018  9:15 PM      Component Results     Component Value Ref Range & Units Status    Sodium 138 133 - 144 mmol/L Final    Potassium 4.1 3.4 - 5.3 mmol/L Final    Chloride 102 94 - 109 mmol/L Final    Carbon Dioxide 28 20 - 32 mmol/L Final    Anion Gap 8 3 - 14 mmol/L Final    Glucose 101 (H) 70 - 99 mg/dL Final    Urea Nitrogen 18 7 - 30 mg/dL Final    Creatinine 0.75 0.52 - 1.04 mg/dL Final    GFR Estimate 74 >60 mL/min/1.7m2 Final    Non  GFR Calc    GFR Estimate If Black 90 >60 mL/min/1.7m2 Final    African American GFR Calc    Calcium 8.7 8.5 - 10.1 mg/dL Final         8/12/2018  9:15 PM      Component Results     Component Value Ref Range & Units Status    Troponin I ES <0.015 0.000 - 0.045 ug/L Final    The 99th percentile for upper reference range is 0.045 ug/L.  Troponin values   in the range of 0.045 - 0.120 ug/L may be associated with risks of adverse   clinical events.           8/12/2018  9:23 PM      Narrative     CHEST TWO VIEWS  8/12/2018 9:06 PM     HISTORY: Chest pain.     COMPARISON: 9/6/2017        Impression     IMPRESSION: Flattened diaphragms. Lungs are clear. Normal heart size  and pulmonary vascularity. No change.    JAYCEE BENNETT MD         8/12/2018  9:21 PM      Component Results     Component Value Ref Range & Units Status    Bilirubin Direct 0.1 0.0 - 0.2 mg/dL Final    Bilirubin Total 0.4 0.2 - 1.3 mg/dL Final    Albumin 3.6 3.4 - 5.0 g/dL Final    Protein Total 7.2 6.8 - 8.8 g/dL Final    Alkaline Phosphatase 106 40 - 150 U/L Final    ALT 51 (H) 0 - 50 U/L Final    AST 26 0 - 45 U/L Final               8/12/2018  9:38 PM      Component Results     Component Value Ref Range & Units Status    Ph Venous 7.41 7.32 - 7.43 pH Final    PCO2 Venous 41 40 - 50 mm Hg Final    PO2 Venous 44 25 - 47 mm Hg Final    Bicarbonate Venous 26 21 - 28 mmol/L Final     O2 Sat Venous 80 % Final    Lactic Acid 2.6 (H) 0.7 - 2.1 mmol/L Final                Clinical Quality Measure: Blood Pressure Screening     Your blood pressure was checked while you were in the emergency department today. The last reading we obtained was  BP: 142/62 . Please read the guidelines below about what these numbers mean and what you should do about them.  If your systolic blood pressure (the top number) is less than 120 and your diastolic blood pressure (the bottom number) is less than 80, then your blood pressure is normal. There is nothing more that you need to do about it.  If your systolic blood pressure (the top number) is 120-139 or your diastolic blood pressure (the bottom number) is 80-89, your blood pressure may be higher than it should be. You should have your blood pressure rechecked within a year by a primary care provider.  If your systolic blood pressure (the top number) is 140 or greater or your diastolic blood pressure (the bottom number) is 90 or greater, you may have high blood pressure. High blood pressure is treatable, but if left untreated over time it can put you at risk for heart attack, stroke, or kidney failure. You should have your blood pressure rechecked by a primary care provider within the next 4 weeks.  If your provider in the emergency department today gave you specific instructions to follow-up with your doctor or provider even sooner than that, you should follow that instruction and not wait for up to 4 weeks for your follow-up visit.        Thank you for choosing [facility]       Thank you for choosing [facility] for your care. Our goal is always to provide you with excellent care. Hearing back from our patients is one way we can continue to improve our services. Please take a few minutes to complete the written survey that you may receive in the mail after you visit with us. Thank you!        [employer] Information     [facility] gives you secure access to your electronic  health record. If you see a primary care provider, you can also send messages to your care team and make appointments. If you have questions, please call your primary care clinic.  If you do not have a primary care provider, please call [phone] and they will assist you.        Care EveryWhere ID     This is your Care EveryWhere ID. This could be used by other organizations to access your [facility] medical records  [id number]        Equal Access to Services     CRISTO GRACIA : Juani Alston, sangeetha mccarty, eric coon, cathy moran . So [facility] [phone].    ATENCIÓN: Si habla español, tiene a robertson disposición servicios gratuitos de asistencia lingüística. Llame al [phone].    We comply with applicable federal civil rights laws and [place] laws. We do not discriminate on the basis of race, color, national origin, age, disability, sex, sexual orientation, or gender identity.            After Visit Summary       This is your record. Keep this with you and show to your community pharmacist(s) and doctor(s) at your next visit.

## 2018-08-13 LAB — INTERPRETATION ECG - MUSE: NORMAL

## 2018-08-13 RX ORDER — DULOXETIN HYDROCHLORIDE 20 MG/1
20 CAPSULE, DELAYED RELEASE ORAL 2 TIMES DAILY
Qty: 60 CAPSULE | Refills: 2 | Status: SHIPPED | OUTPATIENT
Start: 2018-08-13 | End: 2018-10-08

## 2018-08-13 NOTE — TELEPHONE ENCOUNTER
"Requested Prescriptions   Pending Prescriptions Disp Refills     DULoxetine (CYMBALTA) 20 MG EC capsule 60 capsule 2     Sig: Take 1 capsule (20 mg) by mouth 2 times daily    Serotonin-Norepinephrine Reuptake Inhibitors  Passed    8/10/2018  3:56 PM       Passed - Blood pressure under 140/90 in past 12 months    BP Readings from Last 3 Encounters:   08/12/18 142/62   07/31/18 118/63   05/14/18 138/66                Passed - Recent (12 mo) or future (30 days) visit within the authorizing provider's specialty    Patient had office visit in the last 12 months or has a visit in the next 30 days with authorizing provider or within the authorizing provider's specialty.  See \"Patient Info\" tab in inbasket, or \"Choose Columns\" in Meds & Orders section of the refill encounter.           Passed - Patient is age 18 or older       Passed - No active pregnancy on record       Passed - No positive pregnancy test in past 12 months        Recent higher BP was in ER. Pt stable and sent home.   Prescription approved per [facility] Refill Protocol.  Matilda Evans RN  [facility]    "

## 2018-08-13 NOTE — PROGRESS NOTES
RT Note    Paged for home neb machine instruct. Patient has Medicare for insurance. Spoke with RN and advise patient to get neb machine from our pharmacy. Medicare will not cover neb machine and unsure about secondary insurance.      Candice Patel, RRT

## 2018-08-13 NOTE — DISCHARGE INSTRUCTIONS
USE YOUR ALBUTEROL MDI WITH SPACER EVERY 4 HOURS OVER THE NEXT 2 DAYS, THEN EVERY 6 HOURS THERE-AFTER, START PREDNISONE TOMORROW AND TAKE DAILY FOR 5 DAYS.  FOLLOW UP WITH YOUR PRIMARY MD FOR RECHECK THIS WEEK.  RETURN FOR WORSENING OR NEW SYMPTOMS.    COPD Flare    You have had a flare-up of your COPD.  COPD, or chronic obstructive pulmonary disease, is a common lung disease. It causes your airways to become irritated and narrower. This makes it harder for you to breathe. Emphysema and chronic bronchitis are both types of COPD. This is a chronic condition, which means you always have it. Sometimes it gets worse. When this happens, it is called a flare-up.  Symptoms of COPD  People with COPD may have symptoms most of the time. In a flare-up, your symptoms get worse. These symptoms may mean you are having a flare-up:    Shortness of breath, shallow or rapid breathing, or wheezing that gets worse    Lung infection    Cough that gets worse    More mucus, thicker mucus or mucus of a different color    Tiredness, decreased energy, or trouble doing your usual activities    Fever    Chest tightness    Your symptoms don t get better even when you use your usual medicines, inhalers, and nebulizer    Trouble talking    You feel confused  Causes of flare-ups  Unfortunately, a flare-up can happen even though you did everything right, and you followed your doctor s instructions. Some causes of flare-ups are:    Smoking or secondhand smoke    Colds, the flu, or respiratory infections    Air pollution    Sudden change in the weather    Dust, irritating chemicals, or strong fumes    Not taking your medicines as prescribed  Home care  Here are some things you can do at home to treat a flare-up:    Try not to panic. This makes it harder to breathe, and keeps you from doing the right things.    Don t smoke or be around others who are smoking.    Try to drink more fluids than usual during a flare-up, unless your doctor has told you  not to because of heart and kidney problems. More fluids can help loosen the mucus.    Use your inhalers and nebulizer, if you have one, as you have been told to.    If you were given antibiotics, take them until they are used up or your doctor tells you to stop. It s important to finish the antibiotics, even though you feel better. This will make sure the infection has cleared.    If you were given prednisone or another steroid, finish it even if you feel better.  Preventing a flare-up  Even though flare-ups happen, the best way to treat one is to prevent it before it starts. Here are some pointers:    Don t smoke or be around others who are smoking.    Take your medicines as you have been told.    Talk with your doctor about getting a flu shot every year. Also find out if you need a pneumonia shot.    If there is a weather advisory warning to stay indoors, try to stay inside when possible.    Try to eat healthy and get plenty of sleep.    Try to avoid things that usually set you off, like dust, chemical fumes, hairsprays, or strong perfumes.  Follow-up care  Follow up with your healthcare provider, or as advised.  If a culture was done, you will be told if your treatment needs to be changed. You can call as directed for the results.  If X-rays were done, you will be notified of any new findings that may affect your care.  Call 911  Call 911 if any of these occur:    You have trouble breathing    You feel confused or it s difficult to wake you up    You faint or lose consciousness    You have a rapid heart rate    You have new pain in your chest, arm, shoulder, neck or upper back  When to seek medical advice  Call your healthcare provider right away if any of these occur:    Wheezing or shortness of breath gets worse    You need to use your inhalers more often than usual without relief    Fever of 100.4 F (38 C) or higher, or as directed by your healthcare provider    Coughing up lots of dark-colored or bloody  mucus (sputum)    Chest pain with each breath    You do not start to get better within 24 hours    Swelling of your ankles gets worse    Dizziness or weakness  Date Last Reviewed: 9/1/2016 2000-2017 [employer]. [address] [place] [address], [place] [address]. All rights reserved. This information is not intended as a substitute for professional medical care. Always follow your healthcare professional's instructions.

## 2018-08-13 NOTE — ED TRIAGE NOTES
Pt presents to ED for increased short of breath.  Pt has COPD.  States that her chest is also more tight than usual.  Denies diaphoresis or nausea.  ABCs intact, alert and orientated.
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
## 2018-08-15 ENCOUNTER — OFFICE VISIT (OUTPATIENT)
Dept: FAMILY MEDICINE | Facility: CLINIC | Age: 83
End: 2018-08-15
Payer: COMMERCIAL

## 2018-08-15 VITALS
WEIGHT: 127.38 LBS | HEIGHT: 63 IN | SYSTOLIC BLOOD PRESSURE: 124 MMHG | BODY MASS INDEX: 22.57 KG/M2 | OXYGEN SATURATION: 98 % | DIASTOLIC BLOOD PRESSURE: 80 MMHG | HEART RATE: 73 BPM | TEMPERATURE: 97.8 F

## 2018-08-15 DIAGNOSIS — J30.2 SEASONAL ALLERGIC RHINITIS, UNSPECIFIED CHRONICITY, UNSPECIFIED TRIGGER: ICD-10-CM

## 2018-08-15 DIAGNOSIS — J44.1 COPD EXACERBATION (H): Primary | ICD-10-CM

## 2018-08-15 PROCEDURE — 99214 OFFICE O/P EST MOD 30 MIN: CPT | Performed by: PHYSICIAN ASSISTANT

## 2018-08-15 RX ORDER — CETIRIZINE HYDROCHLORIDE 10 MG/1
10 TABLET ORAL EVERY EVENING
Qty: 90 TABLET | Refills: 3 | COMMUNITY
Start: 2018-08-15 | End: 2022-01-25

## 2018-08-15 ASSESSMENT — ANXIETY QUESTIONNAIRES
7. FEELING AFRAID AS IF SOMETHING AWFUL MIGHT HAPPEN: NOT AT ALL
GAD7 TOTAL SCORE: 2
6. BECOMING EASILY ANNOYED OR IRRITABLE: NOT AT ALL
5. BEING SO RESTLESS THAT IT IS HARD TO SIT STILL: NOT AT ALL
IF YOU CHECKED OFF ANY PROBLEMS ON THIS QUESTIONNAIRE, HOW DIFFICULT HAVE THESE PROBLEMS MADE IT FOR YOU TO DO YOUR WORK, TAKE CARE OF THINGS AT HOME, OR GET ALONG WITH OTHER PEOPLE: NOT DIFFICULT AT ALL
1. FEELING NERVOUS, ANXIOUS, OR ON EDGE: SEVERAL DAYS
2. NOT BEING ABLE TO STOP OR CONTROL WORRYING: NOT AT ALL
3. WORRYING TOO MUCH ABOUT DIFFERENT THINGS: SEVERAL DAYS

## 2018-08-15 ASSESSMENT — PATIENT HEALTH QUESTIONNAIRE - PHQ9: 5. POOR APPETITE OR OVEREATING: NOT AT ALL

## 2018-08-15 NOTE — MR AVS SNAPSHOT
"              After Visit Summary   8/15/2018    Gifty Villa    MRN: [id number]           Patient Information     Date Of Birth          1935        Visit Information        Provider Department      8/15/2018 3:00 PM Leann Gordon PA-C [facility]        Today's Diagnoses     COPD exacerbation (H)    -  1    Seasonal allergic rhinitis, unspecified chronicity, unspecified trigger           Follow-ups after your visit        Who to contact     If you have questions or need follow up information about today's clinic visit or your schedule please contact [facility] directly at [phone].  Normal or non-critical lab and imaging results will be communicated to you by Arnicahart, letter or phone within 4 business days after the clinic has received the results. If you do not hear from us within 7 days, please contact the clinic through Arnicahart or phone. If you have a critical or abnormal lab result, we will notify you by phone as soon as possible.  Submit refill requests through Aunt Kitchen or call your pharmacy and they will forward the refill request to us. Please allow 3 business days for your refill to be completed.          Additional Information About Your Visit        MyChart Information     [employer] gives you secure access to your electronic health record. If you see a primary care provider, you can also send messages to your care team and make appointments. If you have questions, please call your primary care clinic.  If you do not have a primary care provider, please call [phone] and they will assist you.        Care EveryWhere ID     This is your Care EveryWhere ID. This could be used by other organizations to access your [facility] medical records  [id number]        Your Vitals Were     Pulse Temperature Height Pulse Oximetry BMI (Body Mass Index)       73 97.8  F (36.6  C) (Tympanic) 5' 3\" (1.6 m) 98% 22.56 kg/m2        Blood Pressure from Last 3 Encounters: "   08/15/18 124/80   08/12/18 142/62   07/31/18 118/63    Weight from Last 3 Encounters:   08/15/18 127 lb 6 oz (57.8 kg)   08/12/18 120 lb (54.4 kg)   07/31/18 125 lb (56.7 kg)              Today, you had the following     No orders found for display         Today's Medication Changes          These changes are accurate as of 8/15/18  3:35 PM.  If you have any questions, ask your nurse or doctor.               Start taking these medicines.        Dose/Directions    cetirizine 10 MG tablet   Commonly known as:  zyrTEC   Used for:  Seasonal allergic rhinitis, unspecified chronicity, unspecified trigger   Started by:  Leann Gordon PA-C        Dose:  10 mg   Take 1 tablet (10 mg) by mouth every evening   Quantity:  90 tablet   Refills:  3            Where to get your medicines      Some of these will need a paper prescription and others can be bought over the counter.  Ask your nurse if you have questions.     You don't need a prescription for these medications     cetirizine 10 MG tablet                Primary Care Provider Office Phone # Fax #    Leann Gordon PA-C [phone]       [address] [place] [address]        Equal Access to Services     CRISTO GRACIA AH: Hadii faith ku hadasho Soomaali, waaxda luqadaha, qaybta kaalmada adeegyada, waxay tonyin hayangellan arsen samuel. So [facility] [phone].    ATENCIÓN: Si habla español, tiene a robertson disposición servicios gratuitos de asistencia lingüística. Llame al [phone].    We comply with applicable federal civil rights laws and [place] laws. We do not discriminate on the basis of race, color, national origin, age, disability, sex, sexual orientation, or gender identity.            Thank you!     Thank you for choosing [facility]  for your care. Our goal is always to provide you with excellent care. Hearing back from our patients is one way we can continue to improve our services. Please take a few minutes to  complete the written survey that you may receive in the mail after your visit with us. Thank you!             Your Updated Medication List - Protect others around you: Learn how to safely use, store and throw away your medicines at www.disposemymeds.org.          This list is accurate as of 8/15/18  3:35 PM.  Always use your most recent med list.                   Brand Name Dispense Instructions for use Diagnosis    amLODIPine 10 MG tablet    NORVASC    90 tablet    Take 1 tablet (10 mg) by mouth daily    Essential hypertension       aspirin 81 MG tablet     0    ONE DAILY    Unspecified essential hypertension       calcium carbonate-vitamin D 500-400 MG-UNIT Tabs per tablet     180 tablet    Take 1 tablet by mouth 2 times daily (with meals)    Post menopausal problems       cetirizine 10 MG tablet    zyrTEC    90 tablet    Take 1 tablet (10 mg) by mouth every evening    Seasonal allergic rhinitis, unspecified chronicity, unspecified trigger       cholecalciferol 5000 units Tabs tablet    vitamin D3    100 tablet    Take 1 tablet (5,000 Units) by mouth daily    Other fatigue       donepezil 5 MG tablet    ARICEPT    60 tablet    Take 2 tablets (10 mg) by mouth At Bedtime    Early onset Alzheimer's dementia without behavioral disturbance       DULoxetine 20 MG EC capsule    CYMBALTA    60 capsule    Take 1 capsule (20 mg) by mouth 2 times daily    Other fatigue, Adjustment disorder with depressed mood       methocarbamol 500 MG tablet    ROBAXIN    30 tablet    Take 1-2 tablets (500-1,000 mg) by mouth 3 times daily as needed for muscle spasms    Cervicalgia       MUCINEX D MAX STRENGTH 120-1200 MG Tb12   Generic drug:  PseudoePHEDrine-guaiFENesin       Other fatigue, Cognitive changes       omeprazole 40 MG capsule    priLOSEC    90 capsule    Take 1 capsule (40 mg) by mouth daily 30 minutes before breakfast    Peptic ulcer       predniSONE 20 MG tablet    DELTASONE    13 tablet    Take 2.5 tablets (50 mg) by mouth  daily for 5 days        PROAIR  (90 Base) MCG/ACT inhaler   Generic drug:  albuterol      Inhale 2 puffs into the lungs as needed for shortness of breath / dyspnea or wheezing        solifenacin 10 MG tablet    VESICARE    45 tablet    Take 1 tablet (10 mg) by mouth every other day    Urinary urgency       tiotropium-olodaterol 2.5-2.5 MCG/ACT Aers    STIOLTO RESPIMAT    4 g    Inhale 2 puffs into the lungs daily    Chronic obstructive pulmonary disease with acute exacerbation (H)       VITRON-C  MG Tabs tablet   Generic drug:  ferrous fumarate 65 mg (Elim IRA. FE)-Vitamin C 125 mg     100 tablet    Take 1 tablet by mouth 2 times daily (with meals)    Restless legs syndrome (RLS), Low ferritin
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
## 2018-08-16 ASSESSMENT — ANXIETY QUESTIONNAIRES: GAD7 TOTAL SCORE: 2

## 2018-09-12 DIAGNOSIS — I10 ESSENTIAL HYPERTENSION: ICD-10-CM

## 2018-09-12 DIAGNOSIS — K27.9 PEPTIC ULCER: ICD-10-CM

## 2018-09-12 NOTE — TELEPHONE ENCOUNTER
omeprazole (PRILOSEC) 40 MG capsule    Last Written Prescription Date:  3.13.18  Last Fill Quantity: 90,  # refills: 1   Last Office Visit: 8/15/2018   Future Office Visit:         amLODIPine (NORVASC) 10 MG tablet    Last Written Prescription Date:  3.13.18  Last Fill Quantity: 90,  # refills: 1   Last Office Visit: 8/15/2018   Future Office Visit:

## 2018-09-13 RX ORDER — AMLODIPINE BESYLATE 10 MG/1
10 TABLET ORAL DAILY
Qty: 90 TABLET | Refills: 1 | Status: SHIPPED | OUTPATIENT
Start: 2018-09-13 | End: 2019-03-14

## 2018-09-13 RX ORDER — OMEPRAZOLE 40 MG/1
40 CAPSULE, DELAYED RELEASE ORAL DAILY
Qty: 90 CAPSULE | Refills: 1 | Status: SHIPPED | OUTPATIENT
Start: 2018-09-13 | End: 2019-03-14

## 2018-09-13 NOTE — TELEPHONE ENCOUNTER
"Requested Prescriptions   Pending Prescriptions Disp Refills     omeprazole (PRILOSEC) 40 MG capsule 90 capsule 1     Sig: Take 1 capsule (40 mg) by mouth daily 30 minutes before breakfast    PPI Protocol Passed    9/12/2018 11:51 AM       Passed - Not on Clopidogrel (unless Pantoprazole ordered)       Passed - No diagnosis of osteoporosis on record       Passed - Recent (12 mo) or future (30 days) visit within the authorizing provider's specialty    Patient had office visit in the last 12 months or has a visit in the next 30 days with authorizing provider or within the authorizing provider's specialty.  See \"Patient Info\" tab in inbasket, or \"Choose Columns\" in Meds & Orders section of the refill encounter.           Passed - Patient is age 18 or older       Passed - No active pregnacy on record       Passed - No positive pregnancy test in past 12 months        amLODIPine (NORVASC) 10 MG tablet 90 tablet 1     Sig: Take 1 tablet (10 mg) by mouth daily    Calcium Channel Blockers Protocol  Passed    9/12/2018 11:51 AM       Passed - Blood pressure under 140/90 in past 12 months    BP Readings from Last 3 Encounters:   08/15/18 124/80   08/12/18 142/62   07/31/18 118/63                Passed - Recent (12 mo) or future (30 days) visit within the authorizing provider's specialty    Patient had office visit in the last 12 months or has a visit in the next 30 days with authorizing provider or within the authorizing provider's specialty.  See \"Patient Info\" tab in inbasket, or \"Choose Columns\" in Meds & Orders section of the refill encounter.           Passed - Patient is age 18 or older       Passed - No active pregnancy on record       Passed - Normal serum creatinine on file in past 12 months    Recent Labs   Lab Test  08/12/18 2044 01/03/16   1945   CR  0.75   < >   --    CREAT   --    --   0.6    < > = values in this interval not displayed.            Passed - No positive pregnancy test in past 12 months    "     Prescription approved per [facility] Refill Protocol.  Matilda Evans RN  [facility]

## 2018-09-20 DIAGNOSIS — F02.80 EARLY ONSET ALZHEIMER'S DEMENTIA WITHOUT BEHAVIORAL DISTURBANCE (H): ICD-10-CM

## 2018-09-20 DIAGNOSIS — G30.0 EARLY ONSET ALZHEIMER'S DEMENTIA WITHOUT BEHAVIORAL DISTURBANCE (H): ICD-10-CM

## 2018-09-21 RX ORDER — DONEPEZIL HYDROCHLORIDE 5 MG/1
10 TABLET, FILM COATED ORAL AT BEDTIME
Qty: 60 TABLET | Refills: 1 | Status: SHIPPED | OUTPATIENT
Start: 2018-09-21 | End: 2018-10-08

## 2018-09-21 NOTE — TELEPHONE ENCOUNTER
"Last Written Prescription Date:  7/16/18  Last Fill Quantity: 60,  # refills: 1   Last office visit: 8/15/2018 with prescribing provider:  Leann Gordon PA-C   Future Office Visit:    Requested Prescriptions   Pending Prescriptions Disp Refills     donepezil (ARICEPT) 5 MG tablet 60 tablet 1     Sig: Take 2 tablets (10 mg) by mouth At Bedtime    Miscellaneous Dementia Agents Passed    9/20/2018  9:02 AM       Passed - Recent (12 mo) or future (30 days) visit within the authorizing provider's specialty    Patient had office visit in the last 12 months or has a visit in the next 30 days with authorizing provider or within the authorizing provider's specialty.  See \"Patient Info\" tab in inbasket, or \"Choose Columns\" in Meds & Orders section of the refill encounter.           Passed - Patient is 18 years of age or older        Prescription [place] per [facility] [place].  Per LOV note the patient is due for follow up appointment 9/30/18.  Chasidy Feldman RN    "

## 2018-10-08 ENCOUNTER — OFFICE VISIT (OUTPATIENT)
Dept: FAMILY MEDICINE | Facility: CLINIC | Age: 83
End: 2018-10-08
Payer: COMMERCIAL

## 2018-10-08 ENCOUNTER — PATIENT OUTREACH (OUTPATIENT)
Dept: CARE COORDINATION | Facility: CLINIC | Age: 83
End: 2018-10-08

## 2018-10-08 VITALS
SYSTOLIC BLOOD PRESSURE: 112 MMHG | BODY MASS INDEX: 22.72 KG/M2 | HEIGHT: 63 IN | TEMPERATURE: 97.1 F | OXYGEN SATURATION: 97 % | DIASTOLIC BLOOD PRESSURE: 76 MMHG | WEIGHT: 128.25 LBS | HEART RATE: 109 BPM

## 2018-10-08 DIAGNOSIS — F43.21 ADJUSTMENT DISORDER WITH DEPRESSED MOOD: ICD-10-CM

## 2018-10-08 DIAGNOSIS — H69.93 DYSFUNCTION OF BOTH EUSTACHIAN TUBES: ICD-10-CM

## 2018-10-08 DIAGNOSIS — R53.83 OTHER FATIGUE: ICD-10-CM

## 2018-10-08 DIAGNOSIS — F02.80 EARLY ONSET ALZHEIMER'S DEMENTIA WITHOUT BEHAVIORAL DISTURBANCE (H): ICD-10-CM

## 2018-10-08 DIAGNOSIS — G30.0 EARLY ONSET ALZHEIMER'S DEMENTIA WITHOUT BEHAVIORAL DISTURBANCE (H): ICD-10-CM

## 2018-10-08 DIAGNOSIS — H93.13 TINNITUS, BILATERAL: ICD-10-CM

## 2018-10-08 DIAGNOSIS — R25.2 LEG CRAMPS: Primary | ICD-10-CM

## 2018-10-08 DIAGNOSIS — Z23 NEED FOR PROPHYLACTIC VACCINATION AND INOCULATION AGAINST INFLUENZA: ICD-10-CM

## 2018-10-08 DIAGNOSIS — Z23 NEED FOR TDAP VACCINATION: ICD-10-CM

## 2018-10-08 DIAGNOSIS — R39.15 URINARY URGENCY: ICD-10-CM

## 2018-10-08 DIAGNOSIS — R79.89 LOW VITAMIN B12 LEVEL: ICD-10-CM

## 2018-10-08 LAB
ALBUMIN SERPL-MCNC: 4.1 G/DL (ref 3.4–5)
ALP SERPL-CCNC: 95 U/L (ref 40–150)
ALT SERPL W P-5'-P-CCNC: 26 U/L (ref 0–50)
ANION GAP SERPL CALCULATED.3IONS-SCNC: 8 MMOL/L (ref 3–14)
AST SERPL W P-5'-P-CCNC: 20 U/L (ref 0–45)
BILIRUB SERPL-MCNC: 0.3 MG/DL (ref 0.2–1.3)
BUN SERPL-MCNC: 21 MG/DL (ref 7–30)
CALCIUM SERPL-MCNC: 9.5 MG/DL (ref 8.5–10.1)
CHLORIDE SERPL-SCNC: 104 MMOL/L (ref 94–109)
CK SERPL-CCNC: 43 U/L (ref 30–225)
CO2 SERPL-SCNC: 27 MMOL/L (ref 20–32)
CREAT SERPL-MCNC: 0.72 MG/DL (ref 0.52–1.04)
ERYTHROCYTE [DISTWIDTH] IN BLOOD BY AUTOMATED COUNT: 14 % (ref 10–15)
GFR SERPL CREATININE-BSD FRML MDRD: 77 ML/MIN/1.7M2
GLUCOSE SERPL-MCNC: 84 MG/DL (ref 70–99)
HCT VFR BLD AUTO: 45.5 % (ref 35–47)
HGB BLD-MCNC: 15.1 G/DL (ref 11.7–15.7)
MAGNESIUM SERPL-MCNC: 2.1 MG/DL (ref 1.6–2.3)
MCH RBC QN AUTO: 29.8 PG (ref 26.5–33)
MCHC RBC AUTO-ENTMCNC: 33.2 G/DL (ref 31.5–36.5)
MCV RBC AUTO: 90 FL (ref 78–100)
PLATELET # BLD AUTO: 212 10E9/L (ref 150–450)
POTASSIUM SERPL-SCNC: 4.7 MMOL/L (ref 3.4–5.3)
PROT SERPL-MCNC: 7.7 G/DL (ref 6.8–8.8)
RBC # BLD AUTO: 5.06 10E12/L (ref 3.8–5.2)
SODIUM SERPL-SCNC: 139 MMOL/L (ref 133–144)
TSH SERPL DL<=0.005 MIU/L-ACNC: 3.78 MU/L (ref 0.4–4)
VIT B12 SERPL-MCNC: 389 PG/ML (ref 193–986)
WBC # BLD AUTO: 6.1 10E9/L (ref 4–11)

## 2018-10-08 PROCEDURE — 84443 ASSAY THYROID STIM HORMONE: CPT | Performed by: PHYSICIAN ASSISTANT

## 2018-10-08 PROCEDURE — 36415 COLL VENOUS BLD VENIPUNCTURE: CPT | Performed by: PHYSICIAN ASSISTANT

## 2018-10-08 PROCEDURE — 90662 IIV NO PRSV INCREASED AG IM: CPT | Performed by: PHYSICIAN ASSISTANT

## 2018-10-08 PROCEDURE — 99214 OFFICE O/P EST MOD 30 MIN: CPT | Mod: 25 | Performed by: PHYSICIAN ASSISTANT

## 2018-10-08 PROCEDURE — 82607 VITAMIN B-12: CPT | Performed by: PHYSICIAN ASSISTANT

## 2018-10-08 PROCEDURE — 90471 IMMUNIZATION ADMIN: CPT | Performed by: PHYSICIAN ASSISTANT

## 2018-10-08 PROCEDURE — 83735 ASSAY OF MAGNESIUM: CPT | Performed by: PHYSICIAN ASSISTANT

## 2018-10-08 PROCEDURE — 82306 VITAMIN D 25 HYDROXY: CPT | Performed by: PHYSICIAN ASSISTANT

## 2018-10-08 PROCEDURE — 90715 TDAP VACCINE 7 YRS/> IM: CPT | Performed by: PHYSICIAN ASSISTANT

## 2018-10-08 PROCEDURE — 80053 COMPREHEN METABOLIC PANEL: CPT | Performed by: PHYSICIAN ASSISTANT

## 2018-10-08 PROCEDURE — 85027 COMPLETE CBC AUTOMATED: CPT | Performed by: PHYSICIAN ASSISTANT

## 2018-10-08 PROCEDURE — G0008 ADMIN INFLUENZA VIRUS VAC: HCPCS | Mod: 59 | Performed by: PHYSICIAN ASSISTANT

## 2018-10-08 PROCEDURE — 82550 ASSAY OF CK (CPK): CPT | Performed by: PHYSICIAN ASSISTANT

## 2018-10-08 PROCEDURE — 82728 ASSAY OF FERRITIN: CPT | Performed by: PHYSICIAN ASSISTANT

## 2018-10-08 RX ORDER — DONEPEZIL HYDROCHLORIDE 5 MG/1
10 TABLET, FILM COATED ORAL AT BEDTIME
Qty: 180 TABLET | Refills: 1 | Status: SHIPPED | OUTPATIENT
Start: 2018-10-08 | End: 2019-03-25

## 2018-10-08 RX ORDER — DULOXETIN HYDROCHLORIDE 20 MG/1
20 CAPSULE, DELAYED RELEASE ORAL 2 TIMES DAILY
Qty: 60 CAPSULE | Refills: 2 | Status: SHIPPED | OUTPATIENT
Start: 2018-10-08 | End: 2019-01-16

## 2018-10-08 RX ORDER — SOLIFENACIN SUCCINATE 10 MG/1
10 TABLET, FILM COATED ORAL EVERY OTHER DAY
Qty: 90 TABLET | Refills: 1 | Status: SHIPPED | OUTPATIENT
Start: 2018-10-08 | End: 2019-12-11

## 2018-10-08 RX ORDER — FLUTICASONE PROPIONATE 50 MCG
2 SPRAY, SUSPENSION (ML) NASAL DAILY
Qty: 1 BOTTLE | Refills: 1 | Status: SHIPPED | OUTPATIENT
Start: 2018-10-08 | End: 2019-05-29

## 2018-10-08 ASSESSMENT — PATIENT HEALTH QUESTIONNAIRE - PHQ9: 5. POOR APPETITE OR OVEREATING: NOT AT ALL

## 2018-10-08 ASSESSMENT — ANXIETY QUESTIONNAIRES
5. BEING SO RESTLESS THAT IT IS HARD TO SIT STILL: NOT AT ALL
1. FEELING NERVOUS, ANXIOUS, OR ON EDGE: NOT AT ALL
7. FEELING AFRAID AS IF SOMETHING AWFUL MIGHT HAPPEN: NOT AT ALL
2. NOT BEING ABLE TO STOP OR CONTROL WORRYING: NOT AT ALL
6. BECOMING EASILY ANNOYED OR IRRITABLE: NOT AT ALL
GAD7 TOTAL SCORE: 0
3. WORRYING TOO MUCH ABOUT DIFFERENT THINGS: NOT AT ALL

## 2018-10-08 NOTE — PROGRESS NOTES
"  SUBJECTIVE:   Gifty Villa is a 83 year old female who presents to clinic today for the following health issues:    MEDICATION follow up of all medications-     Previous vist: ~3 month(s) ago    Medication changes: no        Taking medication as prescribed: YES- forgets occasionally                New side effects: no    Accompanying Signs & Symptoms:          How are you feeling today: Patient has been having bilateral tinnitus for ~1 month. Fatigue, confusion, foggy. Right leg cramps for ~2 days. Wondering if she should be taking protein.           Do you feel the medication(s) are helping: Yes     Patient is accompanied by her son and daughter-in-law.    Patient is here for a medication recheck. She reports she has been doing well, however she notes fatigue and has been feeling down lately. She reports for the last month she was planning a party that put stress on her. She relates the weather and the stress could be contributing to her mood. She is taking 20 mg Cymbalta twice daily. She keeps herself busy by moving around in her home. She is project oriented. She had sewed in the past and loved it. She would like to start up again. She plans to increase her time with her  and with [facility] [place] to 3 days per week.    PHQ-9 SCORE 1/7/2016 3/13/2018 10/8/2018   Total Score - - -   Total Score 3 8 9     FERNY-7 SCORE 1/3/2013 8/15/2018 10/8/2018   Total Score 7 - -   Total Score - 2 0       Tinnitus   She complains of ringing in both ears that started 1 month ago. She endorses ringing is equal in both ears. She reports ringing is similar to \"driving underneath a high power line\". She takes aspirin daily. Denies stuffiness.    Leg cramps  Patient complains of right leg cramp that started a couple weeks ago. She cannot associate anything she has done that could have caused leg pain. Pain occurs more frequently during the day than night. She has tried stretching her legs, which seems to relieve " pain. However, she feels residual aches. She eats lots of bananas. She does not hydrate herself as much as she needs. She is concerned she does not consume enough protein, which she believes might contribute to pain. Denies falls and trauma.  She continues to take iron daily and is tolerating this well.    Ferritin   Date Value Ref Range Status   01/24/2018 54 8 - 252 ng/mL Final         Problem list and histories reviewed & adjusted, as indicated.  Additional history: as documented    Patient Active Problem List   Diagnosis     Backache     Urge incontinence     Essential hypertension     Aftercare following joint replacement     Knee joint replacement by other means     HYPERLIPIDEMIA LDL GOAL <130     Urinary incontinence     Urgency-frequency syndrome     Advanced directives, counseling/discussion     RLQ abdominal mass     Lumbago     Pain in joint, pelvic region and thigh     Anxiety     COPD (chronic obstructive pulmonary disease) (H)     Major depressive disorder, recurrent episode, in full remission (H)     Chronic obstructive pulmonary disease, unspecified COPD type (H) - followed by Dr. Alida PICHARDO Lung     Atopic dermatitis, unspecified type     Hypervitaminosis B6     Peptic ulcer     Alzheimer's dementia without behavioral disturbance, unspecified timing of dementia onset     Low ferritin     Past Surgical History:   Procedure Laterality Date     ARTHROPLASTY PATELLO-FEMORAL (KNEE)  8/2010    Left TKA     C SPINE FUSION,ANTER,3 SGMTS  1992 1992     C TOTAL HIP ARTHROPLASTY  1995    Hip Replacement, Total (Right)     C VAGINAL HYSTERECTOMY  1978    Hysterectomy, Vaginal w anterior repair     CARDIAC CATH - HIM SCAN  2011    normal.  Done d/t abnormal stress test (false positive) - patient noted to have hypertensive heart disease     COLONOSCOPY  ~2003    No polyps     COLONOSCOPY N/A 10/10/2014    Procedure: COLONOSCOPY;  Surgeon: Chavez Ward MD;  Location:  GI     ESOPHAGOSCOPY, GASTROSCOPY,  DUODENOSCOPY (EGD), COMBINED  2016    Dr. Starla PARKS     ESOPHAGOSCOPY, GASTROSCOPY, DUODENOSCOPY (EGD), COMBINED N/A 2016    Procedure: COMBINED ESOPHAGOSCOPY, GASTROSCOPY, DUODENOSCOPY (EGD), BIOPSY SINGLE OR MULTIPLE;  Surgeon: Jada Cha MD;  Location: [facility] GI     EXCISE GANGLION WRIST Left 2015    Procedure: EXCISE GANGLION WRIST;  Surgeon: Justin Lyons MD;  Location: [facility] OR     HYSTERECTOMY, PAP NO LONGER INDICATED       wisdom teeth         Social History   Substance Use Topics     Smoking status: Former Smoker     Quit date: 1988     Smokeless tobacco: Never Used      Comment: Quit in      Alcohol use No      Comment: none     Family History   Problem Relation Age of Onset     Alzheimer Disease Mother       at 98,  of old age     Alzheimer Disease Father       at 78     C.A.D. Father      Cancer Other 90     unknown cancer     Alzheimer Disease Brother       age 82      Family History Negative Brother      Born 1931     Cancer Maternal Aunt      x'2 w Ca unknown etiology     Colon Cancer No family hx of          Current Outpatient Prescriptions   Medication Sig Dispense Refill     albuterol (ALBUTEROL) 108 (90 BASE) MCG/ACT Inhaler Inhale 2 puffs into the lungs as needed for shortness of breath / dyspnea or wheezing       amLODIPine (NORVASC) 10 MG tablet Take 1 tablet (10 mg) by mouth daily 90 tablet 1     ASPIRIN 81 MG OR TABS ONE DAILY 0 0     calcium carbonate-vitamin D 500-400 MG-UNIT TABS per tablet Take 1 tablet by mouth 2 times daily (with meals) 180 tablet 3     cetirizine (ZYRTEC) 10 MG tablet Take 1 tablet (10 mg) by mouth every evening 90 tablet 3     cholecalciferol (VITAMIN D3) 5000 UNITS TABS tablet Take 1 tablet (5,000 Units) by mouth daily 100 tablet 1     donepezil (ARICEPT) 5 MG tablet Take 2 tablets (10 mg) by mouth At Bedtime 180 tablet 1     DULoxetine (CYMBALTA) 20 MG EC capsule Take 1 capsule (20 mg) by mouth 2 times daily 60 capsule  "2     fluticasone (FLONASE) 50 MCG/ACT spray Spray 2 sprays into both nostrils daily 1 Bottle 1     omeprazole (PRILOSEC) 40 MG capsule Take 1 capsule (40 mg) by mouth daily 30 minutes before breakfast 90 capsule 1     PseudoePHEDrine-guaiFENesin (MUCINEX D MAX STRENGTH) 120-1200 MG TB12        solifenacin (VESICARE) 10 MG tablet Take 1 tablet (10 mg) by mouth every other day 90 tablet 1     tiotropium-olodaterol (STIOLTO RESPIMAT) 2.5-2.5 MCG/ACT AERS Inhale 2 puffs into the lungs daily 4 g 3     UNABLE TO FIND 2 times daily MEDICATION NAME: AREDS 2       VITRON-C  MG TABS tablet Take 1 tablet by mouth 2 times daily (with meals) 100 tablet 0     [DISCONTINUED] DULoxetine (CYMBALTA) 20 MG EC capsule Take 1 capsule (20 mg) by mouth 2 times daily 60 capsule 2     No Known Allergies    Reviewed and updated as needed this visit by clinical staff  Tobacco  Allergies  Meds  Problems  Med Hx  Surg Hx  Fam Hx  Soc Hx        Reviewed and updated as needed this visit by Provider  Tobacco  Allergies  Meds  Problems  Med Hx  Surg Hx  Fam Hx  Soc Hx        ROS:  Constitutional, HEENT, cardiovascular, pulmonary, GI, , musculoskeletal, neuro, skin, endocrine and psych systems are negative, except as otherwise noted.    This document serves as a record of the services and decisions personally performed and made by Leann Gordon PA-C. It was created on her behalf by Nita Salas, a trained medical scribe. The creation of this document is based on the provider's statements to the medical scribe.  Nita Salas 8:46 AM October 8, 2018  OBJECTIVE:   /76  Pulse 109  Temp 97.1  F (36.2  C) (Tympanic)  Ht 5' 3\" (1.6 m)  Wt 128 lb 4 oz (58.2 kg)  SpO2 97%  BMI 22.72 kg/m2 Body mass index is 22.72 kg/(m^2).    GENERAL: healthy, alert and no distress  EYES: Eyes grossly normal to inspection, PERRL and conjunctivae and sclerae normal  HENT: Partial cerumen impaction on right ear, removed with curette, otherwise TM's " normal, nose and mouth without ulcers or lesions  RESP: lungs clear to auscultation - no rales, rhonchi or wheezes  CV: regular rate and rhythm, normal S1 S2, no S3 or S4, no murmur, click or rub, no peripheral edema and peripheral pulses strong  SKIN: no suspicious lesions or rashes  NEURO: Normal strength and tone, mentation intact and speech normal  PSYCH: mentation appears normal, affect normal/bright    Diagnostic Test Results:  Results for orders placed or performed in visit on 10/08/18 (from the past 24 hour(s))   CBC with platelets   Result Value Ref Range    WBC 6.1 4.0 - 11.0 10e9/L    RBC Count 5.06 3.8 - 5.2 10e12/L    Hemoglobin 15.1 11.7 - 15.7 g/dL    Hematocrit 45.5 35.0 - 47.0 %    MCV 90 78 - 100 fl    MCH 29.8 26.5 - 33.0 pg    MCHC 33.2 31.5 - 36.5 g/dL    RDW 14.0 10.0 - 15.0 %    Platelet Count 212 150 - 450 10e9/L       ASSESSMENT/PLAN:   Gifty was seen today for recheck medication.    Diagnoses and all orders for this visit:    Leg cramps  She reports stretching relieves pain, however she feels residual aches in right leg. Discussed symptoms of electrolyte deficiency. Labs ordered for evaluation.   -     Ferritin  -     Magnesium  -     Vitamin D Deficiency  -     CBC with platelets  -     Comprehensive metabolic panel  -     TSH with free T4 reflex  -     CK total    Adjustment disorder with depressed mood, Other fatigue  Patient complains of fatigue and decreased mood. Her mood might be related to lack of sense of purpose. Recommended to join groups that perform ongoing projects. If she is having trouble finding local projects, can refer to care coordination. I will review lab results and if follow up is needed sooner than planned, I will inform patient.   -     Ferritin  -     Vitamin D Deficiency  -     TSH with free T4 reflex  -     Vitamin B12  -     DULoxetine (CYMBALTA) 20 MG EC capsule; Take 1 capsule (20 mg) by mouth 2 times daily  -     CARE COORDINATION REFERRAL    Early onset  Alzheimer's dementia without behavioral disturbance  Stable. Continue 5 mg of Aricept twice daily.  -     donepezil (ARICEPT) 5 MG tablet; Take 2 tablets (10 mg) by mouth At Bedtime  -     CARE COORDINATION REFERRAL    Need for Tdap vaccination  Administered in office today  -     TDAP, IM (10 - 64 YRS) - Adacel    Urinary urgency  Stable. She endorses Vesicare is working well for her. Continue 10 mg of Vesicare every other day.  -     solifenacin (VESICARE) 10 MG tablet; Take 1 tablet (10 mg) by mouth every other day    Dysfunction of both eustachian tubes, Tinnitus, bilateral  Patient uses aspirin. Denies stuffiness. Start Flonase 2 puffs in each nostril once daily for 2 weeks. If ringing improves, can discontinue use of Flonase. If ringing does not improve, hold aspirin to see if tinnitus improves.  -     fluticasone (FLONASE) 50 MCG/ACT spray; Spray 2 sprays into both nostrils daily    Need for prophylactic vaccination and inoculation against influenza  Administered in office today  -     FLU VACCINE, INCREASED ANTIGEN, PRESV FREE, AGE 65+ [17188]  -     Vaccine Administration, Initial [99450]  -     ADMIN INFLUENZA (For MEDICARE Patients ONLY) []    Return in about 6 months (around 4/8/2019) for Routine Visit, Lab Work.    The information in this document, created by the medical scribe for me, accurately reflects the services I personally performed and the decisions made by me. I have reviewed and approved this document for accuracy prior to leaving the patient care area.  October 8, 2018 9:12 AM    Leann Gordon PA-C  [facility]

## 2018-10-08 NOTE — PROGRESS NOTES

## 2018-10-08 NOTE — PROGRESS NOTES
Clinic Care Coordination Contact  [facility]/Voicemail    Referral Source: PCP  Referral Reason: Mental Wellness; Help find local activities  Clinical Data: Care Coordinator Outreach  Outreach attempted x 1.  Left message on voicemail with call back information and requested return call.  Plan: Care Coordinator will try to reach patient again in 3-5 business days.    HERBER Juarez   Clinic Care Coordinator  [phone]  [email or web address]

## 2018-10-09 PROBLEM — R79.89 LOW VITAMIN B12 LEVEL: Status: ACTIVE | Noted: 2018-10-09

## 2018-10-09 LAB
DEPRECATED CALCIDIOL+CALCIFEROL SERPL-MC: 91 UG/L (ref 20–75)
FERRITIN SERPL-MCNC: 64 NG/ML (ref 8–252)

## 2018-10-09 ASSESSMENT — PATIENT HEALTH QUESTIONNAIRE - PHQ9: SUM OF ALL RESPONSES TO PHQ QUESTIONS 1-9: 9

## 2018-10-09 ASSESSMENT — ANXIETY QUESTIONNAIRES: GAD7 TOTAL SCORE: 0

## 2018-10-09 NOTE — PROGRESS NOTES
Triage: please advise of results/recommendations.  Phone # on file is daughter in law who has CTC on file.    Hermes  I have reviewed your recent labs. Here are the results:    Overall, labs are quite good!    One area of possible improvement is your B12 level.  It is low normal and starting and over the counter supplement (1000 mcg) of B12 daily can help with mood, energy and cognition.  I would recommend starting this very soon.    Your Vitamin D is great (don't worry about it being too high) and I'd like you to continue your current supplement.    Your ferritin (iron stores) are good, please continue that supplement.    Overall, the electrolytes are normal, thyroid function is normal and no evidence for muscle breakdown.  Protein levels are normal.  Most likely, your leg cramps are from dehydration.  I would recommend that you increase your fluid intake and if not improving, please contact the clinic.    If you are not noticing improvement in your mood with some of the measures we discussed in clinic in the next 3-4 weeks please come back to see me to see if adjustment in medications can provide some benefit.     If you have any questions please do not hesitate to contact our office via phone ([phone]) or [place].    eLann Gordon, MS, PA-C  [facility]

## 2018-10-11 NOTE — PROGRESS NOTES
Clinic Care Coordination Contact  [facility]/Voicemail    Referral Source: PCP  Referral Reason: Mental Wellness; Help find local activities  Clinical Data: Care Coordinator Outreach attempted x 2. [facility] did not leave a voicemail during second attempt.  Plan: Care Coordinator will try to reach patient again in 1-2 business days.    HERBER Juarez   Clinic Care Coordinator  [phone]  [email or web address]

## 2018-10-15 NOTE — PROGRESS NOTES
Clinic Care Coordination Contact  [facility]/Voicemail    Referral Source: PCP  Referral Reason: Mental Wellness; Help find local activities  Clinical Data: Care Coordinator Outreach  Outreach attempted x 3.  Left message on voicemail with call back information and requested return call.  Plan: Care Coordinator will do no further outreaches at this time as patient and patient's daughter-in-law have not returned [facility] calls.     HERBER Juarez  [facility] Care Coordinator  [phone]  [email or web address]

## 2018-12-06 ENCOUNTER — TELEPHONE (OUTPATIENT)
Dept: FAMILY MEDICINE | Facility: CLINIC | Age: 83
End: 2018-12-06

## 2018-12-06 NOTE — TELEPHONE ENCOUNTER
Brought forms to .  Patient notified - Laila Romero listed as .  Copy sent to scan    Harper Malik

## 2018-12-20 ENCOUNTER — TELEPHONE (OUTPATIENT)
Dept: FAMILY MEDICINE | Facility: CLINIC | Age: 83
End: 2018-12-20

## 2018-12-20 NOTE — TELEPHONE ENCOUNTER
Pt's step daughter called (has consent to communicate) to make an appointment for Gifty for lower back pain that is radiating down her left leg. Reports it is intermittent. Denies any chest pain. Do you need to triage her?   Laila's number is: [phone]    Telly PALACIOS

## 2018-12-20 NOTE — TELEPHONE ENCOUNTER
OV currently scheduled for 01/02/2019  Need to triage patient directly    Attempt #1  Called patient @ [phone] - spoke with step-daughter. Stated patient is just getting into tub. Will have patient contact clinic for further triaging    Lola Hansen RN  [place] [facility]

## 2018-12-20 NOTE — TELEPHONE ENCOUNTER
Spoke with patient, reports her back pain started a couple days ago radiating down her right leg, now starting on the left.     Back Pain       Duration: 2 days        Specific cause: none    Description:   Location of pain: low back bilateral  Character of pain: dull ache  Pain radiation:radiates into the right leg and radiates into the left leg  New numbness or weakness in legs, not attributed to pain:  no     Intensity: Currently 4/10    History:   Pain interferes with job: No  History of back problems: previous spinal surgery - 1994  Therapies tried without relief: heat and NSAIDs    Patient has complete mobility. Denies any numbness/ tingling in legs or saddle area. Has control over her bladder/ bowels. OV scheduled for tomorrow for evaluation. Advised if above sxs develop to be seen sooner.     Mary Ramos RN   [facility] - Triage

## 2018-12-21 ENCOUNTER — OFFICE VISIT (OUTPATIENT)
Dept: FAMILY MEDICINE | Facility: CLINIC | Age: 83
End: 2018-12-21
Payer: COMMERCIAL

## 2018-12-21 VITALS
TEMPERATURE: 98.9 F | HEART RATE: 95 BPM | OXYGEN SATURATION: 93 % | DIASTOLIC BLOOD PRESSURE: 68 MMHG | WEIGHT: 130 LBS | SYSTOLIC BLOOD PRESSURE: 122 MMHG | HEIGHT: 63 IN | BODY MASS INDEX: 23.04 KG/M2

## 2018-12-21 DIAGNOSIS — G30.9 ALZHEIMER'S DEMENTIA WITHOUT BEHAVIORAL DISTURBANCE, UNSPECIFIED TIMING OF DEMENTIA ONSET: ICD-10-CM

## 2018-12-21 DIAGNOSIS — M54.41 ACUTE BILATERAL LOW BACK PAIN WITH BILATERAL SCIATICA: Primary | ICD-10-CM

## 2018-12-21 DIAGNOSIS — M54.42 ACUTE BILATERAL LOW BACK PAIN WITH BILATERAL SCIATICA: Primary | ICD-10-CM

## 2018-12-21 DIAGNOSIS — F33.42 MAJOR DEPRESSIVE DISORDER, RECURRENT EPISODE, IN FULL REMISSION (H): ICD-10-CM

## 2018-12-21 DIAGNOSIS — Z51.81 MEDICATION MONITORING ENCOUNTER: ICD-10-CM

## 2018-12-21 DIAGNOSIS — F02.80 ALZHEIMER'S DEMENTIA WITHOUT BEHAVIORAL DISTURBANCE, UNSPECIFIED TIMING OF DEMENTIA ONSET: ICD-10-CM

## 2018-12-21 DIAGNOSIS — F41.9 ANXIETY: ICD-10-CM

## 2018-12-21 DIAGNOSIS — I10 ESSENTIAL HYPERTENSION: ICD-10-CM

## 2018-12-21 DIAGNOSIS — J44.9 CHRONIC OBSTRUCTIVE PULMONARY DISEASE, UNSPECIFIED COPD TYPE (H): ICD-10-CM

## 2018-12-21 PROCEDURE — 99214 OFFICE O/P EST MOD 30 MIN: CPT | Performed by: FAMILY MEDICINE

## 2018-12-21 ASSESSMENT — MIFFLIN-ST. JEOR: SCORE: 1018.81

## 2018-12-21 NOTE — PROGRESS NOTES
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 Denies any numbness/ tingling in legs or saddle area. Has control over her bladder/ bowels. Patient denies any issues with strength. Patient reports bladder is doing well on Vesicare.    Alzheimer's:  Stable    Depression/Anxiety:  Stable    Htn:    BP Readings from Last 3 Encounters:   12/21/18 122/68   10/08/18 112/76   08/15/18 124/80     Lab Results   Component Value Date    CR 0.72 10/08/2018     COPD: Well controlled.     Problem list and histories reviewed & adjusted, as indicated.  Additional history: as documented    body mass index is 23.03 kg/m .    Wt Readings from Last 4 Encounters:   12/21/18 59 kg (130 lb)   10/08/18 58.2 kg (128 lb 4 oz)   08/15/18 57.8 kg (127 lb 6 oz)   08/12/18 54.4 kg (120 lb)       Health Maintenance    Health Maintenance Due   Topic Date Due     COPD ACTION PLAN Q1 YR  1935     DEPRESSION ACTION PLAN  02/13/1954     ZOSTER IMMUNIZATION (2 of 3) 07/09/2009     FALL RISK ASSESSMENT  03/10/2018       Current Problem List    Patient Active Problem List   Diagnosis     Backache     Urge incontinence     Essential hypertension     Aftercare following joint replacement     Knee joint replacement by other means     HYPERLIPIDEMIA LDL GOAL <130     Urinary incontinence      Urgency-frequency syndrome     Advanced directives, counseling/discussion     RLQ abdominal mass     Lumbago     Pain in joint, pelvic region and thigh     Anxiety     COPD (chronic obstructive pulmonary disease) (H)     Major depressive disorder, recurrent episode, in full remission (H)     Chronic obstructive pulmonary disease, unspecified COPD type (H) - followed by Dr. Irwin [facility]     Atopic dermatitis, unspecified type     Hypervitaminosis B6     Peptic ulcer     Alzheimer's dementia without behavioral disturbance, unspecified timing of dementia onset     Low ferritin     Low vitamin B12 level       Past Medical History    Past Medical History:   Diagnosis Date     Arthritis      Backache, unspecified      COPD (chronic obstructive pulmonary disease) (H)      Essential hypertension, benign      Frequency-urgency sydrome      Hyperlipidemia      Pulmonary nodule     f/b Dr. Irwin at [facility] - scan 1/2018 showed stability since 2016     RLS (restless legs syndrome) 2009    noted on PSG     Uncomplicated asthma        Past Surgical History    Past Surgical History:   Procedure Laterality Date     ARTHROPLASTY PATELLO-FEMORAL (KNEE)  8/2010    Left TKA     C SPINE FUSION,ANTER,3 SGMTS  1992 1992     C TOTAL HIP ARTHROPLASTY  1995    Hip Replacement, Total (Right)     C VAGINAL HYSTERECTOMY  1978    Hysterectomy, Vaginal w anterior repair     CARDIAC CATH - HIM SCAN  2011    normal.  Done d/t abnormal stress test (false positive) - patient noted to have hypertensive heart disease     COLONOSCOPY  ~2003    No polyps     COLONOSCOPY N/A 10/10/2014    Procedure: COLONOSCOPY;  Surgeon: Chavez Ward MD;  Location:  GI     ESOPHAGOSCOPY, GASTROSCOPY, DUODENOSCOPY (EGD), COMBINED  6/1/2016    Dr. Cha [facility]     ESOPHAGOSCOPY, GASTROSCOPY, DUODENOSCOPY (EGD), COMBINED N/A 6/1/2016    Procedure: COMBINED ESOPHAGOSCOPY, GASTROSCOPY, DUODENOSCOPY (EGD), BIOPSY SINGLE OR MULTIPLE;  Surgeon: Jada Cha,  MD;  Location: [facility]     EXCISE GANGLION WRIST Left 7/7/2015    Procedure: EXCISE GANGLION WRIST;  Surgeon: Justin Lyons MD;  Location: [facility] OR     HYSTERECTOMY, PAP NO LONGER INDICATED       wisdom teeth         Current Medications    Current Outpatient Medications   Medication Sig Dispense Refill     albuterol (ALBUTEROL) 108 (90 BASE) MCG/ACT Inhaler Inhale 2 puffs into the lungs as needed for shortness of breath / dyspnea or wheezing       amLODIPine (NORVASC) 10 MG tablet Take 1 tablet (10 mg) by mouth daily 90 tablet 1     ASPIRIN 81 MG OR TABS ONE DAILY 0 0     calcium carbonate-vitamin D 500-400 MG-UNIT TABS per tablet Take 1 tablet by mouth 2 times daily (with meals) 180 tablet 3     cetirizine (ZYRTEC) 10 MG tablet Take 1 tablet (10 mg) by mouth every evening 90 tablet 3     cholecalciferol (VITAMIN D3) 5000 UNITS TABS tablet Take 1 tablet (5,000 Units) by mouth daily 100 tablet 1     Cyanocobalamin (B-12) 1000 MCG TBCR Take 1,000 mcg by mouth daily 100 tablet 1     donepezil (ARICEPT) 5 MG tablet Take 2 tablets (10 mg) by mouth At Bedtime 180 tablet 1     DULoxetine (CYMBALTA) 20 MG EC capsule Take 1 capsule (20 mg) by mouth 2 times daily 60 capsule 2     fluticasone (FLONASE) 50 MCG/ACT spray Spray 2 sprays into both nostrils daily 1 Bottle 1     omeprazole (PRILOSEC) 40 MG capsule Take 1 capsule (40 mg) by mouth daily 30 minutes before breakfast 90 capsule 1     solifenacin (VESICARE) 10 MG tablet Take 1 tablet (10 mg) by mouth every other day 90 tablet 1     tiotropium-olodaterol (STIOLTO RESPIMAT) 2.5-2.5 MCG/ACT AERS Inhale 2 puffs into the lungs daily 4 g 3     UNABLE TO FIND 2 times daily MEDICATION NAME: AREDS 2       VITRON-C  MG TABS tablet Take 1 tablet by mouth 2 times daily (with meals) 100 tablet 0       Allergies    No Known Allergies    Immunizations    Immunization History   Administered Date(s) Administered     Influenza (High Dose) 3 valent vaccine 09/30/2014, 10/05/2015,  2016, 2017, 10/08/2018     Influenza (IIV3) PF 10/01/2005, 10/01/2006, 2011, 2012, 10/01/2013     Pneumo Conj 13-V (2010&after) 2016     Pneumococcal 23 valent 2012     TD (ADULT, 7+) 2007     TDAP Vaccine (Adacel) 10/08/2018     Zoster vaccine, live 2009       Family History    Family History   Problem Relation Age of Onset     Alzheimer Disease Mother          at 98,  of old age     Alzheimer Disease Father          at 78     C.A.D. Father      Cancer Other 90        unknown cancer     Alzheimer Disease Brother          age 82      Family History Negative Brother         Born 1931     Cancer Maternal Aunt         x'2 w Ca unknown etiology     Colon Cancer No family hx of        Social History    Social History     Socioeconomic History     Marital status:      Spouse name:      Number of children: 3     Years of education: Not on file     Highest education level: Not on file   Social Needs     Financial resource strain: Not on file     Food insecurity - worry: Not on file     Food insecurity - inability: Not on file     Transportation needs - medical: Not on file     Transportation needs - non-medical: Not on file   Occupational History     Occupation: office work     Employer: RETIRED   Tobacco Use     Smoking status: Former Smoker     Last attempt to quit: 1988     Years since quittin.4     Smokeless tobacco: Never Used     Tobacco comment: Quit in    Substance and Sexual Activity     Alcohol use: No     Comment: none     Drug use: No     Sexual activity: Not Currently     Partners: Male     Birth control/protection: Abstinence, Surgical   Other Topics Concern     Parent/sibling w/ CABG, MI or angioplasty before 65F 55M? Not Asked      Service Not Asked     Blood Transfusions Not Asked     Caffeine Concern Yes     Comment: 4-6 daily     Occupational Exposure Not Asked     Hobby Hazards Not Asked     Sleep Concern  "No     Stress Concern Not Asked     Weight Concern Not Asked     Special Diet No     Back Care Not Asked     Exercise No     Bike Helmet Not Asked     Seat Belt Not Asked     Self-Exams Not Asked   Social History Narrative    Has had success in past following [employer] eating plan.       All above reviewed and updated, all stable unless otherwise noted    Recent labs reviewed    ROS:  Constitutional, HEENT, cardiovascular, pulmonary, GI, , musculoskeletal, neuro, skin, endocrine and psych systems are negative, except as otherwise noted.    OBJECTIVE:                                                    /68   Pulse 95   Temp 98.9  F (37.2  C) (Oral)   Ht 1.6 m (5' 3\")   Wt 59 kg (130 lb)   SpO2 93%   BMI 23.03 kg/m    Body mass index is 23.03 kg/m .  GENERAL: healthy, alert and no distress  EYES: Eyes grossly normal to inspection  HENT:ear canals and TM's normal upon viewing with otoscope, nose and mouth without ulcers or lesions upon viewing with otoscope  NECK: no tenderness, no adenopathy, no asymmetry, no masses, no stiffness; thyroid- normal to palpation  RESP: lungs clear to auscultation - no rales, no rhonchi, no wheezes  CV: regular rates and rhythm, normal S1 S2, no S3 or S4 and no murmur, no click or rub -  ABDOMEN: soft, no tenderness, no  hepatosplenomegaly, no masses, normal bowel sounds  MS: extremities- no gross deformities noted, no edema  SKIN: no suspicious lesions, no rashes  NEURO: strength and tone- normal, sensory exam- grossly normal, mentation- intact, speech- normal, Reflexes-normal, symmetric  BACK: no CVA tenderness, no paralumbar tenderness  PSYCH: Alert and oriented times 3; speech- coherent , normal rate and volume; able to articulate logical thoughts, able to abstract reason, no tangential thoughts, no hallucinations or delusions, affect- normal    DIAGNOSTICS/PROCEDURES:                                                      No results found for this or any previous visit " (from the past 24 hour(s)).     ASSESSMENT/PLAN:                                                        ICD-10-CM    1. Acute bilateral low back pain with bilateral sciatica M54.42     M54.41    2. Alzheimer's dementia without behavioral disturbance, unspecified timing of dementia onset G30.9     F02.80    3. Chronic obstructive pulmonary disease, unspecified COPD type (H) J44.9    4. Essential hypertension I10    5. Major depressive disorder, recurrent episode, in full remission (H) F33.42    6. Anxiety F41.9    7. Medication monitoring encounter Z51.81      Discussed treatment/modality options, including risk and benefits, she desires advised aspirin 81 mg po daily, advised 1 multivitamin per day, advised calcium 0992-8957 mg/d and Vitamin D 800-1200 IU/d, advised dentist every 6 months, advised diet and exercise and advised opthalmologist every 1-2 years. All diagnosis above reviewed and noted above, otherwise stable.  See [facility] orders for further details.  Follow up as needed.    1) Patient presented today with acute bilateral low back pain with bilateral sciatica. Recommend heat/ice/stretching and 1000 mg Tylenol every 8 hours as needed for pain management. Recommend good shoe wear. Close follow up, consider imaging/PT/FSOC    2) Follow up if symptoms persist or worsen.    Health Maintenance Due   Topic Date Due     COPD ACTION PLAN Q1 YR  1935     DEPRESSION ACTION PLAN  02/13/1954     ZOSTER IMMUNIZATION (2 of 3) 07/09/2009     FALL RISK ASSESSMENT  03/10/2018     This document serves as a record of the services and decisions personally performed and made by Nakul Guzman MD. It was created on his behalf by Gabriele Kearney, a trained medical scribe. The creation of this document is based on the provider's statements to the medical scribe.  Gabriele Kearney December 21, 2018 3:15 PM     The information in this document, created by the medical scribe for me, accurately reflects the services I personally performed and  the decisions made by me. I have reviewed and approved this document for accuracy prior to leaving the patient care area.  December 21, 2018            Nakul Guzman MD [address]  [address] [phone] Fax

## 2018-12-21 NOTE — PATIENT INSTRUCTIONS
Recommend heat/ice/stretching and 1000 mg Tylenol every 8 hours as needed for pain management. Recommend good shoe wear. Recommend DM for cough and congestion instead of Mucinex D.    [facility]                        To reach your care team during and after hours:   [phone]  To reach our pharmacy:        [phone]    Clinic Hours                        Our clinic hours are:    Monday   7:30 am to 7:00 pm                  Tuesday through Friday 7:30 am to 5:00 pm                             Saturday   8:00 am to 12:00 pm      Sunday   Closed      Pharmacy Hours                        Our pharmacy hours are:    Monday   8:30 am to 7:00 pm       Tuesday to Friday  8:30 am to 6:00 pm                       Saturday    9:00 am to 1:00 pm              Sunday    Closed              There is also information available at our web site:  [email or web address]    If your provider ordered any lab tests and you do not receive the results within 10 business days, please call the clinic.    If you need a medication refill please contact your pharmacy.  Please allow 2-3 business days for your refill to be completed.    Our clinic offers telephone visits and e visits.  Please ask one of your team members to explain more.      Use [employer] (secure email communication and access to your chart) to send your primary care provider a message or make an appointment. Ask someone on your Team how to sign up for [employer].  Immunizations                      Immunization History   Administered Date(s) Administered     Influenza (High Dose) 3 valent vaccine 09/30/2014, 10/05/2015, 12/28/2016, 09/06/2017, 10/08/2018     Influenza (IIV3) PF 10/01/2005, 10/01/2006, 09/17/2011, 09/01/2012, 10/01/2013     Pneumo Conj 13-V (2010&after) 12/28/2016     Pneumococcal 23 valent 11/06/2012     TD (ADULT, 7+) 08/17/2007     TDAP Vaccine (Adacel) 10/08/2018     Zoster vaccine, live 05/14/2009        [facility] [place]                          Health Maintenance Due   Topic Date Due     COPD ACTION PLAN Q1 YR  1935     Depression Action Plan Review  02/13/1954     Zoster (Chicken Pox) Vaccine (2 of 3) 07/09/2009     FALL RISK ASSESSMENT  03/10/2018                Low Back Pain            What is low back pain?   Low back pain is pain and stiffness in the lower back. It is one of the most common reasons people miss work.   How does it occur?   Your lower back is called your lumbar spine. It is made up of 5 bones called lumbar vertebrae. In between the vertebrae are shock absorbers called disks. Back pain can occur from an injury to the vertebrae or when a disk bulges or herniates.   Low back pain is usually caused when a ligament or muscle holding a vertebra in its proper position is strained. Vertebrae are bones that make up the spinal column through which the spinal cord passes. When these muscles or ligaments become weak or strained, the spine loses its stability, resulting in pain.   Low back pain can occur if your job involves lifting and carrying heavy objects, or if you spend a lot of time sitting or standing in one position or bending over. It can be caused by a fall or by unusually strenuous exercise. It can be brought on by the tension and stress that cause headaches in some people. It can even be brought on by violent sneezing or coughing.   People who are overweight may have low back pain because of the added stress on their back.   Back pain may occur when the muscles, joints, bones, and connective tissues of the back become inflamed as a result of an infection or an immune system problem. Arthritic disorders as well as some congenital and degenerative conditions may cause back pain.   Back pain accompanied by loss of bladder or bowel control, trouble moving your legs, or numbness or tingling in your arms or legs requires immediate medical treatment.   What are the symptoms?   Symptoms include:   pain in the back or legs    stiffness, spasm, or limited motion   The pain may be constant or may happen only in certain positions. It may get worse when you cough, sneeze, bend, twist, or strain during a bowel movement. The pain may be in only one spot or may spread to other areas, most commonly down the buttocks and into the back of the thigh.   A low back strain typically does not produce pain past the knee into the calf or foot. Tingling or numbness in the calf or foot may indicate a herniated disk or pinched nerve.   Be sure to see your healthcare provider if:   You have weakness in your leg, especially if you cannot lift your foot, because this may be a sign of nerve damage.   You have new bowel or bladder problems as well as back pain, which may be a sign of severe injury to your spinal cord.   You have pain that gets worse despite treatment.   How is it diagnosed?   Your healthcare provider will review your medical history and examine you. You may have X-rays, an MRI, CT scan, or a bone scan.   How is it treated?   To treat this condition:   Put an ice pack, gel pack, or package of frozen vegetables, wrapped in a cloth on the area every 3 to 4 hours, for up to 20 minutes at a time for the first 2 or 3 days.   Use a heating pad or hot water bottle. Don't let the heating pad get too hot, and don't fall asleep with it. You could get a burn.   Rest in bed on a firm mattress. Often it helps to lie on your back with your knees raised on a pillow. However, some people prefer to lie on their side with their knees bent. It's best to try to stay active, so try not to rest in bed longer than 1 to 2 days.   Take muscle relaxants as recommended by your healthcare provider.   Take an anti-inflammatory such as ibuprofen, or other medicine as directed by your provider. Nonsteroidal anti-inflammatory medicines (NSAIDs) may cause stomach bleeding and other problems. These risks increase with age. Read the label and take as directed. Unless recommended  by your healthcare provider, do not take for more than 10 days.   Get a back massage by a trained person.   Wear a belt or corset to support your back.   Do the exercises recommended by your provider. Your provider may also prescribe physical therapy.   Talk with a counselor, if your back pain is related to tension caused by emotional problems.   When the pain is gone, ask your healthcare provider about starting an exercise program such as the following:   Exercise moderately every day, using stretching and warm-up exercises suggested by your provider or physical therapist.   Exercise vigorously for about 30 minutes 3 times a week by walking, swimming, using a stationary bicycle, or doing low-impact aerobics.   Exercising regularly will not only help your back, it will also help keep you healthier overall.   How long will the effects last?   The effects of back pain last as long as the cause exists or until your body recovers from the strain, usually a day or two but sometimes weeks.   How can I take care of myself?   In addition to the treatment described above, keep in mind these suggestions:   Practice good posture. Stand with your head up, shoulders straight, chest forward, weight balanced evenly on both feet, and pelvis tucked in.   Lose weight if you are overweight   Keep your core muscles strong. These are your abdominal and back muscles.   Sleep without a pillow under your head.   Pain is the best way to  the pace you should set in increasing your activity and exercise. Minor discomfort, stiffness, soreness, and mild aches need not interfere with activity. However, limit your activities temporarily if:   Your symptoms return.   The pain increases when you are more active.   The pain increases within 24 hours after a new or higher level of activity.   When can I return to my normal activities?   Everyone recovers from an injury at a different rate. Return to your activities depends on how soon your back  recovers, not by how many days or weeks it has been since your injury has occurred. In general, the longer you have symptoms before you start treatment, the longer it will take to get better. The goal is to return to your normal activities as soon as is safely possible. If you return too soon you may worsen your injury.   It is important that you have fully recovered from your low back pain before you return to any strenuous activity. You must be able to have the same range of motion that you had before your injury. You must be able to walk and twist without pain.   What can I do to help prevent low back pain?   You can reduce the strain on your back by doing the following:   Don't push with your arms when you move a heavy object. Turn around and push backwards so the strain is taken by your legs.   Whenever you sit, sit in a straight-backed chair and hold your spine against the back of the chair.   Bend your knees and hips and keep your back straight when you lift a heavy object.   Avoid lifting heavy objects higher than your waist.   Hold packages you carry close to your body, with your arms bent.   Use a footrest for one foot when you stand or sit in one spot for a long time. This keeps your back straight.   Bend your knees when you bend over.   Sit close to the pedals when you drive and use your seat belt and a hard backrest or pillow.   Lie on your side with your knees bent when you sleep or rest. It may help to put a pillow between your knees.   Put a pillow under your knees when you sleep on your back.   Raise the foot of the bed 8 inches to discourage sleeping on your stomach unless you have other problems that require that you keep your head elevated.   To rest your back, hold each of these positions for 5?minutes or longer:   Lie on your back, bend your knees, and put pillows under your knees.   Lie on your back on the floor with a pillow under your neck. Bend your knees to a 90-degree angle, and put your  lower legs and feet on a chair.   Lie on your back, bend your knees, and bring one knee up to your chest and hold it there. Repeat with the other knee, then bring both knees to your chest. When holding your knee to your chest, grab your thigh rather than your lower leg to avoid over flexing your knee.     Published by [employer].  This content is reviewed periodically and is subject to change as new health information becomes available. The information is intended to inform and educate and is not a replacement for medical evaluation, advice, diagnosis or treatment by a healthcare professional.   Developed by Samantha Miller RN, [place], and [facility].   ? 2010 [facility] and/or its affiliates. All Rights Reserved.           Low Back Pain Exercise          Standing hamstring stretch: Put the heel of one leg on a stool about 15 inches high. Keep your leg straight. Lean forward, bending at the hips until you feel a mild stretch in the back of your thigh. Make sure you do not roll your shoulders or bend at the waist when doing this. You want to stretch your leg, not your lower back. Hold the stretch for 15 to 30 seconds. Repeat with each leg 3 times.   Cat and camel: Get down on your hands and knees. Let your stomach sag, allowing your back to curve downward. Hold this position for 5 seconds. Then arch your back and hold for 5 seconds. Do 3 sets of 10.   Quadruped arm and leg raise: Get down on your hands and knees. Pull in your belly button and tighten your abdominal muscles to stiffen your spine. While keeping your abdominals tight, raise one arm and the opposite leg away from you. Hold this position for 5 seconds. Lower your arm and leg slowly and change sides. Do this 10 times on each side.   Pelvic tilt: Lie on your back with your knees bent and your feet flat on the floor. Tighten your abdominal muscles and push your lower back into the floor. Hold this position for 5 seconds, then relax. Do 3 sets of 10.    Partial curl: Lie on your back with your knees bent and your feet flat on the floor. Tighten your stomach muscles. Tuck your chin to your chest. With your hands stretched out in front of you, curl your upper body forward until your shoulders clear the floor. Hold this position for 3 seconds. Don't hold your breath. It helps to breathe out as you lift your shoulders up. Relax back to the floor. Repeat 10 times. Build to 3 sets of 10. To challenge yourself, clasp your hands behind your head and keep your elbows out to the side.   Gluteal stretch: Lie on your back with both knees bent. Rest the ankle of one leg over the knee of your other leg. Grasp the thigh of the bottom leg and pull toward your chest. You will feel a stretch along the buttocks and possibly along the outside of your hip. Hold the stretch for 15 to 30 seconds. Repeat 3 times with each leg.   Extension exercise:   0. Lie face down on the floor for 5 minutes. If this hurts too much, lie face down with a pillow under your stomach. This should relieve your leg or back pain. When you can lie on your stomach for 5 minutes without a pillow, you can continue with Part B of this exercise.   0. After lying on your stomach for 5 minutes, prop yourself up on your elbows for another 5 minutes. If you can do this without having more leg or buttock pain, you can start doing part C of this exercise.   0. Lie on your stomach with your hands under your shoulders. Then press down on your hands and extend your elbows while keeping your hips flat on the floor. Hold for 1 second and lower yourself to the floor. Do 3 to 5 sets of 10 repetitions. Rest for 1 minute between sets. You should have no pain in your legs when you do this, but it is normal to feel some pain in your lower back.   Do this exercise several times a day.   Side plank: Lie on your side with your legs, hips, and shoulders in a straight line. Prop yourself up onto your forearm so your elbow is directly  under your shoulder. Lift your hips off the floor and balance on your forearm and the outside of your foot. Try to hold this position for 15 seconds, then slowly lower your hip to the ground. Switch sides and repeat. Work up to holding for 1 minute or longer. This exercise can be made easier by starting with your knees and hips flexed toward your chest.   Published by [employer].  This content is reviewed periodically and is subject to change as new health information becomes available. The information is intended to inform and educate and is not a replacement for medical evaluation, advice, diagnosis or treatment by a healthcare professional.   Written by Aneta Dash, MS, PT, and Samantha Anderson, PT, [facility], hospitals, for [facility]   ? 2010 [facility] and/or its affiliates. All Rights Reserved.         Copyright   [employer] 2011

## 2019-01-09 DIAGNOSIS — R53.83 OTHER FATIGUE: ICD-10-CM

## 2019-01-09 DIAGNOSIS — F43.21 ADJUSTMENT DISORDER WITH DEPRESSED MOOD: ICD-10-CM

## 2019-01-09 NOTE — TELEPHONE ENCOUNTER
DULoxetine (CYMBALTA) 20 MG EC capsule    Last Written Prescription Date:  10.8.18  Last Fill Quantity: 60,  # refills: 2   Last Office Visit: 12/21/2018   Future Office Visit:

## 2019-01-10 NOTE — TELEPHONE ENCOUNTER
"               DULoxetine (CYMBALTA) 20 MG EC capsule     Last Written Prescription Date:  10.8.18  Last Fill Quantity: 60,  # refills: 2   Last Office Visit: 12/21/2018   Future Office Visit:             Failed - PHQ-9 score of less than 5 in past 6 months    Please review last PHQ-9 score.       PHQ-9 SCORE 1/7/2016 3/13/2018 10/8/2018   PHQ-9 Total Score - - -   PHQ-9 Total Score 3 8 9     FERNY-7 SCORE 1/3/2013 8/15/2018 10/8/2018   Total Score 7 - -   Total Score - 2 0              Passed - Blood pressure under 140/90 in past 12 months    BP Readings from Last 3 Encounters:   12/21/18 122/68   10/08/18 112/76   08/15/18 124/80                Passed - Medication is active on med list       Passed - Patient is age 18 or older       Passed - No active pregnancy on record       Passed - No positive pregnancy test in past 12 months       Passed - Recent (6 mo) or future (30 days) visit within the authorizing provider's specialty    Patient had office visit in the last 6 months or has a visit in the next 30 days with authorizing provider or within the authorizing provider's specialty.  See \"Patient Info\" tab in inbasket, or \"Choose Columns\" in Meds & Orders section of the refill encounter.            My chart message sent with questions     awaiting response    Lory Villela RN, BSN  [place] [facility]       "

## 2019-01-16 RX ORDER — DULOXETIN HYDROCHLORIDE 20 MG/1
20 CAPSULE, DELAYED RELEASE ORAL 2 TIMES DAILY
Qty: 180 CAPSULE | Refills: 1 | Status: SHIPPED | OUTPATIENT
Start: 2019-01-16 | End: 2019-05-29

## 2019-01-16 ASSESSMENT — ANXIETY QUESTIONNAIRES
6. BECOMING EASILY ANNOYED OR IRRITABLE: NOT AT ALL
GAD7 TOTAL SCORE: 0
3. WORRYING TOO MUCH ABOUT DIFFERENT THINGS: NOT AT ALL
5. BEING SO RESTLESS THAT IT IS HARD TO SIT STILL: NOT AT ALL
1. FEELING NERVOUS, ANXIOUS, OR ON EDGE: NOT AT ALL
IF YOU CHECKED OFF ANY PROBLEMS ON THIS QUESTIONNAIRE, HOW DIFFICULT HAVE THESE PROBLEMS MADE IT FOR YOU TO DO YOUR WORK, TAKE CARE OF THINGS AT HOME, OR GET ALONG WITH OTHER PEOPLE: NOT DIFFICULT AT ALL
7. FEELING AFRAID AS IF SOMETHING AWFUL MIGHT HAPPEN: NOT AT ALL
2. NOT BEING ABLE TO STOP OR CONTROL WORRYING: NOT AT ALL

## 2019-01-16 ASSESSMENT — PATIENT HEALTH QUESTIONNAIRE - PHQ9
SUM OF ALL RESPONSES TO PHQ QUESTIONS 1-9: 0
5. POOR APPETITE OR OVEREATING: NOT AT ALL

## 2019-01-16 NOTE — TELEPHONE ENCOUNTER
Pt did not respond to my chart message      Called #   Telephone Information:   Mobile [phone]       Pt updated questions     Refill per RN protocol     Lory Villela RN, BSN  [place] [facility]       [facility] RN[facility] BSN  [facility]

## 2019-01-17 ASSESSMENT — ANXIETY QUESTIONNAIRES: GAD7 TOTAL SCORE: 0

## 2019-02-27 ENCOUNTER — OFFICE VISIT (OUTPATIENT)
Dept: FAMILY MEDICINE | Facility: CLINIC | Age: 84
End: 2019-02-27
Payer: COMMERCIAL

## 2019-02-27 VITALS
HEART RATE: 100 BPM | BODY MASS INDEX: 23.39 KG/M2 | OXYGEN SATURATION: 96 % | TEMPERATURE: 99.1 F | SYSTOLIC BLOOD PRESSURE: 128 MMHG | WEIGHT: 132 LBS | DIASTOLIC BLOOD PRESSURE: 72 MMHG | HEIGHT: 63 IN

## 2019-02-27 DIAGNOSIS — M67.432 GANGLION CYST OF WRIST, LEFT: Primary | ICD-10-CM

## 2019-02-27 DIAGNOSIS — J44.9 CHRONIC OBSTRUCTIVE PULMONARY DISEASE, UNSPECIFIED COPD TYPE (H): ICD-10-CM

## 2019-02-27 DIAGNOSIS — M25.511 RIGHT ANTERIOR SHOULDER PAIN: ICD-10-CM

## 2019-02-27 DIAGNOSIS — M75.41 IMPINGEMENT SYNDROME OF RIGHT SHOULDER: ICD-10-CM

## 2019-02-27 DIAGNOSIS — L03.115 CELLULITIS OF RIGHT LOWER EXTREMITY: ICD-10-CM

## 2019-02-27 DIAGNOSIS — H93.13 TINNITUS, BILATERAL: ICD-10-CM

## 2019-02-27 DIAGNOSIS — F33.42 MAJOR DEPRESSIVE DISORDER, RECURRENT EPISODE, IN FULL REMISSION (H): ICD-10-CM

## 2019-02-27 PROCEDURE — 20610 DRAIN/INJ JOINT/BURSA W/O US: CPT | Mod: RT | Performed by: PHYSICIAN ASSISTANT

## 2019-02-27 PROCEDURE — 99214 OFFICE O/P EST MOD 30 MIN: CPT | Mod: 25 | Performed by: PHYSICIAN ASSISTANT

## 2019-02-27 RX ORDER — TRIAMCINOLONE ACETONIDE 40 MG/ML
80 INJECTION, SUSPENSION INTRA-ARTICULAR; INTRAMUSCULAR ONCE
Status: DISCONTINUED | OUTPATIENT
Start: 2019-02-27 | End: 2019-05-29

## 2019-02-27 RX ORDER — CEPHALEXIN 500 MG/1
500 CAPSULE ORAL 3 TIMES DAILY
Qty: 30 CAPSULE | Refills: 0 | Status: SHIPPED | OUTPATIENT
Start: 2019-02-27 | End: 2019-05-29

## 2019-02-27 ASSESSMENT — MIFFLIN-ST. JEOR: SCORE: 1022.88

## 2019-02-27 NOTE — LETTER
My Depression Action Plan  Name: Gifty Villa   Date of Birth 1935  Date: 2/27/2019    My doctor: Leann Gordon   My clinic: [address] [id number] [phone]          GREEN    ZONE   Good Control    What it looks like:     Things are going generally well. You have normal up s and down s. You may even feel depressed from time to time, but bad moods usually last less than a day.   What you need to do:  1. Continue to care for yourself (see self care plan)  2. Check your depression survival kit and update it as needed  3. Follow your physician s recommendations including any medication.  4. Do not stop taking medication unless you consult with your physician first.           YELLOW         ZONE Getting Worse    What it looks like:     Depression is starting to interfere with your life.     It may be hard to get out of bed; you may be starting to isolate yourself from others.    Symptoms of depression are starting to last most all day and this has happened for several days.     You may have suicidal thoughts but they are not constant.   What you need to do:     1. Call your care team, your response to treatment will improve if you keep your care team informed of your progress. Yellow periods are signs an adjustment may need to be made.     2. Continue your self-care, even if you have to fake it!    3. Talk to someone in your support network    4. Open up your depression survival kit           RED    ZONE Medical Alert - Get Help    What it looks like:     Depression is seriously interfering with your life.     You may experience these or other symptoms: You can t get out of bed most days, can t work or engage in other necessary activities, you have trouble taking care of basic hygiene, or basic responsibilities, thoughts of suicide or death that will not go away, self-injurious behavior.     What you need to do:  1. Call your  care team and request a same-day appointment. If they are not available (weekends or after hours) call your local crisis line, emergency room or 911.            Depression Self Care Plan / Survival Kit    Self-Care for Depression  Here s the deal. Your body and mind are really not as separate as most people think.  What you do and think affects how you feel and how you feel influences what you do and think. This means if you do things that people who feel good do, it will help you feel better.  Sometimes this is all it takes.  There is also a place for medication and therapy depending on how severe your depression is, so be sure to consult with your medical provider and/ or Behavioral Health Consultant if your symptoms are worsening or not improving.     In order to better manage my stress, I will:    Exercise  Get some form of exercise, every day. This will help reduce pain and release endorphins, the  feel good  chemicals in your brain. This is almost as good as taking antidepressants!  This is not the same as joining a gym and then never going! (they count on that by the way ) It can be as simple as just going for a walk or doing some gardening, anything that will get you moving.      Hygiene   Maintain good hygiene (Get out of bed in the morning, Make your bed, Brush your teeth, Take a shower, and Get dressed like you were going to work, even if you are unemployed).  If your clothes don't fit try to get ones that do.    Diet  I will strive to eat foods that are good for me, drink plenty of water, and avoid excessive sugar, caffeine, alcohol, and other mood-altering substances.  Some foods that are helpful in depression are: complex carbohydrates, B vitamins, flaxseed, fish or fish oil, fresh fruits and vegetables.    Psychotherapy  I agree to participate in Individual Therapy (if recommended).    Medication  If prescribed medications, I agree to take them.  Missing doses can result in serious side effects.  I  understand that drinking alcohol, or other illicit drug use, may cause potential side effects.  I will not stop my medication abruptly without first discussing it with my provider.    Staying Connected With Others  I will stay in touch with my friends, family members, and my primary care provider/team.    Use your imagination  Be creative.  We all have a creative side; it doesn t matter if it s oil painting, sand castles, or mud pies! This will also kick up the endorphins.    Witness Beauty  (AKA stop and smell the roses) Take a look outside, even in mid-winter. Notice colors, textures. Watch the squirrels and birds.     Service to others  Be of service to others.  There is always someone else in need.  By helping others we can  get out of ourselves  and remember the really important things.  This also provides opportunities for practicing all the other parts of the program.    Humor  Laugh and be silly!  Adjust your TV habits for less news and crime-drama and more comedy.    Control your stress  Try breathing deep, massage therapy, biofeedback, and meditation. Find time to relax each day.     My support system    Clinic Contact:  Phone number:    Contact 1:  Phone number:    Contact 2:  Phone number:    Restoration/:  Phone number:    Therapist:  Phone number:    Local crisis center:    Phone number:    Other community support:  Phone number:

## 2019-02-27 NOTE — PROGRESS NOTES
SUBJECTIVE:   Gifty Villa is a 84 year old female who presents to clinic today for the following health issues:    Mass/Cyst       Duration: 6 months     Description (location/character/radiation): Has a possible ganglion cyst on the inner left wrist, has been causing the patient some discomfort. She has had one in the past in the same spot that she had surgically removed ~ 5 years ago.     Intensity:  moderate    Accompanying signs and symptoms: particular movements can cause a shock feeling up the arm.      History (similar episodes/previous evaluation): None    Precipitating or alleviating factors: None    Therapies tried and outcome: None     The cyst on her wrist first occurred 5 years ago and she had it surgically removed. It has reoccurred and causes her pain when it is bumped or she lays her wrist a certain way. She is concerned about this as garden season is approaching and she wants it to be removed and healed before then so as to not limit her      Shoulder Pain    Onset: 6 months or so    Description:Right shoulder   Location: Right shoulder and down arm   Radiation: only into the arm from the shoulder.   Character: achy, constant with movement   Constant or intermittent: with any movement     History:   Does it wake you up at night: no  Any trauma or injury repetitive activity, was sewing a lot over the summer and carrying grandchildren.   Any previous Imaging/evaluations in the past: Orthology     Accompanying Signs & Symptoms:   Any weakness in the shoulder: no  Numbness or tingling in the arm: no  Does movement of the neck create shoulder pain: no  Do rest or certain positions help decrease the shoulder pain: yes, resting it on a flat surface.   What movement causes the shoulder pain to occur: movement upward   Do rest or certain positions decrease the pain: YES   Is pain related to exertion: no    Therapies tried  Deep tissue massage/orthology- had moderate relief, physical therapy, muscle  relaxant medication with minor relief    The shoulder pain has been going on since last summer when she sewed many pillows for a wedding last summer. She states she has full range of motion it is just sore - most notably anteriorly. She plans to see orthology for this problem.  She denies any history of issues with this shoulder (this is her dominant arm) but has had an arthroscopic surgery of her left arm for bursitis in 2007 by Dr. Forbes and is doing well since this procedure.      Rash:  On the right lower leg, present for 1-2 months and not significantly worsening.  States the rash is very itchy, appears red in color.  Somewhat warm to the touch.  Denies fevers.  She states it worsens when she scratches her leg. She has been prescribed tramcinolone and hydroxyzine for it in the past which helped, however this time the cream does not appear to be helping.  She denies exposures to anyone with similar ailments.    Ringing in Ears: has in both ears, no pain or any other symptoms. She reports the ringing in her ears has been going on since September and become unbearable. She states it has frustrated her to the point of tears and she cannot sleep. She states it is better when there is other sound in the environment and it bothers her most when it is quiet. She has thought about using headphones to listen to music, but is afraid she will not be as aware of her surroundings. She tried flonase 3 times a day with no relief and stopping her aspirin with no relief.       Problem list and histories reviewed & adjusted, as indicated.  Additional history: as documented    Patient Active Problem List   Diagnosis     Backache     Urge incontinence     Essential hypertension     Aftercare following joint replacement     Knee joint replacement by other means     HYPERLIPIDEMIA LDL GOAL <130     Urinary incontinence     Urgency-frequency syndrome     Advanced directives, counseling/discussion     RLQ abdominal mass     Lumbago      Pain in joint, pelvic region and thigh     Anxiety     COPD (chronic obstructive pulmonary disease) (H)     Major depressive disorder, recurrent episode, in full remission (H)     Chronic obstructive pulmonary disease, unspecified COPD type (H) - followed by Dr. [facility]     Hypervitaminosis B6     Peptic ulcer     Alzheimer's dementia without behavioral disturbance, unspecified timing of dementia onset     Low ferritin     Low vitamin B12 level     Past Surgical History:   Procedure Laterality Date     ARTHROPLASTY PATELLO-FEMORAL (KNEE)  2010    Left TKA     C SPINE FUSION,ANTER,3 SGMTS  1992     C TOTAL HIP ARTHROPLASTY      Hip Replacement, Total (Right)     C VAGINAL HYSTERECTOMY      Hysterectomy, Vaginal w anterior repair     CARDIAC CATH - HIM SCAN      normal.  Done d/t abnormal stress test (false positive) - patient noted to have hypertensive heart disease     COLONOSCOPY  ~    No polyps     COLONOSCOPY N/A 10/10/2014    Procedure: COLONOSCOPY;  Surgeon: Chavez Ward MD;  Location: [facility]     ESOPHAGOSCOPY, GASTROSCOPY, DUODENOSCOPY (EGD), COMBINED  2016    Dr. Cha [facility]     ESOPHAGOSCOPY, GASTROSCOPY, DUODENOSCOPY (EGD), COMBINED N/A 2016    Procedure: COMBINED ESOPHAGOSCOPY, GASTROSCOPY, DUODENOSCOPY (EGD), BIOPSY SINGLE OR MULTIPLE;  Surgeon: Jada Cha MD;  Location: [facility]     EXCISE GANGLION WRIST Left 2015    Procedure: EXCISE GANGLION WRIST;  Surgeon: Justin Lyons MD;  Location: [facility]     HYSTERECTOMY, PAP NO LONGER INDICATED       wisdom teeth         Social History     Tobacco Use     Smoking status: Former Smoker     Last attempt to quit: 1988     Years since quittin.6     Smokeless tobacco: Never Used     Tobacco comment: Quit in    Substance Use Topics     Alcohol use: No     Comment: none     Family History   Problem Relation Age of Onset     Alzheimer Disease Mother          at 98,  of old age      Alzheimer Disease Father          at 78     C.A.D. Father      Cancer Other 90        unknown cancer     Alzheimer Disease Brother          age 82      Family History Negative Brother         Born 1931     Cancer Maternal Aunt         x'2 w Ca unknown etiology     Colon Cancer No family hx of          Current Outpatient Medications   Medication Sig Dispense Refill     albuterol (ALBUTEROL) 108 (90 BASE) MCG/ACT Inhaler Inhale 2 puffs into the lungs as needed for shortness of breath / dyspnea or wheezing       amLODIPine (NORVASC) 10 MG tablet Take 1 tablet (10 mg) by mouth daily 90 tablet 1     ASPIRIN 81 MG OR TABS ONE DAILY 0 0     calcium carbonate-vitamin D 500-400 MG-UNIT TABS per tablet Take 1 tablet by mouth 2 times daily (with meals) 180 tablet 3     cephALEXin (KEFLEX) 500 MG capsule Take 1 capsule (500 mg) by mouth 3 times daily for 10 days 30 capsule 0     cetirizine (ZYRTEC) 10 MG tablet Take 1 tablet (10 mg) by mouth every evening 90 tablet 3     cholecalciferol (VITAMIN D3) 5000 UNITS TABS tablet Take 1 tablet (5,000 Units) by mouth daily 100 tablet 1     Cyanocobalamin (B-12) 1000 MCG TBCR Take 1,000 mcg by mouth daily 100 tablet 1     donepezil (ARICEPT) 5 MG tablet Take 2 tablets (10 mg) by mouth At Bedtime 180 tablet 1     DULoxetine (CYMBALTA) 20 MG capsule Take 1 capsule (20 mg) by mouth 2 times daily 180 capsule 1     fluticasone (FLONASE) 50 MCG/ACT spray Spray 2 sprays into both nostrils daily 1 Bottle 1     omeprazole (PRILOSEC) 40 MG capsule Take 1 capsule (40 mg) by mouth daily 30 minutes before breakfast 90 capsule 1     solifenacin (VESICARE) 10 MG tablet Take 1 tablet (10 mg) by mouth every other day 90 tablet 1     tiotropium-olodaterol (STIOLTO RESPIMAT) 2.5-2.5 MCG/ACT AERS Inhale 2 puffs into the lungs daily 4 g 3     UNABLE TO FIND 2 times daily MEDICATION NAME: AREDS 2       VITRON-C  MG TABS tablet Take 1 tablet by mouth 2 times daily (with meals) 100 tablet 0     No  "Known Allergies    Reviewed and updated as needed this visit by clinical staff  Tobacco  Allergies  Meds  Problems  Med Hx  Surg Hx  Fam Hx  Soc Hx        Reviewed and updated as needed this visit by Provider  Tobacco  Allergies  Meds  Problems  Med Hx  Surg Hx  Fam Hx         ROS:  Constitutional, HEENT, cardiovascular, pulmonary, GI, , musculoskeletal, neuro, skin, endocrine and psych systems are negative, except as otherwise noted.      OBJECTIVE:     /72 (BP Location: Right arm, Cuff Size: Adult Regular)   Pulse 100   Temp 99.1  F (37.3  C) (Oral)   Ht 1.6 m (5' 3\")   Wt 59.9 kg (132 lb)   SpO2 96%   BMI 23.38 kg/m    Body mass index is 23.38 kg/m .  GENERAL: healthy, alert and no distress  EYES: Eyes grossly normal to inspection, PERRL and conjunctivae and sclerae normal  HENT: ear canals and TM's normal, nose and mouth without ulcers or lesions  NECK: no adenopathy, no asymmetry, masses, or scars and thyroid normal to palpation  RESP: lungs clear to auscultation - no rales, rhonchi or wheezes  CV: regular rate and rhythm, normal S1 S2, no S3 or S4, no murmur, click or rub, no peripheral edema and peripheral pulses strong  ABDOMEN: soft, nontender, no hepatosplenomegaly, no masses and bowel sounds normal  MS: no gross musculoskeletal defects noted, no edema. Tenderness noted at insertion of rotator cuff and posterior aspect of right shoulder.  5/5 rotator cuff strength in all planes.  Positive Post Tj.  Full range of motion in all planes.  SKIN: Well-demarcated [id number] warm, edematous, and erythematous area with excoriations on anterior shin.  2cm Ganglion cyst noted on left wrist on lateral aspect directly proximal to the base of the thumb -this is nonmobile and mildly tender to palpation.  NEURO: Normal strength and tone, mentation intact and speech normal  PSYCH: mentation appears normal, affect normal/bright    Diagnostic Test Results:  none     PROCEDURE:   After " discussing the risks, benefits and alternatives to a right subacromial cortisone injection the patient elected to proceed with the injection.  The posterior aspect of the right shoulder was prepped with a betadine solution.  Using a sterile technique and a 22 gauge needle, 4 cc's of 1% lidocaine, 4 cc's of 0.25% marcaine, and 80 mg in Kenalog were injected into the right subacromial.  The patient tolerated the procedure well and there were no immediate adverse effects.      ASSESSMENT/PLAN:     Gifty was seen today for mass and musculoskeletal problem.    Diagnoses and all orders for this visit:    Ganglion cyst of wrist, left  Referral to hand specialist placed with [facility]. she wishes to have it removed/assessed.    -     ORTHO  REFERRAL    Tinnitus, bilateral  Impairing quality of life.  ENT referral. Conservative measures failed.   -     OTOLARYNGOLOGY REFERRAL    Right anterior shoulder pain, Impingement syndrome of right shoulder  Injection of kenalog was given to reduce inflammation.  Continue to pursue physical therapy as previously planned.  -     Large Joint/Bursa injection and/or drainage (Shoulder, Knee)  -     triamcinolone (KENALOG-40) injection 80 mg    Cellulitis of right lower extremity   Apply warm washcloth to the area and apply non-scented lotion or other mild moisturizer to the affected area. Call or return if condition worsens or fails to improve.  -     cephALEXin (KEFLEX) 500 MG capsule; Take 1 capsule (500 mg) by mouth 3 times daily for 10 days        Return in about 2 weeks (around 3/13/2019) for With physical therapy.      Leann Gordon PA-C  [facility]

## 2019-03-12 DIAGNOSIS — I10 ESSENTIAL HYPERTENSION: ICD-10-CM

## 2019-03-12 DIAGNOSIS — K27.9 PEPTIC ULCER: ICD-10-CM

## 2019-03-12 NOTE — TELEPHONE ENCOUNTER
"Requested Prescriptions   Pending Prescriptions Disp Refills     omeprazole (PRILOSEC) 40 MG DR capsule 90 capsule 1    Last Written Prescription Date:  9.13.18   Last Fill Quantity: 90 capsule ,  # refills: 1   Last office visit: 2/27/2019 with prescribing provider:  HONEY Juárez       Future Office Visit:       Sig: Take 1 capsule (40 mg) by mouth daily 30 minutes before breakfast    PPI Protocol Passed - 3/12/2019  2:37 PM       Passed - Not on Clopidogrel (unless Pantoprazole ordered)       Passed - No diagnosis of osteoporosis on record       Passed - Recent (12 mo) or future (30 days) visit within the authorizing provider's specialty    Patient had office visit in the last 12 months or has a visit in the next 30 days with authorizing provider or within the authorizing provider's specialty.  See \"Patient Info\" tab in inbasket, or \"Choose Columns\" in Meds & Orders section of the refill encounter.             Passed - Medication is active on med list       Passed - Patient is age 18 or older       Passed - No active pregnacy on record       Passed - No positive pregnancy test in past 12 months                amLODIPine (NORVASC) 10 MG tablet 90 tablet 1      Last Written Prescription Date:  9.13.18  Last Fill Quantity: 90 tablet,  # refills: 1   Last office visit: 2/27/2019 with prescribing provider:  HONEY Juárez       Future Office Visit:       Sig: Take 1 tablet (10 mg) by mouth daily    Calcium Channel Blockers Protocol  Passed - 3/12/2019  2:37 PM       Passed - Blood pressure under 140/90 in past 12 months    BP Readings from Last 3 Encounters:   02/27/19 128/72   12/21/18 122/68   10/08/18 112/76                Passed - Recent (12 mo) or future (30 days) visit within the authorizing provider's specialty    Patient had office visit in the last 12 months or has a visit in the next 30 days with authorizing provider or within the authorizing provider's specialty.  See \"Patient Info\" tab in inbasket, or " "\"Choose Columns\" in Meds & Orders section of the refill encounter.             Passed - Medication is active on med list       Passed - Patient is age 18 or older       Passed - No active pregnancy on record       Passed - Normal serum creatinine on file in past 12 months    Recent Labs   Lab Test 10/08/18  0915  01/03/16  1945   CR 0.72   < >  --    CREAT  --   --  0.6    < > = values in this interval not displayed.            Passed - No positive pregnancy test in past 12 months        "

## 2019-03-14 RX ORDER — AMLODIPINE BESYLATE 10 MG/1
10 TABLET ORAL DAILY
Qty: 90 TABLET | Refills: 1 | Status: SHIPPED | OUTPATIENT
Start: 2019-03-14 | End: 2019-05-29

## 2019-03-14 RX ORDER — OMEPRAZOLE 40 MG/1
40 CAPSULE, DELAYED RELEASE ORAL DAILY
Qty: 90 CAPSULE | Refills: 1 | Status: SHIPPED | OUTPATIENT
Start: 2019-03-14 | End: 2019-05-29

## 2019-03-25 DIAGNOSIS — F02.80 EARLY ONSET ALZHEIMER'S DEMENTIA WITHOUT BEHAVIORAL DISTURBANCE (H): ICD-10-CM

## 2019-03-25 DIAGNOSIS — G30.0 EARLY ONSET ALZHEIMER'S DEMENTIA WITHOUT BEHAVIORAL DISTURBANCE (H): ICD-10-CM

## 2019-03-25 RX ORDER — DONEPEZIL HYDROCHLORIDE 5 MG/1
10 TABLET, FILM COATED ORAL AT BEDTIME
Qty: 180 TABLET | Refills: 1 | Status: SHIPPED | OUTPATIENT
Start: 2019-03-25 | End: 2019-05-29

## 2019-03-25 NOTE — TELEPHONE ENCOUNTER
"Requested Prescriptions   Pending Prescriptions Disp Refills     donepezil (ARICEPT) 5 MG tablet 180 tablet 1    Last Written Prescription Date:  10.8.18  Last Fill Quantity: 180 tablet,  # refills: 1   Last office visit: 2/27/2019 with prescribing provider:  HONEY Juárez       Future Office Visit:       Sig: Take 2 tablets (10 mg) by mouth At Bedtime    Miscellaneous Dementia Agents Passed - 3/25/2019  3:45 PM       Passed - Recent (12 mo) or future (30 days) visit within the authorizing provider's specialty    Patient had office visit in the last 12 months or has a visit in the next 30 days with authorizing provider or within the authorizing provider's specialty.  See \"Patient Info\" tab in inbasket, or \"Choose Columns\" in Meds & Orders section of the refill encounter.             Passed - Medication is active on med list       Passed - Patient is 18 years of age or older        "

## 2019-04-29 RX ORDER — DONEPEZIL HYDROCHLORIDE 10 MG/1
10 TABLET, FILM COATED ORAL AT BEDTIME
Qty: 90 TABLET | Refills: 1 | Status: SHIPPED | OUTPATIENT
Start: 2019-04-29 | End: 2019-05-29

## 2019-04-29 NOTE — TELEPHONE ENCOUNTER
"Received notice from [facility] pharmacy:     \"Ins will only cover 10 mg once daily, please send new Rx\"     Routing to  Refill     Poly Mccain MA    "

## 2019-05-22 ENCOUNTER — TRANSFERRED RECORDS (OUTPATIENT)
Dept: HEALTH INFORMATION MANAGEMENT | Facility: CLINIC | Age: 84
End: 2019-05-22

## 2019-05-29 ENCOUNTER — OFFICE VISIT (OUTPATIENT)
Dept: FAMILY MEDICINE | Facility: CLINIC | Age: 84
End: 2019-05-29
Payer: COMMERCIAL

## 2019-05-29 ENCOUNTER — TELEPHONE (OUTPATIENT)
Dept: FAMILY MEDICINE | Facility: CLINIC | Age: 84
End: 2019-05-29

## 2019-05-29 VITALS
TEMPERATURE: 98.4 F | OXYGEN SATURATION: 94 % | DIASTOLIC BLOOD PRESSURE: 70 MMHG | BODY MASS INDEX: 24.27 KG/M2 | HEIGHT: 63 IN | HEART RATE: 92 BPM | WEIGHT: 137 LBS | SYSTOLIC BLOOD PRESSURE: 120 MMHG

## 2019-05-29 DIAGNOSIS — F02.80 EARLY ONSET ALZHEIMER'S DEMENTIA WITHOUT BEHAVIORAL DISTURBANCE (H): ICD-10-CM

## 2019-05-29 DIAGNOSIS — J44.9 CHRONIC OBSTRUCTIVE PULMONARY DISEASE, UNSPECIFIED COPD TYPE (H): ICD-10-CM

## 2019-05-29 DIAGNOSIS — F43.21 ADJUSTMENT DISORDER WITH DEPRESSED MOOD: ICD-10-CM

## 2019-05-29 DIAGNOSIS — G30.0 EARLY ONSET ALZHEIMER'S DEMENTIA WITHOUT BEHAVIORAL DISTURBANCE (H): ICD-10-CM

## 2019-05-29 DIAGNOSIS — H93.13 TINNITUS OF BOTH EARS: ICD-10-CM

## 2019-05-29 DIAGNOSIS — J44.9 CHRONIC OBSTRUCTIVE PULMONARY DISEASE, UNSPECIFIED COPD TYPE (H): Primary | ICD-10-CM

## 2019-05-29 DIAGNOSIS — I10 ESSENTIAL HYPERTENSION: ICD-10-CM

## 2019-05-29 DIAGNOSIS — R53.83 OTHER FATIGUE: ICD-10-CM

## 2019-05-29 DIAGNOSIS — K27.9 PEPTIC ULCER: ICD-10-CM

## 2019-05-29 PROCEDURE — 99214 OFFICE O/P EST MOD 30 MIN: CPT | Performed by: PHYSICIAN ASSISTANT

## 2019-05-29 RX ORDER — AMLODIPINE BESYLATE 10 MG/1
10 TABLET ORAL DAILY
Qty: 90 TABLET | Refills: 1 | Status: SHIPPED | OUTPATIENT
Start: 2019-05-29 | End: 2019-12-05

## 2019-05-29 RX ORDER — DULOXETIN HYDROCHLORIDE 20 MG/1
20 CAPSULE, DELAYED RELEASE ORAL 2 TIMES DAILY
Qty: 180 CAPSULE | Refills: 1 | Status: SHIPPED | OUTPATIENT
Start: 2019-05-29 | End: 2019-12-11

## 2019-05-29 RX ORDER — OMEPRAZOLE 40 MG/1
40 CAPSULE, DELAYED RELEASE ORAL DAILY
Qty: 90 CAPSULE | Refills: 1 | Status: SHIPPED | OUTPATIENT
Start: 2019-05-29 | End: 2019-12-05

## 2019-05-29 RX ORDER — ALBUTEROL SULFATE 90 UG/1
2 AEROSOL, METERED RESPIRATORY (INHALATION) EVERY 6 HOURS PRN
Qty: 8.5 G | Refills: 1 | Status: SHIPPED | OUTPATIENT
Start: 2019-05-29 | End: 2019-12-11

## 2019-05-29 RX ORDER — DONEPEZIL HYDROCHLORIDE 10 MG/1
10 TABLET, FILM COATED ORAL AT BEDTIME
Qty: 90 TABLET | Refills: 1 | Status: SHIPPED | OUTPATIENT
Start: 2019-05-29 | End: 2019-12-11

## 2019-05-29 RX ORDER — ALBUTEROL SULFATE 90 UG/1
2 AEROSOL, METERED RESPIRATORY (INHALATION) PRN
Qty: 8.5 G | Refills: 1 | Status: SHIPPED | OUTPATIENT
Start: 2019-05-29 | End: 2019-05-29

## 2019-05-29 ASSESSMENT — MIFFLIN-ST. JEOR: SCORE: 1045.56

## 2019-05-29 NOTE — PROGRESS NOTES
Subjective     Gifty Villa is a 84 year old female who presents to clinic today for the following health issues:    HPI   MEDICATION follow up of Aricept 10 mg -     Previous vist: 2/27/2019    Medication changes: no        Taking medication as prescribed: YES               New side effects: no    Accompanying Signs & Symptoms:         Do you feel the medication(s) are helping: difficult to determine   Pt reports she still has a good appetite.      Ringing in Ears  Patient is still having ringing in both ears, patient had stopped the aspirin for several weeks which did not resolve tinnitus (has since restarted). Pt then cut out caffeine but no impact, flonase did not work either. Patient wondering about acupuncture, and if this would help at all?  Ringing in ears started in September 2018 around same time she started Areds II and vitamin B12. Ringing in ears is irritating when going to bed at night however no issues with falling asleep.     COPD  COPD follow up with [facility] on 5/22 - no changes to treatment plan. Pt requesting for me to manage inhalers. Rare use of albuterol inhaler, not daily.      Patient Active Problem List   Diagnosis     Backache     Urge incontinence     Essential hypertension     Aftercare following joint replacement     Knee joint replacement by other means     HYPERLIPIDEMIA LDL GOAL <130     Urinary incontinence     Urgency-frequency syndrome     Advanced directives, counseling/discussion     RLQ abdominal mass     Lumbago     Pain in joint, pelvic region and thigh     Anxiety     COPD (chronic obstructive pulmonary disease) (H)     Major depressive disorder, recurrent episode, in full remission (H)     Chronic obstructive pulmonary disease, unspecified COPD type (H) - followed by Dr. [facility]     Hypervitaminosis B6     Early onset Alzheimer's dementia without behavioral disturbance     Peptic ulcer     Alzheimer's dementia without behavioral disturbance, unspecified timing of  dementia onset     Low ferritin     Low vitamin B12 level     Adjustment disorder with depressed mood     Other fatigue     Tinnitus of both ears     Past Surgical History:   Procedure Laterality Date     ARTHROPLASTY PATELLO-FEMORAL (KNEE)  2010    Left TKA     C SPINE FUSION,ANTER,3 SGMTS  1992     C TOTAL HIP ARTHROPLASTY      Hip Replacement, Total (Right)     C VAGINAL HYSTERECTOMY      Hysterectomy, Vaginal w anterior repair     CARDIAC CATH - HIM SCAN      normal.  Done d/t abnormal stress test (false positive) - patient noted to have hypertensive heart disease     COLONOSCOPY  ~    No polyps     COLONOSCOPY N/A 10/10/2014    Procedure: COLONOSCOPY;  Surgeon: Chavez Ward MD;  Location: [facility]     ESOPHAGOSCOPY, GASTROSCOPY, DUODENOSCOPY (EGD), COMBINED  2016    Dr. [facility]     ESOPHAGOSCOPY, GASTROSCOPY, DUODENOSCOPY (EGD), COMBINED N/A 2016    Procedure: COMBINED ESOPHAGOSCOPY, GASTROSCOPY, DUODENOSCOPY (EGD), BIOPSY SINGLE OR MULTIPLE;  Surgeon: Jada Cha MD;  Location: [facility]     EXCISE GANGLION WRIST Left 2015    Procedure: EXCISE GANGLION WRIST;  Surgeon: Justin Lyons MD;  Location: [facility]     HYSTERECTOMY, PAP NO LONGER INDICATED       wisdom teeth         Social History     Tobacco Use     Smoking status: Former Smoker     Last attempt to quit: 1988     Years since quittin.9     Smokeless tobacco: Never Used     Tobacco comment: Quit in    Substance Use Topics     Alcohol use: No     Comment: none     Family History   Problem Relation Age of Onset     Alzheimer Disease Mother          at 98,  of old age     Alzheimer Disease Father          at 78     C.A.D. Father      Cancer Other 90        unknown cancer     Alzheimer Disease Brother          age 82      Family History Negative Brother         Born 1931     Cancer Maternal Aunt         x'2 w Ca unknown etiology     Colon Cancer No family hx of           Current Outpatient Medications   Medication Sig Dispense Refill     amLODIPine (NORVASC) 10 MG tablet Take 1 tablet (10 mg) by mouth daily 90 tablet 1     ASPIRIN 81 MG OR TABS ONE DAILY 0 0     calcium carbonate-vitamin D 500-400 MG-UNIT TABS per tablet Take 1 tablet by mouth 2 times daily (with meals) 180 tablet 3     cetirizine (ZYRTEC) 10 MG tablet Take 1 tablet (10 mg) by mouth every evening 90 tablet 3     cholecalciferol (VITAMIN D3) 5000 UNITS TABS tablet Take 1 tablet (5,000 Units) by mouth daily 100 tablet 1     Cyanocobalamin (B-12) 1000 MCG TBCR Take 1,000 mcg by mouth daily 100 tablet 1     donepezil (ARICEPT) 10 MG tablet Take 1 tablet (10 mg) by mouth At Bedtime 90 tablet 1     DULoxetine (CYMBALTA) 20 MG capsule Take 1 capsule (20 mg) by mouth 2 times daily 180 capsule 1     omeprazole (PRILOSEC) 40 MG DR capsule Take 1 capsule (40 mg) by mouth daily 30 minutes before breakfast 90 capsule 1     solifenacin (VESICARE) 10 MG tablet Take 1 tablet (10 mg) by mouth every other day 90 tablet 1     tiotropium-olodaterol (STIOLTO RESPIMAT) 2.5-2.5 MCG/ACT AERS Inhale 2 puffs into the lungs daily 4 g 3     UNABLE TO FIND 2 times daily MEDICATION NAME: AREDS 2       VITRON-C  MG TABS tablet Take 1 tablet by mouth 2 times daily (with meals) 100 tablet 0     albuterol (PROAIR HFA) 108 (90 Base) MCG/ACT inhaler Inhale 2 puffs into the lungs every 6 hours as needed for shortness of breath / dyspnea or wheezing 8.5 g 1     No Known Allergies      Reviewed and updated as needed this visit by Provider         Review of Systems   ROS COMP: Constitutional, HEENT, cardiovascular, pulmonary, GI, , musculoskeletal, neuro, skin, endocrine and psych systems are negative, except as otherwise noted.    This document serves as a record of the services and decisions personally performed and made by HONEY Juárez. It was created on her behalf by Brunilda Platt, a trained medical scribe. The creation of this  "document is based on the provider's statements to the medical scribe.  Brunilda Platt May 29, 2019 9:42 AM          Objective    /70 (BP Location: Left arm, Cuff Size: Adult Regular)   Pulse 92   Temp 98.4  F (36.9  C) (Oral)   Ht 1.6 m (5' 3\")   Wt 62.1 kg (137 lb)   SpO2 94%   BMI 24.27 kg/m    Body mass index is 24.27 kg/m .  Physical Exam   GENERAL: healthy, alert and no distress  RESP: lungs clear to auscultation - no rales, rhonchi or wheezes  CV: regular rate and rhythm, normal S1 S2, no S3 or S4, no murmur, click or rub, no peripheral edema and peripheral pulses strong  MS: no gross musculoskeletal defects noted, no edema  SKIN: no suspicious lesions or rashes  NEURO: Normal strength and tone, mentation intact and speech normal  PSYCH: mentation appears normal, affect normal/bright    Diagnostic Test Results:  Labs reviewed in Epic  No results found for this or any previous visit (from the past 24 hour(s)).        Assessment & Plan     Gifty was seen today for recheck medication.    Diagnoses and all orders for this visit:    Chronic obstructive pulmonary disease, unspecified COPD type (H) - followed by Dr[facility]  Stable, continue current medication regimen.   -     tiotropium-olodaterol (STIOLTO RESPIMAT) 2.5-2.5 MCG/ACT AERS; Inhale 2 puffs into the lungs daily  -     Discontinue: albuterol (PROAIR HFA) 108 (90 Base) MCG/ACT inhaler; Inhale 2 puffs into the lungs as needed for shortness of breath / dyspnea or wheezing    Early onset Alzheimer's dementia without behavioral disturbance  Stable, continue current medication regimen. Discussed with pt and daughter the only was to see if medication is working is by discontinuing it - advised against this.   -     donepezil (ARICEPT) 10 MG tablet; Take 1 tablet (10 mg) by mouth At Bedtime    Tinnitus of both ears  Trial of discontinuing Areds II for 2 weeks. If no resolution restart this med and discontinue vitamin B12 for a 2-week trial. " If this also does not work send me a MyC message for further treatment.  Likely refer for acupuncture vs. ENT.    Other fatigue, Adjustment disorder with depressed mood  Stable, continue current medication regimen.  -     DULoxetine (CYMBALTA) 20 MG capsule; Take 1 capsule (20 mg) by mouth 2 times daily    Peptic ulcer  Stable, continue current medication regimen.  -     omeprazole (PRILOSEC) 40 MG DR capsule; Take 1 capsule (40 mg) by mouth daily 30 minutes before breakfast    Essential hypertension  Stable, continue current medication regimen.  -     amLODIPine (NORVASC) 10 MG tablet; Take 1 tablet (10 mg) by mouth daily      Return in about 6 months (around 11/29/2019) for Physical Exam, Lab Work.    The information in this document, created by the medical scribe for me, accurately reflects the services I personally performed and the decisions made by me. I have reviewed and approved this document for accuracy prior to leaving the patient care area.  May 29, 2019 9:41 AM      Leann Gordon PA-C  [facility]

## 2019-07-23 ENCOUNTER — ANCILLARY PROCEDURE (OUTPATIENT)
Dept: GENERAL RADIOLOGY | Facility: CLINIC | Age: 84
End: 2019-07-23
Attending: NURSE PRACTITIONER
Payer: COMMERCIAL

## 2019-07-23 ENCOUNTER — OFFICE VISIT (OUTPATIENT)
Dept: FAMILY MEDICINE | Facility: CLINIC | Age: 84
End: 2019-07-23
Payer: COMMERCIAL

## 2019-07-23 VITALS
TEMPERATURE: 98.5 F | OXYGEN SATURATION: 95 % | HEART RATE: 82 BPM | SYSTOLIC BLOOD PRESSURE: 122 MMHG | BODY MASS INDEX: 24.98 KG/M2 | HEIGHT: 63 IN | DIASTOLIC BLOOD PRESSURE: 70 MMHG | WEIGHT: 141 LBS

## 2019-07-23 DIAGNOSIS — M25.531 RIGHT WRIST PAIN: Primary | ICD-10-CM

## 2019-07-23 DIAGNOSIS — M25.531 RIGHT WRIST PAIN: ICD-10-CM

## 2019-07-23 PROCEDURE — 73110 X-RAY EXAM OF WRIST: CPT | Mod: RT

## 2019-07-23 PROCEDURE — 99213 OFFICE O/P EST LOW 20 MIN: CPT | Performed by: NURSE PRACTITIONER

## 2019-07-23 ASSESSMENT — PATIENT HEALTH QUESTIONNAIRE - PHQ9
5. POOR APPETITE OR OVEREATING: NOT AT ALL
SUM OF ALL RESPONSES TO PHQ QUESTIONS 1-9: 2

## 2019-07-23 ASSESSMENT — ANXIETY QUESTIONNAIRES
1. FEELING NERVOUS, ANXIOUS, OR ON EDGE: NOT AT ALL
IF YOU CHECKED OFF ANY PROBLEMS ON THIS QUESTIONNAIRE, HOW DIFFICULT HAVE THESE PROBLEMS MADE IT FOR YOU TO DO YOUR WORK, TAKE CARE OF THINGS AT HOME, OR GET ALONG WITH OTHER PEOPLE: NOT DIFFICULT AT ALL
3. WORRYING TOO MUCH ABOUT DIFFERENT THINGS: NOT AT ALL
5. BEING SO RESTLESS THAT IT IS HARD TO SIT STILL: NOT AT ALL
6. BECOMING EASILY ANNOYED OR IRRITABLE: NOT AT ALL
2. NOT BEING ABLE TO STOP OR CONTROL WORRYING: NOT AT ALL
7. FEELING AFRAID AS IF SOMETHING AWFUL MIGHT HAPPEN: NOT AT ALL
GAD7 TOTAL SCORE: 0

## 2019-07-23 ASSESSMENT — MIFFLIN-ST. JEOR: SCORE: 1063.7

## 2019-07-23 NOTE — RESULT ENCOUNTER NOTE
Dear Gayla,     The right wrist x-ray showed arthritis (degenerative changes).    No acute bony abnormalities.      See below for radiologist's reading:      IMPRESSION: Advanced STT(Scaphotrapeziotrapezoidal)  degenerative changes. Diffuse osteopenia.  Moderate second and third MCP joint degenerative changes. No acute  fracture or subluxation.      Please send a [employer] message or call [phone]  if you have any questions.      Ketty Lopez, APRN, CNP  [facility]    If you have further questions about the interpretation of your labs, [email or web address] is a good website to check out for further information.

## 2019-07-23 NOTE — PROGRESS NOTES
Subjective     Gifty Villa is a 84 year old female who presents to clinic today for the following health issues:    HPI   Joint Pain    Onset: several weeks ago- thinks its due to sewing    Recently sewed 40 pillows several months ago.     Right wrist and forearm has been sore.      Description:   Location: right wrist -   Character: tender and sore if she touches it , if she touches it wrong or picks up something it hurts, only certain movements or if she touches it does it feel sore      Intensity: moderate    Progression of Symptoms: intermittent ,     Accompanying Signs & Symptoms:  Other symptoms: none    History:   Previous similar pain: no       Precipitating factors:   Trauma or overuse: no     Alleviating factors:  Improved by: rest/inactivity and ice    Therapies Tried and outcome: nothing  Patient Active Problem List   Diagnosis     Backache     Urge incontinence     Essential hypertension     Aftercare following joint replacement     Knee joint replacement by other means     HYPERLIPIDEMIA LDL GOAL <130     Urinary incontinence     Urgency-frequency syndrome     Advanced directives, counseling/discussion     RLQ abdominal mass     Lumbago     Pain in joint, pelvic region and thigh     Anxiety     COPD (chronic obstructive pulmonary disease) (H)     Major depressive disorder, recurrent episode, in full remission (H)     Chronic obstructive pulmonary disease, unspecified COPD type (H) - followed by Dr. Alida PICHARDO [facility]     Hypervitaminosis B6     Early onset Alzheimer's dementia without behavioral disturbance     Peptic ulcer     Alzheimer's dementia without behavioral disturbance, unspecified timing of dementia onset     Low ferritin     Low vitamin B12 level     Adjustment disorder with depressed mood     Other fatigue     Tinnitus of both ears     Past Surgical History:   Procedure Laterality Date     ARTHROPLASTY PATELLO-FEMORAL (KNEE)  8/2010    Left TKA     C SPINE FUSION,ANTER,3 SGMTS  1992          C TOTAL HIP ARTHROPLASTY      Hip Replacement, Total (Right)     C VAGINAL HYSTERECTOMY      Hysterectomy, Vaginal w anterior repair     CARDIAC CATH - HIM SCAN      normal.  Done d/t abnormal stress test (false positive) - patient noted to have hypertensive heart disease     COLONOSCOPY  ~    No polyps     COLONOSCOPY N/A 10/10/2014    Procedure: COLONOSCOPY;  Surgeon: Chavez Ward MD;  Location: [facility]     ESOPHAGOSCOPY, GASTROSCOPY, DUODENOSCOPY (EGD), COMBINED  2016    Dr. Cha [facility]     ESOPHAGOSCOPY, GASTROSCOPY, DUODENOSCOPY (EGD), COMBINED N/A 2016    Procedure: COMBINED ESOPHAGOSCOPY, GASTROSCOPY, DUODENOSCOPY (EGD), BIOPSY SINGLE OR MULTIPLE;  Surgeon: Jada Cha MD;  Location: [facility]     EXCISE GANGLION WRIST Left 2015    Procedure: EXCISE GANGLION WRIST;  Surgeon: Justin Lyons MD;  Location: [facility]     HYSTERECTOMY, PAP NO LONGER INDICATED       wisdom teeth         Social History     Tobacco Use     Smoking status: Former Smoker     Last attempt to quit: 1988     Years since quittin.0     Smokeless tobacco: Never Used     Tobacco comment: Quit in    Substance Use Topics     Alcohol use: No     Comment: none     Family History   Problem Relation Age of Onset     Alzheimer Disease Mother          at 98,  of old age     Alzheimer Disease Father          at 78     C.A.D. Father      Cancer Other 90        unknown cancer     Alzheimer Disease Brother          age 82      Family History Negative Brother         Born 1931     Cancer Maternal Aunt         x'2 w Ca unknown etiology     Colon Cancer No family hx of          Current Outpatient Medications   Medication Sig Dispense Refill     order for DME Equipment being ordered: wrist brace without thumb 1 Device 0     albuterol (PROAIR HFA) 108 (90 Base) MCG/ACT inhaler Inhale 2 puffs into the lungs every 6 hours as needed for shortness of breath / dyspnea or wheezing 8.5 g 1  "    amLODIPine (NORVASC) 10 MG tablet Take 1 tablet (10 mg) by mouth daily 90 tablet 1     ASPIRIN 81 MG OR TABS ONE DAILY 0 0     calcium carbonate-vitamin D 500-400 MG-UNIT TABS per tablet Take 1 tablet by mouth 2 times daily (with meals) 180 tablet 3     cetirizine (ZYRTEC) 10 MG tablet Take 1 tablet (10 mg) by mouth every evening 90 tablet 3     cholecalciferol (VITAMIN D3) 5000 UNITS TABS tablet Take 1 tablet (5,000 Units) by mouth daily 100 tablet 1     Cyanocobalamin (B-12) 1000 MCG TBCR Take 1,000 mcg by mouth daily 100 tablet 1     donepezil (ARICEPT) 10 MG tablet Take 1 tablet (10 mg) by mouth At Bedtime 90 tablet 1     DULoxetine (CYMBALTA) 20 MG capsule Take 1 capsule (20 mg) by mouth 2 times daily 180 capsule 1     omeprazole (PRILOSEC) 40 MG DR capsule Take 1 capsule (40 mg) by mouth daily 30 minutes before breakfast 90 capsule 1     solifenacin (VESICARE) 10 MG tablet Take 1 tablet (10 mg) by mouth every other day 90 tablet 1     tiotropium-olodaterol (STIOLTO RESPIMAT) 2.5-2.5 MCG/ACT AERS Inhale 2 puffs into the lungs daily 4 g 3     UNABLE TO FIND 2 times daily MEDICATION NAME: AREDS 2       VITRON-C  MG TABS tablet Take 1 tablet by mouth 2 times daily (with meals) 100 tablet 0     No Known Allergies    Reviewed and updated as needed this visit by Provider         Review of Systems   ROS COMP: Constitutional, HEENT, cardiovascular, pulmonary, gi and gu systems are negative, except as otherwise noted.      Objective    /70 (BP Location: Right arm, Patient Position: Sitting, Cuff Size: Adult Regular)   Pulse 82   Temp 98.5  F (36.9  C) (Oral)   Ht 1.6 m (5' 3\")   Wt 64 kg (141 lb)   SpO2 95%   BMI 24.98 kg/m    Body mass index is 24.98 kg/m .  Physical Exam   GENERAL: healthy, alert and no distress  MS: right wrist with tenderness to palpation over dorsal aspect, no erythema or swelling  PSYCH: mentation appears normal, affect normal/bright          Assessment & Plan     Gifty was " seen today for musculoskeletal problem.    Diagnoses and all orders for this visit:    Right wrist pain  -     XR Wrist Right G/E 3 Views; Future                                                                   IMPRESSION: Advanced STT degenerative changes. Diffuse osteopenia.  Moderate second and third MCP joint degenerative changes. No acute  fracture or subluxation.  -     order for DME; Equipment being ordered: wrist brace without thumb    Acetaminophen (Tylenol), 500-1,000 mg 2-3 times daily (every 6 hours as needed)    Activity modification as able, rest right wrist.  Ice application 2 times daily for 10-15 minutes.      Follow-up with no improvement or worsening of symptoms.          Return in about 2 weeks (around 8/6/2019) for No improvement or worsening of symptoms.    WILBERTO Macdonald [facility]

## 2019-07-23 NOTE — PATIENT INSTRUCTIONS
Right wrist pain  -     XR Wrist Right G/E 3 Views; Future  -     order for DME; Equipment being ordered: wrist brace without thumb    Acetaminophen (Tylenol), 500-1,000 mg 2-3 times daily (every 6 hours as needed)    Activity modification as able, rest right wrist.  Ice application 2 times daily for 10-15 minutes.      Follow-up with no improvement or worsening of symptoms.

## 2019-07-24 ASSESSMENT — ANXIETY QUESTIONNAIRES: GAD7 TOTAL SCORE: 0

## 2019-11-05 ENCOUNTER — OFFICE VISIT (OUTPATIENT)
Dept: FAMILY MEDICINE | Facility: CLINIC | Age: 84
End: 2019-11-05
Payer: COMMERCIAL

## 2019-11-05 VITALS
TEMPERATURE: 97.2 F | BODY MASS INDEX: 25.33 KG/M2 | DIASTOLIC BLOOD PRESSURE: 86 MMHG | HEART RATE: 88 BPM | SYSTOLIC BLOOD PRESSURE: 122 MMHG | RESPIRATION RATE: 14 BRPM | OXYGEN SATURATION: 95 % | WEIGHT: 143 LBS

## 2019-11-05 DIAGNOSIS — S81.811A LACERATION OF RIGHT LOWER LEG, INITIAL ENCOUNTER: Primary | ICD-10-CM
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
## 2019-11-05 PROCEDURE — 99213 OFFICE O/P EST LOW 20 MIN: CPT | Performed by: PHYSICIAN ASSISTANT

## 2019-11-05 NOTE — PROGRESS NOTES
Subjective     Gifty Villa is a 84 year old female who presents to clinic today for the following health issues:    HPI   Concern - dog scratch on leg  Onset: 1 days    Description:   Red, oozing    Intensity: mild    Progression of Symptoms:  same    Therapies Tried and outcome: had it wrapped      Gayla presents to the clinic with 1 day hx of a laceration to her right shin that she sustained from an accidental dog scratch yesterday.  Her friend's dog jumped on her lap on the couch, scratching her leg.  The wound was bleeding, but was controlled with pressure.  Patient cleaned her wound with soap and water and covered with a band aid. She is concerned because the wound is slightly gaping.  TDAP UTD.   The wound is mildly tender, she has not noticed any surrounding redness.         Patient Active Problem List   Diagnosis     Backache     Urge incontinence     Essential hypertension     Aftercare following joint replacement     Knee joint replacement by other means     HYPERLIPIDEMIA LDL GOAL <130     Urinary incontinence     Urgency-frequency syndrome     Advanced directives, counseling/discussion     RLQ abdominal mass     Lumbago     Pain in joint, pelvic region and thigh     Anxiety     COPD (chronic obstructive pulmonary disease) (H)     Major depressive disorder, recurrent episode, in full remission (H)     Chronic obstructive pulmonary disease, unspecified COPD type (H) - followed by Dr. Alida PICHARDO [facility]     Hypervitaminosis B6     Early onset Alzheimer's dementia without behavioral disturbance (H)     Peptic ulcer     Alzheimer's dementia without behavioral disturbance, unspecified timing of dementia onset (H)     Low ferritin     Low vitamin B12 level     Adjustment disorder with depressed mood     Other fatigue     Tinnitus of both ears     Past Surgical History:   Procedure Laterality Date     ARTHROPLASTY PATELLO-FEMORAL (KNEE)  8/2010    Left TKA     C SPINE FUSION,ANTER,3 SGMTS  1992 1992     C  TOTAL HIP ARTHROPLASTY      Hip Replacement, Total (Right)     C VAGINAL HYSTERECTOMY      Hysterectomy, Vaginal w anterior repair     CARDIAC CATH - HIM SCAN      normal.  Done d/t abnormal stress test (false positive) - patient noted to have hypertensive heart disease     COLONOSCOPY  ~    No polyps     COLONOSCOPY N/A 10/10/2014    Procedure: COLONOSCOPY;  Surgeon: Chavez Ward MD;  [facility]: [facility]     ESOPHAGOSCOPY, GASTROSCOPY, DUODENOSCOPY (EGD), COMBINED  2016    Dr. Cha [facility]     ESOPHAGOSCOPY, GASTROSCOPY, DUODENOSCOPY (EGD), COMBINED N/A 2016    Procedure: COMBINED ESOPHAGOSCOPY, GASTROSCOPY, DUODENOSCOPY (EGD), BIOPSY SINGLE OR MULTIPLE;  Surgeon: Jada Cha MD;  [facility]: [facility]     EXCISE GANGLION WRIST Left 2015    Procedure: EXCISE GANGLION WRIST;  Surgeon: Justin Lyons MD;  [facility]: [facility]     HYSTERECTOMY, PAP NO LONGER INDICATED       wisdom teeth         Social History     Tobacco Use     Smoking status: Former Smoker     Last attempt to quit: 1988     Years since quittin.3     Smokeless tobacco: Never Used     Tobacco comment: Quit in    Substance Use Topics     Alcohol use: No     Comment: none     Family History   Problem Relation Age of Onset     Alzheimer Disease Mother          at 98,  of old age     Alzheimer Disease Father          at 78     C.A.D. Father      Cancer Other 90        unknown cancer     Alzheimer Disease Brother          age 82      Family History Negative Brother         Born 1930     Cancer Maternal Aunt         x'2 w Ca unknown etiology     Colon Cancer No family hx of          Current Outpatient Medications   Medication Sig Dispense Refill     albuterol (PROAIR HFA) 108 (90 Base) MCG/ACT inhaler Inhale 2 puffs into the lungs every 6 hours as needed for shortness of breath / dyspnea or wheezing 8.5 g 1     amLODIPine (NORVASC) 10 MG tablet Take 1 tablet (10 mg) by mouth daily 90 tablet 1      amoxicillin-clavulanate (AUGMENTIN) 875-125 MG tablet Take 1 tablet by mouth 2 times daily for 7 days 14 tablet 0     ASPIRIN 81 MG OR TABS ONE DAILY 0 0     calcium carbonate-vitamin D 500-400 MG-UNIT TABS per tablet Take 1 tablet by mouth 2 times daily (with meals) 180 tablet 3     cetirizine (ZYRTEC) 10 MG tablet Take 1 tablet (10 mg) by mouth every evening 90 tablet 3     cholecalciferol (VITAMIN D3) 5000 UNITS TABS tablet Take 1 tablet (5,000 Units) by mouth daily 100 tablet 1     Cyanocobalamin (B-12) 1000 MCG TBCR Take 1,000 mcg by mouth daily 100 tablet 1     donepezil (ARICEPT) 10 MG tablet Take 1 tablet (10 mg) by mouth At Bedtime 90 tablet 1     DULoxetine (CYMBALTA) 20 MG capsule Take 1 capsule (20 mg) by mouth 2 times daily 180 capsule 1     omeprazole (PRILOSEC) 40 MG DR capsule Take 1 capsule (40 mg) by mouth daily 30 minutes before breakfast 90 capsule 1     order for DME Equipment being ordered: wrist brace without thumb 1 Device 0     solifenacin (VESICARE) 10 MG tablet Take 1 tablet (10 mg) by mouth every other day 90 tablet 1     tiotropium-olodaterol (STIOLTO RESPIMAT) 2.5-2.5 MCG/ACT AERS Inhale 2 puffs into the lungs daily 4 g 3     UNABLE TO FIND 2 times daily MEDICATION NAME: AREDS 2       VITRON-C  MG TABS tablet Take 1 tablet by mouth 2 times daily (with meals) 100 tablet 0     No Known Allergies      Reviewed and updated as needed this visit by Provider         Review of Systems   ROS COMP: Constitutional, HEENT, cardiovascular, pulmonary, gi and gu systems are negative, except as otherwise noted.      Objective    /86   Pulse 88   Temp 97.2  F (36.2  C) (Tympanic)   Resp 14   Wt 64.9 kg (143 lb)   SpO2 95%   BMI 25.33 kg/m    Body mass index is 25.33 kg/m .  Physical Exam   GENERAL: healthy, alert and no distress  SKIN:  3 cm linear laceration noted to right shin that extends to the subcutaneous tissue.  No tendon or bone is visible. Wound appears to be partially healed  "with scabbing noted at the ends. No surrounding redness, induration or drainage. Surrounding area is slightly warm and TTP    Diagnostic Test Results:  Labs reviewed in Epic        Assessment & Plan     1. Laceration of right lower leg, initial encounter  Wound was irrigated with NS under pressure and steri strips were placed over wounds to help approximate edges.  Advised against sutures today as wound is 24 hours old and appears to have started healing. Will prophylax with antibiotics given the nature of the laceration from a dog scratch.Home care instructions given.  Advise that she monitor for signs of infection and return if these should occur.   - amoxicillin-clavulanate (AUGMENTIN) 875-125 MG tablet; Take 1 tablet by mouth 2 times daily for 7 days  Dispense: 14 tablet; Refill: 0    2. Dog scratch  - amoxicillin-clavulanate (AUGMENTIN) 875-125 MG tablet; Take 1 tablet by mouth 2 times daily for 7 days  Dispense: 14 tablet; Refill: 0     BMI:   Estimated body mass index is 25.33 kg/m  as calculated from the following:    Height as of 7/23/19: 1.6 m (5' 3\").    Weight as of this encounter: 64.9 kg (143 lb).       Follow up if new or worsening symptoms.     No follow-ups on file.    Tre Castañeda PA-C  [facility] [place]      "

## 2019-11-07 ENCOUNTER — HEALTH MAINTENANCE LETTER (OUTPATIENT)
Age: 84
End: 2019-11-07

## 2019-12-05 DIAGNOSIS — K27.9 PEPTIC ULCER: ICD-10-CM

## 2019-12-05 DIAGNOSIS — I10 ESSENTIAL HYPERTENSION: ICD-10-CM

## 2019-12-05 NOTE — TELEPHONE ENCOUNTER
"Requested Prescriptions   Pending Prescriptions Disp Refills     amLODIPine (NORVASC) 10 MG tablet [Pharmacy Med Name: AMLODIPINE BESYLATE 10MG TAB]        Last Written Prescription Date:  5.29.19  Last Fill Quantity: 90 tablet,  # refills: 1   Last office visit: 11/5/2019 with prescribing provider:  HNOEY Juárez             Future Office Visit:   Next 5 appointments (look out 90 days)    Dec 11, 2019  9:00 AM CST  PHYSICAL with HONEY Snow-C  [facility] [address] [place] [id number] [phone]            90 tablet 1     Sig: TAKE ONE TABLET BY MOUTH EVERY DAY       Calcium Channel Blockers Protocol  Failed - 12/5/2019  7:09 AM        Failed - Normal serum creatinine on file in past 12 months     Recent Labs   Lab Test 10/08/18  0915  01/03/16  1945   CR 0.72   < >  --    CREAT  --   --  0.6    < > = values in this interval not displayed.             Passed - Blood pressure under 140/90 in past 12 months     BP Readings from Last 3 Encounters:   11/05/19 122/86   07/23/19 122/70   05/29/19 120/70                 Passed - Recent (12 mo) or future (30 days) visit within the authorizing provider's specialty     Patient has had an office visit with the authorizing provider or a provider within the authorizing providers department within the previous 12 mos or has a future within next 30 days. See \"Patient Info\" tab in inbasket, or \"Choose Columns\" in Meds & Orders section of the refill encounter.              Passed - Medication is active on med list        Passed - Patient is age 18 or older        Passed - No active pregnancy on record        Passed - No positive pregnancy test in past 12 months        omeprazole (PRILOSEC) 40 MG DR capsule [Pharmacy Med Name: OMEPRAZOLE 40MG CPDR]          Last Written Prescription Date:  5.29.19  Last Fill Quantity: 90 capsule,  # refills: 1   Last office visit: 11/5/2019 with prescribing " "provider:  HONEY Juárez             Future Office Visit:   Next 5 appointments (look out 90 days)    Dec 11, 2019  9:00 AM CST  PHYSICAL with HONEY Snow-BASHIR  [facility] ([facility]) [address] [place] [id number] [phone]            90 capsule 1     Sig: TAKE ONE CAPSULE BY MOUTH EVERY DAY 30 MINUTES BEFORE BREAKFAST       PPI Protocol Passed - 12/5/2019  7:09 AM        Passed - Not on Clopidogrel (unless Pantoprazole ordered)        Passed - No diagnosis of osteoporosis on record        Passed - Recent (12 mo) or future (30 days) visit within the authorizing provider's specialty     Patient has had an office visit with the authorizing provider or a provider within the authorizing providers department within the previous 12 mos or has a future within next 30 days. See \"Patient Info\" tab in inbasket, or \"Choose Columns\" in Meds & Orders section of the refill encounter.              Passed - Medication is active on med list        Passed - Patient is age 18 or older        Passed - No active pregnacy on record        Passed - No positive pregnancy test in past 12 months        "

## 2019-12-06 RX ORDER — AMLODIPINE BESYLATE 10 MG/1
TABLET ORAL
Qty: 30 TABLET | Refills: 0 | Status: SHIPPED | OUTPATIENT
Start: 2019-12-06 | End: 2019-12-11

## 2019-12-06 RX ORDER — OMEPRAZOLE 40 MG/1
CAPSULE, DELAYED RELEASE ORAL
Qty: 30 CAPSULE | Refills: 0 | Status: SHIPPED | OUTPATIENT
Start: 2019-12-06 | End: 2019-12-11

## 2019-12-06 NOTE — TELEPHONE ENCOUNTER
Next 5 appointments (look out 90 days)    Dec 11, 2019  9:00 AM CST  PHYSICAL with Leann Gordon PA-C  [facility] [address] [place] [id number] [phone]        Medication is being filled for 1 time refill only due to:  Patient needs to be seen because due for physical/labs.    Lola Hansen RN  [facility]

## 2019-12-10 NOTE — PROGRESS NOTES
"SUBJECTIVE:   Gifty Villa is a 84 year old female who presents for Preventive Visit.    Are you in the first 12 months of your Medicare coverage?  No    Healthy Habits:    In general, how would you rate your overall health?  Good    Frequency of exercise:  2-3 days/week    Duration of exercise:  15-30 minutes    Do you usually eat at least 4 servings of fruit and vegetables a day, include whole grains    & fiber and avoid regularly eating high fat or \"junk\" foods?  Yes    Taking medications regularly:  Yes    Barriers to taking medications:  None    Medication side effects:  None    Ability to successfully perform activities of daily living:  Housework requires assistance    Home Safety:  No safety concerns identified    Hearing Impairment:  No hearing concerns    In the past 6 months, have you been bothered by leaking of urine?  No    In general, how would you rate your overall mental or emotional health?  Good      PHQ-2 Total Score:    Additional concerns today:  Yes (Has been coughing more lately. )    Do you feel safe in your environment? Yes    Have you ever done Advance Care Planning? (For example, a Health Directive, POLST, or a discussion with a medical provider or your loved ones about your wishes): Yes, advance care planning is on file.      Fall risk  Fallen 2 or more times in the past year?: No  Any fall with injury in the past year?: No    Cognitive Screening Not appropriate due to known dementia    Do you have sleep apnea, excessive snoring or daytime drowsiness?: no    Reviewed and updated as needed this visit by clinical staff  Tobacco  Allergies  Meds  Problems  Med Hx  Surg Hx  Fam Hx  Soc Hx          Reviewed and updated as needed this visit by Provider  Tobacco  Allergies  Meds  Problems  Med Hx  Surg Hx  Fam Hx        Social History     Tobacco Use     Smoking status: Former Smoker     Last attempt to quit: 1988     Years since quittin.4     Smokeless tobacco: Never " Used     Tobacco comment: Quit in 1988   Substance Use Topics     Alcohol use: No     Comment: none     If you drink alcohol do you typically have >3 drinks per day or >7 drinks per week? No    Alzheimers  Patient compliant taking donepezil 10 mg nightly. Her accompanying son notes she has been a little more forgetful recently, but notes it has been gradual. She had a formal neuropsychological consultation by Ramonita Finch, phD, LP, ABPP at [facility] in [place] on 2/7/2018 and 2/14/2018.  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  Started on donepezil 3/13/2018 and her family has found this to be helful.  She had a consultation by Dr. Magana in the [facility] who corroborated her diagnosis.  He recommended continuing her medication, physical activity, social activity and mental activity to allow her to live her best life.      Hypertension Follow-up  Patient compliant taking Amlodipine 10 mg daily.    Do you check your blood pressure regularly outside of the clinic? No     Are you following a low salt diet? Yes    Are your blood pressures ever more than 140 on the top number (systolic) OR more   than 90 on the bottom number (diastolic), for example 140/90? No    Depression and Anxiety Follow-Up  Patient's son notes he and her home care staff notice she has been a little more anxious and down recently. She has started to pace more in the mornings and is hard to get out of bed. Patient reports she goes to bed around 10pm and wakes up around 9pm (pt's son reports the staff sometimes can't get her up until 11am). Patient thinks she is sleeping well during the night, but does not feel fully rested during the day. She is currently taking duloxetine 20 mg BID.     How are you doing  with your depression since your last visit? Worsened     How are you doing with your anxiety since your last visit?  Worsened     Are you having other symptoms that might be associated with depression or anxiety? Yes:  pacing more and being forgetful makes her more anxious    Have you had a significant life event? No     Do you have any concerns with your use of alcohol or other drugs? No    Social History     Tobacco Use     Smoking status: Former Smoker     Last attempt to quit: 1988     Years since quittin.4     Smokeless tobacco: Never Used     Tobacco comment: Quit in    Substance Use Topics     Alcohol use: No     Comment: none     Drug use: No     PHQ 2019   PHQ-9 Total Score 0 2 4   Q9: Thoughts of better off dead/self-harm past 2 weeks Not at all Not at all Not at all   F/U: Thoughts of suicide or self-harm - - -   F/U: Safety concerns - - -     FERNY-7 SCORE 2019   Total Score - - -   Total Score 0 0 6     Tinnitus:  Patient notes she has had ringing in both her ears for the past year. She denies it getting better or worse. She has tried removing her aspirin, but this did not improve. She also tried flonase w/o any improvement. She is wondering if there is anything else that can be done.     COPD Follow-Up  Patient compliant taking Stiolto as a controlled inhaler daily and albuterol PRN. She does not use her albuterol often. Previously managed by [facility]. Patient reports a cough that has been present for about 2 months that is not improving. It is mostly a dry cough, but she produces some yellow/green sputum on some days. Her cough is only in the mornings. She does take mucinex 600 mg BID. She denies any SOB, wheezing or chest pain.     Overall, how are your COPD symptoms since your last clinic visit?  Slightly worse    How much fatigue or shortness of breath do you have when you are walking?  Same as usual    How much shortness of breath do  "you have when you are resting?  None    How often do you cough? Sometimes    Have you noticed any change in your sputum/phlegm?  No    Have you experienced a recent fever? No    Have you had any Emergency Room Visits, Urgent Care Visits, or Hospital Admissions because of your COPD since your last office visit?  No    History   Smoking Status     Former Smoker     Quit date: 7/6/1988   Smokeless Tobacco     Never Used     Comment: Quit in 1988     Current providers sharing in care for this patient include:   Patient Care Team:  Leann Gordon PA-C as PCP - General (Physician Assistant)  Leann Gordon PA-C as Assigned PCP    The following health maintenance items are reviewed in Epic and correct as of today:  Health Maintenance   Topic Date Due     COPD ACTION PLAN  1935     ZOSTER IMMUNIZATION (2 of 3) 07/09/2009     FALL RISK ASSESSMENT  02/27/2020     PHQ-9  06/11/2020     MEDICARE ANNUAL WELLNESS VISIT  12/11/2020     BMP  12/11/2020     MICROALBUMIN  12/11/2020     ADVANCE CARE PLANNING  12/11/2024     DTAP/TDAP/TD IMMUNIZATION (3 - Td) 10/08/2028     DEXA  Completed     SPIROMETRY  Completed     DEPRESSION ACTION PLAN  Completed     INFLUENZA VACCINE  Completed     PNEUMOCOCCAL IMMUNIZATION 65+ LOW/MEDIUM RISK  Completed     IPV IMMUNIZATION  Aged Out     MENINGITIS IMMUNIZATION  Aged Out     Labs reviewed in EPIC      Review of Systems  Constitutional, HEENT, cardiovascular, pulmonary, GI, , musculoskeletal, neuro, skin, endocrine and psych systems are negative, except as otherwise noted.    OBJECTIVE:   /78 (BP Location: Right arm, Cuff Size: Adult Regular)   Pulse 102   Temp 98.3  F (36.8  C) (Oral)   Ht 1.6 m (5' 3\")   Wt 65.3 kg (144 lb)   SpO2 94%   BMI 25.51 kg/m   Estimated body mass index is 25.51 kg/m  as calculated from the following:    Height as of this encounter: 1.6 m (5' 3\").    Weight as of this encounter: 65.3 kg (144 lb).  Physical Exam  GENERAL: healthy, alert " and no distress  EYES: Eyes grossly normal to inspection, PERRL and conjunctivae and sclerae normal  HENT: ear canals and TM's normal, nose and mouth without ulcers or lesions  NECK: no adenopathy, no asymmetry, masses, or scars and thyroid normal to palpation  RESP: mild expiratory wheezes to bilateral upper lobes, but otherwise lungs clear to auscultation - no rales or rhonchi  CV: regular rate and rhythm, normal S1 S2, no S3 or S4, no murmur, click or rub, no peripheral edema and peripheral pulses strong  ABDOMEN: soft, nontender, no hepatosplenomegaly, no masses and bowel sounds normal  MS: no gross musculoskeletal defects noted, no edema  SKIN: no suspicious lesions or rashes  NEURO: Normal strength and tone, mentation intact and speech normal  PSYCH: mentation appears normal, affect normal/bright    Diagnostic Test Results:  Labs reviewed in Epic  Results for orders placed or performed in visit on 12/11/19 (from the past 24 hour(s))   CBC with platelets   Result Value Ref Range    WBC 6.4 4.0 - 11.0 10e9/L    RBC Count 5.15 3.8 - 5.2 10e12/L    Hemoglobin 15.1 11.7 - 15.7 g/dL    Hematocrit 44.9 35.0 - 47.0 %    MCV 87 78 - 100 fl    MCH 29.3 26.5 - 33.0 pg    MCHC 33.6 31.5 - 36.5 g/dL    RDW 13.3 10.0 - 15.0 %    Platelet Count 226 150 - 450 10e9/L       ASSESSMENT / PLAN:   Gifty was seen today for physical.    Diagnoses and all orders for this visit:    Routine general medical examination at a health care facility    Essential hypertension  Well controlled, will continue amlodipine 10 mg daily. Will repeat labs today.  -     Basic metabolic panel  (Ca, Cl, CO2, Creat, Gluc, K, Na, BUN)  -     Albumin Random Urine Quantitative with Creat Ratio  -     amLODIPine (NORVASC) 10 MG tablet; Take 1 tablet (10 mg) by mouth daily    Chronic obstructive pulmonary disease, unspecified COPD type (H)  Continue stiolto inhaler 2 puffs daily and mucinex 600 mg BID. Continue albuterol inhaler PRN, but advised her to use  nightly until her cough resolves.   -     tiotropium-olodaterol (STIOLTO RESPIMAT) 2.5-2.5 MCG/ACT AERS; Inhale 2 puffs into the lungs daily  -     guaiFENesin (MUCINEX) 600 MG 12 hr tablet; Take 2 tablets (1,200 mg) by mouth 2 times daily  -     albuterol (PROAIR HFA) 108 (90 Base) MCG/ACT inhaler; Inhale 2 puffs into the lungs every 6 hours as needed for shortness of breath / dyspnea or wheezing    Early onset Alzheimer's dementia without behavioral disturbance (H)  Continue donepezil 10 mg nightly. Patient continues to have wonderful support system with family and home care 6 days a week.  -     donepezil (ARICEPT) 10 MG tablet; Take 1 tablet (10 mg) by mouth At Bedtime    Acute bronchitis with symptoms > 10 days  Given patient's history of COPD and longevity of cough, will start her on azithromycin (z-pack) today. Advised patient to continue with her stiolto inhaler daily and start using her albuterol inhaler nightly. Advised a humidifier in her bedroom over night. Also continue mucinex 600 mg BID. Advised patient to RTC if symptoms worsen or do not improve over the next few weeks.   -     azithromycin (ZITHROMAX) 250 MG tablet; Two tablets first day, then one tablet daily for four days    Peptic ulcer  Well controlled, continue omeprazole 40 mg daily.   -     omeprazole (PRILOSEC) 40 MG DR capsule; Take 1 capsule (40 mg) by mouth daily 30 minutes before breakfast    Adjustment disorder with depressed mood, Major depressive disorder, recurrent episode, in full remission (H)  Continue duloxetine 20 mg BID. Will start trazodone 25 mg (patient instructed to take half a tablet of the 50 mg) at nighttime for sleep. Will see if this improves her sleep and improve her morning/daytime fatigue.   -     DULoxetine (CYMBALTA) 20 MG capsule; Take 1 capsule (20 mg) by mouth 2 times daily  -     traZODone (DESYREL) 50 MG tablet; Take 0.5 tablets (25 mg) by mouth At Bedtime For sleep/mood    Urinary urgency  Well controlled,  "continue vesicare 10 mg daily.   -     solifenacin (VESICARE) 10 MG tablet; Take 1 tablet (10 mg) by mouth every other day    Tinnitus of both ears  Since patient has failed treatment with flonase and removing her aspirin did not improve, discussed there is not a specific treatment that will likely be helpful. Discussed an ENT referral in the future if it continues to bother her, but patient would like to wait on this for now.     Other fatigue, Low ferritin  Continue iron supplements daily. Will recheck labs today.   -     Ferritin  -     CBC with platelets  -     DULoxetine (CYMBALTA) 20 MG capsule; Take 1 capsule (20 mg) by mouth 2 times daily    Lipid screening  -     Lipid panel reflex to direct LDL Fasting    Other orders  -     HC FLU VACCINE, INCREASED ANTIGEN, PRESV FREE [43049]      COUNSELING:  Reviewed preventive health counseling, as reflected in patient instructions       Regular exercise       Healthy diet/nutrition       Vision screening       Hearing screening    Estimated body mass index is 25.51 kg/m  as calculated from the following:    Height as of this encounter: 1.6 m (5' 3\").    Weight as of this encounter: 65.3 kg (144 lb).         reports that she quit smoking about 31 years ago. She has never used smokeless tobacco.      Appropriate preventive services were discussed with this patient, including applicable screening as appropriate for cardiovascular disease, diabetes, osteopenia/osteoporosis, and glaucoma.  As appropriate for age/gender, discussed screening for colorectal cancer, prostate cancer, breast cancer, and cervical cancer. Checklist reviewing preventive services available has been given to the patient.    Reviewed patients plan of care and provided an AVS. The Basic Care Plan (routine screening as documented in Health Maintenance) for Gifty meets the Care Plan requirement. This Care Plan has been established and reviewed with the Patient.    Counseling Resources:  ATP IV " Guidelines  Pooled Cohorts Equation Calculator  Breast Cancer Risk Calculator  FRAX Risk Assessment  ICSI Preventive Guidelines  Dietary Guidelines for Americans, 2010  USDA's MyPlate  ASA Prophylaxis  Lung CA Screening     [address] [address]  [email or web address]   Office: [phone]           Leann Gordon MS, PA-C    Leann Gordon, PACalliC  [facility]    Identified Health Risks:

## 2019-12-10 NOTE — PATIENT INSTRUCTIONS
Preventive Health Recommendations    See your health care provider every year to    Review health changes.     Discuss preventive care.      Review your medicines if your doctor has prescribed any.      You no longer need a yearly Pap test unless you've had an abnormal Pap test in the past 10 years. If you have vaginal symptoms, such as bleeding or discharge, be sure to talk with your provider about a Pap test.      Every 1 to 2 years, have a mammogram.  If you are over 69, talk with your health care provider about whether or not you want to continue having screening mammograms.      Every 10 years, have a colonoscopy. Or, have a yearly FIT test (stool test). These exams will check for colon cancer.       Have a cholesterol test every 5 years, or more often if your doctor advises it.       Have a diabetes test (fasting glucose) every three years. If you are at risk for diabetes, you should have this test more often.       At age 65, have a bone density scan (DEXA) to check for osteoporosis (brittle bone disease).    Shots:    Get a flu shot each year.    Get a tetanus shot every 10 years.    Talk to your doctor about your pneumonia vaccines. There are now two you should receive - Pneumovax (PPSV 23) and Prevnar (PCV 13).    Talk to your pharmacist about the shingles vaccine.    Talk to your doctor about the hepatitis B vaccine.    Nutrition:     Eat at least 5 servings of fruits and vegetables each day.      Eat whole-grain bread, whole-wheat pasta and brown rice instead of white grains and rice.      Get adequate about Calcium and Vitamin D.     Lifestyle    Exercise at least 150 minutes a week (30 minutes a day, 5 days a week). This will help you control your weight and prevent disease.      Limit alcohol to one drink per day.      No smoking.       Wear sunscreen to prevent skin cancer.       See your dentist twice a year for an exam and cleaning.      See your eye doctor every 1 to 2 years to screen for  conditions such as glaucoma, macular degeneration, cataracts, etc.    Personalized Prevention Plan  You are due for the preventive services outlined below.  Your care team is available to assist you in scheduling these services.  If you have already completed any of these items, please share that information with your care team to update in your medical record.    Health Maintenance Due   Topic Date Due     Kidney Microalbumin Urine Test  1935     COPD Action Plan  1935     Zoster (Shingles) Vaccine (2 of 3) 07/09/2009     Annual Wellness Visit  03/10/2018     Flu Vaccine (1) 09/01/2019     Basic Metabolic Panel  10/08/2019     Patient Education   Personalized Prevention Plan  You are due for the preventive services outlined below.  Your care team is available to assist you in scheduling these services.  If you have already completed any of these items, please share that information with your care team to update in your medical record.  Health Maintenance Due   Topic Date Due     Kidney Microalbumin Urine Test  1935     COPD Action Plan  1935     Zoster (Shingles) Vaccine (2 of 3) 07/09/2009     Annual Wellness Visit  03/10/2018     Flu Vaccine (1) 09/01/2019     Basic Metabolic Panel  10/08/2019

## 2019-12-11 ENCOUNTER — OFFICE VISIT (OUTPATIENT)
Dept: FAMILY MEDICINE | Facility: CLINIC | Age: 84
End: 2019-12-11
Payer: COMMERCIAL

## 2019-12-11 VITALS
DIASTOLIC BLOOD PRESSURE: 78 MMHG | SYSTOLIC BLOOD PRESSURE: 122 MMHG | TEMPERATURE: 98.3 F | HEART RATE: 102 BPM | HEIGHT: 63 IN | WEIGHT: 144 LBS | BODY MASS INDEX: 25.52 KG/M2 | OXYGEN SATURATION: 94 %

## 2019-12-11 DIAGNOSIS — K27.9 PEPTIC ULCER: ICD-10-CM

## 2019-12-11 DIAGNOSIS — J44.9 CHRONIC OBSTRUCTIVE PULMONARY DISEASE, UNSPECIFIED COPD TYPE (H): ICD-10-CM

## 2019-12-11 DIAGNOSIS — H93.13 TINNITUS OF BOTH EARS: ICD-10-CM

## 2019-12-11 DIAGNOSIS — R53.83 OTHER FATIGUE: ICD-10-CM

## 2019-12-11 DIAGNOSIS — Z13.220 LIPID SCREENING: ICD-10-CM

## 2019-12-11 DIAGNOSIS — F02.80 EARLY ONSET ALZHEIMER'S DEMENTIA WITHOUT BEHAVIORAL DISTURBANCE (H): ICD-10-CM

## 2019-12-11 DIAGNOSIS — Z00.00 ENCOUNTER FOR MEDICARE ANNUAL WELLNESS EXAM: ICD-10-CM

## 2019-12-11 DIAGNOSIS — R39.15 URINARY URGENCY: ICD-10-CM

## 2019-12-11 DIAGNOSIS — F43.21 ADJUSTMENT DISORDER WITH DEPRESSED MOOD: ICD-10-CM

## 2019-12-11 DIAGNOSIS — J20.9 ACUTE BRONCHITIS WITH SYMPTOMS > 10 DAYS: ICD-10-CM

## 2019-12-11 DIAGNOSIS — G30.0 EARLY ONSET ALZHEIMER'S DEMENTIA WITHOUT BEHAVIORAL DISTURBANCE (H): ICD-10-CM

## 2019-12-11 DIAGNOSIS — F33.42 MAJOR DEPRESSIVE DISORDER, RECURRENT EPISODE, IN FULL REMISSION (H): ICD-10-CM

## 2019-12-11 DIAGNOSIS — R79.0 LOW FERRITIN: ICD-10-CM

## 2019-12-11 DIAGNOSIS — I10 ESSENTIAL HYPERTENSION: ICD-10-CM

## 2019-12-11 DIAGNOSIS — Z00.00 ROUTINE GENERAL MEDICAL EXAMINATION AT A HEALTH CARE FACILITY: Primary | ICD-10-CM

## 2019-12-11 LAB
ERYTHROCYTE [DISTWIDTH] IN BLOOD BY AUTOMATED COUNT: 13.3 % (ref 10–15)
HCT VFR BLD AUTO: 44.9 % (ref 35–47)
HGB BLD-MCNC: 15.1 G/DL (ref 11.7–15.7)
MCH RBC QN AUTO: 29.3 PG (ref 26.5–33)
MCHC RBC AUTO-ENTMCNC: 33.6 G/DL (ref 31.5–36.5)
MCV RBC AUTO: 87 FL (ref 78–100)
PLATELET # BLD AUTO: 226 10E9/L (ref 150–450)
RBC # BLD AUTO: 5.15 10E12/L (ref 3.8–5.2)
WBC # BLD AUTO: 6.4 10E9/L (ref 4–11)

## 2019-12-11 PROCEDURE — 82728 ASSAY OF FERRITIN: CPT | Performed by: PHYSICIAN ASSISTANT

## 2019-12-11 PROCEDURE — 85027 COMPLETE CBC AUTOMATED: CPT | Performed by: PHYSICIAN ASSISTANT

## 2019-12-11 PROCEDURE — 82043 UR ALBUMIN QUANTITATIVE: CPT | Performed by: PHYSICIAN ASSISTANT

## 2019-12-11 PROCEDURE — 80048 BASIC METABOLIC PNL TOTAL CA: CPT | Performed by: PHYSICIAN ASSISTANT

## 2019-12-11 PROCEDURE — G0008 ADMIN INFLUENZA VIRUS VAC: HCPCS | Performed by: PHYSICIAN ASSISTANT

## 2019-12-11 PROCEDURE — 99397 PER PM REEVAL EST PAT 65+ YR: CPT | Mod: 25 | Performed by: PHYSICIAN ASSISTANT

## 2019-12-11 PROCEDURE — 90662 IIV NO PRSV INCREASED AG IM: CPT | Performed by: PHYSICIAN ASSISTANT

## 2019-12-11 PROCEDURE — 80061 LIPID PANEL: CPT | Performed by: PHYSICIAN ASSISTANT

## 2019-12-11 PROCEDURE — 99214 OFFICE O/P EST MOD 30 MIN: CPT | Mod: 25 | Performed by: PHYSICIAN ASSISTANT

## 2019-12-11 PROCEDURE — 36415 COLL VENOUS BLD VENIPUNCTURE: CPT | Performed by: PHYSICIAN ASSISTANT

## 2019-12-11 RX ORDER — DONEPEZIL HYDROCHLORIDE 10 MG/1
10 TABLET, FILM COATED ORAL AT BEDTIME
Qty: 90 TABLET | Refills: 3 | Status: SHIPPED | OUTPATIENT
Start: 2019-12-11 | End: 2020-12-26

## 2019-12-11 RX ORDER — TRAZODONE HYDROCHLORIDE 50 MG/1
25 TABLET, FILM COATED ORAL AT BEDTIME
Qty: 90 TABLET | Refills: 1 | Status: SHIPPED | OUTPATIENT
Start: 2019-12-11 | End: 2021-02-07

## 2019-12-11 RX ORDER — DULOXETIN HYDROCHLORIDE 20 MG/1
20 CAPSULE, DELAYED RELEASE ORAL 2 TIMES DAILY
Qty: 180 CAPSULE | Refills: 1 | Status: SHIPPED | OUTPATIENT
Start: 2019-12-11 | End: 2020-08-13

## 2019-12-11 RX ORDER — GUAIFENESIN 600 MG/1
1200 TABLET, EXTENDED RELEASE ORAL 2 TIMES DAILY
COMMUNITY
Start: 2019-12-11 | End: 2021-01-28

## 2019-12-11 RX ORDER — AZITHROMYCIN 250 MG/1
TABLET, FILM COATED ORAL
Qty: 6 TABLET | Refills: 0 | Status: SHIPPED | OUTPATIENT
Start: 2019-12-11 | End: 2020-07-23

## 2019-12-11 RX ORDER — GUAIFENESIN AND DEXTROMETHORPHAN HYDROBROMIDE 600; 30 MG/1; MG/1
1 TABLET, EXTENDED RELEASE ORAL EVERY 12 HOURS
COMMUNITY
End: 2020-07-23

## 2019-12-11 RX ORDER — ALBUTEROL SULFATE 90 UG/1
2 AEROSOL, METERED RESPIRATORY (INHALATION) EVERY 6 HOURS PRN
Qty: 8.5 G | Refills: 1 | Status: SHIPPED | OUTPATIENT
Start: 2019-12-11 | End: 2020-01-31

## 2019-12-11 RX ORDER — OMEPRAZOLE 40 MG/1
40 CAPSULE, DELAYED RELEASE ORAL DAILY
Qty: 90 CAPSULE | Refills: 3 | Status: SHIPPED | OUTPATIENT
Start: 2019-12-11 | End: 2020-12-26

## 2019-12-11 RX ORDER — SOLIFENACIN SUCCINATE 10 MG/1
10 TABLET, FILM COATED ORAL EVERY OTHER DAY
Qty: 90 TABLET | Refills: 3 | Status: SHIPPED | OUTPATIENT
Start: 2019-12-11 | End: 2020-02-20

## 2019-12-11 RX ORDER — AMLODIPINE BESYLATE 10 MG/1
10 TABLET ORAL DAILY
Qty: 90 TABLET | Refills: 3 | Status: SHIPPED | OUTPATIENT
Start: 2019-12-11 | End: 2020-12-26

## 2019-12-11 ASSESSMENT — ANXIETY QUESTIONNAIRES
3. WORRYING TOO MUCH ABOUT DIFFERENT THINGS: SEVERAL DAYS
2. NOT BEING ABLE TO STOP OR CONTROL WORRYING: SEVERAL DAYS
1. FEELING NERVOUS, ANXIOUS, OR ON EDGE: SEVERAL DAYS
IF YOU CHECKED OFF ANY PROBLEMS ON THIS QUESTIONNAIRE, HOW DIFFICULT HAVE THESE PROBLEMS MADE IT FOR YOU TO DO YOUR WORK, TAKE CARE OF THINGS AT HOME, OR GET ALONG WITH OTHER PEOPLE: SOMEWHAT DIFFICULT
7. FEELING AFRAID AS IF SOMETHING AWFUL MIGHT HAPPEN: SEVERAL DAYS
5. BEING SO RESTLESS THAT IT IS HARD TO SIT STILL: SEVERAL DAYS
GAD7 TOTAL SCORE: 6
6. BECOMING EASILY ANNOYED OR IRRITABLE: NOT AT ALL

## 2019-12-11 ASSESSMENT — PATIENT HEALTH QUESTIONNAIRE - PHQ9
5. POOR APPETITE OR OVEREATING: SEVERAL DAYS
SUM OF ALL RESPONSES TO PHQ QUESTIONS 1-9: 4

## 2019-12-11 ASSESSMENT — MIFFLIN-ST. JEOR: SCORE: 1077.31

## 2019-12-11 ASSESSMENT — ACTIVITIES OF DAILY LIVING (ADL): CURRENT_FUNCTION: HOUSEWORK REQUIRES ASSISTANCE

## 2019-12-12 LAB
ANION GAP SERPL CALCULATED.3IONS-SCNC: 7 MMOL/L (ref 3–14)
BUN SERPL-MCNC: 15 MG/DL (ref 7–30)
CALCIUM SERPL-MCNC: 9.3 MG/DL (ref 8.5–10.1)
CHLORIDE SERPL-SCNC: 104 MMOL/L (ref 94–109)
CHOLEST SERPL-MCNC: 271 MG/DL
CO2 SERPL-SCNC: 26 MMOL/L (ref 20–32)
CREAT SERPL-MCNC: 0.67 MG/DL (ref 0.52–1.04)
FERRITIN SERPL-MCNC: 90 NG/ML (ref 8–252)
GFR SERPL CREATININE-BSD FRML MDRD: 81 ML/MIN/{1.73_M2}
GLUCOSE SERPL-MCNC: 82 MG/DL (ref 70–99)
HDLC SERPL-MCNC: 58 MG/DL
LDLC SERPL CALC-MCNC: 180 MG/DL
NONHDLC SERPL-MCNC: 213 MG/DL
POTASSIUM SERPL-SCNC: 4.3 MMOL/L (ref 3.4–5.3)
SODIUM SERPL-SCNC: 137 MMOL/L (ref 133–144)
TRIGL SERPL-MCNC: 163 MG/DL

## 2019-12-12 ASSESSMENT — ANXIETY QUESTIONNAIRES: GAD7 TOTAL SCORE: 6

## 2019-12-13 LAB
CREAT UR-MCNC: 55 MG/DL
MICROALBUMIN UR-MCNC: 17 MG/L
MICROALBUMIN/CREAT UR: 30.84 MG/G CR (ref 0–25)

## 2019-12-13 NOTE — RESULT ENCOUNTER NOTE
Gayla  I have reviewed your recent labs. Here are the results:    -Normal red blood cell (hgb) levels, normal white blood cell count and normal platelet levels.  -LDL(bad) cholesterol level is elevated, and your triglycerides are elevated which can increase your heart disease risk.  A diet high in fat and simple carbohydrates, genetics and being overweight can contribute to this. ADVISE: exercising 150 minutes of aerobic exercise per week (30 minutes for 5 days per week or 50 minutes for 3 days per week are options), eating a low saturated fat/low carbohydrate diet, and omega-3 fatty acids (fish oil) 2641-8677 mg daily are helpful to improve this. In 6 months, we can recheck you levels.  -Kidney function is normal (Cr, GFR), Sodium is normal, Potassium is normal, Calcium is normal, Glucose is normal.   -Ferritin (iron) level is normal.  Continue your daily supplementation.  -Microalbumin (urine protein) level is VERY modestly elevated. This is suggestive of early damage to your kidneys from high blood pressure.  ADVISE: avoiding anti-inflamatory agents such as ibuprofen (Advil, Motrin) or naproxen (Aleve) as much as possible, keeping your blood pressure in a normal range, and staying well hydrated.  Also, this should be rechecked in 1 year.      If you have any questions please do not hesitate to contact our office via phone ([phone]) or MyChart.    Leann Gordon, MS, PA-C  [facility]

## 2019-12-27 ENCOUNTER — ALLIED HEALTH/NURSE VISIT (OUTPATIENT)
Dept: NURSING | Facility: CLINIC | Age: 84
End: 2019-12-27
Payer: COMMERCIAL

## 2019-12-27 DIAGNOSIS — Z23 NEED FOR VACCINATION: Primary | ICD-10-CM

## 2019-12-27 PROCEDURE — 90471 IMMUNIZATION ADMIN: CPT

## 2019-12-27 PROCEDURE — 90750 HZV VACC RECOMBINANT IM: CPT

## 2020-01-05 DIAGNOSIS — K27.9 PEPTIC ULCER: ICD-10-CM

## 2020-01-05 DIAGNOSIS — I10 ESSENTIAL HYPERTENSION: ICD-10-CM

## 2020-01-06 RX ORDER — AMLODIPINE BESYLATE 10 MG/1
TABLET ORAL
Qty: 30 TABLET | Refills: 0 | OUTPATIENT
Start: 2020-01-06

## 2020-01-06 RX ORDER — OMEPRAZOLE 40 MG/1
CAPSULE, DELAYED RELEASE ORAL
Qty: 30 CAPSULE | Refills: 0 | OUTPATIENT
Start: 2020-01-06

## 2020-01-06 NOTE — TELEPHONE ENCOUNTER
"Requested Prescriptions   Pending Prescriptions Disp Refills     amLODIPine (NORVASC) 10 MG tablet [Pharmacy Med Name: AMLODIPINE  Last Written Prescription Date:  12/11/19  Last Fill Quantity: 90,  # refills: 3   Last Office Visit: 12/11/2019   Future Office Visit:      BESYLATE 10MG TAB] 30 tablet 0     Sig: TAKE ONE TABLET BY MOUTH EVERY DAY *DUE FOR OFFICE VISIT*       Calcium Channel Blockers Protocol  Passed - 1/5/2020  4:08 AM        Passed - Blood pressure under 140/90 in past 12 months     BP Readings from Last 3 Encounters:   12/11/19 122/78   11/05/19 122/86   07/23/19 122/70           Passed - Recent (12 mo) or future (30 days) visit within the authorizing provider's specialty     Patient has had an office visit with the authorizing provider or a provider within the authorizing providers department within the previous 12 mos or has a future within next 30 days. See \"Patient Info\" tab in inbasket, or \"Choose Columns\" in Meds & Orders section of the refill encounter.            Passed - Medication is active on med list        Passed - Patient is age 18 or older        Passed - No active pregnancy on record        Passed - Normal serum creatinine on file in past 12 months     Recent Labs   Lab Test 12/11/19  0957  01/03/16  1945   CR 0.67   < >  --    CREAT  --   --  0.6    < > = values in this interval not displayed.           Passed - No positive pregnancy test in past 12 months        omeprazole (PRILOSEC) 40 MG DR capsule [Pharmacy Med Name:  Last Written Prescription Date:  12/11/19  Last Fill Quantity: 90,  # refills: 3   Last Office Visit: 12/11/2019   Future Office Visit:      OMEPRAZOLE 40MG CPDR] 30 capsule 0     Sig: TAKE ONE CAPSULE BY MOUTH EVERY DAY 30 MINUTES BEFORE BREAKFAST *DUE FOR OFFICE VISIT*       PPI Protocol Passed - 1/5/2020  4:08 AM        Passed - Not on Clopidogrel (unless Pantoprazole ordered)        Passed - No diagnosis of osteoporosis on record        Passed - Recent (12 mo) " "or future (30 days) visit within the authorizing provider's specialty     Patient has had an office visit with the authorizing provider or a provider within the authorizing providers department within the previous 12 mos or has a future within next 30 days. See \"Patient Info\" tab in inbasket, or \"Choose Columns\" in Meds & Orders section of the refill encounter.            Passed - Medication is active on med list        Passed - Patient is age 18 or older        Passed - No active pregnacy on record        Passed - No positive pregnancy test in past 12 months          "

## 2020-01-27 DIAGNOSIS — J44.9 CHRONIC OBSTRUCTIVE PULMONARY DISEASE, UNSPECIFIED COPD TYPE (H): ICD-10-CM

## 2020-01-27 NOTE — LETTER
[facility] - [address] [id number] [phone]    January 31, 2020    Gifty Villa  [address]  [facility] [address]      To Whom it May Concern:    Thank you for your refill request for your PROAIR  (90 Base) MCG/ACT inhaler.     After reviewing your chart, you are due for an updated ACT (Asthma Control Test), which is a questionnaire regarding the level of control of your asthma.     Please fill it out and send it back to me.     Thank you for your time.        Sincerely,    Leann Gordon PA-C / SEVEN, RN

## 2020-01-27 NOTE — TELEPHONE ENCOUNTER
"Requested Prescriptions   Pending Prescriptions Disp Refills     PROAIR  (90 Base) MCG/ACT inhaler [Pharmacy Med Name: PROAIR HFA 108MCG/ACT AERS]      Last Written Prescription Date:  12.11.19  Last Fill Quantity: 8.5 g,  # refills: 1   Last office visit: 12/11/2019 with prescribing provider:  HONEY Juárez         Future Office Visit:       8.5 g 1     Sig: INHALE TWO PUFFS BY MOUTH EVERY 6 HOURS AS NEEDED FOR SHORTNESS OF BREATH OR WHEEZING       Asthma Maintenance Inhalers - Anticholinergics Failed - 1/27/2020  8:46 AM        Failed - Asthma control assessment score within normal limits in last 6 months     Please review ACT score.     No flowsheet data found.            Passed - Patient is age 12 years or older        Passed - Medication is active on med list        Passed - Recent (6 mo) or future (30 days) visit within the authorizing provider's specialty     Patient had office visit in the last 6 months or has a visit in the next 30 days with authorizing provider or within the authorizing provider's specialty.  See \"Patient Info\" tab in inbasket, or \"Choose Columns\" in Meds & Orders section of the refill encounter.            "

## 2020-01-28 NOTE — TELEPHONE ENCOUNTER
Arlettie message sent to update ACT score    Ej Munson RN   [facility]

## 2020-01-31 RX ORDER — ALBUTEROL SULFATE 90 UG/1
AEROSOL, METERED RESPIRATORY (INHALATION)
Qty: 1 INHALER | Refills: 0 | Status: SHIPPED | OUTPATIENT
Start: 2020-01-31 | End: 2020-02-28

## 2020-01-31 NOTE — TELEPHONE ENCOUNTER
Patient has not responded to last 2 [employer] Messages    Attempt #3  Letter sent with ACT and self-addressed, stamped, return envelope    1 inhaler sent in the meantime.     Lola Hansen RN  [facility]

## 2020-02-19 DIAGNOSIS — R39.15 URINARY URGENCY: ICD-10-CM

## 2020-02-19 NOTE — TELEPHONE ENCOUNTER
"Requested Prescriptions   Pending Prescriptions Disp Refills     solifenacin (VESICARE) 10 MG PO tablet 90 tablet 3     Sig: Take 1 tablet (10 mg) by mouth every other day       Last Written Prescription Date:  12/11/2019  Last Fill Quantity: 90,  # refills: 3   Last office visit: 12/11/2019 with prescribing provider:     Future Office Visit:          Muscarinic Antagonists (Urinary Incontinence Agents) Passed - 2/19/2020  9:37 AM        Passed - Recent (12 mo) or future (30 days) visit within the authorizing provider's specialty     Patient has had an office visit with the authorizing provider or a provider within the authorizing providers department within the previous 12 mos or has a future within next 30 days. See \"Patient Info\" tab in inbasket, or \"Choose Columns\" in Meds & Orders section of the refill encounter.              Passed - Patient does not have a diagnosis of glaucoma on the problem list     If glaucoma diagnosis is new, refer refill to physician.          Passed - Medication is active on med list        Passed - Patient is 18 years of age or older        "

## 2020-02-19 NOTE — TELEPHONE ENCOUNTER
Reason for Call:  Medication or medication refill:    Do you use a [facility]?  Name of the pharmacy and phone number for the current request:     [id number] [place] [facility] [place], [place] - [address] [address] [facility] [address] [place]  [place], [place] - [address]    Name of the medication requested: solifenacin (VESICARE) 10 MG tablet    Other request: Pt is having symptoms worsening and want to go back to one full tablet instead of half     Can we leave a detailed message on this number? YES    Phone number patient can be reached at: Home number on file [phone] (home)    Best Time: anytime     Call taken on 2/19/2020 at 9:36 AM by Saray Swain

## 2020-02-20 RX ORDER — SOLIFENACIN SUCCINATE 10 MG/1
10 TABLET, FILM COATED ORAL EVERY OTHER DAY
Qty: 90 TABLET | Refills: 3 | Status: SHIPPED | OUTPATIENT
Start: 2020-02-20 | End: 2021-01-28

## 2020-02-20 NOTE — TELEPHONE ENCOUNTER
Prescription approved per [facility] Refill Protocol.    Ej Munson RN   [facility]

## 2020-03-21 ENCOUNTER — HOSPITAL ENCOUNTER (EMERGENCY)
Facility: CLINIC | Age: 85
Discharge: HOME | End: 2020-03-21
Attending: EMERGENCY MEDICINE | Admitting: EMERGENCY MEDICINE
Payer: COMMERCIAL

## 2020-03-21 ENCOUNTER — NURSE TRIAGE (OUTPATIENT)
Dept: NURSING | Facility: CLINIC | Age: 85
End: 2020-03-21

## 2020-03-21 VITALS
DIASTOLIC BLOOD PRESSURE: 100 MMHG | TEMPERATURE: 98.5 F | RESPIRATION RATE: 16 BRPM | SYSTOLIC BLOOD PRESSURE: 150 MMHG | OXYGEN SATURATION: 95 %

## 2020-03-21 DIAGNOSIS — R04.0 EPISTAXIS: ICD-10-CM

## 2020-03-21 DIAGNOSIS — J33.9 NASAL POLYP: ICD-10-CM

## 2020-03-21 LAB
APTT PPP: 35 SEC (ref 22–37)
BASOPHILS # BLD AUTO: 0 10E9/L (ref 0–0.2)
BASOPHILS NFR BLD AUTO: 0.4 %
DIFFERENTIAL METHOD BLD: NORMAL
EOSINOPHIL # BLD AUTO: 0.1 10E9/L (ref 0–0.7)
EOSINOPHIL NFR BLD AUTO: 1.6 %
ERYTHROCYTE [DISTWIDTH] IN BLOOD BY AUTOMATED COUNT: 13.3 % (ref 10–15)
HCT VFR BLD AUTO: 45.3 % (ref 35–47)
HGB BLD-MCNC: 14.8 G/DL (ref 11.7–15.7)
IMM GRANULOCYTES # BLD: 0 10E9/L (ref 0–0.4)
IMM GRANULOCYTES NFR BLD: 0.4 %
INR PPP: 0.95 (ref 0.86–1.14)
LYMPHOCYTES # BLD AUTO: 0.9 10E9/L (ref 0.8–5.3)
LYMPHOCYTES NFR BLD AUTO: 12.2 %
MCH RBC QN AUTO: 28.9 PG (ref 26.5–33)
MCHC RBC AUTO-ENTMCNC: 32.7 G/DL (ref 31.5–36.5)
MCV RBC AUTO: 89 FL (ref 78–100)
MONOCYTES # BLD AUTO: 0.7 10E9/L (ref 0–1.3)
MONOCYTES NFR BLD AUTO: 8.5 %
NEUTROPHILS # BLD AUTO: 5.9 10E9/L (ref 1.6–8.3)
NEUTROPHILS NFR BLD AUTO: 76.9 %
NRBC # BLD AUTO: 0 10*3/UL
NRBC BLD AUTO-RTO: 0 /100
PLATELET # BLD AUTO: 223 10E9/L (ref 150–450)
RBC # BLD AUTO: 5.12 10E12/L (ref 3.8–5.2)
WBC # BLD AUTO: 7.7 10E9/L (ref 4–11)

## 2020-03-21 PROCEDURE — 99283 EMERGENCY DEPT VISIT LOW MDM: CPT | Mod: 25

## 2020-03-21 PROCEDURE — 85025 COMPLETE CBC W/AUTO DIFF WBC: CPT | Performed by: EMERGENCY MEDICINE

## 2020-03-21 PROCEDURE — 85730 THROMBOPLASTIN TIME PARTIAL: CPT | Performed by: EMERGENCY MEDICINE

## 2020-03-21 PROCEDURE — 30901 CONTROL OF NOSEBLEED: CPT

## 2020-03-21 PROCEDURE — 25000125 ZZHC RX 250: Performed by: EMERGENCY MEDICINE

## 2020-03-21 PROCEDURE — 85610 PROTHROMBIN TIME: CPT | Performed by: EMERGENCY MEDICINE

## 2020-03-21 RX ORDER — OXYMETAZOLINE HYDROCHLORIDE 0.05 G/100ML
2 SPRAY NASAL ONCE
Status: COMPLETED | OUTPATIENT
Start: 2020-03-21 | End: 2020-03-21

## 2020-03-21 RX ADMIN — Medication 2 SPRAY: at 17:58

## 2020-03-21 ASSESSMENT — ENCOUNTER SYMPTOMS
LIGHT-HEADEDNESS: 0
DIZZINESS: 0

## 2020-03-21 NOTE — ED AVS SNAPSHOT
[facility]  [address]  [facility] [address]  Phone:  [phone]  Fax:  [phone]                                    Gifty Villa   MRN: [id number]    Department:  [facility]   Date of Visit:  3/21/2020           After Visit Summary Signature Page    I have received my discharge instructions, and my questions have been answered. I have discussed any challenges I see with this plan with the nurse or doctor.    ..........................................................................................................................................  Patient/Patient Representative Signature      ..........................................................................................................................................  Patient Representative Print Name and Relationship to Patient    ..................................................               ................................................  Date                                   Time    ..........................................................................................................................................  Reviewed by Signature/Title    ...................................................              ..............................................  Date                                               Time          22EPIC Rev 08/18

## 2020-03-21 NOTE — TELEPHONE ENCOUNTER
Nose has been bleeding for over an hour. Describes correct method of pressure. FNA advised they go to ER now. Caller voiced understand and will follow disposition.   Guerita Griffin, RN  [facility] Nurse Advisor       Reason for Disposition    [1] Bleeding present > 30 minutes AND [2] using correct method of direct pressure    Additional Information    Negative: Fainted or too weak to stand following large blood loss    Negative: Sounds like a life-threatening emergency to the triager    Negative: Nosebleed followed a nose injury    Protocols used: NOSEBLEED-A-

## 2020-03-21 NOTE — ED TRIAGE NOTES
Pt presents for complaint of a nose bleed that started several hours ago. Family states they were able to get the nose bleed to stop, but the patient blew her nose shortly after and the bleeding started again. Pt states she has had two large clots noted as well. Bleeding appears controlled at this time. Denies dizziness or light headedness. Hx of dementia. ABC intact, Alert and oriented x4.

## 2020-03-21 NOTE — ED PROVIDER NOTES
"  History     Chief Complaint:  Epistaxis      HPI   History limited secondary to the patient's dementia. History supplemented by daughter at bedside.  Gifty Villa is a 85 year old female with a history of hypertension, hyperlipidemia, and early onset Alzheimer's disease who presents to the ED with her daughter for evaluation of epistaxis. The patient's daughter states that 2-2.5 hours ago the patient began to bleed profusely from the bilateral nares. Her caretaker attempted to ice the area, tilted her head back, and had the patient pinch her nose without getting control of the bleeding. The patient's daughter states that she then \"coughed up\" a large blood clot, though the patient is unable to recall if this came from the mouth or nose. The patient ultimately came to the ED secondary to the severity of her epistaxis. Here she states that she feels well and denies any dizziness, lightheadedness, or loss of consciousness. The patient is not anticoagulated aside from a daily baby aspirin, and she is not prone to nosebleeds. No ENT surgeries.    Allergies:  NKDA      Medications:    Amlodipine  Aspirin 81 mg  Zyrtec  Mucinex  Aricept  Cymbalta  Prilosec  Proair  Solifenacin  Stiolto respimat  Trazodone  Vitron-C    Past Medical History:    Arthritis  Pulmonary nodule  RLS  Asthma  Essential hypertension  Hyperlipidemia   Urgency-frequency syndrome  RLQ abdominal mass  Lumbago  Anxiety  COPD (chronic obstructive pulmonary disease)  Hypervitaminosis B6  Early onset Alzheimer's dementia without behavioral disturbance   Peptic ulcer  Adjustment disorder with depressed mood    Past Surgical History:    Left patello-femoral TKA  Spinal fusion, anterior  Right ZEE  Vaginal hysterectomy  Cardiac catheterization  EGD  Excise ganglion wrist, left  [place] teeth removal    Family History:    Alzheimer's disease  CAD    Social History:  Smoking Status: Former smoker  Negative for alcohol use.  Negative for drug use.   Marital " Status:   [5]     Review of Systems   HENT: Positive for nosebleeds.    Neurological: Negative for dizziness and light-headedness.   All other systems reviewed and are negative.        Physical Exam     Patient Vitals for the past 24 hrs:   BP Temp Temp src Heart Rate Resp SpO2   03/21/20 1800 -- -- -- -- -- 95 %   03/21/20 1718 (!) 150/100 98.5  F (36.9  C) Oral 90 16 95 %        Physical Exam  I have reviewed the triage vital signs    Constitutional: Appears stated age  Eyes: No discharge, symmetrical lids  ENT: Moist mucous membranes, no ear discharge. Small 2 mm nasal polyp L interior nasal septum, scant slow oozing of red blood.  No blood posterior OP.  Neck: Full range of motion  Respiratory: CTAB, no wheezes  Cardiovascular: Regular rate and rhythm, no lower extremity edema  Chest: Equal rise  Gastrointestinal: Soft. Nondistended. NTTP. No rebound or guarding  Musculoskeletal: No gross deformities.   Skin: Warm and well perfused. No visible rash.  Neurologic: Moves all extremities, speech fluent without dysarthria  Psychiatric: Appropriate affect, alert and interactive. Pleasant.    Emergency Department Course   Laboratory:  CBC: WBC: 7.7, HGB: 14.8, PLT: 223  PTT: 35  INR: 0.95    Procedures:    Silver Nitrate Nasal Cautery     PROCEDURE NOTE: The patient's left nare was prepped with Afrin.  The location of the patient's bleeding in the left nare was identified and cauterized with silver nitrate.  The patient tolerated the procedure well and there were no complications.  He was observed in the emergency department following the procedure and had no recurrence of his bleeding.      Interventions:  1758 Afrin 0.05% 2 sprays Nasal     Emergency Department Course:  Nursing notes and vitals reviewed. (1740) I performed an exam of the patient as documented above.     Medicine administered as documented above. Blood drawn. This was sent to the lab for further testing, results above.    (1757) Nasal clamp was  applied at this time.     () I rechecked the patient, who was still bleeding slightly from the left nare.    () I performed a silver nitrate cautery, as noted above. There were no complications of the procedure.     Findings and plan explained to the Patient. Patient discharged home with instructions regarding supportive care, medications, and reasons to return. The importance of close follow-up was reviewed.    I personally reviewed the laboratory results with the Patient and answered all related questions prior to discharge.      Impression & Plan    Medical Decision MakinF presenting with epistaxis.  DDx includes but is not limited to anterior epistaxis, posterior epistaxis, anemia, coagulopathy.  Appears anterior on exam. No posterior OP blood seen.  Afrin and clamp used.  Post intervention, reexam with improved bleeding.  Small nasal polyp with active bleeding seen . Cauterized as above with silver nitrate.  Pt tolerated this well.  Bleeding controlled.  Labs obtained as above, reassuring.  Advised close follow-up with PCP for monitoring of nasal polyp and recheck.  Strict RTED precautions given.    Diagnosis:    ICD-10-CM    1. Epistaxis  R04.0    2. Nasal polyp  J33.9        Disposition:  discharged to home      Scribe Disclosure:  I, Miley Dee, am serving as a scribe on 3/21/2020 at 5:40 PM to personally document services performed by Ky Pryor MD based on my observations and the provider's statements to me.      Miley Dee  3/21/2020   [facility] EMERGENCY DEPARTMENT       Ky Pryor MD  20 2049

## 2020-05-15 DIAGNOSIS — J44.9 CHRONIC OBSTRUCTIVE PULMONARY DISEASE, UNSPECIFIED COPD TYPE (H): ICD-10-CM

## 2020-05-19 RX ORDER — TIOTROPIUM BROMIDE AND OLODATEROL 3.124; 2.736 UG/1; UG/1
SPRAY, METERED RESPIRATORY (INHALATION)
Qty: 4 G | Refills: 3 | Status: SHIPPED | OUTPATIENT
Start: 2020-05-19 | End: 2020-09-22

## 2020-07-21 ENCOUNTER — MYC MEDICAL ADVICE (OUTPATIENT)
Dept: FAMILY MEDICINE | Facility: CLINIC | Age: 85
End: 2020-07-21

## 2020-07-23 ENCOUNTER — VIRTUAL VISIT (OUTPATIENT)
Dept: FAMILY MEDICINE | Facility: CLINIC | Age: 85
End: 2020-07-23
Payer: COMMERCIAL

## 2020-07-23 DIAGNOSIS — J20.9 ACUTE BRONCHITIS WITH SYMPTOMS > 10 DAYS: ICD-10-CM

## 2020-07-23 DIAGNOSIS — J44.9 CHRONIC OBSTRUCTIVE PULMONARY DISEASE, UNSPECIFIED COPD TYPE (H): Primary | ICD-10-CM

## 2020-07-23 PROCEDURE — 99213 OFFICE O/P EST LOW 20 MIN: CPT | Mod: 95 | Performed by: NURSE PRACTITIONER

## 2020-07-23 RX ORDER — PREDNISONE 20 MG/1
40 TABLET ORAL DAILY
Qty: 10 TABLET | Refills: 0 | Status: SHIPPED | OUTPATIENT
Start: 2020-07-23 | End: 2020-07-28

## 2020-07-23 RX ORDER — AZITHROMYCIN 250 MG/1
TABLET, FILM COATED ORAL
Qty: 6 TABLET | Refills: 0 | Status: SHIPPED | OUTPATIENT
Start: 2020-07-23 | End: 2021-01-28

## 2020-07-23 NOTE — PROGRESS NOTES
"Gifty Villa is a 85 year old female who is being evaluated via a billable video visit.      The patient has been notified of following:     \"This video visit will be conducted via a call between you and your physician/provider. We have found that certain health care needs can be provided without the need for an in-person physical exam.  This service lets us provide the care you need with a video conversation.  If a prescription is necessary we can send it directly to your pharmacy.  If lab work is needed we can place an order for that and you can then stop by our lab to have the test done at a later time.    Video visits are billed at different rates depending on your insurance coverage.  Please reach out to your insurance provider with any questions.    If during the course of the call the physician/provider feels a video visit is not appropriate, you will not be charged for this service.\"    Patient has given verbal consent for Video visit? Yes  How would you like to obtain your AVS? NUVETAhart  If you are dropped from the video visit, the video invite should be resent to: Via [employer], Text- [phone]   Will anyone else be joining your video visit? Laila, Daughter in Law       Subjective     Gifty Villa is a 85 year old female who presents today via video visit for the following health issues:    HPI    Acute Illness   Acute illness concerns: Cough   Onset: 2-3 weeks     Fever: no    Chills/Sweats: no    Headache (location?): no    Sinus Pressure:no    Conjunctivitis:  no    Ear Pain: no    Rhinorrhea: no    Congestion: no    Sore Throat: no     Cough: YES - some times productive some times not. Its a mix.     Wheeze: no    Decreased Appetite: no    Nausea: no    Vomiting: no    Diarrhea:  no    Dysuria/Freq.: no    Fatigue/Achiness: no    Sick/Strep Exposure: no     Therapies Tried and outcome: Albuterol inhaler- effective, Zyrtec- not effective     Increase in cough for the past couple weeks. " Continues to use her inhaler as needed and daily inhaler as ordered. Takes Mucinex on a regular basis.     No increase in wheezing per report or SOB.          Video Start Time: 1:38 PM        Patient Active Problem List   Diagnosis     Backache     Urge incontinence     Essential hypertension     Aftercare following joint replacement     Knee joint replacement by other means     HYPERLIPIDEMIA LDL GOAL <130     Urinary incontinence     Urgency-frequency syndrome     Advanced directives, counseling/discussion     RLQ abdominal mass     Lumbago     Pain in joint, pelvic region and thigh     Anxiety     COPD (chronic obstructive pulmonary disease) (H)     Major depressive disorder, recurrent episode, in full remission (H)     Chronic obstructive pulmonary disease, unspecified COPD type (H) - followed by Dr. Alida P[facility]ARDO [facility]     Hypervitaminosis B6     Early onset Alzheimer's dementia without behavioral disturbance (H)     Peptic ulcer     Alzheimer's dementia without behavioral disturbance, unspecified timing of dementia onset (H)     Low ferritin     Low vitamin B12 level     Adjustment disorder with depressed mood     Other fatigue     Tinnitus of both ears     Urinary urgency     Past Surgical History:   Procedure Laterality Date     ARTHROPLASTY PATELLO-FEMORAL (KNEE)  8/2010    Left TKA     C SPINE FUSION,ANTER,3 SGMTS  1992 1992     C TOTAL HIP ARTHROPLASTY  1995    Hip Replacement, Total (Right)     C VAGINAL HYSTERECTOMY  1978    Hysterectomy, Vaginal w anterior repair     CARDIAC CATH - HIM SCAN  2011    normal.  Done d/t abnormal stress test (false positive) - patient noted to have hypertensive heart disease     COLONOSCOPY  ~2003    No polyps     COLONOSCOPY N/A 10/10/2014    Procedure: COLONOSCOPY;  Surgeon: Chavez Ward MD;  Location:  GI     ESOPHAGOSCOPY, GASTROSCOPY, DUODENOSCOPY (EGD), COMBINED  6/1/2016    Dr. Starla PICHARDO     ESOPHAGOSCOPY, GASTROSCOPY, DUODENOSCOPY (EGD), COMBINED N/A 6/1/2016     Procedure: COMBINED ESOPHAGOSCOPY, GASTROSCOPY, DUODENOSCOPY (EGD), BIOPSY SINGLE OR MULTIPLE;  Surgeon: Jada Cha MD;  Location: [facility] GI     EXCISE GANGLION WRIST Left 2015    Procedure: EXCISE GANGLION WRIST;  Surgeon: Justin Lyons MD;  Location: [facility] OR     HYSTERECTOMY, PAP NO LONGER INDICATED       wisdom teeth         Social History     Tobacco Use     Smoking status: Former Smoker     Last attempt to quit: 1988     Years since quittin.0     Smokeless tobacco: Never Used     Tobacco comment: Quit in    Substance Use Topics     Alcohol use: No     Comment: none     Family History   Problem Relation Age of Onset     Alzheimer Disease Mother          at 98,  of old age     Alzheimer Disease Father          at 78     C.A.D. Father      Cancer Other 90        unknown cancer     Alzheimer Disease Brother          age 82      Family History Negative Brother         Born 1931     Cancer Maternal Aunt         x'2 w Ca unknown etiology     Colon Cancer No family hx of          Current Outpatient Medications   Medication Sig Dispense Refill     amLODIPine (NORVASC) 10 MG tablet Take 1 tablet (10 mg) by mouth daily 90 tablet 3     ASPIRIN 81 MG OR TABS ONE DAILY 0 0     calcium carbonate-vitamin D 500-400 MG-UNIT TABS per tablet Take 1 tablet by mouth 2 times daily (with meals) 180 tablet 3     cetirizine (ZYRTEC) 10 MG tablet Take 1 tablet (10 mg) by mouth every evening 90 tablet 3     cholecalciferol (VITAMIN D3) 5000 UNITS TABS tablet Take 1 tablet (5,000 Units) by mouth daily 100 tablet 1     Cyanocobalamin (B-12) 1000 MCG TBCR Take 1,000 mcg by mouth daily 100 tablet 1     donepezil (ARICEPT) 10 MG tablet Take 1 tablet (10 mg) by mouth At Bedtime 90 tablet 3     DULoxetine (CYMBALTA) 20 MG capsule Take 1 capsule (20 mg) by mouth 2 times daily 180 capsule 1     guaiFENesin (MUCINEX) 600 MG 12 hr tablet Take 2 tablets (1,200 mg) by mouth 2 times daily       omeprazole  (PRILOSEC) 40 MG DR capsule Take 1 capsule (40 mg) by mouth daily 30 minutes before breakfast 90 capsule 3     PROAIR  (90 Base) MCG/ACT inhaler INHALE TWO PUFFS BY MOUTH EVERY 6 HOURS AS NEEDED FOR SHORTNESS OF BREATH OR WHEEZING - DUE FOR FOLLOW-UP 8.5 g 8     solifenacin (VESICARE) 10 MG PO tablet Take 1 tablet (10 mg) by mouth every other day 90 tablet 3     STIOLTO RESPIMAT 2.5-2.5 MCG/ACT AERS INHALE TWO PUFFS BY MOUTH EVERY DAY 4 g 3     traZODone (DESYREL) 50 MG tablet Take 0.5 tablets (25 mg) by mouth At Bedtime For sleep/mood 90 tablet 1     UNABLE TO FIND 2 times daily MEDICATION NAME: AREDS 2       VITRON-C  MG TABS tablet Take 1 tablet by mouth 2 times daily (with meals) 100 tablet 0     No Known Allergies    Reviewed and updated as needed this visit by Provider         Review of Systems   Constitutional, HEENT, cardiovascular, pulmonary, GI, , musculoskeletal, neuro, skin, endocrine and psych systems are negative, except as otherwise noted.      Objective             Physical Exam     GENERAL: Healthy, alert and no distress  EYES: Eyes grossly normal to inspection.  No discharge or erythema, or obvious scleral/conjunctival abnormalities.  HENT: Normal cephalic/atraumatic.  External ears, nose and mouth without ulcers or lesions.  No nasal drainage visible.  RESP: No audible wheeze, cough, or visible cyanosis.  No visible retractions or increased work of breathing.    SKIN: Visible skin clear. No significant rash, abnormal pigmentation or lesions.  NEURO: Cranial nerves grossly intact.  Mentation and speech appropriate for age.  PSYCH: Mentation appears normal for Gayla's baseline, affect normal/bright,  normal speech and appearance well-groomed.      Diagnostic Test Results:  Labs reviewed in Epic        Assessment & Plan     1. Chronic obstructive pulmonary disease, unspecified COPD type (H) - followed by Dr. Alida PICHARDO Lung  - predniSONE (DELTASONE) 20 MG tablet; Take 2 tablets (40 mg)  "by mouth daily for 5 days  Dispense: 10 tablet; Refill: 0    2. Acute bronchitis with symptoms > 10 days  Treat as COPD exacerbation today as below. Likely allergy/humidity induced. Follow for improvement. Continue other medications as ordered.   - predniSONE (DELTASONE) 20 MG tablet; Take 2 tablets (40 mg) by mouth daily for 5 days  Dispense: 10 tablet; Refill: 0  - azithromycin (ZITHROMAX) 250 MG tablet; Two tablets first day, then one tablet daily for four days  Dispense: 6 tablet; Refill: 0     BMI:   Estimated body mass index is 25.51 kg/m  as calculated from the following:    Height as of 12/11/19: 1.6 m (5' 3\").    Weight as of 12/11/19: 65.3 kg (144 lb).               Return in about 2 weeks (around 8/6/2020) for If acute symptoms are persistenting or worsening..    Nita Dsouza CNP  [facility]      Video-Visit Details    Type of service:  Video Visit    Video End Time:1:52 PM    Originating Location (pt. Location): Home    Distant Location (provider location):  [facility]     Platform used for Video Visit: [facility]    Return in about 2 weeks (around 8/6/2020) for If acute symptoms are persistenting or worsening..       Nita Dsouza CNP      "

## 2020-07-24 ASSESSMENT — ASTHMA QUESTIONNAIRES: ACT_TOTALSCORE: 20

## 2020-08-12 DIAGNOSIS — F43.21 ADJUSTMENT DISORDER WITH DEPRESSED MOOD: ICD-10-CM

## 2020-08-12 DIAGNOSIS — R53.83 OTHER FATIGUE: ICD-10-CM

## 2020-08-13 RX ORDER — DULOXETIN HYDROCHLORIDE 20 MG/1
CAPSULE, DELAYED RELEASE ORAL
Qty: 180 CAPSULE | Refills: 0 | Status: SHIPPED | OUTPATIENT
Start: 2020-08-13 | End: 2020-11-12

## 2020-08-13 NOTE — TELEPHONE ENCOUNTER
Medication is being filled for 1 time refill only due to:  Patient needs to be seen because due for med check, BP check.    Lola Hansen RN  [facility]

## 2020-09-19 DIAGNOSIS — J44.9 CHRONIC OBSTRUCTIVE PULMONARY DISEASE, UNSPECIFIED COPD TYPE (H): ICD-10-CM

## 2020-09-21 NOTE — TELEPHONE ENCOUNTER
Routing refill request to provider for review/approval because:    Long-Acting Beta Agonist Inhalers Protocol [id number]/21/2020 09:35 AM   Order for Serevent, Striverdi, or Foradil and pt has steroid inhaler

## 2020-09-22 RX ORDER — TIOTROPIUM BROMIDE AND OLODATEROL 3.124; 2.736 UG/1; UG/1
SPRAY, METERED RESPIRATORY (INHALATION)
Qty: 3 INHALER | Refills: 1 | Status: SHIPPED | OUTPATIENT
Start: 2020-09-22 | End: 2021-01-28

## 2020-11-11 ENCOUNTER — IMMUNIZATION (OUTPATIENT)
Dept: FAMILY MEDICINE | Facility: CLINIC | Age: 85
End: 2020-11-11
Payer: COMMERCIAL

## 2020-11-11 DIAGNOSIS — Z23 NEED FOR PROPHYLACTIC VACCINATION AND INOCULATION AGAINST INFLUENZA: Primary | ICD-10-CM

## 2020-11-11 PROCEDURE — 99207 PR NO CHARGE NURSE ONLY: CPT

## 2020-11-11 PROCEDURE — 90662 IIV NO PRSV INCREASED AG IM: CPT

## 2020-11-11 PROCEDURE — G0008 ADMIN INFLUENZA VIRUS VAC: HCPCS

## 2020-12-25 DIAGNOSIS — K27.9 PEPTIC ULCER: ICD-10-CM

## 2020-12-25 DIAGNOSIS — G30.0 EARLY ONSET ALZHEIMER'S DEMENTIA WITHOUT BEHAVIORAL DISTURBANCE (H): ICD-10-CM

## 2020-12-25 DIAGNOSIS — F02.80 EARLY ONSET ALZHEIMER'S DEMENTIA WITHOUT BEHAVIORAL DISTURBANCE (H): ICD-10-CM

## 2020-12-25 DIAGNOSIS — I10 ESSENTIAL HYPERTENSION: ICD-10-CM

## 2020-12-26 RX ORDER — DONEPEZIL HYDROCHLORIDE 10 MG/1
TABLET, FILM COATED ORAL
Qty: 90 TABLET | Refills: 0 | Status: SHIPPED | OUTPATIENT
Start: 2020-12-26 | End: 2021-01-28

## 2020-12-26 RX ORDER — OMEPRAZOLE 40 MG/1
CAPSULE, DELAYED RELEASE ORAL
Qty: 90 CAPSULE | Refills: 0 | Status: SHIPPED | OUTPATIENT
Start: 2020-12-26 | End: 2021-01-28

## 2020-12-27 RX ORDER — AMLODIPINE BESYLATE 10 MG/1
TABLET ORAL
Qty: 90 TABLET | Refills: 0 | Status: SHIPPED | OUTPATIENT
Start: 2020-12-27 | End: 2021-01-28

## 2020-12-28 NOTE — TELEPHONE ENCOUNTER
90 days sent to pharmacy.  Fasting physical due for further fills.       Leann Gordon MBA, MS, PA-C

## 2020-12-31 NOTE — TELEPHONE ENCOUNTER
Left detailed message for patient to call back and schedule an appointment or to schedule via my chart.    Leann Clveeland MA

## 2021-01-15 ENCOUNTER — HEALTH MAINTENANCE LETTER (OUTPATIENT)
Age: 86
End: 2021-01-15

## 2021-01-28 ENCOUNTER — OFFICE VISIT (OUTPATIENT)
Dept: FAMILY MEDICINE | Facility: CLINIC | Age: 86
End: 2021-01-28
Payer: COMMERCIAL

## 2021-01-28 VITALS
HEART RATE: 97 BPM | BODY MASS INDEX: 27.16 KG/M2 | SYSTOLIC BLOOD PRESSURE: 136 MMHG | HEIGHT: 62 IN | WEIGHT: 147.6 LBS | DIASTOLIC BLOOD PRESSURE: 80 MMHG | OXYGEN SATURATION: 96 % | TEMPERATURE: 97.3 F

## 2021-01-28 DIAGNOSIS — G30.0 EARLY ONSET ALZHEIMER'S DEMENTIA WITHOUT BEHAVIORAL DISTURBANCE (H): ICD-10-CM

## 2021-01-28 DIAGNOSIS — R79.89 LOW VITAMIN B12 LEVEL: ICD-10-CM

## 2021-01-28 DIAGNOSIS — R39.15 URINARY URGENCY: ICD-10-CM

## 2021-01-28 DIAGNOSIS — J44.9 CHRONIC OBSTRUCTIVE PULMONARY DISEASE, UNSPECIFIED COPD TYPE (H): ICD-10-CM

## 2021-01-28 DIAGNOSIS — Z00.00 ENCOUNTER FOR MEDICARE ANNUAL WELLNESS EXAM: Primary | ICD-10-CM

## 2021-01-28 DIAGNOSIS — I10 ESSENTIAL HYPERTENSION: ICD-10-CM

## 2021-01-28 DIAGNOSIS — K27.9 PEPTIC ULCER: ICD-10-CM

## 2021-01-28 DIAGNOSIS — F33.42 MAJOR DEPRESSIVE DISORDER, RECURRENT EPISODE, IN FULL REMISSION (H): ICD-10-CM

## 2021-01-28 DIAGNOSIS — F02.80 EARLY ONSET ALZHEIMER'S DEMENTIA WITHOUT BEHAVIORAL DISTURBANCE (H): ICD-10-CM

## 2021-01-28 DIAGNOSIS — R53.83 OTHER FATIGUE: ICD-10-CM

## 2021-01-28 DIAGNOSIS — R79.89 ELEVATED LFTS: ICD-10-CM

## 2021-01-28 DIAGNOSIS — M25.552 HIP PAIN, LEFT: ICD-10-CM

## 2021-01-28 DIAGNOSIS — F43.21 ADJUSTMENT DISORDER WITH DEPRESSED MOOD: ICD-10-CM

## 2021-01-28 DIAGNOSIS — D58.2 ELEVATED HEMOGLOBIN (H): ICD-10-CM

## 2021-01-28 DIAGNOSIS — R79.0 LOW FERRITIN: ICD-10-CM

## 2021-01-28 DIAGNOSIS — E78.5 HYPERLIPIDEMIA LDL GOAL <130: ICD-10-CM

## 2021-01-28 DIAGNOSIS — E87.6 HYPOKALEMIA: ICD-10-CM

## 2021-01-28 LAB
ERYTHROCYTE [DISTWIDTH] IN BLOOD BY AUTOMATED COUNT: 13.6 % (ref 10–15)
HCT VFR BLD AUTO: 47.8 % (ref 35–47)
HGB BLD-MCNC: 16.3 G/DL (ref 11.7–15.7)
MCH RBC QN AUTO: 29.1 PG (ref 26.5–33)
MCHC RBC AUTO-ENTMCNC: 34.1 G/DL (ref 31.5–36.5)
MCV RBC AUTO: 85 FL (ref 78–100)
PLATELET # BLD AUTO: 240 10E9/L (ref 150–450)
RBC # BLD AUTO: 5.61 10E12/L (ref 3.8–5.2)
WBC # BLD AUTO: 7.5 10E9/L (ref 4–11)

## 2021-01-28 PROCEDURE — 85027 COMPLETE CBC AUTOMATED: CPT | Performed by: PHYSICIAN ASSISTANT

## 2021-01-28 PROCEDURE — 82607 VITAMIN B-12: CPT | Performed by: PHYSICIAN ASSISTANT

## 2021-01-28 PROCEDURE — 80061 LIPID PANEL: CPT | Performed by: PHYSICIAN ASSISTANT

## 2021-01-28 PROCEDURE — 36415 COLL VENOUS BLD VENIPUNCTURE: CPT | Performed by: PHYSICIAN ASSISTANT

## 2021-01-28 PROCEDURE — 80053 COMPREHEN METABOLIC PANEL: CPT | Performed by: PHYSICIAN ASSISTANT

## 2021-01-28 PROCEDURE — 82728 ASSAY OF FERRITIN: CPT | Performed by: PHYSICIAN ASSISTANT

## 2021-01-28 PROCEDURE — 82043 UR ALBUMIN QUANTITATIVE: CPT | Performed by: PHYSICIAN ASSISTANT

## 2021-01-28 PROCEDURE — 99397 PER PM REEVAL EST PAT 65+ YR: CPT | Performed by: PHYSICIAN ASSISTANT

## 2021-01-28 RX ORDER — SOLIFENACIN SUCCINATE 10 MG/1
10 TABLET, FILM COATED ORAL EVERY OTHER DAY
Qty: 90 TABLET | Refills: 3 | Status: SHIPPED | OUTPATIENT
Start: 2021-01-28 | End: 2022-01-25

## 2021-01-28 RX ORDER — AMLODIPINE BESYLATE 10 MG/1
10 TABLET ORAL DAILY
Qty: 90 TABLET | Refills: 3 | Status: SHIPPED | OUTPATIENT
Start: 2021-01-28 | End: 2022-08-03

## 2021-01-28 RX ORDER — OMEPRAZOLE 40 MG/1
CAPSULE, DELAYED RELEASE ORAL
Qty: 90 CAPSULE | Refills: 1 | Status: SHIPPED | OUTPATIENT
Start: 2021-01-28 | End: 2023-06-13

## 2021-01-28 RX ORDER — DONEPEZIL HYDROCHLORIDE 10 MG/1
10 TABLET, FILM COATED ORAL AT BEDTIME
Qty: 90 TABLET | Refills: 1 | Status: SHIPPED | OUTPATIENT
Start: 2021-01-28 | End: 2022-08-03

## 2021-01-28 RX ORDER — ALBUTEROL SULFATE 90 UG/1
AEROSOL, METERED RESPIRATORY (INHALATION)
Qty: 8.5 G | Refills: 8 | Status: SHIPPED | OUTPATIENT
Start: 2021-01-28 | End: 2021-05-27

## 2021-01-28 RX ORDER — DULOXETIN HYDROCHLORIDE 20 MG/1
20 CAPSULE, DELAYED RELEASE ORAL 2 TIMES DAILY
Qty: 180 CAPSULE | Refills: 1 | Status: SHIPPED | OUTPATIENT
Start: 2021-01-28 | End: 2022-08-03

## 2021-01-28 RX ORDER — GUAIFENESIN 600 MG/1
1200 TABLET, EXTENDED RELEASE ORAL 2 TIMES DAILY
Start: 2021-01-28 | End: 2022-01-25

## 2021-01-28 RX ORDER — TIOTROPIUM BROMIDE AND OLODATEROL 3.124; 2.736 UG/1; UG/1
SPRAY, METERED RESPIRATORY (INHALATION)
Qty: 4 G | Refills: 1 | Status: SHIPPED | OUTPATIENT
Start: 2021-01-28 | End: 2021-05-27

## 2021-01-28 ASSESSMENT — ANXIETY QUESTIONNAIRES
1. FEELING NERVOUS, ANXIOUS, OR ON EDGE: NOT AT ALL
5. BEING SO RESTLESS THAT IT IS HARD TO SIT STILL: NOT AT ALL
2. NOT BEING ABLE TO STOP OR CONTROL WORRYING: NOT AT ALL
7. FEELING AFRAID AS IF SOMETHING AWFUL MIGHT HAPPEN: NOT AT ALL
6. BECOMING EASILY ANNOYED OR IRRITABLE: NOT AT ALL
3. WORRYING TOO MUCH ABOUT DIFFERENT THINGS: NOT AT ALL
GAD7 TOTAL SCORE: 0

## 2021-01-28 ASSESSMENT — MIFFLIN-ST. JEOR: SCORE: 1072.76

## 2021-01-28 ASSESSMENT — PATIENT HEALTH QUESTIONNAIRE - PHQ9
SUM OF ALL RESPONSES TO PHQ QUESTIONS 1-9: 0
5. POOR APPETITE OR OVEREATING: NOT AT ALL

## 2021-01-28 NOTE — PROGRESS NOTES
"  SUBJECTIVE:   Gifty Villa is a 85 year old female who presents for Preventive Visit.    Patient has been advised of split billing requirements and indicates understanding: Yes  Are you in the first 12 months of your Medicare Part B coverage?  No    Physical Health:    In general, how would you rate your overall physical health? good    Outside of work, how many days during the week do you exercise? none    Outside of work, approximately how many minutes a day do you exercise?not applicable    If you drink alcohol do you typically have >3 drinks per day or >7 drinks per week? No    Do you usually eat at least 4 servings of fruit and vegetables a day, include whole grains & fiber and avoid regularly eating high fat or \"junk\" foods? Yes    Do you have any problems taking medications regularly?  No    Do you have any side effects from medications? none    Needs assistance for the following daily activities: transportation, shopping, housework, laundry, money management and taking medicine    Which of the following safety concerns are present in your home?  lack of grab bars in the bathroom     Hearing impairment: No    In the past 6 months, have you been bothered by leaking of urine? no    Mental Health:    In general, how would you rate your overall mental or emotional health? good  PHQ-2 Score:      Do you feel safe in your environment? Yes    Have you ever done Advance Care Planning? (For example, a Health Directive, POLST, or a discussion with a medical provider or your loved ones about your wishes): Yes, patient states has an Advance Care Planning document and will bring a copy to the clinic.    Additional concerns to address?  YES    Fall risk:  Fallen 2 or more times in the past year?: No  Any fall with injury in the past year?: No    Cognitive Screenin) Repeat 3 items (Leader, Season, Table)    2) Clock draw: NORMAL  3) 3 item recall: Recalls NO objects   Results: 0 items recalled: PROBABLE " COGNITIVE IMPAIRMENT, **INFORM PROVIDER**    Mini-CogTM Copyright GIL Butler. Licensed by the author for use in [facility]; reprinted with permission (tree@[facility]). All rights reserved.      Do you have sleep apnea, excessive snoring or daytime drowsiness?: no        Depression and Anxiety Follow-Up  Duloxetine 20 mg twice daily.  Trazodone very rarely.    How are you doing with your depression since your last visit? No change    How are you doing with your anxiety since your last visit?  No change    Are you having other symptoms that might be associated with depression or anxiety? No    Have you had a significant life event? No     Do you have any concerns with your use of alcohol or other drugs? No    Social History     Tobacco Use     Smoking status: Former Smoker     Quit date: 1988     Years since quittin.5     Smokeless tobacco: Never Used     Tobacco comment: Quit in    Substance Use Topics     Alcohol use: No     Comment: none     Drug use: No     PHQ 2019   PHQ-9 Total Score 2 4 0   Q9: Thoughts of better off dead/self-harm past 2 weeks Not at all Not at all Not at all   F/U: Thoughts of suicide or self-harm - - -   F/U: Safety concerns - - -     FERNY-7 SCORE 2019   Total Score - - -   Total Score 0 6 0       Suicide Assessment Five-step Evaluation and Treatment (SAFE-T)    COPD Follow-Up  Using Stiolto every day.  Occasional use of albuterol.  Previously managed by [facility]. Patient reports a cough that has been present for about 2 months that is not improving. It is mostly a dry cough, but she produces some yellow/green sputum on some days. Her cough is only in the mornings. She does take mucinex 600 mg BID. She denies any SOB, wheezing or chest pain.    Overall, how are your COPD symptoms since your last clinic visit?  No change    How much fatigue or shortness of breath do you have when you are walking?  Same as usual    How  "much shortness of breath do you have when you are resting?  Same as usual    How often do you cough? Never    Have you noticed any change in your sputum/phlegm?  No    Have you experienced a recent fever? No    Please describe how far you can walk without stopping to rest:  2-5 blocks    How many flights of stairs are you able to walk up without stopping?  1    Have you had any Emergency Room Visits, Urgent Care Visits, or Hospital Admissions because of your COPD since your last office visit?  No     Tinnitus:  Patient notes she has had ringing in both her ears for the past year. She denies it getting better or worse. She has tried removing her aspirin, but this did not improve. She also tried flonase w/o any improvement.  We had referred her to ear nose and throat in 2019 and she states that she did complete this appointment, however, we never received records.  Release of information signed today.  She recalls that they told her there was \"nothing she can do \".    Left hip pain  The patient and her caregiver report that she complains and reports hip pain nearly every day.  She states that it is not severe.  She does not take any medication for it and has not tried anything topically.  She has had this hip replaced in the past.  She denies any trauma or falls.  This does not wake her from sleep.    Alzheimers  Patient compliant taking donepezil 10 mg nightly. Her son is out of state for the winter but is still actively involved in her care.  She does have caregivers that help her during the day but she is home alone at night.  No safety concerns at this time.. She had a formal neuropsychological consultation by Ramonita Finch, phD, LP, ABPP at [facility] in [place] on 2/7/2018 and 2/14/2018.  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  Started on donepezil 3/13/2018 and her family has found this to be helful.  She had a consultation by Dr. Magana in the [facility] who corroborated her diagnosis.  He recommended continuing her medication, physical activity, social activity and mental activity to allow her to live her best life.      History   Smoking Status     Former Smoker     Quit date: 1988   Smokeless Tobacco     Never Used     Comment: Quit in      Hypertension Follow-up  Amlodipine 10 mg daily    Outpatient blood pressures are not being checked.    Diet: no added salt  BP Readings from Last 3 Encounters:   21 136/80   20 (!) 150/100   19 122/78        Medication side effects: No        Reviewed and updated as needed this visit by clinical staff  Tobacco  Allergies  Meds  Problems  Med Hx  Surg Hx  Fam Hx          Reviewed and updated as needed this visit by Provider  Tobacco  Allergies  Meds  Problems  Med Hx  Surg Hx  Fam Hx         Social History     Tobacco Use     Smoking status: Former Smoker     Quit date: 1988     Years since quittin.5     Smokeless tobacco: Never Used     Tobacco comment: Quit in    Substance Use Topics     Alcohol use: No     Comment: none                           Current providers sharing in care for this patient include:   Patient Care Team:  Leann Gordon PA-C as PCP - General (Physician Assistant)  Nita Dsouza, CNP as Assigned PCP    The following health maintenance items are reviewed in Epic and correct as of today:  Health Maintenance   Topic Date Due     COPD ACTION PLAN  1935     VITAMIN B12  10/08/2019     FERRITIN  2020     FALL RISK ASSESSMENT  2020     ZOSTER IMMUNIZATION (3 of 3) 2020     PHQ-9  2021     MEDICARE ANNUAL WELLNESS VISIT  2022     CMP  2022     LIPID  2022     MICROALBUMIN  2022     ANNUAL REVIEW OF HM ORDERS   01/28/2022     CBC  01/28/2022     ADVANCE CARE PLANNING  01/28/2026     DEXA  03/29/2026     DTAP/TDAP/TD IMMUNIZATION (3 - Td) 10/08/2028     SPIROMETRY  Completed     DEPRESSION ACTION PLAN  Completed     INFLUENZA VACCINE  Completed     Pneumococcal Vaccine: 65+ Years  Completed     Pneumococcal Vaccine: Pediatrics (0 to 5 Years) and At-Risk Patients (6 to 64 Years)  Aged Out     IPV IMMUNIZATION  Aged Out     MENINGITIS IMMUNIZATION  Aged Out     HEPATITIS B IMMUNIZATION  Aged Out     BP Readings from Last 3 Encounters:   01/28/21 136/80   03/21/20 (!) 150/100   12/11/19 122/78    Wt Readings from Last 3 Encounters:   01/28/21 67 kg (147 lb 9.6 oz)   12/11/19 65.3 kg (144 lb)   11/05/19 64.9 kg (143 lb)                  Patient Active Problem List   Diagnosis     Backache     Urge incontinence     Essential hypertension     Aftercare following joint replacement     Knee joint replacement by other means     HYPERLIPIDEMIA LDL GOAL <130     Urinary incontinence     Urgency-frequency syndrome     Advanced directives, counseling/discussion     RLQ abdominal mass     Lumbago     Pain in joint, pelvic region and thigh     Anxiety     Major depressive disorder, recurrent episode, in full remission (H)     Chronic obstructive pulmonary disease, unspecified COPD type (H) - followed by Dr. [facility]     Hypervitaminosis B6     Early onset Alzheimer's dementia without behavioral disturbance (H)     Peptic ulcer     Alzheimer's dementia without behavioral disturbance, unspecified timing of dementia onset (H)     Low ferritin     Low vitamin B12 level     Adjustment disorder with depressed mood     Other fatigue     Tinnitus of both ears     Urinary urgency     Past Surgical History:   Procedure Laterality Date     ARTHROPLASTY PATELLO-FEMORAL (KNEE)  8/2010    Left TKA     C SPINE FUSION,ANTER,3 SGMTS  1992 1992     C TOTAL HIP ARTHROPLASTY  1995    Hip Replacement, Total (Right)     C VAGINAL HYSTERECTOMY  1978     Hysterectomy, Vaginal w anterior repair     CARDIAC CATH - HIM SCAN      normal.  Done d/t abnormal stress test (false positive) - patient noted to have hypertensive heart disease     COLONOSCOPY  ~    No polyps     COLONOSCOPY N/A 10/10/2014    Procedure: COLONOSCOPY;  Surgeon: Chavez Ward MD;  Location: [facility]     ESOPHAGOSCOPY, GASTROSCOPY, DUODENOSCOPY (EGD), COMBINED  2016    Dr. Cha [facility]     ESOPHAGOSCOPY, GASTROSCOPY, DUODENOSCOPY (EGD), COMBINED N/A 2016    Procedure: COMBINED ESOPHAGOSCOPY, GASTROSCOPY, DUODENOSCOPY (EGD), BIOPSY SINGLE OR MULTIPLE;  Surgeon: Jada Cha MD;  Location: [facility]     EXCISE GANGLION WRIST Left 2015    Procedure: EXCISE GANGLION WRIST;  Surgeon: Justin Lyons MD;  Location: [facility]     HYSTERECTOMY, PAP NO LONGER INDICATED       wisdom teeth         Social History     Tobacco Use     Smoking status: Former Smoker     Quit date: 1988     Years since quittin.5     Smokeless tobacco: Never Used     Tobacco comment: Quit in    Substance Use Topics     Alcohol use: No     Comment: none     Family History   Problem Relation Age of Onset     Alzheimer Disease Mother          at 98,  of old age     Alzheimer Disease Father          at 78     C.A.D. Father      Cancer Other 90        unknown cancer     Alzheimer Disease Brother          age 82      Family History Negative Brother         Born 1931     Cancer Maternal Aunt         x'2 w Ca unknown etiology     Colon Cancer No family hx of          Current Outpatient Medications   Medication Sig Dispense Refill     albuterol (PROAIR HFA) 108 (90 Base) MCG/ACT inhaler INHALE TWO PUFFS BY MOUTH EVERY 6 HOURS AS NEEDED FOR SHORTNESS OF BREATH OR WHEEZING - DUE FOR FOLLOW-UP 8.5 g 8     amLODIPine (NORVASC) 10 MG tablet Take 1 tablet (10 mg) by mouth daily 90 tablet 3     ASPIRIN 81 MG OR TABS ONE DAILY 0 0     calcium carbonate-vitamin D 500-400 MG-UNIT TABS per tablet Take  "1 tablet by mouth 2 times daily (with meals) 180 tablet 3     cetirizine (ZYRTEC) 10 MG tablet Take 1 tablet (10 mg) by mouth every evening 90 tablet 3     cholecalciferol (VITAMIN D3) 5000 UNITS TABS tablet Take 1 tablet (5,000 Units) by mouth daily 100 tablet 1     Cyanocobalamin (B-12) 1000 MCG TBCR Take 1,000 mcg by mouth daily 100 tablet 1     diclofenac (VOLTAREN) 1 % topical gel Apply 2 g topically 4 times daily To left hip 100 g 0     donepezil (ARICEPT) 10 MG tablet Take 1 tablet (10 mg) by mouth At Bedtime 90 tablet 1     DULoxetine (CYMBALTA) 20 MG capsule Take 1 capsule (20 mg) by mouth 2 times daily 180 capsule 1     guaiFENesin (MUCINEX) 600 MG 12 hr tablet Take 2 tablets (1,200 mg) by mouth 2 times daily       omeprazole (PRILOSEC) 40 MG DR capsule TAKE ONE CAPSULE BY MOUTH EVERY DAY 30 MINUTES BEFORE BREAKFAST 90 capsule 1     solifenacin (VESICARE) 10 MG tablet Take 1 tablet (10 mg) by mouth every other day 90 tablet 3     tiotropium-olodaterol (STIOLTO RESPIMAT) 2.5-2.5 MCG/ACT AERS INHALE TWO PUFFS BY MOUTH EVERY DAY 4 g 1     traZODone (DESYREL) 50 MG tablet Take 0.5 tablets (25 mg) by mouth At Bedtime For sleep/mood 90 tablet 1     UNABLE TO FIND 2 times daily MEDICATION NAME: AREDS 2       VITRON-C  MG TABS tablet Take 1 tablet by mouth 2 times daily (with meals) 100 tablet 0       ROS:  Constitutional, HEENT, cardiovascular, pulmonary, GI, , musculoskeletal, neuro, skin, endocrine and psych systems are negative, except as otherwise noted.    OBJECTIVE:   /80 (BP Location: Right arm, Patient Position: Sitting, Cuff Size: Adult Regular)   Pulse 97   Temp 97.3  F (36.3  C) (Tympanic)   Ht 1.575 m (5' 2\")   Wt 67 kg (147 lb 9.6 oz)   SpO2 96%   BMI 27.00 kg/m   Estimated body mass index is 27 kg/m  as calculated from the following:    Height as of this encounter: 1.575 m (5' 2\").    Weight as of this encounter: 67 kg (147 lb 9.6 oz).  EXAM:   GENERAL APPEARANCE: healthy, alert " and no distress  EYES: Eyes grossly normal to inspection, PERRL and conjunctivae and sclerae normal  HENT: ear canals and TM's normal, nose and mouth without ulcers or lesions, oropharynx clear and oral mucous membranes moist  NECK: no adenopathy, no asymmetry, masses, or scars and thyroid normal to palpation  RESP: lungs clear to auscultation - no rales, rhonchi or wheezes  CV: regular rate and rhythm, normal S1 S2, no S3 or S4, no murmur, click or rub, no peripheral edema and peripheral pulses strong  ABDOMEN: soft, nontender, no hepatosplenomegaly, no masses and bowel sounds normal  MS: Very slight tenderness over the greater trochanter of the left hip, no musculoskeletal defects are noted and gait is age appropriate without ataxia  SKIN: no suspicious lesions or rashes  NEURO: Normal strength and tone, sensory exam grossly normal, mentation intact and speech normal  PSYCH: mentation appears normal and affect normal/bright    Diagnostic Test Results:  Results for orders placed or performed in visit on 01/28/21 (from the past 24 hour(s))   CBC with platelets   Result Value Ref Range    WBC 7.5 4.0 - 11.0 10e9/L    RBC Count 5.61 (H) 3.8 - 5.2 10e12/L    Hemoglobin 16.3 (H) 11.7 - 15.7 g/dL    Hematocrit 47.8 (H) 35.0 - 47.0 %    MCV 85 78 - 100 fl    MCH 29.1 26.5 - 33.0 pg    MCHC 34.1 31.5 - 36.5 g/dL    RDW 13.6 10.0 - 15.0 %    Platelet Count 240 150 - 450 10e9/L       ASSESSMENT / PLAN:   Gifty was seen today for physical.    Diagnoses and all orders for this visit:    Encounter for Medicare annual wellness exam  -     REVIEW OF HEALTH MAINTENANCE PROTOCOL ORDERS    Major depressive disorder, recurrent episode, in full remission (H)  Adjustment disorder with depressed mood  Other fatigue  Stable.  Continue current regimen.  -     DULoxetine (CYMBALTA) 20 MG capsule; Take 1 capsule (20 mg) by mouth 2 times daily    Early onset Alzheimer's dementia without behavioral disturbance (H)  Stable.  Continue current  regimen.  -     donepezil (ARICEPT) 10 MG tablet; Take 1 tablet (10 mg) by mouth At Bedtime    Chronic obstructive pulmonary disease, unspecified COPD type (H)  Stable.  Continue current regimen.  -     tiotropium-olodaterol (STIOLTO RESPIMAT) 2.5-2.5 MCG/ACT AERS; INHALE TWO PUFFS BY MOUTH EVERY DAY  -     albuterol (PROAIR HFA) 108 (90 Base) MCG/ACT inhaler; INHALE TWO PUFFS BY MOUTH EVERY 6 HOURS AS NEEDED FOR SHORTNESS OF BREATH OR WHEEZING - DUE FOR FOLLOW-UP  -     guaiFENesin (MUCINEX) 600 MG 12 hr tablet; Take 2 tablets (1,200 mg) by mouth 2 times daily    Hyperlipidemia LDL goal <130  Managed with diet.  Labs for surveillance.  -     Lipid panel reflex to direct LDL Fasting    Peptic ulcer  Stable.  Continue current regimen.  -     omeprazole (PRILOSEC) 40 MG DR capsule; TAKE ONE CAPSULE BY MOUTH EVERY DAY 30 MINUTES BEFORE BREAKFAST    Essential hypertension  Stable.  Continue current regimen.  -     Albumin Random Urine Quantitative with Creat Ratio  -     Comprehensive metabolic panel (BMP + Alb, Alk Phos, ALT, AST, Total. Bili, TP)  -     amLODIPine (NORVASC) 10 MG tablet; Take 1 tablet (10 mg) by mouth daily    Low ferritin  Low vitamin B12 level  Continue supplementation.  Labs for surveillance.  -     CBC with platelets  -     Ferritin  -     Vitamin B12    Urinary urgency  Stable.  Continue current regimen.  -     solifenacin (VESICARE) 10 MG tablet; Take 1 tablet (10 mg) by mouth every other day    Hip pain, left  Symptoms appear consistent with bursitis.  Gayla prefers simple approaches to treatment.  We will do a trial of diclofenac topical gel.  If not improving would consider physical therapy.  She or her family will keep me informed of her response.  -     diclofenac (VOLTAREN) 1 % topical gel; Apply 2 g topically 4 times daily To left hip          Patient has been advised of split billing requirements and indicates understanding: Yes    COUNSELING:  Reviewed preventive health counseling, as  "reflected in patient instructions       Regular exercise       Healthy diet/nutrition    Estimated body mass index is 27 kg/m  as calculated from the following:    Height as of this encounter: 1.575 m (5' 2\").    Weight as of this encounter: 67 kg (147 lb 9.6 oz).    Weight management plan: Discussed healthy diet and exercise guidelines    She reports that she quit smoking about 32 years ago. She has never used smokeless tobacco.    Appropriate preventive services were discussed with this patient, including applicable screening as appropriate for cardiovascular disease, diabetes, osteopenia/osteoporosis, and glaucoma.  As appropriate for age/gender, discussed screening for colorectal cancer, prostate cancer, breast cancer, and cervical cancer. Checklist reviewing preventive services available has been given to the patient.    Reviewed patients plan of care and provided an AVS. The Basic Care Plan (routine screening as documented in Health Maintenance) for Gifty meets the Care Plan requirement. This Care Plan has been established and reviewed with the Patient and caregiver.    Counseling Resources:  ATP IV Guidelines  Pooled Cohorts Equation Calculator  Breast Cancer Risk Calculator  BRCA-Related Cancer Risk Assessment: FHS-7 Tool  FRAX Risk Assessment  ICSI Preventive Guidelines  Dietary Guidelines for Americans, 2010  USDA's MyPlate  ASA Prophylaxis  Lung CA Screening    Leann Gordon PA-C  [facility]  "

## 2021-01-28 NOTE — PATIENT INSTRUCTIONS
Patient Education   Personalized Prevention Plan  You are due for the preventive services outlined below.  Your care team is available to assist you in scheduling these services.  If you have already completed any of these items, please share that information with your care team to update in your medical record.  Health Maintenance Due   Topic Date Due     ANNUAL REVIEW OF HM ORDERS  1935     COPD Action Plan  1935     Depression Assessment  06/11/2020     Basic Metabolic Panel  12/11/2020     Kidney Microalbumin Urine Test  12/11/2020     FALL RISK ASSESSMENT  12/11/2020     Zoster (Shingles) Vaccine (3 of 3) 02/21/2020

## 2021-01-29 LAB
ALBUMIN SERPL-MCNC: 4.4 G/DL (ref 3.4–5)
ALP SERPL-CCNC: 101 U/L (ref 40–150)
ALT SERPL W P-5'-P-CCNC: 77 U/L (ref 0–50)
ANION GAP SERPL CALCULATED.3IONS-SCNC: 9 MMOL/L (ref 3–14)
AST SERPL W P-5'-P-CCNC: 42 U/L (ref 0–45)
BILIRUB SERPL-MCNC: 0.5 MG/DL (ref 0.2–1.3)
BUN SERPL-MCNC: 10 MG/DL (ref 7–30)
CALCIUM SERPL-MCNC: 10 MG/DL (ref 8.5–10.1)
CHLORIDE SERPL-SCNC: 103 MMOL/L (ref 94–109)
CHOLEST SERPL-MCNC: 277 MG/DL
CO2 SERPL-SCNC: 27 MMOL/L (ref 20–32)
CREAT SERPL-MCNC: 0.63 MG/DL (ref 0.52–1.04)
CREAT UR-MCNC: 27 MG/DL
FERRITIN SERPL-MCNC: 80 NG/ML (ref 8–252)
GFR SERPL CREATININE-BSD FRML MDRD: 82 ML/MIN/{1.73_M2}
GLUCOSE SERPL-MCNC: 100 MG/DL (ref 70–99)
HDLC SERPL-MCNC: 62 MG/DL
LDLC SERPL CALC-MCNC: 180 MG/DL
MICROALBUMIN UR-MCNC: 131 MG/L
MICROALBUMIN/CREAT UR: 483.4 MG/G CR (ref 0–25)
NONHDLC SERPL-MCNC: 215 MG/DL
POTASSIUM SERPL-SCNC: 3 MMOL/L (ref 3.4–5.3)
PROT SERPL-MCNC: 8.2 G/DL (ref 6.8–8.8)
SODIUM SERPL-SCNC: 139 MMOL/L (ref 133–144)
TRIGL SERPL-MCNC: 173 MG/DL
VIT B12 SERPL-MCNC: 1309 PG/ML (ref 193–986)

## 2021-01-29 ASSESSMENT — ANXIETY QUESTIONNAIRES: GAD7 TOTAL SCORE: 0

## 2021-02-01 NOTE — RESULT ENCOUNTER NOTE
Triage: please call family and discuss recommendations (pt has dementia)    Gayla  I have reviewed your recent labs. Here are the results:    -Normal red blood cell (hgb) levels, normal white blood cell count and normal platelet levels.  -LDL(bad) cholesterol level is elevated, and your triglycerides are elevated which can increase your heart disease risk.  A diet high in fat and simple carbohydrates, genetics and being overweight can contribute to this. ADVISE: exercising 150 minutes of aerobic exercise per week (30 minutes for 5 days per week or 50 minutes for 3 days per week are options), eating a low saturated fat/low carbohydrate diet, and omega-3 fatty acids (fish oil) 5216-4497 mg daily are helpful to improve this. In 12 months, you should recheck your fasting cholesterol panel by scheduling a lab-only appointment.  -Liver and gallbladder tests (ALT,AST, Alk phos,bilirubin) are very mildly elevated - avoid tylenol and alcohol use and recheck in 1-2 months with a lab only appointment  -Kidney function (GFR) is normal.  -Sodium is normal.  -Potassium is decreased.  Please increase your intake of potassium rich foods and recheck in 4 weeks.  (see below for potassium rich food list)  -Calcium is normal.  -Glucose (diabetic screening test) is normal.  -Ferritin (iron) level is normal.  Continue your current supplementation.  -Microalbumin (urine protein) level is elevated. This is suggestive of early damage to your kidneys from high blood pressure.  ADVISE: avoiding anti-inflamatory agents such as ibuprofen (Advil, Motrin) or naproxen (Aleve) as much as possible, keeping your blood pressure in a normal range, and rechecking your microalbumin level in 3-6 months.  -B12 level is high normal.  This is not to a concerning level, however.  I recommend decreasing your B12 supplement to every other day.    For additional lab test information, labtestsonline.org is an excellent reference.      If you have any questions  please do not hesitate to contact our office via phone ([phone]) or [email or web address].    Leann Gordon, MS, PA-C  [facility] [place]    High-potassium content foods  Highest content (>25 mEq/100 g)  Dried figs  Molasses  Seaweed  Very high content (>12.5 mEq/100 g)  Dried fruits (dates, prunes)  Nuts  Avocados  Bran cereals  Wheat germ  Lima beans     High content (>6.2 mEq/100 g)  Vegetables  Spinach  Tomatoes  Broccoli  Winter squash  Beets  Carrots  Cauliflower  Potatoes  Fruits  Bananas  Cantaloupe  Kiwis  Oranges  Mangos  Meats  Ground beef  Steak  Pork  Veal  Akers

## 2021-02-07 DIAGNOSIS — F43.21 ADJUSTMENT DISORDER WITH DEPRESSED MOOD: ICD-10-CM

## 2021-02-07 RX ORDER — TRAZODONE HYDROCHLORIDE 50 MG/1
TABLET, FILM COATED ORAL
Qty: 90 TABLET | Refills: 1 | Status: SHIPPED | OUTPATIENT
Start: 2021-02-07 | End: 2021-10-04

## 2021-02-17 ENCOUNTER — IMMUNIZATION (OUTPATIENT)
Dept: NURSING | Facility: CLINIC | Age: 86
End: 2021-02-17
Payer: COMMERCIAL

## 2021-02-17 PROCEDURE — 0001A PR COVID VAC PFIZER DIL RECON 30 MCG/0.3 ML IM: CPT

## 2021-02-17 PROCEDURE — 91300 PR COVID VAC PFIZER DIL RECON 30 MCG/0.3 ML IM: CPT

## 2021-03-10 ENCOUNTER — IMMUNIZATION (OUTPATIENT)
Dept: NURSING | Facility: CLINIC | Age: 86
End: 2021-03-10
Attending: INTERNAL MEDICINE
Payer: COMMERCIAL

## 2021-03-10 PROCEDURE — 91300 PR COVID VAC PFIZER DIL RECON 30 MCG/0.3 ML IM: CPT

## 2021-03-10 PROCEDURE — 0002A PR COVID VAC PFIZER DIL RECON 30 MCG/0.3 ML IM: CPT

## 2021-05-19 ENCOUNTER — APPOINTMENT (OUTPATIENT)
Dept: GENERAL RADIOLOGY | Facility: CLINIC | Age: 86
End: 2021-05-19
Attending: EMERGENCY MEDICINE
Payer: COMMERCIAL

## 2021-05-19 PROCEDURE — 93005 ELECTROCARDIOGRAM TRACING: CPT

## 2021-05-19 PROCEDURE — 99285 EMERGENCY DEPT VISIT HI MDM: CPT | Mod: 25

## 2021-05-19 PROCEDURE — 71046 X-RAY EXAM CHEST 2 VIEWS: CPT

## 2021-05-19 PROCEDURE — 94640 AIRWAY INHALATION TREATMENT: CPT

## 2021-05-20 ENCOUNTER — HOSPITAL ENCOUNTER (EMERGENCY)
Facility: CLINIC | Age: 86
Discharge: HOME | End: 2021-05-20
Attending: EMERGENCY MEDICINE | Admitting: EMERGENCY MEDICINE
Payer: COMMERCIAL

## 2021-05-20 VITALS
DIASTOLIC BLOOD PRESSURE: 85 MMHG | SYSTOLIC BLOOD PRESSURE: 193 MMHG | TEMPERATURE: 97.4 F | HEART RATE: 62 BPM | RESPIRATION RATE: 20 BRPM | OXYGEN SATURATION: 98 %

## 2021-05-20 DIAGNOSIS — J44.1 COPD EXACERBATION (H): ICD-10-CM

## 2021-05-20 LAB
ALBUMIN SERPL-MCNC: 3.9 G/DL (ref 3.4–5)
ALP SERPL-CCNC: 98 U/L (ref 40–150)
ALT SERPL W P-5'-P-CCNC: 28 U/L (ref 0–50)
ANION GAP SERPL CALCULATED.3IONS-SCNC: 5 MMOL/L (ref 3–14)
AST SERPL W P-5'-P-CCNC: 20 U/L (ref 0–45)
BASOPHILS # BLD AUTO: 0 10E9/L (ref 0–0.2)
BASOPHILS NFR BLD AUTO: 0.1 %
BILIRUB SERPL-MCNC: 0.5 MG/DL (ref 0.2–1.3)
BUN SERPL-MCNC: 13 MG/DL (ref 7–30)
CALCIUM SERPL-MCNC: 9.5 MG/DL (ref 8.5–10.1)
CHLORIDE SERPL-SCNC: 97 MMOL/L (ref 94–109)
CO2 SERPL-SCNC: 31 MMOL/L (ref 20–32)
CREAT SERPL-MCNC: 0.72 MG/DL (ref 0.52–1.04)
DIFFERENTIAL METHOD BLD: NORMAL
EOSINOPHIL # BLD AUTO: 0.2 10E9/L (ref 0–0.7)
EOSINOPHIL NFR BLD AUTO: 2.4 %
ERYTHROCYTE [DISTWIDTH] IN BLOOD BY AUTOMATED COUNT: 13.3 % (ref 10–15)
GFR SERPL CREATININE-BSD FRML MDRD: 76 ML/MIN/{1.73_M2}
GLUCOSE SERPL-MCNC: 129 MG/DL (ref 70–99)
HCT VFR BLD AUTO: 42.9 % (ref 35–47)
HGB BLD-MCNC: 14.4 G/DL (ref 11.7–15.7)
IMM GRANULOCYTES # BLD: 0.1 10E9/L (ref 0–0.4)
IMM GRANULOCYTES NFR BLD: 0.8 %
INTERPRETATION ECG - MUSE: NORMAL
LYMPHOCYTES # BLD AUTO: 0.9 10E9/L (ref 0.8–5.3)
LYMPHOCYTES NFR BLD AUTO: 13.1 %
MCH RBC QN AUTO: 29.2 PG (ref 26.5–33)
MCHC RBC AUTO-ENTMCNC: 33.6 G/DL (ref 31.5–36.5)
MCV RBC AUTO: 87 FL (ref 78–100)
MONOCYTES # BLD AUTO: 0.9 10E9/L (ref 0–1.3)
MONOCYTES NFR BLD AUTO: 12.3 %
NEUTROPHILS # BLD AUTO: 5.1 10E9/L (ref 1.6–8.3)
NEUTROPHILS NFR BLD AUTO: 71.3 %
NRBC # BLD AUTO: 0 10*3/UL
NRBC BLD AUTO-RTO: 0 /100
PLATELET # BLD AUTO: 279 10E9/L (ref 150–450)
POTASSIUM SERPL-SCNC: 4.4 MMOL/L (ref 3.4–5.3)
PROT SERPL-MCNC: 7.5 G/DL (ref 6.8–8.8)
RBC # BLD AUTO: 4.93 10E12/L (ref 3.8–5.2)
SODIUM SERPL-SCNC: 133 MMOL/L (ref 133–144)
TROPONIN I SERPL-MCNC: <0.015 UG/L (ref 0–0.04)
WBC # BLD AUTO: 7.1 10E9/L (ref 4–11)

## 2021-05-20 PROCEDURE — 999N000105 HC STATISTIC NO DOCUMENTATION TO SUPPORT CHARGE

## 2021-05-20 PROCEDURE — 80053 COMPREHEN METABOLIC PANEL: CPT | Performed by: EMERGENCY MEDICINE

## 2021-05-20 PROCEDURE — 85025 COMPLETE CBC W/AUTO DIFF WBC: CPT | Performed by: EMERGENCY MEDICINE

## 2021-05-20 PROCEDURE — 250N000009 HC RX 250: Performed by: EMERGENCY MEDICINE

## 2021-05-20 PROCEDURE — 250N000012 HC RX MED GY IP 250 OP 636 PS 637: Performed by: EMERGENCY MEDICINE

## 2021-05-20 PROCEDURE — 84484 ASSAY OF TROPONIN QUANT: CPT | Performed by: EMERGENCY MEDICINE

## 2021-05-20 RX ORDER — PREDNISONE 20 MG/1
TABLET ORAL
Qty: 10 TABLET | Refills: 0 | Status: SHIPPED | OUTPATIENT
Start: 2021-05-20 | End: 2021-05-27

## 2021-05-20 RX ORDER — PREDNISONE 20 MG/1
40 TABLET ORAL ONCE
Status: COMPLETED | OUTPATIENT
Start: 2021-05-20 | End: 2021-05-20

## 2021-05-20 RX ORDER — IPRATROPIUM BROMIDE AND ALBUTEROL SULFATE 2.5; .5 MG/3ML; MG/3ML
3 SOLUTION RESPIRATORY (INHALATION) ONCE
Status: COMPLETED | OUTPATIENT
Start: 2021-05-20 | End: 2021-05-20

## 2021-05-20 RX ADMIN — PREDNISONE 40 MG: 20 TABLET ORAL at 02:59

## 2021-05-20 RX ADMIN — IPRATROPIUM BROMIDE AND ALBUTEROL SULFATE 3 ML: .5; 3 SOLUTION RESPIRATORY (INHALATION) at 02:59

## 2021-05-20 ASSESSMENT — ENCOUNTER SYMPTOMS
SHORTNESS OF BREATH: 1
CHEST TIGHTNESS: 1
MYALGIAS: 0

## 2021-05-20 NOTE — ED TRIAGE NOTES
Pt presents with chest tightness and cough that started this evening. Pt has hx of COPD and used inhaler - not helpful. ABCs intact. A&Ox3.

## 2021-05-20 NOTE — DISCHARGE INSTRUCTIONS
You have improved with prednisone and a DuoNeb inhaler.  Please an alternate proair with Combivent.  Use prednisone daily for 5 days return to the emergency room with chest pain or increased shortness of breath.

## 2021-05-20 NOTE — ED PROVIDER NOTES
History   Chief Complaint:  Shortness of Breath    The history is provided by the patient.      Gifty Villa is a 86 year old female with history of asthma, COPD, hypertension, and pulmonary nodules who presents with shortness of breath. The patient reports that she has been experiencing shortness of breath for the past couple of days, but just tonight her chest began to feel tight, causing her concern. The patient says she has taken two doses of albuterol tonight. The patient says she has a chronic intermittent nonproductive cough. The patient is not on at-home oxygen for her COPD. The patient denies chest pain, leg swelling, leg pain, or any recent history of surgery. She does not have a history of blood clots.     Review of Systems   Respiratory: Positive for chest tightness and shortness of breath.    Cardiovascular: Negative for chest pain and leg swelling.   Musculoskeletal: Negative for myalgias.   All other systems reviewed and are negative.    Allergies:  Grass    Medications:  Albuterol  Amlodipine  Aspirin 81 mg  Zyrtec  Donepezil  Cymbalta  Mucinex  Omeprazole  Vesicare  Stiolto respimat  Desyrel  Vitron-C    Past Medical History:    COPD  Hypertension  Hyperlipidemia  Pulmonary nodule  Asthma  Adjustment disorder with depressed mood  Tinnitus of both ears  Low ferritin  Alzheimer's dementia without behavioral disturbance  Peptic ulcer   Chronic obstructive pulmonary disease   RLQ abdominal mass     Past Surgical History:    Arthroplasty patello-femoral  Spine fusion  Total hip arthroplasty  Hysterectomy  Cardiac cath  Colonoscopy x2  EGD combined x2  Excise ganglion cyst  [place] teeth extraction    Family History:    Mother: Alzheimer disease, CAD  Brother: Alzheimer disease    Social History:  Patient presents to the ED with daughter.     Physical Exam     Patient Vitals for the past 24 hrs:   BP Temp Temp src Pulse Resp SpO2   05/20/21 0326 (!) 193/85 -- -- 62 20 98 %   05/20/21 0245 (!)  198/103 -- -- 62 20 99 %   05/20/21 0219 (!) 193/97 -- -- 69 20 97 %   05/19/21 2256 (!) 212/106 -- -- -- -- --   05/19/21 2254 -- -- -- 77 -- --   05/19/21 2252 -- 97.4  F (36.3  C) Temporal -- 16 98 %       Physical Exam  Vitals signs reviewed.   HENT:      Head: Normocephalic.      Mouth/Throat:      Mouth: Mucous membranes are moist.   Cardiovascular:      Rate and Rhythm: Normal rate and regular rhythm.   Pulmonary:      Effort: Pulmonary effort is normal.      Breath sounds: Wheezing present.   Musculoskeletal: Normal range of motion.   Skin:     General: Skin is warm.      Capillary Refill: Capillary refill takes less than 2 seconds.   Neurological:      General: No focal deficit present.      Mental Status: She is alert.           Emergency Department Course   ECG  ECG taken at 2301, ECG read at 0324  Normal sinus rhythm. Normal ECG   Rate 70 bpm. ND interval 192 ms. QRS duration 80 ms. QT/QTc 430 ms. P-R-T axes 464 80 84.     Imaging:  XR Chest, G/E 2 views:   Hazy opacity near the cardiac apex likely represents prominent pericardial fat pad. No evidence for CHF or pneumonia. Normal heart size. No pleural effusion or pneumothorax. As per radiology.    Laboratory:  CBC: WBC 7.1, HGB 14.4,     BMP: Glucose: 129 (H) o/w WNL (Creatinine 0.72)    Troponin (Collected 0002): <0.015     Emergency Department Course:    Reviewed:  I reviewed nursing notes, vitals, past medical history and care everywhere    Assessments:  0245 I obtained history and examined the patient as noted above.     0413 I rechecked the patient and explained findings.     Interventions:  0259 Duoneb 3 mL Nebulization  0259 Deltasone 40 mg PO    Disposition:  The patient was discharged to home.     Impression & Plan     Medical Decision Making:  Patient presents with shortness of breath though does not seem to be either tachypneic or hypoxic.  Patient is well-appearing but due to age extensive testing was not entertained including EKG and  troponin as well as chest x-ray.  No identified problems existed after 1 neb and prednisone patient felt much better doubt pulmonary embolism due to history of COPD and wheezing on exam care was discussed with the family will discharge with a course of prednisone and offer a follow-up with duo nebs and follow-up with primary care for reassessment return with worsening condition peer    Diagnosis:    ICD-10-CM    1. COPD exacerbation (H)  J44.1        Discharge Medications:  Discharge Medication List as of 5/20/2021  4:16 AM      START taking these medications    Details   ipratropium-albuterol (COMBIVENT RESPIMAT)  MCG/ACT inhaler Inhale 1 puff into the lungs 4 times daily, Disp-4 g, R-0, Local Print      predniSONE (DELTASONE) 20 MG tablet Take two tablets (= 40mg) each day for 5 (five) days, Disp-10 tablet, R-0, Local Print             Scribe Disclosure:  IMadhu, am serving as a scribe at 2:45 AM on 5/20/2021 to document services personally performed by Christos Morton MD based on my observations and the provider's statements to me.     IBrent, am serving as a scribe  at 5:47 AM on 5/20/2021 to document services personally performed by Gopal Morton MD based on my observations and the provider's statements to me.                Christos Morton MD  05/22/21 1869

## 2021-05-27 ENCOUNTER — OFFICE VISIT (OUTPATIENT)
Dept: FAMILY MEDICINE | Facility: CLINIC | Age: 86
End: 2021-05-27
Payer: COMMERCIAL

## 2021-05-27 VITALS
HEIGHT: 62 IN | HEART RATE: 85 BPM | WEIGHT: 150 LBS | RESPIRATION RATE: 20 BRPM | SYSTOLIC BLOOD PRESSURE: 132 MMHG | TEMPERATURE: 96.8 F | BODY MASS INDEX: 27.6 KG/M2 | OXYGEN SATURATION: 97 % | DIASTOLIC BLOOD PRESSURE: 80 MMHG

## 2021-05-27 DIAGNOSIS — I10 ESSENTIAL HYPERTENSION: ICD-10-CM

## 2021-05-27 DIAGNOSIS — J44.9 CHRONIC OBSTRUCTIVE PULMONARY DISEASE, UNSPECIFIED COPD TYPE (H): Primary | ICD-10-CM

## 2021-05-27 PROCEDURE — 99214 OFFICE O/P EST MOD 30 MIN: CPT | Performed by: PHYSICIAN ASSISTANT

## 2021-05-27 RX ORDER — ALBUTEROL SULFATE 90 UG/1
1-2 AEROSOL, METERED RESPIRATORY (INHALATION) EVERY 4 HOURS PRN
Qty: 8.5 G | Refills: 8 | Status: SHIPPED | OUTPATIENT
Start: 2021-05-27 | End: 2022-10-25

## 2021-05-27 RX ORDER — TIOTROPIUM BROMIDE AND OLODATEROL 3.124; 2.736 UG/1; UG/1
SPRAY, METERED RESPIRATORY (INHALATION)
Qty: 4 G | Refills: 3 | Status: SHIPPED | OUTPATIENT
Start: 2021-05-27 | End: 2022-01-25

## 2021-05-27 RX ORDER — IPRATROPIUM BROMIDE AND ALBUTEROL SULFATE 2.5; .5 MG/3ML; MG/3ML
1 SOLUTION RESPIRATORY (INHALATION) EVERY 6 HOURS PRN
Qty: 30 ML | Refills: 1 | Status: SHIPPED | OUTPATIENT
Start: 2021-05-27 | End: 2022-12-05

## 2021-05-27 ASSESSMENT — MIFFLIN-ST. JEOR: SCORE: 1078.65

## 2021-05-27 NOTE — PROGRESS NOTES
Assessment & Plan     Chronic obstructive pulmonary disease, unspecified COPD type (H)   Improved.  Advised not to take the Stiolto inhaler and the Combivent Respimat at the same time as they are the same medication just different formulations for longevity.  Patient and daughter-in-law voiced understanding and agreement.  Will send over prescription for patient to obtain a nebulizer, mask, and tubing and will send a prescription over for ipratropium/albuterol to be used during severe exacerbations.  - tiotropium-olodaterol (STIOLTO RESPIMAT) 2.5-2.5 MCG/ACT AERS  Dispense: 4 g; Refill: 3  - albuterol (PROAIR HFA) 108 (90 Base) MCG/ACT inhaler  Dispense: 8.5 g; Refill: 8  - ipratropium - albuterol 0.5 mg/2.5 mg/3 mL (DUONEB) 0.5-2.5 (3) MG/3ML neb solution  Dispense: 30 mL; Refill: 1  - Nebulizer and Supplies Order for DME - ONLY FOR DME    Essential hypertension  Normotensive today.  Continue current amlodipine 10 mg regimen.       Return in about 8 months (around 1/28/2022) for Physical Exam, Fasting labs, Medication recheck.    Leann Gordon PA-C  [facility]    Clifford Shukla is a 86 year old who presents for the following health issues  accompanied by her daughter-in-law:    [facility]     ED/UC Followup:     Facility:  [facility]  Date of visit: 5/20/21  Reason for visit: COPD exacerbation.  Current Status: Fine, possibly get a nebulizer    Patient had noticed shortness of breath for a few days preceding her ER visit and tightness on 5/20/2021 that ultimately caused her to going to the emergency room.  She was given 5 days of prednisone 40 mg and Combivent 1 puff 4 times daily.  Chest x-ray, EKG, troponin,  and CBC were within normal limits.       Hypertension  Amlodipine 10 mg.  Blood pressure was quite elevated when in the emergency room but she attributes this to the stress of the situation (tornado warning) and multiple doses of albuterol.    Review of Systems  "  Constitutional, HEENT, cardiovascular, pulmonary, GI, , musculoskeletal, neuro, skin, endocrine and psych systems are negative, except as otherwise noted.      Objective    /80   Pulse 85   Temp 96.8  F (36  C)   Resp 20   Ht 1.575 m (5' 2\")   Wt 68 kg (150 lb)   SpO2 97%   BMI 27.44 kg/m    Body mass index is 27.44 kg/m .  Physical Exam   GENERAL: healthy, alert and no distress  EYES: Eyes grossly normal to inspection, PERRL and conjunctivae and sclerae normal  HENT: ear canals and TM's normal, nose and mouth without ulcers or lesions  NECK: no adenopathy, no asymmetry, masses, or scars and thyroid normal to palpation  RESP: lungs clear to auscultation - no rales, rhonchi or wheezes  CV: regular rate and rhythm, normal S1 S2, no S3 or S4, no murmur, click or rub, no peripheral edema and peripheral pulses strong  MS: no gross musculoskeletal defects noted, no edema  SKIN: no suspicious lesions or rashes  NEURO: Normal strength and tone, mentation intact and speech normal  PSYCH: mentation appears normal, affect normal/bright      Component      Latest Ref Rng & Units 5/20/2021   WBC      4.0 - 11.0 10e9/L 7.1   RBC Count      3.8 - 5.2 10e12/L 4.93   Hemoglobin      11.7 - 15.7 g/dL 14.4   Hematocrit      35.0 - 47.0 % 42.9   MCV      78 - 100 fl 87   MCH      26.5 - 33.0 pg 29.2   MCHC      31.5 - 36.5 g/dL 33.6   RDW      10.0 - 15.0 % 13.3   Platelet Count      150 - 450 10e9/L 279   Diff Method       Automated Method   % Neutrophils      % 71.3   % Lymphocytes      % 13.1   % Monocytes      % 12.3   % Eosinophils      % 2.4   % Basophils      % 0.1   % Immature Granulocytes      % 0.8   Nucleated RBCs      0 /100 0   Absolute Neutrophil      1.6 - 8.3 10e9/L 5.1   Absolute Lymphocytes      0.8 - 5.3 10e9/L 0.9   Absolute Monocytes      0.0 - 1.3 10e9/L 0.9   Absolute Eosinophils      0.0 - 0.7 10e9/L 0.2   Absolute Basophils      0.0 - 0.2 10e9/L 0.0   Abs Immature Granulocytes      0 - 0.4 " 10e9/L 0.1   Absolute Nucleated RBC       0.0   Sodium      133 - 144 mmol/L 133   Potassium      3.4 - 5.3 mmol/L 4.4   Chloride      94 - 109 mmol/L 97   Carbon Dioxide      20 - 32 mmol/L 31   Anion Gap      3 - 14 mmol/L 5   Glucose      70 - 99 mg/dL 129 (H)   Urea Nitrogen      7 - 30 mg/dL 13   Creatinine      0.52 - 1.04 mg/dL 0.72   GFR Estimate      >60 mL/min/1.73:m2 76   GFR Estimate If Black      >60 mL/min/1.73:m2 88   Calcium      8.5 - 10.1 mg/dL 9.5   Bilirubin Total      0.2 - 1.3 mg/dL 0.5   Albumin      3.4 - 5.0 g/dL 3.9   Protein Total      6.8 - 8.8 g/dL 7.5   Alkaline Phosphatase      40 - 150 U/L 98   ALT      0 - 50 U/L 28   AST      0 - 45 U/L 20   Troponin I ES      0.000 - 0.045 ug/L <0.015     EXAM: XR CHEST 2 VW  LOCATION: [facility]  DATE/TIME: 5/19/2021 11:37 PM     INDICATION:  chest tightness;  COMPARISON: 8/12/2018                                                                      IMPRESSION: Hazy opacity near the cardiac apex likely represents prominent pericardial fat pad. No evidence for CHF or pneumonia. Normal heart size. No pleural effusion or pneumothorax.

## 2021-06-01 ENCOUNTER — HOSPITAL ENCOUNTER (EMERGENCY)
Facility: CLINIC | Age: 86
Discharge: HOME | End: 2021-06-01
Attending: EMERGENCY MEDICINE | Admitting: EMERGENCY MEDICINE
Payer: COMMERCIAL

## 2021-06-01 VITALS
OXYGEN SATURATION: 95 % | RESPIRATION RATE: 17 BRPM | HEIGHT: 63 IN | TEMPERATURE: 97.3 F | SYSTOLIC BLOOD PRESSURE: 171 MMHG | HEART RATE: 87 BPM | DIASTOLIC BLOOD PRESSURE: 80 MMHG | BODY MASS INDEX: 26.57 KG/M2

## 2021-06-01 DIAGNOSIS — R07.89 CHEST HEAVINESS: ICD-10-CM

## 2021-06-01 DIAGNOSIS — R06.2 WHEEZING: ICD-10-CM

## 2021-06-01 DIAGNOSIS — J44.1 COPD EXACERBATION (H): ICD-10-CM

## 2021-06-01 LAB
ANION GAP SERPL CALCULATED.3IONS-SCNC: 7 MMOL/L (ref 3–14)
BASOPHILS # BLD AUTO: 0 10E9/L (ref 0–0.2)
BASOPHILS NFR BLD AUTO: 0.1 %
BUN SERPL-MCNC: 17 MG/DL (ref 7–30)
CALCIUM SERPL-MCNC: 9.8 MG/DL (ref 8.5–10.1)
CHLORIDE SERPL-SCNC: 106 MMOL/L (ref 94–109)
CO2 SERPL-SCNC: 26 MMOL/L (ref 20–32)
CREAT SERPL-MCNC: 0.68 MG/DL (ref 0.52–1.04)
DIFFERENTIAL METHOD BLD: ABNORMAL
EOSINOPHIL # BLD AUTO: 0 10E9/L (ref 0–0.7)
EOSINOPHIL NFR BLD AUTO: 0 %
ERYTHROCYTE [DISTWIDTH] IN BLOOD BY AUTOMATED COUNT: 14.3 % (ref 10–15)
GFR SERPL CREATININE-BSD FRML MDRD: 79 ML/MIN/{1.73_M2}
GLUCOSE SERPL-MCNC: 194 MG/DL (ref 70–99)
HCT VFR BLD AUTO: 44.3 % (ref 35–47)
HGB BLD-MCNC: 14.5 G/DL (ref 11.7–15.7)
IMM GRANULOCYTES # BLD: 0.1 10E9/L (ref 0–0.4)
IMM GRANULOCYTES NFR BLD: 0.7 %
INTERPRETATION ECG - MUSE: NORMAL
LYMPHOCYTES # BLD AUTO: 1 10E9/L (ref 0.8–5.3)
LYMPHOCYTES NFR BLD AUTO: 8.9 %
MCH RBC QN AUTO: 29.8 PG (ref 26.5–33)
MCHC RBC AUTO-ENTMCNC: 32.7 G/DL (ref 31.5–36.5)
MCV RBC AUTO: 91 FL (ref 78–100)
MONOCYTES # BLD AUTO: 1 10E9/L (ref 0–1.3)
MONOCYTES NFR BLD AUTO: 9.1 %
NEUTROPHILS # BLD AUTO: 8.7 10E9/L (ref 1.6–8.3)
NEUTROPHILS NFR BLD AUTO: 81.2 %
NRBC # BLD AUTO: 0 10*3/UL
NRBC BLD AUTO-RTO: 0 /100
PLATELET # BLD AUTO: 253 10E9/L (ref 150–450)
POTASSIUM SERPL-SCNC: 4.1 MMOL/L (ref 3.4–5.3)
RBC # BLD AUTO: 4.87 10E12/L (ref 3.8–5.2)
SODIUM SERPL-SCNC: 139 MMOL/L (ref 133–144)
TROPONIN I SERPL-MCNC: <0.015 UG/L (ref 0–0.04)
TROPONIN I SERPL-MCNC: <0.015 UG/L (ref 0–0.04)
WBC # BLD AUTO: 10.8 10E9/L (ref 4–11)

## 2021-06-01 PROCEDURE — 93005 ELECTROCARDIOGRAM TRACING: CPT

## 2021-06-01 PROCEDURE — 99285 EMERGENCY DEPT VISIT HI MDM: CPT | Mod: 25

## 2021-06-01 PROCEDURE — 999N000105 HC STATISTIC NO DOCUMENTATION TO SUPPORT CHARGE

## 2021-06-01 PROCEDURE — 96374 THER/PROPH/DIAG INJ IV PUSH: CPT

## 2021-06-01 PROCEDURE — 250N000009 HC RX 250: Performed by: EMERGENCY MEDICINE

## 2021-06-01 PROCEDURE — 80048 BASIC METABOLIC PNL TOTAL CA: CPT | Performed by: EMERGENCY MEDICINE

## 2021-06-01 PROCEDURE — 250N000011 HC RX IP 250 OP 636: Performed by: EMERGENCY MEDICINE

## 2021-06-01 PROCEDURE — 85025 COMPLETE CBC W/AUTO DIFF WBC: CPT | Performed by: EMERGENCY MEDICINE

## 2021-06-01 PROCEDURE — 94640 AIRWAY INHALATION TREATMENT: CPT

## 2021-06-01 PROCEDURE — 84484 ASSAY OF TROPONIN QUANT: CPT | Performed by: EMERGENCY MEDICINE

## 2021-06-01 RX ORDER — METHYLPREDNISOLONE SODIUM SUCCINATE 125 MG/2ML
125 INJECTION, POWDER, LYOPHILIZED, FOR SOLUTION INTRAMUSCULAR; INTRAVENOUS ONCE
Status: COMPLETED | OUTPATIENT
Start: 2021-06-01 | End: 2021-06-01

## 2021-06-01 RX ORDER — IPRATROPIUM BROMIDE AND ALBUTEROL SULFATE 2.5; .5 MG/3ML; MG/3ML
3 SOLUTION RESPIRATORY (INHALATION) ONCE
Status: COMPLETED | OUTPATIENT
Start: 2021-06-01 | End: 2021-06-01

## 2021-06-01 RX ORDER — PREDNISONE 20 MG/1
TABLET ORAL
Qty: 10 TABLET | Refills: 0 | Status: SHIPPED | OUTPATIENT
Start: 2021-06-02 | End: 2021-07-27

## 2021-06-01 RX ADMIN — METHYLPREDNISOLONE SODIUM SUCCINATE 125 MG: 125 INJECTION, POWDER, FOR SOLUTION INTRAMUSCULAR; INTRAVENOUS at 04:22

## 2021-06-01 RX ADMIN — IPRATROPIUM BROMIDE AND ALBUTEROL SULFATE 3 ML: .5; 3 SOLUTION RESPIRATORY (INHALATION) at 04:24

## 2021-06-01 ASSESSMENT — ENCOUNTER SYMPTOMS
CHILLS: 0
DIAPHORESIS: 0
SHORTNESS OF BREATH: 1

## 2021-06-01 NOTE — ED TRIAGE NOTES
Here for left sided chest pain started couple hours while in bed associated with sob, pain radiates to shoulder and back, sob worse with exertion, and bilateral ear ringing. History of COPD. Airway intact without need for intervention.

## 2021-06-01 NOTE — DISCHARGE INSTRUCTIONS
What do you do next:   Continue your home medications unless we have specifically changed them  I have prescribed a steroid medication to help reduce your COPD exacerbation.  Take these as directed.  Follow up as indicated below    When do you return: If you have uncontrollable chest pain, severe shortness of breath, lightheadedness or fainting, or any other symptoms that concern you, please return to the ED for reevaluation.    Thank you for allowing us to care for you today.

## 2021-06-01 NOTE — ED PROVIDER NOTES
History   Chief Complaint:  Chest Heaviness       HPI   Gifty Villa is a 86 year old female with history of COPD, hypertension, hyperlipidemia and Er visit for COPD exacerbation 05/20/2021 who presents with chest pain. The patient states yesterday afternoon she began feeling chest heaviness and shortness of breath. The patient states she has had a cough for the past few months as well.  Earlier today around 1200 she had a sharp pain in her left shoulder which lasted around an hour. She denies nausea, chills and diaphoresis. She states the heaviness exacerbates with activity. She denies leg swelling. The patient has received both covid vaccinations.     Review of Systems   Constitutional: Negative for chills and diaphoresis.   Respiratory: Positive for shortness of breath.    Cardiovascular: Positive for chest pain. Negative for leg swelling.     Allergies:  Grass     Medications:  Albuterol  Amlodipine  Aspirin   Zyrtec  Donepezil  Cymbalta  Mucinex  Omeprazole  Vesicare  Stiolto respimat  Desyrel  Vitron-C     Past Medical History:    COPD  Hypertension  Hyperlipidemia  Pulmonary nodule  Asthma  Adjustment disorder with depressed mood  Tinnitus of both ears  Low ferritin  Alzheimer's dementia without behavioral disturbance  Peptic ulcer   Chronic obstructive pulmonary disease   RLQ abdominal mass      Past Surgical History:    Arthroplasty patello-femoral  Spine fusion  Total hip arthroplasty  Hysterectomy  Cardiac cath  Colonoscopy x2  EGD combined x2  Excise ganglion cyst  [place] teeth extraction     Family History:    Mother: Alzheimer disease, CAD  Brother: Alzheimer disease    Social History:  Patient presents with son  Comes from home    Physical Exam     Patient Vitals for the past 24 hrs:   BP Temp Temp src Pulse Resp SpO2 Height   06/01/21 0500 -- -- -- 76 18 95 % --   06/01/21 0445 (!) 168/77 -- -- 86 21 96 % --   06/01/21 0430 (!) 140/63 -- -- 75 10 100 % --   06/01/21 0415 (!) 160/76 -- -- 75 17  "93 % --   06/01/21 0400 (!) 151/72 -- -- 78 18 96 % --   06/01/21 0345 (!) 160/77 -- -- 84 25 96 % --   06/01/21 0330 (!) 167/82 -- -- 90 23 96 % --   06/01/21 0203 (!) 187/107 97.3  F (36.3  C) Oral 99 18 96 % 1.6 m (5' 3\")       Physical Exam  Constitutional: Vital signs reviewed as above.   Head: No external signs of trauma noted.  Eyes: Pupils are equal, round, and reactive to light.   Neck: No JVD noted  Cardiovascular: Normal rate, regular rhythm and normal heart sounds.  No murmur heard. Equal B/L peripheral pulses.  Pulmonary/Chest: Effort normal. No respiratory distress. Patient has B/L expiratory wheezes. Patient has no rales.   Gastrointestinal: Soft. There is no tenderness.   Musculoskeletal/Extremities: No edema noted. Normal tone.  Neurological: Patient is alert and oriented to person, place, and time.   Skin: Skin is warm and dry. There is no diaphoresis noted.   Psychiatric: The patient appears calm.      Emergency Department Course     ECG  ECG taken at 0209, ECG read at 0329  Sinus rhythm with marked sinus arrhythmia. Septal infarct, age undetermined. Abnormal ECG   No significant change as compared to prior, dated 05/19/21.  Rate 80 bpm. OR interval 184 ms. QRS duration 82 ms. QT/QTc 398/459 ms. P-R-T axes 78 78 80.     Laboratory:    CBC: WBC: 10.8, HGB: 14.5, PLT: 253  BMP: Glucose 194 (H) o/w WNL (Creatinine 0.68)   Troponin (Collected 0234): <0.015  Troponin (Collected 0439): <0.015      Emergency Department Course:    Reviewed:  I reviewed the patient's nursing notes, vitals, past medical records, Care Everywhere.     Assessments/Consults:  ED Course as of Jun 01 0615 Tue Jun 01, 2021 0415 I performed initial exam and assessment      0606 I rechecked the patient           Interventions:  0424 Duoneb, 3 mL, nebulization   0422 Solumedrol 125 mg I      Disposition:  The patient was discharged to home.     Impression & Plan     CMS Diagnoses: None    Medical Decision Making:  Gifty BILLINGSLEY " Daisy is a 86 year old female presented to the Emergency Department with a complaint of chest heaviness and dyspnea. Fortunately the workup in the ED has been unremarkable and at this time I am not concerned for ACS. The EKG shows sinus rhythm without significant change from previous. The troponin is negative x2. The patient's HEART Score is, unfortunately, 4 (moderate risk).     I considered other possible causes of chest pain including PE (low risk Wells Criteria), infection, pneumothorax, aortic dissection, COPD flare, and even more benign causes such as reflux and esophageal motility issues. The physical exam, laboratory, and radiological findings listed above make life threatening conditions less likely, and the most likely etiology is a COPD flare.  The patient has improved with interventions in the emergency department and I no longer hear wheezing.. At this time I believe the patient is stable for discharge.  She and her son are comfortable with this plan.  I have encouraged close Primary Care Physician follow up. Anticipatory guidance given to patient and son prior to discharge.     Diagnosis:    ICD-10-CM    1. Chest heaviness  R07.89    2. Wheezing  R06.2    3. COPD exacerbation (H)  J44.1        Discharge Medications:  New Prescriptions    PREDNISONE (DELTASONE) 20 MG TABLET    Take two tablets (= 40mg) each day for 5 (five) days       Scribe Disclosure:  I, Christopher De Jesus, am serving as a scribe at 3:25 AM on 6/1/2021 to document services personally performed by César Hansen DO based on my observations and the provider's statements to me.        César Hansen DO  06/01/21 0697

## 2021-07-20 ENCOUNTER — TELEPHONE (OUTPATIENT)
Dept: FAMILY MEDICINE | Facility: CLINIC | Age: 86
End: 2021-07-20

## 2021-07-20 NOTE — TELEPHONE ENCOUNTER
Reason for Call:  Form, our goal is to have forms completed with 72 hours, however, some forms may require a visit or additional information.    Type of letter, form or note:  medical    Who is the form from?: Home care    Where did the form come from: form was faxed in    What clinic location was the form placed at?: [facility]    Where the form was placed: Leann [facility] Box/Folder    What number is listed as a contact on the form?: [phone]           Call taken on 7/20/2021 at 5:45 PM by Luci Burks

## 2021-07-27 ENCOUNTER — MEDICAL CORRESPONDENCE (OUTPATIENT)
Dept: HEALTH INFORMATION MANAGEMENT | Facility: CLINIC | Age: 86
End: 2021-07-27

## 2021-07-28 NOTE — TELEPHONE ENCOUNTER
Form faxed to [employer] @ # [phone].     Original sent to be scanned.     Sushant Olivier

## 2021-09-08 ENCOUNTER — VIRTUAL VISIT (OUTPATIENT)
Dept: SLEEP MEDICINE | Facility: CLINIC | Age: 86
End: 2021-09-08
Payer: COMMERCIAL

## 2021-09-08 VITALS — BODY MASS INDEX: 27.44 KG/M2 | HEIGHT: 62 IN

## 2021-09-08 DIAGNOSIS — G30.0 EARLY ONSET ALZHEIMER'S DEMENTIA WITHOUT BEHAVIORAL DISTURBANCE (H): ICD-10-CM

## 2021-09-08 DIAGNOSIS — R53.83 OTHER FATIGUE: Primary | ICD-10-CM

## 2021-09-08 DIAGNOSIS — H40.9 GLAUCOMA, UNSPECIFIED GLAUCOMA TYPE, UNSPECIFIED LATERALITY: ICD-10-CM

## 2021-09-08 DIAGNOSIS — F02.80 EARLY ONSET ALZHEIMER'S DEMENTIA WITHOUT BEHAVIORAL DISTURBANCE (H): ICD-10-CM

## 2021-09-08 PROCEDURE — 99204 OFFICE O/P NEW MOD 45 MIN: CPT | Mod: GT | Performed by: INTERNAL MEDICINE

## 2021-09-08 NOTE — PATIENT INSTRUCTIONS
"As we discussed consider discontinuing trazodone and seeing if that helps decrease morning grogginess.  You can also consider switching to decaf after 3 or 4 PM.          MY TREATMENT INFORMATION FOR SLEEP APNEA-  [facility]    DOCTOR : Vanessa Antunez MD    Am I having a sleep study at a sleep center?  --->Due to insurance clearance delays, you will be contacted within 2-4 weeks to schedule    Am I having a home sleep study?  --->Watch the video for the device you are using:    -/drop off device-   https://www.Team Robot.com/watch?v=yGGFBdELGhk    -Disposable device sent out require phone/computer application-   https://www.TapBlazeube.com/watch?v=BCce_vbiwxE      Frequently asked questions:  1. What is Obstructive Sleep Apnea (RAMILA)? RAMILA is the most common type of sleep apnea. Apnea means, \"without breath.\"  Apnea is most often caused by narrowing or collapse of the upper airway as muscles relax during sleep.   Almost everyone has occasional apneas. Most people with sleep apnea have had brief interruptions at night frequently for many years.  The severity of sleep apnea is related to how frequent and severe the events are.   2. What are the consequences of RAMILA? Symptoms include: feeling sleepy during the day, snoring loudly, gasping or stopping of breathing, trouble sleeping, and occasionally morning headaches or heartburn at night.  Sleepiness can be serious and even increase the risk of falling asleep while driving. Other health consequences may include development of high blood pressure and other cardiovascular disease in persons who are susceptible. Untreated RAMILA  can contribute to heart disease, stroke and diabetes.   3. What are the treatment options? In most situations, sleep apnea is a lifelong disease that must be managed with daily therapy. Medications are not effective for sleep apnea and surgery is generally not considered until other therapies have been tried. Your treatment is your " choice . Continuous Positive Airway (CPAP) works right away and is the therapy that is effective in nearly everyone. An oral device to hold your jaw forward is usually the next most reliable option. Other options include postioning devices (to keep you off your back), weight loss, and surgery including a tongue pacing device. There is more detail about some of these options below.  4. Are my sleep studies covered by insurance? Although we will request verification of coverage, we advise you also check in advance of the study to ensure there is coverage.    Important tips for those choosing CPAP and similar devices   Know your equipment:  CPAP is continuous positive airway pressure that prevents obstructive sleep apnea by keeping the throat from collapsing while you are sleeping. In most cases, the device is  smart  and can slowly self-adjusts if your throat collapses and keeps a record every day of how well you are treated-this information is available to you and your care team.  BPAP is bilevel positive airway pressure that keeps your throat open and also assists each breath with a pressure boost to maintain adequate breathing.  Special kinds of BPAP are used in patients who have inadequate breathing from lung or heart disease. In most cases, the device is  smart  and can slowly self-adjusts to assist breathing. Like CPAP, the device keeps a record of how well you are treated.  Your mask is your connection to the device. You get to choose what feels most comfortable and the staff will help to make sure if fits. Here: are some examples of the different masks that are available:       Key points to remember on your journey with sleep apnea:  1. Sleep study.  PAP devices often need to be adjusted during a sleep study to show that they are effective and adjusted right.  2. Good tips to remember: Try wearing just the mask during a quiet time during the day so your body adapts to wearing it. A humidifier is recommended  for comfort in most cases to prevent drying of your nose and throat. Allergy medication from your provider may help you if you are having nasal congestion.  3. Getting settled-in. It takes more than one night for most of us to get used to wearing a mask. Try wearing just the mask during a quiet time during the day so your body adapts to wearing it. A humidifier is recommended for comfort in most cases. Our team will work with you carefully on the first day and will be in contact within 4 days and again at 2 and 4 weeks for advice and remote device adjustments. Your therapy is evaluated by the device each day.   4. Use it every night. The more you are able to sleep naturally for 7-8 hours, the more likely you will have good sleep and to prevent health risks or symptoms from sleep apnea. Even if you use it 4 hours it helps. Occasionally all of us are unable to use a medical therapy, in sleep apnea, it is not dangerous to miss one night.   5. Communicate. Call our skilled team on the number provided on the first day if your visit for problems that make it difficult to wear the device. Over 2 out of 3 patients can learn to wear the device long-term with help from our team. Remember to call our team or your sleep providers if you are unable to wear the device as we may have other solutions for those who cannot adapt to mask CPAP therapy. It is recommended that you sleep your sleep provider within the first 3 months and yearly after that if you are not having problems.   6. Use it for your health. We encourage use of CPAP masks during daytime quiet periods to allow your face and brain to adapt to the sensation of CPAP so that it will be a more natural sensation to awaken to at night or during naps. This can be very useful during the first few weeks or months of adapting to CPAP though it does not help medically to wear CPAP during wakefulness and  should not be used as a strategy just to meet guidelines.  7. Take care of  your equipment. Make sure you clean your mask and tubing using directions every day and that your filter and mask are replaced as recommended or if they are not working.     BESIDES CPAP, WHAT OTHER THERAPIES ARE THERE?    Positioning Device  Positioning devices are generally used when sleep apnea is mild and only occurs on your back.This example shows a pillow that straps around the waist. It may be appropriate for those whose sleep study shows milder sleep apnea that occurs primarily when lying flat on one's back. Preliminary studies have shown benefit but effectiveness at home may need to be verified by a home sleep test. These devices are generally not covered by medical insurance.  Examples of devices that maintain sleeping on the back to prevent snoring and mild sleep apnea.    Belt type body positioner  http://[email or web address]    Electronic reminder  http://nightshifttherapy.com/  http://www.[employer].Life360.au/      Oral Appliance  What is oral appliance therapy?  An oral appliance device fits on your teeth at night like a retainer used after having braces. The device is made by a specialized dentist and requires several visits over 1-2 months before a manufactured device is made to fit your teeth and is adjusted to prevent your sleep apnea. Once an oral device is working properly, snoring should be improved. A home sleep test may be recommended at that time if to determine whether the sleep apnea is adequately treated.       Some things to remember:  -Oral devices are often, but not always, covered by your medical insurance. Be sure to check with your insurance provider.   -If you are referred for oral therapy, you will be given a list of specialized dentists to consider or you may choose to visit the Web site of the American Academy of Dental Sleep Medicine  -Oral devices are less likely to work if you have severe sleep apnea or are extremely overweight.     More detailed information  An oral appliance is a small  acrylic device that fits over the upper and lower teeth  (similar to a retainer or a mouth guard). This device slightly moves jaw forward, which moves the base of the tongue forward, opens the airway, improves breathing for effective treat snoring and obstructive sleep apnea in perhaps 7 out of 10 people .  The best working devices are custom-made by a dental device  after a mold is made of the teeth 1, 2, 3.  When is an oral appliance indicated?  Oral appliance therapy is recommended as a first-line treatment for patients with primary snoring, mild sleep apnea, and for patients with moderate sleep apnea who prefer appliance therapy to use of CPAP4, 5. Severity of sleep apnea is determined by sleep testing and is based on the number of respiratory events per hour of sleep.   How successful is oral appliance therapy?  The success rate of oral appliance therapy in patients with mild sleep apnea is 75-80% while in patients with moderate sleep apnea it is 50-70%. The chance of success in patients with severe sleep apnea is 40-50%. The research also shows that oral appliances have a beneficial effect on the cardiovascular health of RAMILA patients at the same magnitude as CPAP therapy7.  Oral appliances should be a second-line treatment in cases of severe sleep apnea, but if not completely successful then a combination therapy utilizing CPAP plus oral appliance therapy may be effective. Oral appliances tend to be effective in a broad range of patients although studies show that the patients who have the highest success are females, younger patients, those with milder disease, and less severe obesity. 3, 6.   Finding a dentist that practices dental sleep medicine  Specific training is available through the American Academy of Dental Sleep Medicine for dentists interested in working in the field of sleep. To find a dentist who is educated in the field of sleep and the use of oral appliances, near you, visit the  Web site of the American Academy of Dental Sleep Medicine.    References  1. Jose, et al. Objectively measured vs self-reported compliance during oral appliance therapy for sleep-disordered breathing. Chest 2013; 144(5): 5723-1275.  2. Abhijeet et al. Objective measurement of compliance during oral appliance therapy for sleep-disordered breathing. Thorax 2013; 68(1): 91-96.  3. Jolene et al. Mandibular advancement devices in 620 men and women with RAMILA and snoring: tolerability and predictors of treatment success. Chest 2004; 125: 1887-6707.  4. Leigh et al. Oral appliances for snoring and RAMILA: a review. Sleep 2006; 29: 244-262.  5. Veronique et al. Oral appliance treatment for RAMILA: an update. J Clin Sleep Med 2014; 10(2): 215-227.  6. Jose et al. Predictors of OSAH treatment outcome. J Dent Res 2007; 86: 5006-4399.      Weight Loss:    Weight loss is a long-term strategy that may improve sleep apnea in some patients.    Weight management is a personal decision and the decision should be based on your interest and the potential benefits.  If you are interested in exploring weight loss strategies, the following discussion covers the impact on weight loss on sleep apnea and the approaches that may be successful.    Being overweight does not necessarily mean you will have health consequences.  Those who have BMI over 35 or over 27 with existing medical conditions carries greater risk.   Weight loss decreases severity of sleep apnea in most people with obesity. For those with mild obesity who have developed snoring with weight gain, even 15-30 pound weight loss can improve and occasionally eliminate sleep apnea.  Structured and life-long dietary and health habits are necessary to lose weight and keep healthier weight levels.     Though there may be significant health benefits from weight loss, long-term weight loss is very difficult to achieve- studies show success with dietary management in less  than 10% of people. In addition, substantial weight loss may require years of dietary control and may be difficult if patients have severe obesity. In these cases, surgical management may be considered.  Finally, older individuals who have tolerated obesity without health complications may be less likely to benefit from weight loss strategies.        Your BMI is Body mass index is 27.44 kg/m .  Weight management is a personal decision.  If you are interested in exploring weight loss strategies, the following discussion covers the approaches that may be successful. Body mass index (BMI) is one way to tell whether you are at a healthy weight, overweight, or obese. It measures your weight in relation to your height.  A BMI of 18.5 to 24.9 is in the healthy range. A person with a BMI of 25 to 29.9 is considered overweight, and someone with a BMI of 30 or greater is considered obese. More than two-thirds of American adults are considered overweight or obese.  Being overweight or obese increases the risk for further weight gain. Excess weight may lead to heart disease and diabetes.  Creating and following plans for healthy eating and physical activity may help you improve your health.  Weight control is part of healthy lifestyle and includes exercise, emotional health, and healthy eating habits. Careful eating habits lifelong are the mainstay of weight control. Though there are significant health benefits from weight loss, long-term weight loss with diet alone may be very difficult to achieve- studies show long-term success with dietary management in less than 10% of people. Attaining a healthy weight may be especially difficult to achieve in those with severe obesity. In some cases, medications, devices and surgical management might be considered.  What can you do?  If you are overweight or obese and are interested in methods for weight loss, you should discuss this with your provider.     Consider reducing daily calorie  intake by 500 calories.     Keep a food journal.     Avoiding skipping meals, consider cutting portions instead.    Diet combined with exercise helps maintain muscle while optimizing fat loss. Strength training is particularly important for building and maintaining muscle mass. Exercise helps reduce stress, increase energy, and improves fitness. Increasing exercise without diet control, however, may not burn enough calories to loose weight.       Start walking three days a week 10-20 minutes at a time    Work towards walking thirty minutes five days a week     Eventually, increase the speed of your walking for 1-2 minutes at time    In addition, we recommend that you review healthy lifestyles and methods for weight loss available through the National Institutes of Health patient information sites:  http://win.niddk.nih.gov/publications/index.htm    And look into health and wellness programs that may be available through your health insurance provider, employer, local community center, or marcial club.          Surgery:    Surgery for obstructive sleep apnea is considered generally only when other therapies fail to work. Surgery may be discussed with you if you are having a difficult time tolerating CPAP and or when there is an abnormal structure that requires surgical correction.  Nose and throat surgeries often enlarge the airway to prevent collapse.  Most of these surgeries create pain for 1-2 weeks and up to half of the most common surgeries are not effective throughout life.  You should carefully discuss the benefits and drawbacks to surgery with your sleep provider and surgeon to determine if it is the best solution for you.   More information  Surgery for RAMILA is directed at areas that are responsible for narrowing or complete obstruction of the airway during sleep.  There are a wide range of procedures available to enlarge and/or stabilize the airway to prevent blockage of breathing in the three major areas  where it can occur: the palate, tongue, and nasal regions.  Successful surgical treatment depends on the accurate identification of the factors responsible for obstructive sleep apnea in each person.  A personalized approach is required because there is no single treatment that works well for everyone.  Because of anatomic variation, consultation with an examination by a sleep surgeon is a critical first step in determining what surgical options are best for each patient.  In some cases, examination during sedation may be recommended in order to guide the selection of procedures.  Patients will be counseled about risks and benefits as well as the typical recovery course after surgery. Surgery is typically not a cure for a person s RAMILA.  However, surgery will often significantly improve one s RAMILA severity (termed  success rate ).  Even in the absence of a cure, surgery will decrease the cardiovascular risk associated with OSA7; improve overall quality of life8 (sleepiness, functionality, sleep quality, etc).      Palate Procedures:  Patients with RAMILA often have narrowing of their airway in the region of their tonsils and uvula.  The goals of palate procedures are to widen the airway in this region as well as to help the tissues resist collapse.  Modern palate procedure techniques focus on tissue conservation and soft tissue rearrangement, rather than tissue removal.  Often the uvula is preserved in this procedure. Residual sleep apnea is common in patient after pharyngoplasty with an average reduction in sleep apnea events of 33%2.      Tongue Procedures:  ExamWhile patients are awake, the muscles that surround the throat are active and keep this region open for breathing. These muscles relax during sleep, allowing the tongue and other structures to collapse and block breathing.  There are several different tongue procedures available.  Selection of a tongue base procedure depends on characteristics seen on physical  exam.  Generally, procedures are aimed at removing bulky tissues in this area or preventing the back of the tongue from falling back during sleep.  Success rates for tongue surgery range from 50-62%3.    Hypoglossal Nerve Stimulation:  Hypoglossal nerve stimulation has recently received approval from the [place] Food and Drug Administration for the treatment of obstructive sleep apnea.  This is based on research showing that the system was safe and effective in treating sleep apnea6.  Results showed that the median AHI score decreased 68%, from 29.3 to 9.0. This therapy uses an implant system that senses breathing patterns and delivers mild stimulation to airway muscles, which keeps the airway open during sleep.  The system consists of three fully implanted components: a small generator (similar in size to a pacemaker), a breathing sensor, and a stimulation lead.  Using a small handheld remote, a patient turns the therapy on before bed and off upon awakening.    Candidates for this device must be greater than 22 years of age, have moderate to severe RAMILA (AHI between 20-65), BMI less than 32, have tried CPAP/oral appliance without success, and have appropriate upper airway anatomy (determined by a sleep endoscopy performed by Dr. Lamb).    Hypoglossal Nerve Stimulation Pathway:    The sleep surgeon s office will work with the patient through the insurance prior-authorization process (including communications and appeals).    Nasal Procedures:  Nasal obstruction can interfere with nasal breathing during the day and night.  Studies have shown that relief of nasal obstruction can improve the ability of some patients to tolerate positive airway pressure therapy for obstructive sleep apnea1.  Treatment options include medications such as nasal saline, topical corticosteroid and antihistamine sprays, and oral medications such as antihistamines or decongestants. Non-surgical treatments can include external nasal  dilators for selected patients. If these are not successful by themselves, surgery can improve the nasal airway either alone or in combination with these other options.      Combination Procedures:  Combination of surgical procedures and other treatments may be recommended, particularly if patients have more than one area of narrowing or persistent positional disease.  The success rate of combination surgery ranges from 66-80%2,3.    References  1. Roderick CAMEJO. The Role of the Nose in Snoring and Obstructive Sleep Apnoea: An Update.  Eur Arch Otorhinolaryngol. 2011; 268: 1365-73.  2.  Vernon SM; Ronak JA; Jace JR; Pallanch JF; Mili MB; Vickie SG; Maycol LUTZ. Surgical modifications of the upper airway for obstructive sleep apnea in adults: a systematic review and meta-analysis. SLEEP 2010;33(10):4658-5455. Smita BHATTI. Hypopharyngeal surgery in obstructive sleep apnea: an evidence-based medicine review.  Arch Otolaryngol Head Neck Surg. 2006 Feb;132(2):206-13.  3. Salazar YH1, Isabella Y, Adi NOY. The efficacy of anatomically based multilevel surgery for obstructive sleep apnea. Otolaryngol Head Neck Surg. 2003 Oct;129(4):327-35.  4. Smita BHATTI, Goldberg A. Hypopharyngeal Surgery in Obstructive Sleep Apnea: An Evidence-Based Medicine Review. Arch Otolaryngol Head Neck Surg. 2006 Feb;132(2):206-13.  5. Ольга PJ et al. Upper-Airway Stimulation for Obstructive Sleep Apnea.  N Engl J Med. 2014 Jan 9;370(2):139-49.  6. Lea Y et al. Increased Incidence of Cardiovascular Disease in Middle-aged Men with Obstructive Sleep Apnea. Am J Respir Crit Care Med; 2002 166: 159-165  7. Barnes EM et al. Studying Life Effects and Effectiveness of Palatopharyngoplasty (SLEEP) study: Subjective Outcomes of Isolated Uvulopalatopharyngoplasty. Otolaryngol Head Neck Surg. 2011; 144: 623-631.    Drive Safe... Drive Alive     Sleep health profoundly affects your health, mood, and your safety.  Thirty three percent of the population (one in  three of us) is not getting enough sleep and many have a sleep disorder. Not getting enough sleep or having an untreated / undertreated sleep condition may make us sleepy without even knowing it. In fact, our driving could be dramatically impaired due to our sleep health. As your provider, here are some things I would like you to know about driving:     Here are some warning signs for impairment and dangerous drowsy driving:              -Having been awake more than 16 hours               -Looking tired               -Eyelid drooping              -Head nodding (it could be too late at this point)              -Driving for more than 30 minutes     Some things you could do to make the driving safer if you are experiencing some drowsiness:              -Stop driving and rest              -Call for transportation              -Make sure your sleep disorder is adequately treated     Some things that have been shown NOT to work when experiencing drowsiness while driving:              -Turning on the radio              -Opening windows              -Eating any  distracting  /  entertaining  foods (e.g., sunflower seeds, candy, or any other)              -Talking on the phone      Your decision may not only impact your life, but also the life of others. Please, remember to drive safe for yourself and all of us.            Your BMI is Body mass index is 27.44 kg/m .  Weight management is a personal decision.  If you are interested in exploring weight loss strategies, the following discussion covers the approaches that may be successful. Body mass index (BMI) is one way to tell whether you are at a healthy weight, overweight, or obese. It measures your weight in relation to your height.  A BMI of 18.5 to 24.9 is in the healthy range. A person with a BMI of 25 to 29.9 is considered overweight, and someone with a BMI of 30 or greater is considered obese. More than two-thirds of American adults are considered overweight or  obese.  Being overweight or obese increases the risk for further weight gain. Excess weight may lead to heart disease and diabetes.  Creating and following plans for healthy eating and physical activity may help you improve your health.  Weight control is part of healthy lifestyle and includes exercise, emotional health, and healthy eating habits. Careful eating habits lifelong are the mainstay of weight control. Though there are significant health benefits from weight loss, long-term weight loss with diet alone may be very difficult to achieve- studies show long-term success with dietary management in less than 10% of people. Attaining a healthy weight may be especially difficult to achieve in those with severe obesity. In some cases, medications, devices and surgical management might be considered.  What can you do?  If you are overweight or obese and are interested in methods for weight loss, you should discuss this with your provider.     Consider reducing daily calorie intake by 500 calories.     Keep a food journal.     Avoiding skipping meals, consider cutting portions instead.    Diet combined with exercise helps maintain muscle while optimizing fat loss. Strength training is particularly important for building and maintaining muscle mass. Exercise helps reduce stress, increase energy, and improves fitness. Increasing exercise without diet control, however, may not burn enough calories to loose weight.       Start walking three days a week 10-20 minutes at a time    Work towards walking thirty minutes five days a week     Eventually, increase the speed of your walking for 1-2 minutes at time    In addition, we recommend that you review healthy lifestyles and methods for weight loss available through the National Institutes of Health patient information sites:  http://win.niddk.nih.gov/publications/index.htm    And look into health and wellness programs that may be available through your health insurance  provider, employer, local community center, or marcial club.

## 2021-09-08 NOTE — PROGRESS NOTES
Gayla is a 86 year old who is being evaluated via a billable video visit.      How would you like to obtain your AVS? MyChart  If the video visit is dropped, the invitation should be resent by: Text to cell phone: [phone]  Will anyone else be joining your video visit? No    Loretta Spencer CMA  Does patient have any form of state insurance? YES          Video-Visit Details    Type of service:  Video Visit    Video Start Time: 8:11 AM     Video End Time:8:44 AM    Originating Location (pt. Location): Home    Distant Location (provider location):  [facility]    Platform used for Video Visit: [employer]     Chief complaint: Consultation requested by Jun bryant MD for evaluation of possible sleep disordered breathing    History of Present Illness: 86-year-old female with history of COPD/asthma, hypertension, glaucoma.  She is recently seen by her eye doctor and recommended to be evaluated for possible sleep apnea.  The visit is done with patient and her daughter-in-law who provides much of the history.  Patient is currently living in assisted living.  Patient was started on trazodone couple of years ago due to some complaints about sleep.  She has some good nights and some bad nights.  She also discontinued caffeine a while back to see if it made any improvements in her sleep and none was identified.  She only became more irritable.  She has been having more daytime alertness since moving into assisted living and doing more activities during the day and with increased social opportunities due to pandemic changes.  She typically goes to bed and watches some TV.  She thinks she goes to sleep around 10:50 PM.  Lately in the last year or so she has been kind of tending to stay in bed and not get out of bed till 10 or 11 in the morning.  However previously she been more of a morning person.  There is no history of dry mouth at night.  She does not wake up with headaches or  reflux symptoms.  She does not need to use her rescue inhaler at night.  She sleeps on a flat bed.  She prefers sleeping prone.  Her sleep is not witnessed she is not sure if she snores.  There is no history of sleepwalking, sleep talking or dream enactment behavior that she is aware of.  She is not taking naps during the day.  She tends to drink coffee maybe up to 10 cups throughout the day.    Patient has been suffering memory problems and was diagnosed with Alzheimer's.  She also has tinnitus that sometimes can interfere with sleep.  She does not drink alcohol.  No known family history of sleep apnea.  Blood pressures under good control but she had elevations during recent ER visits for COPD.    [place] Sleepiness Scale   Sitting and reading: Would never doze   Watching TV: Would never doze   Sitting, inactive in a public place (e.g. a theatre or a meeting): Would never doze   As a passenger in a car for an hour without a break: Slight chance of dozing   Lying down to rest in the afternoon when circumstances permit: Moderate chance of dozing   Sitting and talking to someone: Would never doze   Sitting quietly after a lunch without alcohol: Would never doze   In a car, while stopped for a few minutes in traffic: Would never doze   Total score - [place]: 3   (Less than 10 normal)    Insomnia Severity Scale  ASHLEY Total Score: 2  Total score categories:  0-7 = No clinically significant insomnia   8-14 = Subthreshold insomnia   15-21 = Clinical insomnia (moderate severity)  22-28 = Clinical insomnia (severe)    STOP-BANG  Loud Snore   0  Excessively Tired/Sleepy   0  Observed apnea   0  Hypertension   1  BMI> 35 kg/m2   0  Age >50   1  Neck >16 in/40cm   0  Male Gender   0  Total =   2  (0-2 low, 3-4 intermediate, 5-8 high risk of RAMILA)      Past Medical History:   Diagnosis Date     Arthritis      Backache, unspecified      COPD (chronic obstructive pulmonary disease) (H)      Essential hypertension, benign       Frequency-urgency sydrome      Hyperlipidemia      Pulmonary nodule     f/b Dr. Irwin at [facility] - scan 2018 showed stability since 2016     RLS (restless legs syndrome) 2009    noted on PSG     Uncomplicated asthma        Allergies   Allergen Reactions     Grass        Current Outpatient Medications   Medication     albuterol (PROAIR HFA) 108 (90 Base) MCG/ACT inhaler     amLODIPine (NORVASC) 10 MG tablet     ASPIRIN 81 MG OR TABS     calcium carbonate-vitamin D 500-400 MG-UNIT TABS per tablet     cetirizine (ZYRTEC) 10 MG tablet     cholecalciferol (VITAMIN D3) 5000 UNITS TABS tablet     Cyanocobalamin (B-12) 1000 MCG TBCR     diclofenac (VOLTAREN) 1 % topical gel     donepezil (ARICEPT) 10 MG tablet     DULoxetine (CYMBALTA) 20 MG capsule     guaiFENesin (MUCINEX) 600 MG 12 hr tablet     ipratropium - albuterol 0.5 mg/2.5 mg/3 mL (DUONEB) 0.5-2.5 (3) MG/3ML neb solution     ipratropium-albuterol (COMBIVENT RESPIMAT)  MCG/ACT inhaler     omeprazole (PRILOSEC) 40 MG DR capsule     solifenacin (VESICARE) 10 MG tablet     tiotropium-olodaterol (STIOLTO RESPIMAT) 2.5-2.5 MCG/ACT AERS     traZODone (DESYREL) 50 MG tablet     UNABLE TO FIND     VITRON-C  MG TABS tablet     No current facility-administered medications for this visit.       Social History     Socioeconomic History     Marital status:      Spouse name:      Number of children: 3     Years of education: Not on file     Highest education level: Not on file   Occupational History     Occupation: office work     Employer: RETIRED   Tobacco Use     Smoking status: Former Smoker     Quit date: 1988     Years since quittin.1     Smokeless tobacco: Never Used     Tobacco comment: Quit in    Substance and Sexual Activity     Alcohol use: No     Comment: none     Drug use: No     Sexual activity: Not Currently     Partners: Male     Birth control/protection: Abstinence, Surgical   Other Topics Concern     Parent/sibling w/  CABG, MI or angioplasty before 65F 55M? Not Asked      Service Not Asked     Blood Transfusions Not Asked     Caffeine Concern Yes     Comment: 4-6 daily     Occupational Exposure Not Asked     Hobby Hazards Not Asked     Sleep Concern No     Stress Concern Not Asked     Weight Concern Not Asked     Special Diet No     Back Care Not Asked     Exercise No     Bike Helmet Not Asked     Seat Belt Not Asked     Self-Exams Not Asked   Social History Narrative    Has had success in past following Medifast eating plan.     Social Determinants of Health     Financial Resource Strain:      Difficulty of Paying Living Expenses:    Food Insecurity:      Worried About Running Out of Food in the Last Year:      Ran Out of Food in the Last Year:    Transportation Needs:      Lack of Transportation (Medical):      Lack of Transportation (Non-Medical):    Physical Activity:      Days of Exercise per Week:      Minutes of Exercise per Session:    Stress:      Feeling of Stress :    Social Connections:      Frequency of Communication with Friends and Family:      Frequency of Social Gatherings with Friends and Family:      Attends Christianity Services:      Active Member of Clubs or Organizations:      Attends Club or Organization Meetings:      Marital Status:    Intimate Partner Violence:      Fear of Current or Ex-Partner:      Emotionally Abused:      Physically Abused:      Sexually Abused:        Family History   Problem Relation Age of Onset     Alzheimer Disease Mother          at 98,  of old age     Alzheimer Disease Father          at 78     C.A.D. Father      Cancer Other 90        unknown cancer     Alzheimer Disease Brother          age 82      Family History Negative Brother         Born 1931     Cancer Maternal Aunt         x'2 w Ca unknown etiology     Colon Cancer No family hx of        Review of Systems: Positve for  per HPI, otherwise comprehensive review of systems is negative.    EXAM:  Ht  "1.575 m (5' 2\")   BMI 27.44 kg/m   wt 150 lbs  GENERAL: Alert and no distress  EYES: Eyes grossly normal to inspection.  No discharge or erythema, or obvious scleral/conjunctival abnormalities.  RESP: No audible wheeze, cough, or visible cyanosis.  No visible retractions or increased work of breathing.    SKIN: Visible skin clear. No significant rash, abnormal pigmentation or lesions.  NEURO: Cranial nerves grossly intact.  Mentation and speech appropriate for age.  PSYCH: Mentation appears normal, affect normal,  judgement and insight intact, normal speech and appearance well-groomed.       TSH 10/8/2018 3.78        ASSESSMENT:  86-year-old female with history of moderately severe COPD, Alzheimer's, glaucoma, hypertension.  She has been spending a lot more time in bed with morning grogginess.  Differential includes trazodone affects versus sleep apnea.  Identifying and treating sleep apnea could improve both mental status and glaucoma and blood pressure management.  However, be cautious about forcing treatment of mild sleep apnea if treatment aggravate sleep quality.    PLAN:  Recommended in lab polysomnography for accurate evaluation.  Will have caregiver family member presents for initial set up to ensure patient's orientation and comfort.  If patient does have significant sleep apnea will consider a trial of CPAP therapy.    Also recommended discontinuing trazodone.  It is possible that at this point patient's is having more grogginess in the morning because of it.  Ideally patient would switch to decaf after 3 or 4 PM.  Please see patient instructions for further details of counseling provided.     51 minutes spent on the date of the encounter doing chart review, history and exam, documentation and further activities per the note    Vanessa Antunez M.D.  Pulmonary/Critical Care/Sleep Medicine    [facility]   [address]oor [address], [address]   [address] [place] [address]   Appointments: [phone]    The above note was dictated using voice recognition software and may include typographical errors. Please contact the author for any clarifications.

## 2021-09-08 NOTE — LETTER
9/8/2021         RE: Gifty Villa  [address]  [address]  [place] [address]        Dear Colleague,    Thank you for referring your patient, Gifty Villa, to the [facility]. Please see a copy of my visit note below.    Gayla is a 86 year old who is being evaluated via a billable video visit.      How would you like to obtain your AVS? MyChart  If the video visit is dropped, the invitation should be resent by: Text to cell phone: [phone]  Will anyone else be joining your video visit? No    Loretta Spencer CMA  Does patient have any form of state insurance? YES          Video-Visit Details    Type of service:  Video Visit    Video Start Time: 8:11 AM     Video End Time:8:44 AM    Originating Location (pt. Location): Home    Distant Location (provider location):  [facility]    Platform used for Video Visit: [email or web address]     Chief complaint: Consultation requested by Jun bryant MD for evaluation of possible sleep disordered breathing    History of Present Illness: 86-year-old female with history of COPD/asthma, hypertension, glaucoma.  She is recently seen by her eye doctor and recommended to be evaluated for possible sleep apnea.  The visit is done with patient and her daughter-in-law who provides much of the history.  Patient is currently living in assisted living.  Patient was started on trazodone couple of years ago due to some complaints about sleep.  She has some good nights and some bad nights.  She also discontinued caffeine a while back to see if it made any improvements in her sleep and none was identified.  She only became more irritable.  She has been having more daytime alertness since moving into assisted living and doing more activities during the day and with increased social opportunities due to pandemic changes.  She typically goes to bed and watches some TV.  She thinks she goes to sleep around 10:50 PM.   Lately in the last year or so she has been kind of tending to stay in bed and not get out of bed till 10 or 11 in the morning.  However previously she been more of a morning person.  There is no history of dry mouth at night.  She does not wake up with headaches or reflux symptoms.  She does not need to use her rescue inhaler at night.  She sleeps on a flat bed.  She prefers sleeping prone.  Her sleep is not witnessed she is not sure if she snores.  There is no history of sleepwalking, sleep talking or dream enactment behavior that she is aware of.  She is not taking naps during the day.  She tends to drink coffee maybe up to 10 cups throughout the day.    Patient has been suffering memory problems and was diagnosed with Alzheimer's.  She also has tinnitus that sometimes can interfere with sleep.  She does not drink alcohol.  No known family history of sleep apnea.  Blood pressures under good control but she had elevations during recent ER visits for COPD.    [place] Sleepiness Scale   Sitting and reading: Would never doze   Watching TV: Would never doze   Sitting, inactive in a public place (e.g. a theatre or a meeting): Would never doze   As a passenger in a car for an hour without a break: Slight chance of dozing   Lying down to rest in the afternoon when circumstances permit: Moderate chance of dozing   Sitting and talking to someone: Would never doze   Sitting quietly after a lunch without alcohol: Would never doze   In a car, while stopped for a few minutes in traffic: Would never doze   Total score - [place]: 3   (Less than 10 normal)    Insomnia Severity Scale  ASHLEY Total Score: 2  Total score categories:  0-7 = No clinically significant insomnia   8-14 = Subthreshold insomnia   15-21 = Clinical insomnia (moderate severity)  22-28 = Clinical insomnia (severe)    STOP-BANG  Loud Snore   0  Excessively Tired/Sleepy   0  Observed apnea   0  Hypertension   1  BMI> 35 kg/m2   0  Age >50   1  Neck >16 in/40cm    0  Male Gender   0  Total =   2  (0-2 low, 3-4 intermediate, 5-8 high risk of RAMILA)      Past Medical History:   Diagnosis Date     Arthritis      Backache, unspecified      COPD (chronic obstructive pulmonary disease) (H)      Essential hypertension, benign      Frequency-urgency sydrome      Hyperlipidemia      Pulmonary nodule     f/b Dr. Irwin at [facility] - scan 2018 showed stability since      RLS (restless legs syndrome) 2009    noted on PSG     Uncomplicated asthma        Allergies   Allergen Reactions     Grass        Current Outpatient Medications   Medication     albuterol (PROAIR HFA) 108 (90 Base) MCG/ACT inhaler     amLODIPine (NORVASC) 10 MG tablet     ASPIRIN 81 MG OR TABS     calcium carbonate-vitamin D 500-400 MG-UNIT TABS per tablet     cetirizine (ZYRTEC) 10 MG tablet     cholecalciferol (VITAMIN D3) 5000 UNITS TABS tablet     Cyanocobalamin (B-12) 1000 MCG TBCR     diclofenac (VOLTAREN) 1 % topical gel     donepezil (ARICEPT) 10 MG tablet     DULoxetine (CYMBALTA) 20 MG capsule     guaiFENesin (MUCINEX) 600 MG 12 hr tablet     ipratropium - albuterol 0.5 mg/2.5 mg/3 mL (DUONEB) 0.5-2.5 (3) MG/3ML neb solution     ipratropium-albuterol (COMBIVENT RESPIMAT)  MCG/ACT inhaler     omeprazole (PRILOSEC) 40 MG DR capsule     solifenacin (VESICARE) 10 MG tablet     tiotropium-olodaterol (STIOLTO RESPIMAT) 2.5-2.5 MCG/ACT AERS     traZODone (DESYREL) 50 MG tablet     UNABLE TO FIND     VITRON-C  MG TABS tablet     No current facility-administered medications for this visit.       Social History     Socioeconomic History     Marital status:      Spouse name:      Number of children: 3     Years of education: Not on file     Highest education level: Not on file   Occupational History     Occupation: office work     Employer: RETIRED   Tobacco Use     Smoking status: Former Smoker     Quit date: 1988     Years since quittin.1     Smokeless tobacco: Never Used      Tobacco comment: Quit in    Substance and Sexual Activity     Alcohol use: No     Comment: none     Drug use: No     Sexual activity: Not Currently     Partners: Male     Birth control/protection: Abstinence, Surgical   Other Topics Concern     Parent/sibling w/ CABG, MI or angioplasty before 65F 55M? Not Asked      Service Not Asked     Blood Transfusions Not Asked     Caffeine Concern Yes     Comment: 4-6 daily     Occupational Exposure Not Asked     Hobby Hazards Not Asked     Sleep Concern No     Stress Concern Not Asked     Weight Concern Not Asked     Special Diet No     Back Care Not Asked     Exercise No     Bike Helmet Not Asked     Seat Belt Not Asked     Self-Exams Not Asked   Social History Narrative    Has had success in past following Medifast eating plan.     Social Determinants of Health     Financial Resource Strain:      Difficulty of Paying Living Expenses:    Food Insecurity:      Worried About Running Out of Food in the Last Year:      Ran Out of Food in the Last Year:    Transportation Needs:      Lack of Transportation (Medical):      Lack of Transportation (Non-Medical):    Physical Activity:      Days of Exercise per Week:      Minutes of Exercise per Session:    Stress:      Feeling of Stress :    Social Connections:      Frequency of Communication with Friends and Family:      Frequency of Social Gatherings with Friends and Family:      Attends Hoahaoism Services:      Active Member of Clubs or Organizations:      Attends Club or Organization Meetings:      Marital Status:    Intimate Partner Violence:      Fear of Current or Ex-Partner:      Emotionally Abused:      Physically Abused:      Sexually Abused:        Family History   Problem Relation Age of Onset     Alzheimer Disease Mother          at 98,  of old age     Alzheimer Disease Father          at 78     C.A.D. Father      Cancer Other 90        unknown cancer     Alzheimer Disease Brother          age  "82      Family History Negative Brother         Born 1931     Cancer Maternal Aunt         x'2 w Ca unknown etiology     Colon Cancer No family hx of        Review of Systems: Positve for  per HPI, otherwise comprehensive review of systems is negative.    EXAM:  Ht 1.575 m (5' 2\")   BMI 27.44 kg/m   wt 150 lbs  GENERAL: Alert and no distress  EYES: Eyes grossly normal to inspection.  No discharge or erythema, or obvious scleral/conjunctival abnormalities.  RESP: No audible wheeze, cough, or visible cyanosis.  No visible retractions or increased work of breathing.    SKIN: Visible skin clear. No significant rash, abnormal pigmentation or lesions.  NEURO: Cranial nerves grossly intact.  Mentation and speech appropriate for age.  PSYCH: Mentation appears normal, affect normal,  judgement and insight intact, normal speech and appearance well-groomed.       TSH 10/8/2018 3.78        ASSESSMENT:  86-year-old female with history of moderately severe COPD, Alzheimer's, glaucoma, hypertension.  She has been spending a lot more time in bed with morning grogginess.  Differential includes trazodone affects versus sleep apnea.  Identifying and treating sleep apnea could improve both mental status and glaucoma and blood pressure management.  However, be cautious about forcing treatment of mild sleep apnea if treatment aggravate sleep quality.    PLAN:  Recommended in lab polysomnography for accurate evaluation.  Will have caregiver family member presents for initial set up to ensure patient's orientation and comfort.  If patient does have significant sleep apnea will consider a trial of CPAP therapy.    Also recommended discontinuing trazodone.  It is possible that at this point patient's is having more grogginess in the morning because of it.  Ideally patient would switch to decaf after 3 or 4 PM.  Please see patient instructions for further details of counseling provided.     51 minutes spent on the date of the encounter doing " chart review, history and exam, documentation and further activities per the note    Vanessa Antunez M.D.  Pulmonary/Critical Care/Sleep Medicine    [facility]   Floor 1, [address]   [address] [address]   Appointments: [phone]    The above note was dictated using voice recognition software and may include typographical errors. Please contact the author for any clarifications.                Again, thank you for allowing me to participate in the care of your patient.        Sincerely,        Vanessa Antunez MD

## 2021-09-22 ENCOUNTER — HOSPITAL ENCOUNTER (EMERGENCY)
Facility: CLINIC | Age: 86
Discharge: HOME | End: 2021-09-22
Attending: EMERGENCY MEDICINE | Admitting: EMERGENCY MEDICINE
Payer: COMMERCIAL

## 2021-09-22 ENCOUNTER — APPOINTMENT (OUTPATIENT)
Dept: GENERAL RADIOLOGY | Facility: CLINIC | Age: 86
End: 2021-09-22
Attending: EMERGENCY MEDICINE
Payer: COMMERCIAL

## 2021-09-22 VITALS
WEIGHT: 148 LBS | RESPIRATION RATE: 18 BRPM | HEART RATE: 65 BPM | BODY MASS INDEX: 27.23 KG/M2 | HEIGHT: 62 IN | SYSTOLIC BLOOD PRESSURE: 131 MMHG | TEMPERATURE: 98.6 F | OXYGEN SATURATION: 96 % | DIASTOLIC BLOOD PRESSURE: 67 MMHG

## 2021-09-22 DIAGNOSIS — R07.89 ATYPICAL CHEST PAIN: ICD-10-CM

## 2021-09-22 DIAGNOSIS — R06.02 SHORTNESS OF BREATH: ICD-10-CM

## 2021-09-22 LAB
ANION GAP SERPL CALCULATED.3IONS-SCNC: 6 MMOL/L (ref 3–14)
ATRIAL RATE - MUSE: 68 BPM
BASOPHILS # BLD AUTO: 0 10E3/UL (ref 0–0.2)
BASOPHILS NFR BLD AUTO: 0 %
BUN SERPL-MCNC: 12 MG/DL (ref 7–30)
CALCIUM SERPL-MCNC: 9 MG/DL (ref 8.5–10.1)
CHLORIDE BLD-SCNC: 99 MMOL/L (ref 94–109)
CO2 SERPL-SCNC: 29 MMOL/L (ref 20–32)
CREAT SERPL-MCNC: 0.83 MG/DL (ref 0.52–1.04)
D DIMER PPP FEU-MCNC: 0.45 UG/ML FEU (ref 0–0.5)
DIASTOLIC BLOOD PRESSURE - MUSE: NORMAL MMHG
EOSINOPHIL # BLD AUTO: 0.3 10E3/UL (ref 0–0.7)
EOSINOPHIL NFR BLD AUTO: 5 %
ERYTHROCYTE [DISTWIDTH] IN BLOOD BY AUTOMATED COUNT: 13 % (ref 10–15)
GFR SERPL CREATININE-BSD FRML MDRD: 65 ML/MIN/1.73M2
GLUCOSE BLD-MCNC: 96 MG/DL (ref 70–99)
HCT VFR BLD AUTO: 46.1 % (ref 35–47)
HGB BLD-MCNC: 15.2 G/DL (ref 11.7–15.7)
IMM GRANULOCYTES # BLD: 0 10E3/UL
IMM GRANULOCYTES NFR BLD: 0 %
INTERPRETATION ECG - MUSE: NORMAL
LYMPHOCYTES # BLD AUTO: 1.8 10E3/UL (ref 0.8–5.3)
LYMPHOCYTES NFR BLD AUTO: 29 %
MCH RBC QN AUTO: 29.3 PG (ref 26.5–33)
MCHC RBC AUTO-ENTMCNC: 33 G/DL (ref 31.5–36.5)
MCV RBC AUTO: 89 FL (ref 78–100)
MONOCYTES # BLD AUTO: 0.7 10E3/UL (ref 0–1.3)
MONOCYTES NFR BLD AUTO: 11 %
NEUTROPHILS # BLD AUTO: 3.4 10E3/UL (ref 1.6–8.3)
NEUTROPHILS NFR BLD AUTO: 55 %
NRBC # BLD AUTO: 0 10E3/UL
NRBC BLD AUTO-RTO: 0 /100
NT-PROBNP SERPL-MCNC: 88 PG/ML (ref 0–1800)
P AXIS - MUSE: 72 DEGREES
PLATELET # BLD AUTO: 255 10E3/UL (ref 150–450)
POTASSIUM BLD-SCNC: 3.8 MMOL/L (ref 3.4–5.3)
PR INTERVAL - MUSE: 188 MS
QRS DURATION - MUSE: 78 MS
QT - MUSE: 428 MS
QTC - MUSE: 455 MS
R AXIS - MUSE: 80 DEGREES
RBC # BLD AUTO: 5.18 10E6/UL (ref 3.8–5.2)
SARS-COV-2 RNA RESP QL NAA+PROBE: NEGATIVE
SODIUM SERPL-SCNC: 134 MMOL/L (ref 133–144)
SYSTOLIC BLOOD PRESSURE - MUSE: NORMAL MMHG
T AXIS - MUSE: 81 DEGREES
TROPONIN I SERPL-MCNC: <0.015 UG/L (ref 0–0.04)
VENTRICULAR RATE- MUSE: 68 BPM
WBC # BLD AUTO: 6.2 10E3/UL (ref 4–11)

## 2021-09-22 PROCEDURE — 36415 COLL VENOUS BLD VENIPUNCTURE: CPT | Performed by: EMERGENCY MEDICINE

## 2021-09-22 PROCEDURE — 85025 COMPLETE CBC W/AUTO DIFF WBC: CPT | Performed by: EMERGENCY MEDICINE

## 2021-09-22 PROCEDURE — 83880 ASSAY OF NATRIURETIC PEPTIDE: CPT | Performed by: EMERGENCY MEDICINE

## 2021-09-22 PROCEDURE — 99285 EMERGENCY DEPT VISIT HI MDM: CPT | Mod: 25

## 2021-09-22 PROCEDURE — 71046 X-RAY EXAM CHEST 2 VIEWS: CPT

## 2021-09-22 PROCEDURE — 94640 AIRWAY INHALATION TREATMENT: CPT

## 2021-09-22 PROCEDURE — 87635 SARS-COV-2 COVID-19 AMP PRB: CPT | Performed by: EMERGENCY MEDICINE

## 2021-09-22 PROCEDURE — C9803 HOPD COVID-19 SPEC COLLECT: HCPCS

## 2021-09-22 PROCEDURE — 84484 ASSAY OF TROPONIN QUANT: CPT | Performed by: EMERGENCY MEDICINE

## 2021-09-22 PROCEDURE — 250N000013 HC RX MED GY IP 250 OP 250 PS 637: Performed by: EMERGENCY MEDICINE

## 2021-09-22 PROCEDURE — 93005 ELECTROCARDIOGRAM TRACING: CPT

## 2021-09-22 PROCEDURE — 250N000009 HC RX 250: Performed by: EMERGENCY MEDICINE

## 2021-09-22 PROCEDURE — 85379 FIBRIN DEGRADATION QUANT: CPT | Performed by: EMERGENCY MEDICINE

## 2021-09-22 PROCEDURE — 80048 BASIC METABOLIC PNL TOTAL CA: CPT | Performed by: EMERGENCY MEDICINE

## 2021-09-22 RX ORDER — ASPIRIN 81 MG/1
324 TABLET, CHEWABLE ORAL ONCE
Status: COMPLETED | OUTPATIENT
Start: 2021-09-22 | End: 2021-09-22

## 2021-09-22 RX ORDER — ACETAMINOPHEN 500 MG
1000 TABLET ORAL ONCE
Status: COMPLETED | OUTPATIENT
Start: 2021-09-22 | End: 2021-09-22

## 2021-09-22 RX ORDER — IPRATROPIUM BROMIDE AND ALBUTEROL SULFATE 2.5; .5 MG/3ML; MG/3ML
3 SOLUTION RESPIRATORY (INHALATION) ONCE
Status: COMPLETED | OUTPATIENT
Start: 2021-09-22 | End: 2021-09-22

## 2021-09-22 RX ADMIN — ACETAMINOPHEN 1000 MG: 500 TABLET, FILM COATED ORAL at 20:35

## 2021-09-22 RX ADMIN — ASPIRIN 81 MG CHEWABLE TABLET 324 MG: 81 TABLET CHEWABLE at 20:35

## 2021-09-22 RX ADMIN — IPRATROPIUM BROMIDE AND ALBUTEROL SULFATE 3 ML: .5; 3 SOLUTION RESPIRATORY (INHALATION) at 20:44

## 2021-09-22 ASSESSMENT — ENCOUNTER SYMPTOMS
FEVER: 0
VOMITING: 0
DIARRHEA: 0
COUGH: 0
SHORTNESS OF BREATH: 1

## 2021-09-22 ASSESSMENT — MIFFLIN-ST. JEOR: SCORE: 1069.57

## 2021-09-23 ASSESSMENT — ENCOUNTER SYMPTOMS: ARTHRALGIAS: 1

## 2021-09-23 NOTE — ED PROVIDER NOTES
History   Chief Complaint:  Shortness of breath    The history is provided by the patient.      Gifty Villa is an 86 year old female with Alzhiemer's and COPD who presents with shortness of breath. Gayla presents with shortness of breath that she feels is worse than her baseline, particularly with activity. She denies chest pain, cough, fever, vomiting, or diarrhea. However, when prompted, she notes some pain in the left superior chest that is present only if she pushes on the area. When reminded by nursing, she also reports pain in her left shoulder blade that she feels only if she is moving her left upper extremity. These pains are mild and not exacerbated by deep inspiration. She denies recent travel or recent surgeries. She did receive the COVID-19 vaccine.     Review of Systems   Constitutional: Negative for fever.   Respiratory: Positive for shortness of breath. Negative for cough.    Cardiovascular: Negative for chest pain and leg swelling.   Gastrointestinal: Negative for diarrhea and vomiting.   Musculoskeletal: Positive for arthralgias (left shoulder blade).   All other systems reviewed and are negative.    Allergies:  Grass    Medications:  Albuterol  Norvasc  Zyrtec  Voltaren  Aricept  Cymbalta  Mucinex  Prilosec  Vesicare  Desyrel  Vitron    Past Medical History:    Arthritis  COPD  Hypertension  Hyperlipidemia  Pulmonary nodule  RLS  Asthma  Depression  Alzheimer's dementia  Anxiety    Past Surgical History:    Arthroplasty  Spine fusion  Hysterectomy  Cardiac cath  Colonoscopy  EGD  Excise ganglion wrist    Family History:    Alzheimer's  CAD    Social History:  The patient presents alone. Son later at bedside.  Lives at [facility].  Former smoker     Physical Exam     Patient Vitals for the past 24 hrs:   BP Temp Temp src Pulse Resp SpO2 Height Weight   09/22/21 2300 131/67 -- -- 68 -- 94 % -- --   09/22/21 2250 -- -- -- 65 16 95 % -- --   09/22/21 2230 (!) 143/69 -- -- 66 -- 92 % -- --  "  09/22/21 2200 (!) 159/74 -- -- 66 -- 94 % -- --   09/22/21 2130 (!) 156/64 -- -- 64 8 95 % -- --   09/22/21 2100 (!) 162/83 -- -- 82 (!) 7 100 % -- --   09/22/21 2030 (!) 161/88 -- -- 70 24 96 % -- --   09/22/21 2024 (!) 172/86 98.6  F (37  C) Oral 69 20 96 % 1.575 m (5' 2\") 67.1 kg (148 lb)   09/22/21 2019 (!) 172/86 98.6  F (37  C) Oral 78 15 94 % -- --       Physical Exam  General: Well-developed and well-nourished. Well appearing elderly  woman. Cooperative.  Head:  Atraumatic.  Eyes:  Conjunctivae, lids, and sclerae are normal.  Neck:  Supple. Normal range of motion.  CV:  Regular rate and rhythm. Normal heart sounds with no murmurs, rubs, or gallops detected.  Resp:  No respiratory distress. Slightly diminished breath sounds without wheezing, rales, or rhonchi.  GI:  Soft. Non-distended. Non-tender.    MS:  Normal ROM. No bilateral lower extremity edema.  Skin:  Warm. Non-diaphoretic. No pallor.  Neuro:  Awake and alert. Normal strength.  Psych: Normal mood and affect. Normal speech.  Vitals reviewed.    Emergency Department Course   EKG  Indication: shortness of breath  Time: 2038  Rate 68 bpm. TX interval 188. QRS duration 78. QT/QTc 428/455.   Normal sinus rhythm  Anterior infarct, age undetermined   Artifact in V5  No acute ST changes.  No change as compared to prior, dated 06/01/21.    Imaging:  XR Chest 2 Views:  Heart size is normal. Mild aortic atherosclerotic calcification. Lungs are mildly hyperinflated, though there is no evidence of pneumonia or heart failure. No pneumothorax or pleural effusion.    Reading per radiology.    Laboratory:  CBC: WBC 6.2, HGB 15.2,      BMP: Glucose: 96 o/w WNL (Creatinine 0.83)     D dimer: 0.45    Troponin (Collected 2031): <0.015    Nt probnp inpatient: 88    Symptomatic Covid-19 Virus by PCR: negative    Emergency Department Course:    Reviewed:  I reviewed nursing notes, vitals, and past medical history.    Assessments:  2018 I obtained history " and examined the patient as noted above.     2307 I rechecked the patient and explained findings. She is feeling a lot better but still has mild pain when she moves her left shoulder. Son is at bedside.     Interventions:  2035  mg PO    2035 Tylenol 1,000 mg PO    2044 Duoneb 3 mL Neb    Disposition:  The patient was discharged home.     Impression & Plan     Medical Decision Making:  Gayla is an 86-year-old woman with COPD who thought she was a little bit more short of breath than typical starting this afternoon, particularly with exertion.  She also mentions left superior chest pain that is present only if she pushes on the area as well as some pain over her left shoulder blade that is present only if she is moving her shoulder.  She denies all other concerns or complaints and appears well on exam.  Her EKG is reassuring without acute ST changes or arrhythmias.  Her troponin is undetectable.  She has very atypical pain and I doubt a cardiac etiology for her symptoms.  She does not appear volume overloaded and BNP is normal.  D-dimer is normal essentially ruling out pulmonary embolism.  COVID-19 is negative and chest x-ray does not reveal pneumonia or other acute pathology.  There is no kidney injury, electrolyte derangements, leukocytosis, or anemia. She was treated with aspirin, Tylenol, and a DuoNeb and on repeat evaluation states she is feeling quite well.  She still has some pain in the chest only if she touches the area and in the shoulder blade if she moves.  This suggests a musculoskeletal etiology for this pain.  At this point she is appropriate for discharge.  Although her breath sounds were somewhat diminished she had no wheezing and I would not consider this a COPD exacerbation.  She is not hypoxic.  However, her shortness of breath may be related to this underlying COPD and I recommended she try her inhaler if she has recurrence of shortness of breath.  However, she was given return precautions  and will return immediately if she has worsening dyspnea, chest pain, palpitations, or any other new or concerning symptoms including change in this likely musculoskeletal left-sided pain.  For this she could try Tylenol.  I encouraged her to follow-up with primary care.  Her son is at bedside and is comfortable with her discharging.  All of their questions were answered.     Covid-19  Gifty Villa was evaluated during a global COVID-19 pandemic, which necessitated consideration that the patient might be at risk for infection with the SARS-CoV-2 virus that causes COVID-19.   Applicable protocols for evaluation were followed during the patient's care.   COVID-19 was considered as part of the patient's evaluation. The plan for testing is:  a test was obtained during this visit.    Diagnosis:    ICD-10-CM    1. Shortness of breath  R06.02    2. Atypical chest pain  R07.89        Scribe Disclosure:  Harsha DAVIS, am serving as a scribe at 8:18 PM on 9/22/2021 to document services personally performed by Radha Munson MD based on my observations and the provider's statements to me.          Radha Munson MD  09/23/21 0296

## 2021-09-23 NOTE — DISCHARGE INSTRUCTIONS
If your shortness of breath returns I would try your inhaler.  However, if that does not improve it or if you have worsening chest pain, palpitations, or any other concerns you need to return immediately to the emergency department.  Because your pain is worse when you push it or move your shoulder and may be a muscle.  You can continue Tylenol for this.  If it is present all the time are getting worse you need to be seen again.  Otherwise, please follow-up with your primary care provider in the next few days to ensure you are still doing well.

## 2021-09-23 NOTE — ED NOTES
Patient ambulated to the bathroom across the hallway without difficulty or pain. No further needs at this time.

## 2021-09-23 NOTE — ED TRIAGE NOTES
[facility]  ED Arrival Note    Arrives to ED via [facility]. A &O X4. Pt arrives with c/o 3/10 left axilla pain that radiates to the L shoulder blade that started this afernoon. Pt also states that she is more SOB than normal, especially when she exerts herself. Pt states that she has a hx of COPD and uses an inhaler.  EMS reported pt was hypertensive en route with Sat's 94% RA.    Visitors during triage: None      EMS Interventions: N/A    Ambulatory: Yes    Meets Stroke Criteria?: No    Meets Trauma Criteria?: No    Directed to: Main ED

## 2021-09-25 ENCOUNTER — HEALTH MAINTENANCE LETTER (OUTPATIENT)
Age: 86
End: 2021-09-25

## 2021-10-04 ENCOUNTER — OFFICE VISIT (OUTPATIENT)
Dept: FAMILY MEDICINE | Facility: CLINIC | Age: 86
End: 2021-10-04
Payer: COMMERCIAL

## 2021-10-04 VITALS
HEIGHT: 62 IN | WEIGHT: 143 LBS | SYSTOLIC BLOOD PRESSURE: 124 MMHG | HEART RATE: 96 BPM | BODY MASS INDEX: 26.31 KG/M2 | TEMPERATURE: 98.2 F | OXYGEN SATURATION: 95 % | DIASTOLIC BLOOD PRESSURE: 78 MMHG

## 2021-10-04 DIAGNOSIS — M54.9 UPPER BACK PAIN ON LEFT SIDE: Primary | ICD-10-CM

## 2021-10-04 DIAGNOSIS — M54.2 NECK PAIN: ICD-10-CM

## 2021-10-04 PROCEDURE — G0008 ADMIN INFLUENZA VIRUS VAC: HCPCS | Performed by: FAMILY MEDICINE

## 2021-10-04 PROCEDURE — 99213 OFFICE O/P EST LOW 20 MIN: CPT | Mod: 25 | Performed by: FAMILY MEDICINE

## 2021-10-04 PROCEDURE — 90662 IIV NO PRSV INCREASED AG IM: CPT | Performed by: FAMILY MEDICINE

## 2021-10-04 RX ORDER — CYCLOBENZAPRINE HCL 5 MG
5-10 TABLET ORAL 3 TIMES DAILY PRN
Qty: 20 TABLET | Refills: 1 | Status: SHIPPED | OUTPATIENT
Start: 2021-10-04 | End: 2022-01-25

## 2021-10-04 RX ORDER — PREDNISONE 20 MG/1
TABLET ORAL
Qty: 14 TABLET | Refills: 0 | Status: SHIPPED | OUTPATIENT
Start: 2021-10-04 | End: 2021-10-16

## 2021-10-04 ASSESSMENT — MIFFLIN-ST. JEOR: SCORE: 1046.89

## 2021-10-04 NOTE — PROGRESS NOTES
"Assessment & Plan   Upper back pain on left side  Neck pain  Persistent tenderness in the left neck and upper back consistent with most likely muscle spasms.  Continue with heat, massage, will refer to physical therapy (specifically consider dry needling) and will try muscle relaxer as needed.  For sleep she can try Tylenol 3 No. 10 given small quantity hopefully get some rest.  Lastly will try prednisone course to reduce inflammation.  No significant radicular symptoms and if worsening I would consider MRI of cervical region to look for herniated disc which could be causing the spasms as well.    - cyclobenzaprine (FLEXERIL) 5 MG tablet  Dispense: 20 tablet; Refill: 1  - acetaminophen-codeine (TYLENOL #3) 300-30 MG tablet  Dispense: 10 tablet; Refill: 0  - predniSONE (DELTASONE) 20 MG tablet  Dispense: 14 tablet; Refill: 0  - Physical Therapy Referral      BMI:   Estimated body mass index is 26.16 kg/m  as calculated from the following:    Height as of this encounter: 1.575 m (5' 2\").    Weight as of this encounter: 64.9 kg (143 lb).   Weight management plan: Discussed healthy diet and exercise guidelines      Return in about 2 weeks (around 10/18/2021) for symptoms failing to improve or sooner if worsening.      David Arevalo MD      [address] [address]  [email or web address]   Office: [phone]       Clifford Shukla is a 86 year old who presents for the following health issues with her son and daughter-in-law,    HPI     Musculoskeletal problem/pain  Onset/Duration: ongoing issue for several months - has been in the ER three times for this issue  Description  Location: severe left shoulder pain - from her shoulder blade down her left arm  Joint Swelling: no  Redness: no  Pain: YES  Warmth: no  Intensity:  10/10 at times  Progression of Symptoms:  Same for about the last day 10 days  Accompanying signs and symptoms:   Fevers: no  Numbness/tingling/weakness: " "YES  History  Trauma to the area: no  Recent illness:  no  Previous similar problem: left shoulder has been replaced as well  Previous evaluation:  no  Precipitating or alleviating factors:  Aggravating factors include: movement of any kind at all   Therapies tried and outcome: tylenol, advil, heat, massage, - nothing seems to help at all -     Review of Systems   Constitutional, HEENT, cardiovascular, pulmonary, GI, , musculoskeletal, neuro, skin, endocrine and psych systems are negative, except as otherwise noted.      Objective    /78 (BP Location: Right arm, Cuff Size: Adult Regular)   Pulse 96   Temp 98.2  F (36.8  C) (Tympanic)   Ht 1.575 m (5' 2\")   Wt 64.9 kg (143 lb)   SpO2 95%   BMI 26.16 kg/m    Body mass index is 26.16 kg/m .  Physical Exam   GENERAL: healthy, alert and no distress  NECK: Left-sided posterior pain with rotating her head to the left, significant trapezius trigger point tenderness otherwise no adenopathy, no asymmetry, masses, or scars and thyroid normal to palpation  RESP: lungs clear to auscultation - no rales, rhonchi or wheezes  CV: regular rate and rhythm, normal S1 S2, no S3 or S4, no murmur, click or rub, no peripheral edema and peripheral pulses strong  ABDOMEN: soft, nontender, no hepatosplenomegaly, no masses and bowel sounds normal  MS: no gross musculoskeletal defects noted, no edema  SKIN: no suspicious lesions or rashes  NEURO: Normal strength and tone, mentation intact and speech normal  BACK: Upper back rhomboid and trapezius trigger point tenderness on left right is normal.  No CVA tenderness, no paralumbar tenderness    [facility] PT - dry needling provider                  "

## 2021-10-05 ENCOUNTER — HOSPITAL ENCOUNTER (EMERGENCY)
Facility: CLINIC | Age: 86
Discharge: LEFT BEFORE DISCHARGE | End: 2021-10-05
Payer: COMMERCIAL

## 2021-10-14 ENCOUNTER — TRANSFERRED RECORDS (OUTPATIENT)
Dept: HEALTH INFORMATION MANAGEMENT | Facility: CLINIC | Age: 86
End: 2021-10-14

## 2021-10-18 ENCOUNTER — HOSPITAL ENCOUNTER (OUTPATIENT)
Dept: MRI IMAGING | Facility: CLINIC | Age: 86
Discharge: HOME | End: 2021-10-18
Attending: FAMILY MEDICINE | Admitting: FAMILY MEDICINE
Payer: COMMERCIAL

## 2021-10-18 DIAGNOSIS — M54.12 CERVICAL RADICULOPATHY: ICD-10-CM

## 2021-10-18 PROCEDURE — 72141 MRI NECK SPINE W/O DYE: CPT

## 2021-11-15 ENCOUNTER — NURSE TRIAGE (OUTPATIENT)
Dept: FAMILY MEDICINE | Facility: CLINIC | Age: 86
End: 2021-11-15

## 2021-11-15 ENCOUNTER — APPOINTMENT (OUTPATIENT)
Dept: CT IMAGING | Facility: CLINIC | Age: 86
End: 2021-11-15
Attending: EMERGENCY MEDICINE
Payer: COMMERCIAL

## 2021-11-15 ENCOUNTER — APPOINTMENT (OUTPATIENT)
Dept: MRI IMAGING | Facility: CLINIC | Age: 86
End: 2021-11-15
Attending: EMERGENCY MEDICINE
Payer: COMMERCIAL

## 2021-11-15 ENCOUNTER — HOSPITAL ENCOUNTER (EMERGENCY)
Facility: CLINIC | Age: 86
Discharge: HOME | End: 2021-11-15
Attending: EMERGENCY MEDICINE | Admitting: EMERGENCY MEDICINE
Payer: COMMERCIAL

## 2021-11-15 ENCOUNTER — APPOINTMENT (OUTPATIENT)
Dept: GENERAL RADIOLOGY | Facility: CLINIC | Age: 86
End: 2021-11-15
Attending: EMERGENCY MEDICINE
Payer: COMMERCIAL

## 2021-11-15 VITALS
RESPIRATION RATE: 11 BRPM | BODY MASS INDEX: 26.25 KG/M2 | SYSTOLIC BLOOD PRESSURE: 134 MMHG | WEIGHT: 143.52 LBS | OXYGEN SATURATION: 92 % | HEART RATE: 73 BPM | DIASTOLIC BLOOD PRESSURE: 65 MMHG | TEMPERATURE: 97.8 F

## 2021-11-15 DIAGNOSIS — M62.81 GENERALIZED MUSCLE WEAKNESS: ICD-10-CM

## 2021-11-15 DIAGNOSIS — R40.4 TRANSIENT ALTERATION OF AWARENESS: ICD-10-CM

## 2021-11-15 DIAGNOSIS — R42 DIZZINESS: ICD-10-CM

## 2021-11-15 LAB
ALBUMIN UR-MCNC: NEGATIVE MG/DL
ANION GAP SERPL CALCULATED.3IONS-SCNC: 6 MMOL/L (ref 3–14)
APPEARANCE UR: CLEAR
BASOPHILS # BLD AUTO: 0 10E3/UL (ref 0–0.2)
BASOPHILS NFR BLD AUTO: 0 %
BILIRUB UR QL STRIP: NEGATIVE
BUN SERPL-MCNC: 11 MG/DL (ref 7–30)
CALCIUM SERPL-MCNC: 9 MG/DL (ref 8.5–10.1)
CHLORIDE BLD-SCNC: 102 MMOL/L (ref 94–109)
CO2 SERPL-SCNC: 30 MMOL/L (ref 20–32)
COLOR UR AUTO: ABNORMAL
CREAT BLD-MCNC: 0.8 MG/DL (ref 0.5–1)
CREAT SERPL-MCNC: 0.75 MG/DL (ref 0.52–1.04)
EOSINOPHIL # BLD AUTO: 0.5 10E3/UL (ref 0–0.7)
EOSINOPHIL NFR BLD AUTO: 7 %
ERYTHROCYTE [DISTWIDTH] IN BLOOD BY AUTOMATED COUNT: 14.2 % (ref 10–15)
GFR SERPL CREATININE-BSD FRML MDRD: 73 ML/MIN/1.73M2
GFR SERPL CREATININE-BSD FRML MDRD: >60 ML/MIN/1.73M2
GLUCOSE BLD-MCNC: 98 MG/DL (ref 70–99)
GLUCOSE BLDC GLUCOMTR-MCNC: 104 MG/DL (ref 70–99)
GLUCOSE UR STRIP-MCNC: NEGATIVE MG/DL
HCO3 BLDV-SCNC: 29 MMOL/L (ref 21–28)
HCT VFR BLD AUTO: 44.1 % (ref 35–47)
HGB BLD-MCNC: 14.4 G/DL (ref 11.7–15.7)
HGB UR QL STRIP: NEGATIVE
HOLD SPECIMEN: NORMAL
IMM GRANULOCYTES # BLD: 0 10E3/UL
IMM GRANULOCYTES NFR BLD: 0 %
KETONES UR STRIP-MCNC: 10 MG/DL
LACTATE BLD-SCNC: 0.8 MMOL/L
LEUKOCYTE ESTERASE UR QL STRIP: NEGATIVE
LYMPHOCYTES # BLD AUTO: 1.3 10E3/UL (ref 0.8–5.3)
LYMPHOCYTES NFR BLD AUTO: 18 %
MCH RBC QN AUTO: 30.6 PG (ref 26.5–33)
MCHC RBC AUTO-ENTMCNC: 32.7 G/DL (ref 31.5–36.5)
MCV RBC AUTO: 94 FL (ref 78–100)
MONOCYTES # BLD AUTO: 0.9 10E3/UL (ref 0–1.3)
MONOCYTES NFR BLD AUTO: 12 %
NEUTROPHILS # BLD AUTO: 4.5 10E3/UL (ref 1.6–8.3)
NEUTROPHILS NFR BLD AUTO: 63 %
NITRATE UR QL: NEGATIVE
NRBC # BLD AUTO: 0 10E3/UL
NRBC BLD AUTO-RTO: 0 /100
PCO2 BLDV: 49 MM HG (ref 40–50)
PH BLDV: 7.37 [PH] (ref 7.32–7.43)
PH UR STRIP: 7 [PH] (ref 5–7)
PLATELET # BLD AUTO: 257 10E3/UL (ref 150–450)
PO2 BLDV: 27 MM HG (ref 25–47)
POTASSIUM BLD-SCNC: 4.1 MMOL/L (ref 3.4–5.3)
RBC # BLD AUTO: 4.71 10E6/UL (ref 3.8–5.2)
RBC URINE: <1 /HPF
SAO2 % BLDV: 48 % (ref 94–100)
SODIUM SERPL-SCNC: 138 MMOL/L (ref 133–144)
SP GR UR STRIP: 1.01 (ref 1–1.03)
SQUAMOUS EPITHELIAL: <1 /HPF
UROBILINOGEN UR STRIP-MCNC: NORMAL MG/DL
WBC # BLD AUTO: 7.2 10E3/UL (ref 4–11)
WBC URINE: 2 /HPF

## 2021-11-15 PROCEDURE — 82565 ASSAY OF CREATININE: CPT

## 2021-11-15 PROCEDURE — 36415 COLL VENOUS BLD VENIPUNCTURE: CPT | Performed by: EMERGENCY MEDICINE

## 2021-11-15 PROCEDURE — A9585 GADOBUTROL INJECTION: HCPCS | Performed by: EMERGENCY MEDICINE

## 2021-11-15 PROCEDURE — 99285 EMERGENCY DEPT VISIT HI MDM: CPT | Mod: 25

## 2021-11-15 PROCEDURE — 250N000009 HC RX 250: Performed by: EMERGENCY MEDICINE

## 2021-11-15 PROCEDURE — 70553 MRI BRAIN STEM W/O & W/DYE: CPT

## 2021-11-15 PROCEDURE — 96374 THER/PROPH/DIAG INJ IV PUSH: CPT | Mod: 59

## 2021-11-15 PROCEDURE — 250N000011 HC RX IP 250 OP 636: Performed by: EMERGENCY MEDICINE

## 2021-11-15 PROCEDURE — 85025 COMPLETE CBC W/AUTO DIFF WBC: CPT | Performed by: EMERGENCY MEDICINE

## 2021-11-15 PROCEDURE — 82803 BLOOD GASES ANY COMBINATION: CPT

## 2021-11-15 PROCEDURE — 71045 X-RAY EXAM CHEST 1 VIEW: CPT

## 2021-11-15 PROCEDURE — 82374 ASSAY BLOOD CARBON DIOXIDE: CPT | Performed by: EMERGENCY MEDICINE

## 2021-11-15 PROCEDURE — 255N000002 HC RX 255 OP 636: Performed by: EMERGENCY MEDICINE

## 2021-11-15 PROCEDURE — 70496 CT ANGIOGRAPHY HEAD: CPT

## 2021-11-15 PROCEDURE — 70450 CT HEAD/BRAIN W/O DYE: CPT | Mod: XS

## 2021-11-15 PROCEDURE — 81001 URINALYSIS AUTO W/SCOPE: CPT | Performed by: EMERGENCY MEDICINE

## 2021-11-15 RX ORDER — GADOBUTROL 604.72 MG/ML
7.5 INJECTION INTRAVENOUS ONCE
Status: COMPLETED | OUTPATIENT
Start: 2021-11-15 | End: 2021-11-15

## 2021-11-15 RX ORDER — IOPAMIDOL 755 MG/ML
500 INJECTION, SOLUTION INTRAVASCULAR ONCE
Status: COMPLETED | OUTPATIENT
Start: 2021-11-15 | End: 2021-11-15

## 2021-11-15 RX ORDER — LORAZEPAM 2 MG/ML
0.5 INJECTION INTRAMUSCULAR ONCE
Status: COMPLETED | OUTPATIENT
Start: 2021-11-15 | End: 2021-11-15

## 2021-11-15 RX ADMIN — LORAZEPAM 0.5 MG: 2 INJECTION INTRAMUSCULAR; INTRAVENOUS at 18:57

## 2021-11-15 RX ADMIN — GADOBUTROL 6.5 ML: 604.72 INJECTION INTRAVENOUS at 19:35

## 2021-11-15 RX ADMIN — SODIUM CHLORIDE 80 ML: 9 INJECTION, SOLUTION INTRAVENOUS at 16:11

## 2021-11-15 RX ADMIN — IOPAMIDOL 70 ML: 755 INJECTION, SOLUTION INTRAVENOUS at 16:11

## 2021-11-15 NOTE — ED TRIAGE NOTES
A&O x4, ABCs intact. Pt presents with daughter-in-law for concern of dizziness, confusion and trouble speaking that started yesterday. Pt's DIL stated that pt is having trouble finding words and mixing words up. Pt reports dizziness that feels like things are spinning.

## 2021-11-15 NOTE — ED PROVIDER NOTES
[facility] Provider Note  [facility] Emergency Department  4:34 PM  11/15/2021    Gifty Villa  86 year oldfemale    Chief Complaint   Patient presents with     Confusion     Dizziness       HPI:    86-year-old female here with her daughter-in-law due to symptoms including a fall yesterday while getting up from a couch.  They had noted before this and even more so after acute on chronic confusion.  Occasional shaking of the right hand.  More unsteady on her feet.  She is also had trouble with word finding difficulty.  Normally she is in a facility named [facility] and also has daytime PCA.  She also normally does not need a cane or walker to ambulate.  They deny any recent urinary or stool incontinence, dysuria, chest pain, shortness of breath, cough or fever.  Patient daughter-in-law deny any complaints of pain since the fall in her extremities.  She of COPD not on chronic oxygen therapy.    ROS: 10 point ROS completed and negative other than mentioned above    Past Medical History:   Diagnosis Date     Arthritis      Backache, unspecified      COPD (chronic obstructive pulmonary disease) (H)      Essential hypertension, benign      Frequency-urgency sydrome      Hyperlipidemia      Pulmonary nodule     f/b Dr. Irwin at [facility] - scan 1/2018 showed stability since 2016     RLS (restless legs syndrome) 2009    noted on PSG     Uncomplicated asthma      Past Surgical History:   Procedure Laterality Date     ARTHROPLASTY PATELLO-FEMORAL (KNEE)  8/2010    Left TKA     C SPINE FUSION,ANTER,3 SGMTS  1992 1992     C TOTAL HIP ARTHROPLASTY  1995    Hip Replacement, Total (Right)     C VAGINAL HYSTERECTOMY  1978    Hysterectomy, Vaginal w anterior repair     CARDIAC CATH - HIM SCAN  2011    normal.  Done d/t abnormal stress test (false positive) - patient noted to have hypertensive heart disease     COLONOSCOPY  ~2003    No polyps     COLONOSCOPY N/A 10/10/2014    Procedure: COLONOSCOPY;  Surgeon: Chavez Ward,  MD;  Location: [facility]     ESOPHAGOSCOPY, GASTROSCOPY, DUODENOSCOPY (EGD), COMBINED  2016    Dr. Cha [facility]     ESOPHAGOSCOPY, GASTROSCOPY, DUODENOSCOPY (EGD), COMBINED N/A 2016    Procedure: COMBINED ESOPHAGOSCOPY, GASTROSCOPY, DUODENOSCOPY (EGD), BIOPSY SINGLE OR MULTIPLE;  Surgeon: Jada Cha MD;  Location: [facility]     EXCISE GANGLION WRIST Left 2015    Procedure: EXCISE GANGLION WRIST;  Surgeon: Justin Lyons MD;  Location: [facility]     HYSTERECTOMY, PAP NO LONGER INDICATED       wisdom teeth           Social History     Tobacco Use     Smoking status: Former Smoker     Quit date: 1988     Years since quittin.3     Smokeless tobacco: Never Used     Tobacco comment: Quit in    Substance Use Topics     Alcohol use: No     Comment: none     Drug use: No         No current facility-administered medications on file prior to encounter.  albuterol (PROAIR HFA) 108 (90 Base) MCG/ACT inhaler, Inhale 1-2 puffs into the lungs every 4 hours as needed for shortness of breath / dyspnea or wheezing  amLODIPine (NORVASC) 10 MG tablet, Take 1 tablet (10 mg) by mouth daily  ASPIRIN 81 MG OR TABS, ONE DAILY  calcium carbonate-vitamin D 500-400 MG-UNIT TABS per tablet, Take 1 tablet by mouth 2 times daily (with meals)  cetirizine (ZYRTEC) 10 MG tablet, Take 1 tablet (10 mg) by mouth every evening  cholecalciferol (VITAMIN D3) 5000 UNITS TABS tablet, Take 1 tablet (5,000 Units) by mouth daily  Cyanocobalamin (B-12) 1000 MCG TBCR, Take 1,000 mcg by mouth every other day  cyclobenzaprine (FLEXERIL) 5 MG tablet, Take 1-2 tablets (5-10 mg) by mouth 3 times daily as needed for muscle spasms  diclofenac (VOLTAREN) 1 % topical gel, Apply 2 g topically 4 times daily To left hip  donepezil (ARICEPT) 10 MG tablet, Take 1 tablet (10 mg) by mouth At Bedtime  DULoxetine (CYMBALTA) 20 MG capsule, Take 1 capsule (20 mg) by mouth 2 times daily  guaiFENesin (MUCINEX) 600 MG 12 hr tablet, Take 2 tablets (1,200 mg)  by mouth 2 times daily  ipratropium - albuterol 0.5 mg/2.5 mg/3 mL (DUONEB) 0.5-2.5 (3) MG/3ML neb solution, Take 1 vial (3 mLs) by nebulization every 6 hours as needed for other (for severe COPD/breathing/wheezing flares)  ipratropium-albuterol (COMBIVENT RESPIMAT)  MCG/ACT inhaler, Inhale 1 puff into the lungs 4 times daily  omeprazole (PRILOSEC) 40 MG DR capsule, TAKE ONE CAPSULE BY MOUTH EVERY DAY 30 MINUTES BEFORE BREAKFAST  solifenacin (VESICARE) 10 MG tablet, Take 1 tablet (10 mg) by mouth every other day  tiotropium-olodaterol (STIOLTO RESPIMAT) 2.5-2.5 MCG/ACT AERS, INHALE TWO PUFFS BY MOUTH EVERY DAY  UNABLE TO FIND, 2 times daily MEDICATION NAME: AREDS 2  VITRON-C  MG TABS tablet, Take 1 tablet by mouth 2 times daily (with meals)             Allergies   Allergen Reactions     Grass          Physical Exam  Vitals: BP (!) 165/70   Pulse 71   Temp 97.8  F (36.6  C) (Temporal)   Resp 11   Wt 65.1 kg (143 lb 8.3 oz)   SpO2 94%   BMI 26.25 kg/m      HENT:  external ears unremarkable, Nares clear bilaterally, mmm, oropharynx without tonsillar hypertrophy/erythema/exudate    Eyes: PERRL, measuring 3mm bilaterally, EOMI, visual acuity and fields intact, conjunctiva and lids normal    Neck: supple, painless ROM, no cervical lymphadenopathy    Lungs:  CTAB,  no resp distress    CV: rrr, no m/r/g, ppi    Abd: soft, nontender, nondistended, no rebound/masses/guarding/hsm    Ext: no peripheral edema    Skin: warm, dry, well perused, no rashes/bruising/lesions on exposed skin    Neuro:   alert, follows commands, speech clear, CN 2-12 intact,   strength 5/5 and symmetric in BUE intrinsic hand muscles as well as BLE  SILT in all 4 ext  Negative Pronator drift  cerebellar testing unremarkable  gait stable    Psych: Normal mood, normal affect          Labs and Imaging:    Labs Ordered and Resulted from Time of ED Arrival to Time of ED Departure   GLUCOSE BY METER - Abnormal       Result Value    GLUCOSE BY  METER POCT 104 (*)    ISTAT CREATININE POCT - Normal    Creatinine POCT 0.8      GFR, ESTIMATED POCT >60     BASIC METABOLIC PANEL   CREATININE POCT          CTA Head Neck with Contrast   Preliminary Result   IMPRESSION:   1. No high-grade stenosis or large vessel occlusion involving the   major intracranial arteries. Mild intracranial atherosclerosis   involving the carotid siphons.   2. No flow-limiting stenosis or occlusion of the cervical carotid or   vertebral arteries. Left greater than right atherosclerosis involving   the carotid bifurcations, as described.   3. 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   4. Moderate emphysema involving the visualized upper lung zones.      Head CT w/o contrast   Final Result   IMPRESSION:      1. No evidence of acute intracranial hemorrhage, mass, or herniation.   2. Mild diffuse parenchymal volume loss and white matter changes   likely due to chronic microvascular ischemic disease.         DARLENE FLYNN MD            SYSTEM ID:  [id number]      MR Brain w/o & w Contrast    (Results Pending)   XR Chest Port 1 View    (Results Pending)            ED Medications:   Medications   CT Scan Flush (80 mLs Intravenous Given 11/15/21 161)   iopamidol (ISOVUE-370) solution 500 mL (70 mLs Intravenous Given 11/15/21 1611)             Medical Decision Makin-year-old female with presentation concerning for possible CVA or ICH.  Will also do basic blood test, urinalysis to look for other common etiologies.  If initial CT CTA head and neck are unremarkable plan be to go ahead with MRI of her brain.  If work-up is completely unremarkable discussed with the patient and daughter that she can likely be discharged home if she is in a care facility a 24-hour assist to a nurse and during the daytime as a PCA.  They were comfortable and agreeable with that plan.  She is able to get up independently without assistance from the  radhika for me and take several steps in the room without significant difficulty.      Update: CT and CTA negative for acute findings.  We will do an MR to rule out ischemic insult not seen by CT.  Basic blood test unremarkable.  Will check a urine to rule out UTI.  Will also do a VBG to rule out significant CO2 elevation/respiratory acidosis.  One-view portable chest x-ray to evaluate for underlying pulmonary abnormality.  Assuming the study show no significant underlying disease process plan will be to discharge home.    - VBG without significant resp acidosis, CXR no acute abnormality.      Pending UA and MR result.  TO be followed up by my partner.       Diagnosis:    ICD-10-CM    1. Generalized muscle weakness  M62.81    2. Dizziness  R42    3. Transient alteration of awareness  R40.4          Disposition:  Likely Home       Alonzo Campos MD  [facility]  [facility]     Alonzo Campos MD  11/15/21 1673

## 2021-11-15 NOTE — TELEPHONE ENCOUNTER
"S-(situation): Confusion, dizziness, poor balance x 2 days    B-(background): Patient with Alzheimer's and COPDhas shown decrease in cognition and mobility x2 days. Son Pankaj and daughter in law Laila calling on her behalf.    A-(assessment): Patient is confused baseline but seems to have greater confusion the past 2 days.  Seems to ambulate as if she is dizzy, poor balance. Episode x1 day with right arm \"convulsing\", jumping.  She has not been ill with cough or upper respiratory symptoms, no complaints of dysuria or incontinence or urinary frequency.  She does have a history of COPD with O2 sats the past 2 days 89 with increase to 92-93% when encouraged to deep breathe.  Per Pankaj and Laila patient does not have any focal weakness, is able to move and use all limbs appropriately, speech is clear and she does not seem to have any word finding difficulty or facial droop.     R-(recommendations): Advised Pankaj alexei Laila that patient should be evaluated today.  No openings in PL or BV clinics, transferred Pankaj to central scheduling to see if there are openings anywhere and if no openings in clinics they will take patient to .  RASHAAD Cobian R.N.      "

## 2021-11-16 LAB
ATRIAL RATE - MUSE: 70 BPM
DIASTOLIC BLOOD PRESSURE - MUSE: NORMAL MMHG
INTERPRETATION ECG - MUSE: NORMAL
P AXIS - MUSE: 73 DEGREES
PR INTERVAL - MUSE: 184 MS
QRS DURATION - MUSE: 82 MS
QT - MUSE: 440 MS
QTC - MUSE: 475 MS
R AXIS - MUSE: 77 DEGREES
SYSTOLIC BLOOD PRESSURE - MUSE: NORMAL MMHG
T AXIS - MUSE: 77 DEGREES
VENTRICULAR RATE- MUSE: 70 BPM

## 2021-11-18 ENCOUNTER — LAB REQUISITION (OUTPATIENT)
Dept: LAB | Facility: CLINIC | Age: 86
End: 2021-11-18
Payer: COMMERCIAL

## 2021-11-18 DIAGNOSIS — Z03.818 ENCOUNTER FOR OBSERVATION FOR SUSPECTED EXPOSURE TO OTHER BIOLOGICAL AGENTS RULED OUT: ICD-10-CM

## 2021-11-18 PROCEDURE — U0003 INFECTIOUS AGENT DETECTION BY NUCLEIC ACID (DNA OR RNA); SEVERE ACUTE RESPIRATORY SYNDROME CORONAVIRUS 2 (SARS-COV-2) (CORONAVIRUS DISEASE [COVID-19]), AMPLIFIED PROBE TECHNIQUE, MAKING USE OF HIGH THROUGHPUT TECHNOLOGIES AS DESCRIBED BY CMS-2020-01-R: HCPCS | Mod: ORL | Performed by: FAMILY MEDICINE

## 2021-11-19 LAB
SARS-COV-2 RNA RESP QL NAA+PROBE: NORMAL
SARS-COV-2 RNA RESP QL NAA+PROBE: NOT DETECTED

## 2021-12-14 ENCOUNTER — HOSPITAL ENCOUNTER (EMERGENCY)
Facility: CLINIC | Age: 86
Discharge: HOME | End: 2021-12-14
Attending: EMERGENCY MEDICINE | Admitting: EMERGENCY MEDICINE
Payer: COMMERCIAL

## 2021-12-14 ENCOUNTER — APPOINTMENT (OUTPATIENT)
Dept: GENERAL RADIOLOGY | Facility: CLINIC | Age: 86
End: 2021-12-14
Attending: EMERGENCY MEDICINE
Payer: COMMERCIAL

## 2021-12-14 VITALS
BODY MASS INDEX: 22.86 KG/M2 | WEIGHT: 125 LBS | SYSTOLIC BLOOD PRESSURE: 153 MMHG | TEMPERATURE: 98.4 F | DIASTOLIC BLOOD PRESSURE: 76 MMHG | OXYGEN SATURATION: 93 % | RESPIRATION RATE: 15 BRPM | HEART RATE: 86 BPM

## 2021-12-14 DIAGNOSIS — R07.9 CHEST PAIN, UNSPECIFIED TYPE: ICD-10-CM

## 2021-12-14 LAB
ALBUMIN SERPL-MCNC: 3.8 G/DL (ref 3.4–5)
ALP SERPL-CCNC: 77 U/L (ref 40–150)
ALT SERPL W P-5'-P-CCNC: 28 U/L (ref 0–50)
ANION GAP SERPL CALCULATED.3IONS-SCNC: 5 MMOL/L (ref 3–14)
AST SERPL W P-5'-P-CCNC: 33 U/L (ref 0–45)
BASOPHILS # BLD AUTO: 0 10E3/UL (ref 0–0.2)
BASOPHILS NFR BLD AUTO: 0 %
BILIRUB SERPL-MCNC: 0.3 MG/DL (ref 0.2–1.3)
BUN SERPL-MCNC: 11 MG/DL (ref 7–30)
CALCIUM SERPL-MCNC: 9.2 MG/DL (ref 8.5–10.1)
CHLORIDE BLD-SCNC: 102 MMOL/L (ref 94–109)
CO2 SERPL-SCNC: 28 MMOL/L (ref 20–32)
CREAT SERPL-MCNC: 0.74 MG/DL (ref 0.52–1.04)
EOSINOPHIL # BLD AUTO: 0.2 10E3/UL (ref 0–0.7)
EOSINOPHIL NFR BLD AUTO: 3 %
ERYTHROCYTE [DISTWIDTH] IN BLOOD BY AUTOMATED COUNT: 13.9 % (ref 10–15)
GFR SERPL CREATININE-BSD FRML MDRD: 74 ML/MIN/1.73M2
GLUCOSE BLD-MCNC: 102 MG/DL (ref 70–99)
HCT VFR BLD AUTO: 43.4 % (ref 35–47)
HGB BLD-MCNC: 14.1 G/DL (ref 11.7–15.7)
HOLD SPECIMEN: NORMAL
IMM GRANULOCYTES # BLD: 0 10E3/UL
IMM GRANULOCYTES NFR BLD: 0 %
LYMPHOCYTES # BLD AUTO: 1.1 10E3/UL (ref 0.8–5.3)
LYMPHOCYTES NFR BLD AUTO: 18 %
MCH RBC QN AUTO: 30.2 PG (ref 26.5–33)
MCHC RBC AUTO-ENTMCNC: 32.5 G/DL (ref 31.5–36.5)
MCV RBC AUTO: 93 FL (ref 78–100)
MONOCYTES # BLD AUTO: 0.6 10E3/UL (ref 0–1.3)
MONOCYTES NFR BLD AUTO: 9 %
NEUTROPHILS # BLD AUTO: 4.2 10E3/UL (ref 1.6–8.3)
NEUTROPHILS NFR BLD AUTO: 70 %
NRBC # BLD AUTO: 0 10E3/UL
NRBC BLD AUTO-RTO: 0 /100
PLATELET # BLD AUTO: 237 10E3/UL (ref 150–450)
POTASSIUM BLD-SCNC: 4.5 MMOL/L (ref 3.4–5.3)
PROT SERPL-MCNC: 7.6 G/DL (ref 6.8–8.8)
RBC # BLD AUTO: 4.67 10E6/UL (ref 3.8–5.2)
SODIUM SERPL-SCNC: 135 MMOL/L (ref 133–144)
TROPONIN I SERPL HS-MCNC: 4 NG/L
WBC # BLD AUTO: 6 10E3/UL (ref 4–11)

## 2021-12-14 PROCEDURE — 84484 ASSAY OF TROPONIN QUANT: CPT | Performed by: EMERGENCY MEDICINE

## 2021-12-14 PROCEDURE — 99285 EMERGENCY DEPT VISIT HI MDM: CPT | Mod: 25

## 2021-12-14 PROCEDURE — 82040 ASSAY OF SERUM ALBUMIN: CPT | Performed by: EMERGENCY MEDICINE

## 2021-12-14 PROCEDURE — 93005 ELECTROCARDIOGRAM TRACING: CPT

## 2021-12-14 PROCEDURE — 36415 COLL VENOUS BLD VENIPUNCTURE: CPT | Performed by: EMERGENCY MEDICINE

## 2021-12-14 PROCEDURE — 71046 X-RAY EXAM CHEST 2 VIEWS: CPT

## 2021-12-14 PROCEDURE — 85025 COMPLETE CBC W/AUTO DIFF WBC: CPT | Performed by: EMERGENCY MEDICINE

## 2021-12-14 ASSESSMENT — ENCOUNTER SYMPTOMS
BACK PAIN: 1
FEVER: 0
COUGH: 0
NECK PAIN: 1

## 2021-12-15 NOTE — ED TRIAGE NOTES
BIBA from her independent living apartment (the [facility]) c/o left sided chest/armpit pain (4/10) that started 1 hr ago and dyspnea with ambulation. Per EMS pt hypertensive [id number], 234 mg of ASA given. Pain now 0/10

## 2021-12-15 NOTE — ED NOTES
Bed: ED29  Expected date:   Expected time:   Means of arrival:   Comments:  [facility] 86F chest pain

## 2021-12-15 NOTE — ED PROVIDER NOTES
History   Chief Complaint:  Chest Pain     The history is provided by the patient, medical records and the EMS personnel. History limited by: dementia.      Gifty Villa is a 86 year old female with history of Alzheimer's dementia, hypertension, hyperlipidemia, and COPD who presents via EMS from independent living with chest pain. The patient states she was sitting at her home today when she had a sudden onset of left sided chest pain that radiated to her left shoulder and back. She endorses slight chest wall pain and back pain here in the emergency department. EMS gave the patient 324 of aspirin, no nitroglycerin. She denies fevers, cough, leg swelling. Denies cardiac history. Was a smoker for 15-20 years, however has quit. She has received her COVID vaccine and booster. No recent illness.     According to the patient's son, the patient has had chronic left sided neck pain that radiates to her back and left arm. The patient has been seen at the emergency department multiple times due to pain exacerbations. Chronic pain has been managed with PO oxycodone and gabapentin, along with occasional IM pain and steroid medication. Recently, the patient's pain medication dose has been lowered. The son notes that the patient's left shoulder and back pain is chronic, however the chest wall pain is new and more concerning. Of note, the patient has Alzheimer's dementia.     Review of Systems   Constitutional: Negative for fever.   Respiratory: Negative for cough.    Cardiovascular: Positive for chest pain (chest wall). Negative for leg swelling.   Musculoskeletal: Positive for back pain and neck pain.   All other systems reviewed and are negative.    Allergies:  The patient has no known allergies.     Medications:  Albuterol inhaler  Norvasc  Aspirin  Zyrtec  Flexeril  Aricept  Cymbalta  Duoneb  Prilosec  Vesicare    Past Medical History:     Arthritis  COPD  Hypertension  Hyperlipidemia  Pulmonary nodule  Restless legs  syndrome  Asthma  Early onset Alzheimer's dementia without behavioral disturbance  Peptic ulcer      Past Surgical History:    Knee arthroplasty, left  Hip arthroplasty, right  Spinal fusion  Hysterectomy  Colonoscopy  Esophagogastroduodenoscopy  Ganglion cyst excision  [place] teeth extraction   Shoulder arthroscopy, left   Rotator cuff repair, left    Family History:    Mother - Alzheimer Disease  Father - Alzheimer Disease, Coronary Artery Disease  Brother - Alzheimer Disease    Social History:  The patient arrived to the emergency department via EMS.    Physical Exam     Patient Vitals for the past 24 hrs:   BP Temp Temp src Pulse Resp SpO2 Weight   12/14/21 2100 (!) 153/76 -- -- 85 11 93 % --   12/14/21 2002 (!) 153/83 98.4  F (36.9  C) Oral 94 14 95 % 56.7 kg (125 lb)     Physical Exam  General: Alert and cooperative with exam. Patient in mild distress. Baseline mentation, history of dementia.  Head:  Scalp is NC/AT  Eyes:  No scleral icterus, PERRL  ENT:  The external nose and ears are normal. The oropharynx is normal and without erythema; mucus membranes are moist. Uvula midline, no evidence of deep space infection.  Neck:  Normal range of motion without rigidity.  CV:  Regular rate and rhythm    No pathologic murmur   Resp:  Breath sounds are clear bilaterally    Non-labored, no retractions or accessory muscle use  GI:  Abdomen is soft, no distension, no tenderness. No peritoneal signs  MS:  No lower extremity edema. Mild tenderness to her left posterior shoulder/thoracic back without overlying skin changes.   Skin:  Warm and dry, No rash or lesions noted.  Neuro: Alert. No gross motor deficits.    Emergency Department Course   ECG  ECG obtained at 2001, ECG read at 2030  Normal sinus rhythm with sinus arrhythmia  Septal infarct, age undetermined  Abnormal ECG   No significant change compared to ECG dated 11/15/21  Rate 94 bpm. FL interval 182 ms. QRS duration 88 ms. QT/QTc 374/467 ms. P-R-T axes 80 76 74.      Imaging:  Chest XR,  PA & LAT   Final Result   IMPRESSION: Negative chest.      Report per radiology    Laboratory:  Labs Ordered and Resulted from Time of ED Arrival to Time of ED Departure   COMPREHENSIVE METABOLIC PANEL - Abnormal       Result Value    Sodium 135      Potassium 4.5      Chloride 102      Carbon Dioxide (CO2) 28      Anion Gap 5      Urea Nitrogen 11      Creatinine 0.74      Calcium 9.2      Glucose 102 (*)     Alkaline Phosphatase 77      AST 33      ALT 28      Protein Total 7.6      Albumin 3.8      Bilirubin Total 0.3      GFR Estimate 74     TROPONIN I - Normal    Troponin I High Sensitivity 4     CBC WITH PLATELETS AND DIFFERENTIAL    WBC Count 6.0      RBC Count 4.67      Hemoglobin 14.1      Hematocrit 43.4      MCV 93      MCH 30.2      MCHC 32.5      RDW 13.9      Platelet Count 237      % Neutrophils 70      % Lymphocytes 18      % Monocytes 9      % Eosinophils 3      % Basophils 0      % Immature Granulocytes 0      NRBCs per 100 WBC 0      Absolute Neutrophils 4.2      Absolute Lymphocytes 1.1      Absolute Monocytes 0.6      Absolute Eosinophils 0.2      Absolute Basophils 0.0      Absolute Immature Granulocytes 0.0      Absolute NRBCs 0.0        Emergency Department Course:  Reviewed:  I reviewed nursing notes, vitals, past medical history and Care Everywhere    Assessments:  2020 I obtained history and examined the patient as noted above.   2114 I rechecked the patient and explained findings.    Disposition:  The patient was discharged to home.     Impression & Plan   Medical Decision Making:  Gifty Villa is an 86 year old female who presents with chest pain. The work up in the [facility] thus far negative. The differential diagnosis of chest pain is broad and includes life threatening etiologies such as acute coronary syndrome, myocardial infarction, pulmonary embolism, acute aortic dissection, amongst others. Other causes may include pneumonia, pneumothorax, chest wall  source, pericarditis, pleurisy, esophageal spasm, etc. No serious etiology for the chest pain were detected today during this visit. Workup included EKG, chest x-ray, and blood work, which all returned unremarkable.  2-hour repeat troponin was deferred by patient's son.  Close follow up with primary care is indicated should the pain continue, as further work up may be performed; this was made clear to the patient and her son, who understands.  Return precautions discussed.    Diagnosis:    ICD-10-CM    1. Chest pain, unspecified type  R07.9      Scribe Disclosure:  I, Maryse Jolly, am serving as a scribe at 8:06 PM on 12/14/2021 to document services personally performed by Aldo Mendes DO based on my observations and the provider's statements to me.      Aldo Mendes DO  12/16/21 0237

## 2021-12-22 ENCOUNTER — TRANSFERRED RECORDS (OUTPATIENT)
Dept: HEALTH INFORMATION MANAGEMENT | Facility: CLINIC | Age: 86
End: 2021-12-22
Payer: COMMERCIAL

## 2022-01-25 ENCOUNTER — OFFICE VISIT (OUTPATIENT)
Dept: FAMILY MEDICINE | Facility: CLINIC | Age: 87
End: 2022-01-25
Payer: COMMERCIAL

## 2022-01-25 VITALS
HEIGHT: 62 IN | WEIGHT: 138 LBS | OXYGEN SATURATION: 93 % | BODY MASS INDEX: 25.4 KG/M2 | SYSTOLIC BLOOD PRESSURE: 130 MMHG | RESPIRATION RATE: 18 BRPM | TEMPERATURE: 97.3 F | DIASTOLIC BLOOD PRESSURE: 60 MMHG | HEART RATE: 89 BPM

## 2022-01-25 DIAGNOSIS — F02.80 EARLY ONSET ALZHEIMER'S DEMENTIA WITHOUT BEHAVIORAL DISTURBANCE (H): Primary | ICD-10-CM

## 2022-01-25 DIAGNOSIS — G30.0 EARLY ONSET ALZHEIMER'S DEMENTIA WITHOUT BEHAVIORAL DISTURBANCE (H): Primary | ICD-10-CM

## 2022-01-25 DIAGNOSIS — F33.42 MAJOR DEPRESSIVE DISORDER, RECURRENT EPISODE, IN FULL REMISSION (H): ICD-10-CM

## 2022-01-25 DIAGNOSIS — R79.0 LOW FERRITIN: ICD-10-CM

## 2022-01-25 DIAGNOSIS — I10 ESSENTIAL HYPERTENSION: ICD-10-CM

## 2022-01-25 DIAGNOSIS — J44.9 CHRONIC OBSTRUCTIVE PULMONARY DISEASE, UNSPECIFIED COPD TYPE (H): ICD-10-CM

## 2022-01-25 DIAGNOSIS — R79.89 LOW VITAMIN B12 LEVEL: ICD-10-CM

## 2022-01-25 DIAGNOSIS — Z13.220 SCREENING FOR HYPERLIPIDEMIA: ICD-10-CM

## 2022-01-25 DIAGNOSIS — D58.2 ELEVATED HEMOGLOBIN (H): ICD-10-CM

## 2022-01-25 PROCEDURE — 99214 OFFICE O/P EST MOD 30 MIN: CPT | Performed by: NURSE PRACTITIONER

## 2022-01-25 RX ORDER — SOLIFENACIN SUCCINATE 10 MG/1
5 TABLET, FILM COATED ORAL EVERY OTHER DAY
Qty: 90 TABLET | Refills: 3 | COMMUNITY
Start: 2022-01-25 | End: 2022-02-17

## 2022-01-25 RX ORDER — TIOTROPIUM BROMIDE AND OLODATEROL 3.124; 2.736 UG/1; UG/1
SPRAY, METERED RESPIRATORY (INHALATION)
Qty: 4 G | Refills: 3 | COMMUNITY
Start: 2022-01-25 | End: 2022-08-03

## 2022-01-25 RX ORDER — GABAPENTIN 100 MG/1
200 CAPSULE ORAL 2 TIMES DAILY
COMMUNITY
Start: 2022-01-02 | End: 2022-05-12

## 2022-01-25 ASSESSMENT — ANXIETY QUESTIONNAIRES
GAD7 TOTAL SCORE: 2
3. WORRYING TOO MUCH ABOUT DIFFERENT THINGS: NOT AT ALL
IF YOU CHECKED OFF ANY PROBLEMS ON THIS QUESTIONNAIRE, HOW DIFFICULT HAVE THESE PROBLEMS MADE IT FOR YOU TO DO YOUR WORK, TAKE CARE OF THINGS AT HOME, OR GET ALONG WITH OTHER PEOPLE: NOT DIFFICULT AT ALL
6. BECOMING EASILY ANNOYED OR IRRITABLE: NOT AT ALL
7. FEELING AFRAID AS IF SOMETHING AWFUL MIGHT HAPPEN: NOT AT ALL
2. NOT BEING ABLE TO STOP OR CONTROL WORRYING: NOT AT ALL
1. FEELING NERVOUS, ANXIOUS, OR ON EDGE: SEVERAL DAYS
5. BEING SO RESTLESS THAT IT IS HARD TO SIT STILL: NOT AT ALL

## 2022-01-25 ASSESSMENT — PATIENT HEALTH QUESTIONNAIRE - PHQ9
SUM OF ALL RESPONSES TO PHQ QUESTIONS 1-9: 3
5. POOR APPETITE OR OVEREATING: SEVERAL DAYS

## 2022-01-25 ASSESSMENT — MIFFLIN-ST. JEOR: SCORE: 1024.21

## 2022-01-25 NOTE — PROGRESS NOTES
Assessment & Plan     Diagnoses and all orders for this visit:    Early onset Alzheimer's dementia without behavioral disturbance (H)  Progression of symptoms per family members.  Has regular PCA care and recently moved back to daughter's (Chasidy) home from [facility]'s.      Chronic obstructive pulmonary disease, unspecified COPD type (H)  Stable on Stiolto respimat and Albuterol inhaler as needed.      Essential hypertension  Currently stable on Amlodipine 5 mg daily.    -     Albumin Random Urine Quantitative with Creat Ratio; Future  -     Comprehensive metabolic panel (BMP + Alb, Alk Phos, ALT, AST, Total. Bili, TP); Future    Major depressive disorder, recurrent episode, in full remission (H)  PHQ 12/11/2019 1/28/2021 1/25/2022   PHQ-9 Total Score 4 0 3   Q9: Thoughts of better off dead/self-harm past 2 weeks Not at all Not at all Not at all   F/U: Thoughts of suicide or self-harm - - -   F/U: Safety concerns - - -     FERNY-7 SCORE 12/11/2019 1/28/2021 1/25/2022   Total Score - - -   Total Score 6 0 2     Stable, controlled on Duloxetine 20 mg twice daily.      Screening for hyperlipidemia  -     Lipid panel reflex to direct LDL Fasting; Future    Elevated hemoglobin (H)  Low ferritin  -     Ferritin; Future  -     CBC with platelets; Future      Low vitamin B12 level  -     Vitamin B12; Future    Other orders  -     REVIEW OF HEALTH MAINTENANCE PROTOCOL ORDERS      35 minutes spent on the date of the encounter doing chart review, history and exam, documentation and further activities per the note        Return in about 3 months (around 4/25/2022) for Medicare Wellness, fasting labs.    Ketty Lopez, WILBERTO [facility]        Clifford Shukla is a 86 year old who presents for the following health issues accompanied by daughter-in-law (Laila) and PCA (Pamella)    HPI     Ongoing left shoulder pain/ neck pain- since last may- patient has had injections in the neck- gabapentin  "helps.     MRI of cervical spine completed on 10/18/21.    IMPRESSION:  1.  Severely motion-degraded examination. Repeat examination recommended.  2.  No convincing cord signal abnormalities.  3.  Moderate C5-C6 canal stenosis.  4.  Suboptimal evaluation of the neural foramina.    1 steroid injection in October 2021 ([facility]) - has been helpful with addition of Gabapentin 200 mg twice daily.        July 2021 - [facility] in [place] until now.     Moving in with daughter (Chasidy) in [place].   Chasidy's  at home in hospice.    Pamella - care-giver  Evenings there is a lot of loneliness and anxiety, having company and socialization is helpful.      Weaned off opioids.      Progression of Alzheimer's per daughter-in-law.        PHQ 12/11/2019 1/28/2021 1/25/2022   PHQ-9 Total Score 4 0 3   Q9: Thoughts of better off dead/self-harm past 2 weeks Not at all Not at all Not at all   F/U: Thoughts of suicide or self-harm - - -   F/U: Safety concerns - - -     FERNY-7 SCORE 12/11/2019 1/28/2021 1/25/2022   Total Score - - -   Total Score 6 0 2       Review of Systems   Constitutional, HEENT, cardiovascular, pulmonary, gi and gu systems are negative, except as otherwise noted.      Objective    /60   Pulse 89   Temp 97.3  F (36.3  C) (Tympanic)   Resp 18   Ht 1.575 m (5' 2\")   Wt 62.6 kg (138 lb)   SpO2 93%   BMI 25.24 kg/m    Body mass index is 25.24 kg/m .  Physical Exam   GENERAL: healthy, alert and no distress  EYES: Eyes grossly normal to inspection, PERRL and conjunctivae and sclerae normal  RESP: lungs clear to auscultation - no rales, rhonchi or wheezes  CV: regular rate and rhythm, normal S1 S2, no S3 or S4, no murmur, click or rub, no peripheral edema  PSYCH: mentation appears normal, affect normal/bright            "

## 2022-01-26 ASSESSMENT — ANXIETY QUESTIONNAIRES: GAD7 TOTAL SCORE: 2

## 2022-02-02 ENCOUNTER — VIRTUAL VISIT (OUTPATIENT)
Dept: FAMILY MEDICINE | Facility: CLINIC | Age: 87
End: 2022-02-02
Payer: COMMERCIAL

## 2022-02-02 DIAGNOSIS — R35.0 URINARY FREQUENCY: Primary | ICD-10-CM

## 2022-02-02 DIAGNOSIS — R50.81 FEVER IN OTHER DISEASES: ICD-10-CM

## 2022-02-02 PROCEDURE — 99213 OFFICE O/P EST LOW 20 MIN: CPT | Mod: TEL | Performed by: NURSE PRACTITIONER

## 2022-02-02 RX ORDER — CIPROFLOXACIN 250 MG/1
250 TABLET, FILM COATED ORAL 2 TIMES DAILY
Qty: 6 TABLET | Refills: 0 | Status: SHIPPED | OUTPATIENT
Start: 2022-02-02 | End: 2022-04-05

## 2022-02-02 NOTE — PROGRESS NOTES
Gayla is a 86 year old who is being evaluated via a billable telephone visit.      What phone number would you like to be contacted at? [phone]  How would you like to obtain your AVS? MyChart    Assessment & Plan     Urinary frequency    - ciprofloxacin (CIPRO) 250 MG tablet; Take 1 tablet (250 mg) by mouth 2 times daily    Fever in other diseases    - ciprofloxacin (CIPRO) 250 MG tablet; Take 1 tablet (250 mg) by mouth 2 times daily    20 minutes spent on the date of the encounter doing chart review, history and exam, documentation and further activities per the note     See Patient Instructions: advised giving the full course of antibiotics with daily yogurt or probiotics.  If symptoms do not resolve or are worsening, bring Gayla in for in person evaluation. Pamella (caregiver) in agreement.     Return in about 1 week (around 2/9/2022), or if symptoms worsen or fail to improve.    Lianna Bullock, FILIBERTO  [facility] DIANA Shukla is a 86 year old who presents for the following health issues  accompanied by her daughter and care taker.    HPI   Urinary SYMPTOMS     Onset: yesterday- yesterday was complaining of stomach pain- did fine overnight and today.     Description: also has fever 100.2  Painful urination (Dysuria): no   Blood in urine (Hematuria): no   Frequency/Urgency: YES  Abdominal/Pelvic/Flank Pain : YES    Other vaginal symptoms: none    Progression of Symptoms:  same  Therapies Tried and outcome: tylenol  Discussed if they want her to have a urinalysis or abx treatment for UTI.  They prefer treatment.  Discussed giving the full course with daily yogurt or probiotics.     Review of Systems   Constitutional, HEENT, cardiovascular, pulmonary, GI, , musculoskeletal, neuro, skin, endocrine and psych systems are negative, except as otherwise noted.      Objective           Vitals:  No vitals were obtained today due to virtual visit.    Physical Exam   healthy, alert and no  distress  PSYCH: Alert and oriented times 3; coherent speech, normal   rate and volume, able to articulate logical thoughts, able   to abstract reason, no tangential thoughts, no hallucinations   or delusions  Her affect is normal  RESP: No cough, no audible wheezing, able to talk in full sentences  Remainder of exam unable to be completed due to telephone visits    See orders      Phone call duration: 8 minutes

## 2022-02-02 NOTE — PATIENT INSTRUCTIONS

## 2022-02-17 ENCOUNTER — VIRTUAL VISIT (OUTPATIENT)
Dept: FAMILY MEDICINE | Facility: CLINIC | Age: 87
End: 2022-02-17
Payer: COMMERCIAL

## 2022-02-17 DIAGNOSIS — F02.81 ALZHEIMER'S DEMENTIA WITH BEHAVIORAL DISTURBANCE, UNSPECIFIED TIMING OF DEMENTIA ONSET: ICD-10-CM

## 2022-02-17 DIAGNOSIS — G30.9 ALZHEIMER'S DEMENTIA WITH BEHAVIORAL DISTURBANCE, UNSPECIFIED TIMING OF DEMENTIA ONSET: ICD-10-CM

## 2022-02-17 DIAGNOSIS — F05 SUNDOWNING: Primary | ICD-10-CM

## 2022-02-17 PROBLEM — G30.0 EARLY ONSET ALZHEIMER'S DEMENTIA WITHOUT BEHAVIORAL DISTURBANCE (H): Status: RESOLVED | Noted: 2018-03-13 | Resolved: 2022-02-17

## 2022-02-17 PROBLEM — F02.80 ALZHEIMER'S DISEASE (H): Status: ACTIVE | Noted: 2018-03-15

## 2022-02-17 PROBLEM — F02.80 EARLY ONSET ALZHEIMER'S DEMENTIA WITHOUT BEHAVIORAL DISTURBANCE (H): Status: RESOLVED | Noted: 2018-03-13 | Resolved: 2022-02-17

## 2022-02-17 PROBLEM — F02.80 ALZHEIMER'S DEMENTIA WITHOUT BEHAVIORAL DISTURBANCE, UNSPECIFIED TIMING OF DEMENTIA ONSET: Status: RESOLVED | Noted: 2018-03-15 | Resolved: 2022-02-17

## 2022-02-17 PROCEDURE — 99214 OFFICE O/P EST MOD 30 MIN: CPT | Mod: 95 | Performed by: NURSE PRACTITIONER

## 2022-02-17 NOTE — PROGRESS NOTES
Gayla is a 87 year old who is being evaluated via a billable telephone visit.      What phone number would you like to be contacted at? [phone]  How would you like to obtain your AVS? MyChart- Mail     Assessment & Plan     Gifty was seen today for dementia.    Diagnoses and all orders for this visit:    Alzheimer's dementia with behavioral disturbance, unspecified timing of dementia onset (H)  Sundowning  -     Geriatrics Referral; Future  Short trial of Abilify 2 mg at bedtime as needed.  Close follow-up in 1 week (sooner as needed).    Plan further consultation with geriatrician - Dr. Loretta Abernathy.      Hold Trazodone, previously on 25 mg at bedtime with no noted improvement in insomnia/behavioral disturbances.     Discontinue Vesicare due to anticholinergic activity.  Monitor urinary symptoms.        35 minutes spent on the date of the encounter doing chart review, history and exam, documentation and further activities per the note      Return in about 1 week (around 2/24/2022) for Med check, Telephone Visit.    Ketty Lopez, WILBERTO [facility]   Gayla is a 87 year old who presents for the following health issues    Daughter-in-law (Laila) and PCA (Pamella) present for telephone visit.        HPI     Discuss options.    Gayla moved in with daughter (3-4 weeks ago).  Increase in evening anxiety, confusion over the past few weeks.  The family was hopeful that moving in with daughter would help alleviate those symptoms. Family is reporting wandering at night, confusion.  Getting up 4-8 times per night, asking where she is and when she gets to go home.    Wondering about what else can be done to help facilitate more rest at night.      Is taking Vesicare 1/2 tablet every day.  No major change with using.  Wearing pad daily.      Stopped Trazodone and was then put back on by [facility] provider - Has been taking 25 mg at bedtime most recently with no change in  her evening/nighttime behaviors.      Worsening sundowning symptoms. Daughter (Chasidy) reports that she is having 4-8 episodes per evening of confusion/agitation which then takes 20 minutes or so to settle (and then wait for it to happen again).     Consultation with PharmD completed, recommendations below:      Per 4th [place] Consensus Conference on the Diagnosis and Treatment of Dementia (CCCDTD4):     We recommend that risperidone, olanzapine and aripiprazole be used for severe agitation, aggression and psychosis associated with dementia where there is risk of harm to the patient and/or others. The potential benefit of all antipsychotics must be weighed against the significant risks such as cerebrovascular adverse events and mortality (Grade 2A).     There is insufficient evidence to recommend for or against the use of quetiapine in the management of severe agitation, aggression and psychosis associated with dementia (Grade 2B).     There is insufficient evidence to recommend for or against the use of SSRIs or trazodone in the management of agitated patients (Grade 2B).     As such I would suggest a short-term or as-needed trial of aripiprazole to avoid metabolic side effects of risperidone and olanzapine.  I would also encourage that if patient is not significantly benefiting from solifenacin that it be removed due to anticholinergic activity.       Review of Systems   Constitutional, HEENT, cardiovascular, pulmonary, gi and gu systems are negative, except as otherwise noted.      Objective           Vitals:  No vitals were obtained today due to virtual visit.    Physical Exam   Exam unable to be completed due to telephone visits        Phone call duration: 14 minutes    1:03 p.m. to  1:17 p.m.

## 2022-02-17 NOTE — Clinical Note
Please reach out to family - daughter-in-law Laila and set up follow-up appointment for 1 week (recheck medication changes) with myself.  Need close follow-up prior to geriatrics consultation on 3/31/22.  - Rosa, CNP

## 2022-02-28 ENCOUNTER — MYC MEDICAL ADVICE (OUTPATIENT)
Dept: FAMILY MEDICINE | Facility: CLINIC | Age: 87
End: 2022-02-28
Payer: COMMERCIAL

## 2022-02-28 DIAGNOSIS — F05 SUNDOWNING: ICD-10-CM

## 2022-02-28 DIAGNOSIS — G30.0 EARLY ONSET ALZHEIMER'S DEMENTIA WITHOUT BEHAVIORAL DISTURBANCE (H): ICD-10-CM

## 2022-02-28 DIAGNOSIS — F02.80 EARLY ONSET ALZHEIMER'S DEMENTIA WITHOUT BEHAVIORAL DISTURBANCE (H): ICD-10-CM

## 2022-03-01 NOTE — TELEPHONE ENCOUNTER
Please see my chart message below     Please review and advise     Thank you     Lory Villela RN, BSN  [facility]

## 2022-03-02 RX ORDER — ARIPIPRAZOLE 2 MG/1
2 TABLET ORAL DAILY
Qty: 30 TABLET | Refills: 0 | Status: SHIPPED | OUTPATIENT
Start: 2022-03-16 | End: 2022-04-05

## 2022-03-06 ENCOUNTER — HEALTH MAINTENANCE LETTER (OUTPATIENT)
Age: 87
End: 2022-03-06

## 2022-04-05 ENCOUNTER — OFFICE VISIT (OUTPATIENT)
Dept: FAMILY MEDICINE | Facility: CLINIC | Age: 87
End: 2022-04-05
Payer: COMMERCIAL

## 2022-04-05 VITALS
BODY MASS INDEX: 23.37 KG/M2 | SYSTOLIC BLOOD PRESSURE: 120 MMHG | HEART RATE: 100 BPM | TEMPERATURE: 97.6 F | DIASTOLIC BLOOD PRESSURE: 68 MMHG | WEIGHT: 127.8 LBS | OXYGEN SATURATION: 94 %

## 2022-04-05 DIAGNOSIS — R79.0 LOW FERRITIN: ICD-10-CM

## 2022-04-05 DIAGNOSIS — Z23 HIGH PRIORITY FOR 2019-NCOV VACCINE: ICD-10-CM

## 2022-04-05 DIAGNOSIS — F02.818 EARLY ONSET ALZHEIMER'S DEMENTIA WITH BEHAVIORAL DISTURBANCE (H): ICD-10-CM

## 2022-04-05 DIAGNOSIS — Z00.00 ENCOUNTER FOR MEDICARE ANNUAL WELLNESS EXAM: Primary | ICD-10-CM

## 2022-04-05 DIAGNOSIS — I10 ESSENTIAL HYPERTENSION: ICD-10-CM

## 2022-04-05 DIAGNOSIS — F05 SUNDOWNING: ICD-10-CM

## 2022-04-05 DIAGNOSIS — Z13.220 SCREENING FOR HYPERLIPIDEMIA: ICD-10-CM

## 2022-04-05 DIAGNOSIS — R79.89 LOW VITAMIN B12 LEVEL: ICD-10-CM

## 2022-04-05 DIAGNOSIS — G30.0 EARLY ONSET ALZHEIMER'S DEMENTIA WITH BEHAVIORAL DISTURBANCE (H): ICD-10-CM

## 2022-04-05 LAB
CREAT UR-MCNC: 57 MG/DL
ERYTHROCYTE [DISTWIDTH] IN BLOOD BY AUTOMATED COUNT: 12.9 % (ref 10–15)
HCT VFR BLD AUTO: 43.3 % (ref 35–47)
HGB BLD-MCNC: 14.4 G/DL (ref 11.7–15.7)
MCH RBC QN AUTO: 30.4 PG (ref 26.5–33)
MCHC RBC AUTO-ENTMCNC: 33.3 G/DL (ref 31.5–36.5)
MCV RBC AUTO: 92 FL (ref 78–100)
MICROALBUMIN UR-MCNC: 8 MG/L
MICROALBUMIN/CREAT UR: 14.04 MG/G CR (ref 0–25)
PLATELET # BLD AUTO: 247 10E3/UL (ref 150–450)
RBC # BLD AUTO: 4.73 10E6/UL (ref 3.8–5.2)
VIT B12 SERPL-MCNC: 932 PG/ML (ref 193–986)
WBC # BLD AUTO: 4.4 10E3/UL (ref 4–11)

## 2022-04-05 PROCEDURE — 99214 OFFICE O/P EST MOD 30 MIN: CPT | Mod: 25 | Performed by: NURSE PRACTITIONER

## 2022-04-05 PROCEDURE — 85027 COMPLETE CBC AUTOMATED: CPT | Performed by: NURSE PRACTITIONER

## 2022-04-05 PROCEDURE — 82607 VITAMIN B-12: CPT | Performed by: NURSE PRACTITIONER

## 2022-04-05 PROCEDURE — 82043 UR ALBUMIN QUANTITATIVE: CPT | Performed by: NURSE PRACTITIONER

## 2022-04-05 PROCEDURE — 36415 COLL VENOUS BLD VENIPUNCTURE: CPT | Performed by: NURSE PRACTITIONER

## 2022-04-05 PROCEDURE — 99397 PER PM REEVAL EST PAT 65+ YR: CPT | Performed by: NURSE PRACTITIONER

## 2022-04-05 PROCEDURE — 82728 ASSAY OF FERRITIN: CPT | Performed by: NURSE PRACTITIONER

## 2022-04-05 PROCEDURE — 0064A COVID-19,PF,MODERNA (18+ YRS BOOSTER .25ML): CPT | Performed by: NURSE PRACTITIONER

## 2022-04-05 PROCEDURE — 80053 COMPREHEN METABOLIC PANEL: CPT | Performed by: NURSE PRACTITIONER

## 2022-04-05 PROCEDURE — 91306 COVID-19,PF,MODERNA (18+ YRS BOOSTER .25ML): CPT | Performed by: NURSE PRACTITIONER

## 2022-04-05 PROCEDURE — 80061 LIPID PANEL: CPT | Performed by: NURSE PRACTITIONER

## 2022-04-05 RX ORDER — ANTIOX #8/OM3/DHA/EPA/LUT/ZEAX 250-2.5 MG
2 CAPSULE ORAL DAILY
COMMUNITY
Start: 2022-01-03

## 2022-04-05 RX ORDER — LANOLIN ALCOHOL/MO/W.PET/CERES
CREAM (GRAM) TOPICAL DAILY
COMMUNITY
Start: 2022-01-13 | End: 2023-06-13

## 2022-04-05 RX ORDER — BIMATOPROST 0.1 MG/ML
SOLUTION/ DROPS OPHTHALMIC
COMMUNITY
Start: 2022-03-30

## 2022-04-05 RX ORDER — ARIPIPRAZOLE 2 MG/1
2 TABLET ORAL DAILY
Qty: 30 TABLET | Refills: 0 | Status: SHIPPED | OUTPATIENT
Start: 2022-04-05 | End: 2022-05-12

## 2022-04-05 ASSESSMENT — PATIENT HEALTH QUESTIONNAIRE - PHQ9
SUM OF ALL RESPONSES TO PHQ QUESTIONS 1-9: 0
10. IF YOU CHECKED OFF ANY PROBLEMS, HOW DIFFICULT HAVE THESE PROBLEMS MADE IT FOR YOU TO DO YOUR WORK, TAKE CARE OF THINGS AT HOME, OR GET ALONG WITH OTHER PEOPLE: NOT DIFFICULT AT ALL
SUM OF ALL RESPONSES TO PHQ QUESTIONS 1-9: 0

## 2022-04-05 ASSESSMENT — ANXIETY QUESTIONNAIRES
3. WORRYING TOO MUCH ABOUT DIFFERENT THINGS: NOT AT ALL
GAD7 TOTAL SCORE: 0
7. FEELING AFRAID AS IF SOMETHING AWFUL MIGHT HAPPEN: NOT AT ALL
1. FEELING NERVOUS, ANXIOUS, OR ON EDGE: NOT AT ALL
GAD7 TOTAL SCORE: 0
GAD7 TOTAL SCORE: 0
7. FEELING AFRAID AS IF SOMETHING AWFUL MIGHT HAPPEN: NOT AT ALL
5. BEING SO RESTLESS THAT IT IS HARD TO SIT STILL: NOT AT ALL
4. TROUBLE RELAXING: NOT AT ALL
2. NOT BEING ABLE TO STOP OR CONTROL WORRYING: NOT AT ALL
6. BECOMING EASILY ANNOYED OR IRRITABLE: NOT AT ALL

## 2022-04-05 NOTE — PATIENT INSTRUCTIONS
Patient Education   Personalized Prevention Plan  You are due for the preventive services outlined below.  Your care team is available to assist you in scheduling these services.  If you have already completed any of these items, please share that information with your care team to update in your medical record.  Health Maintenance Due   Topic Date Due     COPD Action Plan  Never done     Zoster (Shingles) Vaccine (3 of 3) 02/21/2020     Ferritin Lab  01/28/2022     Cholesterol Lab  01/28/2022     Kidney Microalbumin Urine Test  01/28/2022     FALL RISK ASSESSMENT  01/28/2022     Vitamin B12 Level  01/28/2022

## 2022-04-05 NOTE — PROGRESS NOTES
SUBJECTIVE:   Gifty Villa is a 87 year old female who presents for Preventive Visit.      Patient has been advised of split billing requirements and indicates understanding: Yes  Are you in the first 12 months of your Medicare coverage?  No    Healthy Habits:   PHQ-2 Total Score: 0  History of Present Illness       Reason for visit:  Annual wellness/med check/vaccine     Do you feel safe in your environment? Yes    Have you ever done Advance Care Planning? (For example, a Health Directive, POLST, or a discussion with a medical provider or your loved ones about your wishes): Yes, advance care planning is on file.       Fall risk  Fallen 2 or more times in the past year?: (P) No  Any fall with injury in the past year?: (P) No    Cognitive Screening   1) Repeat 3 items (Leader, Season, Table)    2) Clock draw: ABNORMAL , wrong arrows  3) 3 item recall: Recalls NO objects   Results: 0 items recalled: PROBABLE COGNITIVE IMPAIRMENT, **INFORM PROVIDER**    Mini-CogTM Copyright GIL Butler. Licensed by the author for use in [facility]; reprinted with permission (tree@[facility]). All rights reserved.        Reviewed and updated as needed this visit by clinical staff    Allergies  Meds              Reviewed and updated as needed this visit by Provider     Meds             Social History     Tobacco Use     Smoking status: Former Smoker     Quit date: 1988     Years since quittin.7     Smokeless tobacco: Never Used     Tobacco comment: Quit in    Substance Use Topics     Alcohol use: No     Comment: none     If you drink alcohol do you typically have >3 drinks per day or >7 drinks per week? No    Alcohol Use 2022   Prescreen: >3 drinks/day or >7 drinks/week? No         Dementia  Geriatrician consultation scheduled for 22.   Stressed out, lonely living in Assisted Living.   Not worried as much.  Eating healthier meals living with daughter Chasidy.    Much less anxiety in the evenings,  sleeping much better.   Lives with osbaldo Araiza as well.   Laughing more at home.      Not requiring rescue inhaler every day.    Breathing is doing well.        Hyperlipidemia Follow-Up  Recent Labs   Lab Test 21  1659 19  0957 03/10/17  0915 03/25/15  0850   CHOL 277* 271*   < > 167   HDL 62 58   < > 59   * 180*   < > 95   TRIG 173* 163*   < > 65   CHOLHDLRATIO  --   --   --  2.8    < > = values in this interval not displayed.       Are you regularly taking any medication or supplement to lower your cholesterol?   No    Are you having muscle aches or other side effects that you think could be caused by your cholesterol lowering medication?  n/a    Hypertension Follow-up      Do you check your blood pressure regularly outside of the clinic? Yes     Are you following a low salt diet? No    Are your blood pressures ever more than 140 on the top number (systolic) OR more   than 90 on the bottom number (diastolic), for example 140/90? Yes 2 weeks ago, just one stressful morning.     BP Readings from Last 2 Encounters:   22 120/68   22 130/60     LDL Cholesterol Calculated (mg/dL)   Date Value   2021 180 (H)   2019 180 (H)       Depression and Anxiety Follow-Up    How are you doing with your depression since your last visit? Improved     How are you doing with your anxiety since your last visit?  Improved     Are you having other symptoms that might be associated with depression or anxiety? No    Have you had a significant life event? OTHER: New Housing     Do you have any concerns with your use of alcohol or other drugs? No    Social History     Tobacco Use     Smoking status: Former Smoker     Quit date: 1988     Years since quittin.7     Smokeless tobacco: Never Used     Tobacco comment: Quit in    Vaping Use     Vaping Use: Never used   Substance Use Topics     Alcohol use: No     Comment: none     Drug use: No     PHQ 2021   PHQ-9 Total  Score 0 3 0   Q9: Thoughts of better off dead/self-harm past 2 weeks Not at all Not at all Not at all   F/U: Thoughts of suicide or self-harm - - -   F/U: Safety concerns - - -     FERNY-7 SCORE 1/28/2021 1/25/2022 4/5/2022   Total Score - - -   Total Score - - 0 (minimal anxiety)   Total Score 0 2 0     Last PHQ-9 4/5/2022   1.  Little interest or pleasure in doing things 0   2.  Feeling down, depressed, or hopeless 0   3.  Trouble falling or staying asleep, or sleeping too much 0   4.  Feeling tired or having little energy 0   5.  Poor appetite or overeating 0   6.  Feeling bad about yourself 0   7.  Trouble concentrating 0   8.  Moving slowly or restless 0   Q9: Thoughts of better off dead/self-harm past 2 weeks 0   PHQ-9 Total Score 0   Difficulty at work, home, or with people -   In the past two weeks have you had thoughts of suicide or self harm? -   Do you have concerns about your personal safety or the safety of others? -     FERNY-7  4/5/2022   1. Feeling nervous, anxious, or on edge 0   2. Not being able to stop or control worrying 0   3. Worrying too much about different things 0   4. Trouble relaxing 0   5. Being so restless that it is hard to sit still 0   6. Becoming easily annoyed or irritable 0   7. Feeling afraid, as if something awful might happen 0   FERNY-7 Total Score 0   If you checked any problems, how difficult have they made it for you to do your work, take care of things at home, or get along with other people? -       Current providers sharing in care for this patient include:   Patient Care Team:  Leann oGrdon PA-C as PCP - General (Physician Assistant)  Leann Gordon PA-C as Assigned PCP  Vanessa Antunez MD as Assigned Pulmonology Provider    The following health maintenance items are reviewed in Epic and correct as of today:  Health Maintenance Due   Topic Date Due     COPD ACTION PLAN  Never done     ZOSTER IMMUNIZATION (3 of 3) 02/21/2020     FERRITIN  01/28/2022      LIPID  01/28/2022     MICROALBUMIN  01/28/2022     FALL RISK ASSESSMENT  01/28/2022     VITAMIN B12  01/28/2022     BP Readings from Last 3 Encounters:   04/05/22 120/68   01/25/22 130/60   12/14/21 (!) 153/76    Wt Readings from Last 3 Encounters:   04/05/22 58 kg (127 lb 12.8 oz)   01/25/22 62.6 kg (138 lb)   12/14/21 56.7 kg (125 lb)                  Patient Active Problem List   Diagnosis     Backache     Urge incontinence     Essential hypertension     Aftercare following joint replacement     Knee joint replacement by other means     HYPERLIPIDEMIA LDL GOAL <130     Urinary incontinence     Urgency-frequency syndrome     Advanced directives, counseling/discussion     RLQ abdominal mass     Lumbago     Pain in joint, pelvic region and thigh     Anxiety     Major depressive disorder, recurrent episode, in full remission (H)     Chronic obstructive pulmonary disease, unspecified COPD type (H) - followed by Dr. Alida PICHARDO [facility]     Hypervitaminosis B6     Peptic ulcer     Alzheimer's disease (H)     Low ferritin     Low vitamin B12 level     Adjustment disorder with depressed mood     Other fatigue     Tinnitus of both ears     Urinary urgency     Elevated hemoglobin (H)     Past Surgical History:   Procedure Laterality Date     ARTHROPLASTY PATELLO-FEMORAL (KNEE)  8/2010    Left TKA     CARDIAC CATH - HIM SCAN  2011    normal.  Done d/t abnormal stress test (false positive) - patient noted to have hypertensive heart disease     COLONOSCOPY  ~2003    No polyps     COLONOSCOPY N/A 10/10/2014    Procedure: COLONOSCOPY;  Surgeon: Chavez Ward MD;  Location:  GI     ESOPHAGOSCOPY, GASTROSCOPY, DUODENOSCOPY (EGD), COMBINED  6/1/2016    Dr. [facility]     ESOPHAGOSCOPY, GASTROSCOPY, DUODENOSCOPY (EGD), COMBINED N/A 6/1/2016    Procedure: COMBINED ESOPHAGOSCOPY, GASTROSCOPY, DUODENOSCOPY (EGD), BIOPSY SINGLE OR MULTIPLE;  Surgeon: Jada Cha MD;  Location:  GI     EXCISE GANGLION WRIST Left 7/7/2015     Procedure: EXCISE GANGLION WRIST;  Surgeon: Justin Lyons MD;  Location: [facility] OR     HYSTERECTOMY, PAP NO LONGER INDICATED       wisdom teeth       ZZC SPINE FUSION,ANTER,3 SGMTS  1992     ZZC TOTAL HIP ARTHROPLASTY      Hip Replacement, Total (Right)     ZZC VAGINAL HYSTERECTOMY      Hysterectomy, Vaginal w anterior repair       Social History     Tobacco Use     Smoking status: Former Smoker     Quit date: 1988     Years since quittin.7     Smokeless tobacco: Never Used     Tobacco comment: Quit in    Substance Use Topics     Alcohol use: No     Comment: none     Family History   Problem Relation Age of Onset     Alzheimer Disease Mother          at 98,  of old age     Alzheimer Disease Father          at 78     C.A.D. Father      Alzheimer Disease Brother          age 82      Family History Negative Brother         Born 1931     Cancer Maternal Aunt         x'2 w Ca unknown etiology     Cancer Other 90        unknown cancer     Colon Cancer No family hx of          Current Outpatient Medications   Medication Sig Dispense Refill     Acetaminophen (TYLENOL PO) Take 2,000 mg by mouth daily       albuterol (PROAIR HFA) 108 (90 Base) MCG/ACT inhaler Inhale 1-2 puffs into the lungs every 4 hours as needed for shortness of breath / dyspnea or wheezing 8.5 g 8     amLODIPine (NORVASC) 10 MG tablet Take 1 tablet (10 mg) by mouth daily 90 tablet 3     ARIPiprazole (ABILIFY) 2 MG tablet Take 1 tablet (2 mg) by mouth daily 30 tablet 0     calcium carbonate-vitamin D 500-400 MG-UNIT TABS per tablet Take 1 tablet by mouth 2 times daily (with meals) 180 tablet 3     cholecalciferol (VITAMIN D3) 5000 UNITS TABS tablet Take 1 tablet (5,000 Units) by mouth daily 100 tablet 1     cyanocobalamin (VITAMIN B-12) 1000 MCG tablet daily       diclofenac (VOLTAREN) 1 % topical gel Apply 2 g topically 4 times daily To left hip 100 g 0     donepezil (ARICEPT) 10 MG tablet Take 1 tablet (10  "mg) by mouth At Bedtime 90 tablet 1     DULoxetine (CYMBALTA) 20 MG capsule Take 1 capsule (20 mg) by mouth 2 times daily 180 capsule 1     gabapentin (NEURONTIN) 100 MG capsule 200 mg 2 times daily       ipratropium - albuterol 0.5 mg/2.5 mg/3 mL (DUONEB) 0.5-2.5 (3) MG/3ML neb solution Take 1 vial (3 mLs) by nebulization every 6 hours as needed for other (for severe COPD/breathing/wheezing flares) 30 mL 1     LUMIGAN 0.01 % SOLN ophthalmic solution INSTILL 1 DROP IN BOTH EYES AT BEDTIME       Multiple Vitamins-Minerals (PRESERVISION AREDS 2) CAPS Take 2 capsules by mouth daily       omeprazole (PRILOSEC) 40 MG DR capsule TAKE ONE CAPSULE BY MOUTH EVERY DAY 30 MINUTES BEFORE BREAKFAST 90 capsule 1     tiotropium-olodaterol (STIOLTO RESPIMAT) 2.5-2.5 MCG/ACT AERS INHALE TWO PUFFS BY MOUTH EVERY DAY 4 g 3     VITRON-C  MG TABS tablet Take 1 tablet by mouth 2 times daily (with meals) 100 tablet 0     Mammogram Screening: Recommended mammography every 1-2 years with patient discussion and risk factor consideration  Pertinent mammograms are reviewed under the imaging tab.    Review of Systems  Constitutional, HEENT, cardiovascular, pulmonary, gi and gu systems are negative, except as otherwise noted.    OBJECTIVE:   /68 (BP Location: Right arm, Patient Position: Sitting, Cuff Size: Adult Regular)   Pulse 100   Temp 97.6  F (36.4  C) (Tympanic)   Wt 58 kg (127 lb 12.8 oz)   SpO2 94%   BMI 23.37 kg/m   Estimated body mass index is 23.37 kg/m  as calculated from the following:    Height as of 1/25/22: 1.575 m (5' 2\").    Weight as of this encounter: 58 kg (127 lb 12.8 oz).     Physical Exam     GENERAL: healthy, alert and no distress  EYES: Eyes grossly normal to inspection  RESP: lungs clear to auscultation - no rales, rhonchi or wheezes  CV: regular rate and rhythm, normal S1 S2, no S3 or S4, no murmur, click or rub, no peripheral edema  NEURO: Normal strength and tone  PSYCH: mentation appears normal, " "affect normal/bright      ASSESSMENT / PLAN:   Gifty was seen today for physical, recheck medication and imm/inj.    Diagnoses and all orders for this visit:    Encounter for Medicare annual wellness exam    Screening for hyperlipidemia  -     Lipid panel reflex to direct LDL Fasting    High priority for 2019-nCoV vaccine  -     COVID-19,PF,MODERNA (18+ Yrs BOOSTER .25mL)    Early onset Alzheimer's dementia without behavioral disturbance (H)  Sundowning  Noted improvement with low dose Abilify.  Continued Abilify 2 mg at bedtime as needed.  Continue with close follow-up.    Plan further consultation with geriatrician - Dr. Loretta Abernathy (scheduled 5/12/22).    Trazodone held at last appt, previously on 25 mg at bedtime with no noted improvement in insomnia/behavioral disturbances.    Previously discontinued Vesicare due to anticholinergic activity.  Monitor urinary symptoms.    -     ARIPiprazole (ABILIFY) 2 MG tablet; Take 1 tablet (2 mg) by mouth daily    Low ferritin  -     Ferritin  -     CBC with platelets    Essential hypertension  Currently well controlled on Amlodipine 10 mg daily.    -     Albumin Random Urine Quantitative with Creat Ratio  -     Comprehensive metabolic panel (BMP + Alb, Alk Phos, ALT, AST, Total. Bili, TP)    Low vitamin B12 level  -     Vitamin B12    COUNSELING:  Reviewed preventive health counseling, as reflected in patient instructions    Estimated body mass index is 23.37 kg/m  as calculated from the following:    Height as of 1/25/22: 1.575 m (5' 2\").    Weight as of this encounter: 58 kg (127 lb 12.8 oz).        She reports that she quit smoking about 33 years ago. She has never used smokeless tobacco.      Appropriate preventive services were discussed with this patient, including applicable screening as appropriate for cardiovascular disease, diabetes, osteopenia/osteoporosis, and glaucoma.  As appropriate for age/gender, discussed screening for colorectal cancer, prostate cancer, " breast cancer, and cervical cancer. Checklist reviewing preventive services available has been given to the patient.    Reviewed patients plan of care and provided an AVS. The Basic Care Plan (routine screening as documented in Health Maintenance) for Gifty meets the Care Plan requirement. This Care Plan has been established and reviewed with the Patient and daughter.    Counseling Resources:  ATP IV Guidelines  Pooled Cohorts Equation Calculator  Breast Cancer Risk Calculator  Breast Cancer: Medication to Reduce Risk  FRAX Risk Assessment  ICSI Preventive Guidelines  Dietary Guidelines for Americans, 2010  USDA's MyPlate  ASA Prophylaxis  Lung CA Screening    Ketty Lopez, WILBERTO [facility]    Identified Health Risks:

## 2022-04-06 LAB
ALBUMIN SERPL-MCNC: 4.2 G/DL (ref 3.4–5)
ALP SERPL-CCNC: 73 U/L (ref 40–150)
ALT SERPL W P-5'-P-CCNC: 37 U/L (ref 0–50)
ANION GAP SERPL CALCULATED.3IONS-SCNC: 8 MMOL/L (ref 3–14)
AST SERPL W P-5'-P-CCNC: 18 U/L (ref 0–45)
BILIRUB SERPL-MCNC: 0.3 MG/DL (ref 0.2–1.3)
BUN SERPL-MCNC: 11 MG/DL (ref 7–30)
CALCIUM SERPL-MCNC: 9.7 MG/DL (ref 8.5–10.1)
CHLORIDE BLD-SCNC: 103 MMOL/L (ref 94–109)
CHOLEST SERPL-MCNC: 225 MG/DL
CO2 SERPL-SCNC: 26 MMOL/L (ref 20–32)
CREAT SERPL-MCNC: 0.74 MG/DL (ref 0.52–1.04)
FASTING STATUS PATIENT QL REPORTED: YES
FERRITIN SERPL-MCNC: 144 NG/ML (ref 8–252)
GFR SERPL CREATININE-BSD FRML MDRD: 78 ML/MIN/1.73M2
GLUCOSE BLD-MCNC: 104 MG/DL (ref 70–99)
HDLC SERPL-MCNC: 54 MG/DL
LDLC SERPL CALC-MCNC: 137 MG/DL
NONHDLC SERPL-MCNC: 171 MG/DL
POTASSIUM BLD-SCNC: 4.8 MMOL/L (ref 3.4–5.3)
PROT SERPL-MCNC: 7.6 G/DL (ref 6.8–8.8)
SODIUM SERPL-SCNC: 137 MMOL/L (ref 133–144)
TRIGL SERPL-MCNC: 172 MG/DL

## 2022-04-06 ASSESSMENT — PATIENT HEALTH QUESTIONNAIRE - PHQ9: SUM OF ALL RESPONSES TO PHQ QUESTIONS 1-9: 0

## 2022-04-06 ASSESSMENT — ANXIETY QUESTIONNAIRES: GAD7 TOTAL SCORE: 0

## 2022-04-06 NOTE — RESULT ENCOUNTER NOTE
Deasonal Shukla,     -Normal red blood cell (hgb) levels, normal white blood cell count and normal platelet levels.  -Microalbumin (urine protein) test is normal.  ADVISE: rechecking this annually.  -Vitamin B12 is within a normal range.        Please send a [employer] message or call [phone]  if you have any questions.      Ketty Lopez, APRN, CNP  [facility]    If you have further questions about the interpretation of your labs, [email or web address] is a good website to check out for further information.

## 2022-04-07 NOTE — RESULT ENCOUNTER NOTE
Dear Gayla,     -LDL(bad) cholesterol level is slightly elevated (but improved from 1 year ago) and your triglycerides are slightly elevated.  Overall reassuring cholesterol levels.     -Liver and gallbladder tests (ALT,AST, Alk phos,bilirubin) are normal.  -Kidney function (GFR) is normal.  -Sodium is normal.  -Potassium is normal.  -Calcium is normal.  -Glucose is slightly elevated and may be a sign of early diabetes (prediabetes). ADVISE: eating a low carbohydrate diet, exercising, trying to lose weight (if necessary) and rechecking your glucose level in 12 months.    -Ferritin (iron) level is normal.      Please send a [employer] message or call [phone]  if you have any questions.      Ketty Lopez, WILBERTO, CNP  [facility] [place]    If you have further questions about the interpretation of your labs, [email or web address] is a good website to check out for further information.

## 2022-05-12 ENCOUNTER — OFFICE VISIT (OUTPATIENT)
Dept: FAMILY MEDICINE | Facility: CLINIC | Age: 87
End: 2022-05-12
Payer: COMMERCIAL

## 2022-05-12 VITALS
OXYGEN SATURATION: 93 % | SYSTOLIC BLOOD PRESSURE: 147 MMHG | DIASTOLIC BLOOD PRESSURE: 71 MMHG | BODY MASS INDEX: 23.63 KG/M2 | WEIGHT: 128.4 LBS | HEART RATE: 67 BPM | RESPIRATION RATE: 16 BRPM | HEIGHT: 62 IN | TEMPERATURE: 98.3 F

## 2022-05-12 DIAGNOSIS — F03.90 DEMENTIA WITHOUT BEHAVIORAL DISTURBANCE, UNSPECIFIED DEMENTIA TYPE: Primary | ICD-10-CM

## 2022-05-12 PROCEDURE — 99215 OFFICE O/P EST HI 40 MIN: CPT | Performed by: INTERNAL MEDICINE

## 2022-05-12 PROCEDURE — 99417 PROLNG OP E/M EACH 15 MIN: CPT | Performed by: INTERNAL MEDICINE

## 2022-05-12 ASSESSMENT — PAIN SCALES - GENERAL: PAINLEVEL: NO PAIN (0)

## 2022-05-12 NOTE — Clinical Note
FYI, she has settled in really well now with family and is going to attempt to wean off of Gabapentin and Abilify.

## 2022-05-12 NOTE — PATIENT INSTRUCTIONS
1) Reduce Gabapentin (Neurontin) down to 200 mg every evening for one week and if no recurrence of neck/arm pain, then discontinue it completely. Update your primary provider if you feel the need to add this medication back to your regimen if pain recurs.    2) Reduce Aripiprazole (Abilify) down to 1 mg every evening for 1 week and then discontinue it completely. If you notice return of evening anxiety/paranoia then this medication may need to be re-started.    3) For now, continue Donepezil (Aricept) but this could potentially be discontinued/decreased too in the future but we don't want to make too many changes at once    4) Continue great care and support which is obviously working well and follow up with me every 6-12 months  Call to schedule your follow up appointment: # [phone]  [facility]  [address]  [address]  [place] [address]

## 2022-05-12 NOTE — PROGRESS NOTES
"MEMORY EVALUATION CLINIC - INITIAL EVALUATION    HPI: Gifty \"Gayla\" Daisy is a pleasant 87yoF here today in initial evaluation in our clinic re: on-going care in the setting of a previously known diagnosis of dementia. She is here today with her son Pankaj and daughter Chasidy (and has another local son Jose G not present during today's visit); daughter-in-law Laila (Pankaj's wife) also very involved in her care. She currently as of a few months ago actually lives with two of her adult children (Chasidy and Jose G) and has 24-7 care/support through them.    Past history includes initial neuropsych testing in February 2018 approximately one year after obvious cognitive changes were starting to form in her life. She especially had difficulty during this testing with learning and memory domains and ultimately the neuropsychologist's impression was that she did carry a diagnosis of a major neurocognitive disorder (dementia) which was most likely of the Alzheimer's subtype though a vascular component was also a potential contributing factor. The progress notes from this testing are scanned into Epic. PCP started her on Donepezil in March 2018 and then Gayla later saw neurologist Dr. Magana in May 2018 who concurred with the diagnosis of dementia and had her continue on Donepezil which supposedly had shown some positive results since initiating it.    The past year was a little challenging for her when she'd been still living independently with growing care needs. Ultimately lived briefly at [facility] but then transitioned to living with Chasidy and Jose G earlier this year. There were times when medically a lot seemed to be going on that would spiral out of control including some neck pain, anxiety, ER visits. Now that she has been living with Chasidy and Jose G the past few months things thankfully have become familiar and routine and she has settled in nicely. Musculoskeletal neck pain resolved (didn't tolerate " "opiates) and they wonder about trying to continue to reduce some polypharmacy. Would be interested to see how she does without Gabapentin. Also while Abilify was started a few months ago when things were quite stressful, they wonder if this could also be reduced/stopped and if she truly needs it any longer. Another question is how long can/should she stay on Donepezil. Gayla does not have trouble with dizziness, diarrhea, insomnia, delusions/hallucinations. She is also now off of Trazodone and Vesicare (and no UI issues).     Gayla and her kids display a great relationship throughout the visit today. She says she is being really well cared for by family; maintains good sense of humor. Also says she is \"extremely thankful and happy\". Seems to benefit from recognizing surroundings and routine. Has been able to start helping around the house with light housework (dishes, making coffee, preparing sandwich) which she enjoys feeling helpful/valuable. Has 24-7 oversight and care though between her kids. She loves coffee and (though she doesn't realize it) it was switched to decaf recently which possibly has helped reduce some of her anxiety.      Social Hx (Employment/Schooling): Originally from [place] and graduated from high school in [place], [place]. Is now  and has 3 adult children very actively engaged in her life. Retired medical and . Enjoys coffee and gardening.  PMH: Reviewed. Most notable for: HTN, COPD, depression/anxiety  Family Hx: Yes, both folks with h/o cognitive impairment later in life    ADLs: Needs prompts on daily hygiene self cares but does shower independently  IADLs: Fully dependent  Home situation: House with son Jose G and daughter Chasidy now since Jan 2022  Support: Extended family  Behaviors/Hallucinations/Delusions: None      OBJECTIVE:  BP (!) 147/71 (BP Location: Right arm, Cuff Size: Adult Regular)   Pulse 67   Temp 98.3  F (36.8  C) (Tympanic)   Resp 16   Ht 1.575 m " "(5' 2\")   Wt 58.2 kg (128 lb 6.4 oz)   SpO2 93%   BMI 23.48 kg/m    Alert, pleasant, no acute distress, well groomed  Normal speech, occasional repetition  No tremor  Very cheerful disposition, engaged in visit, jokes with family      INVESTIGATIONS: Normal TSH and B12 on file, CT Head Nov 2021 noting \"mild diffuse parenchymal volume loss and white matter changes likely due to chronic microvascular ischemic disease.\"  MRI Brain Nov 2021:  IMPRESSION:  1.  Small chronic lacunar infarcts in each basal ganglia. Limited by motion. No definite acute intracranial abnormality.  2.  Age-related and chronic ischemic changes.    PRIOR TESTING: Neuropsych testing as described above Feb 2018; Abnormal MiniCog at April 2022 Wellness Visit      COGNITIVE MEDICATIONS: Donepezil (since early 2018) and Abilify (recently started)      ASSESSMENT/PLAN: Gifty \"Gayla\" Daisy is a pleasant 87yoF with previously diagnosed dementia in 2018, likely of the Alzheimer's subtype but also some potential vascular component as well, who presents today with her family to further focus on on-going care/support as her dementia progresses. They are aware of diagnosis and are appropriately helping to provide 24-7 care/oversight for safety/support and she is now thriving the past few months since moving in with her son and daughter. As such, they wanted to discuss some reduction of polypharmacy which is a great plan (see below re: taper plans for Gabapentin and Abilify) to see if well tolerated. Eventually could taper off of Donepezil but since it was perceived to have provided some original benefit when it was initiated in 2018 and she has tolerated it without side effects, explained would continue this medication for now. She is still fairly independent in ADLs and would want to promote this for as long as possible. They agreed no further cognitive testing would be helpful at this time but we'll follow up in clinic over time to provide support " as her disease progresses.       Patient Instructions   1) Reduce Gabapentin (Neurontin) down to 200 mg every evening for one week and if no recurrence of neck/arm pain, then discontinue it completely. Update your primary provider if you feel the need to add this medication back to your regimen if pain recurs.    2) Reduce Aripiprazole (Abilify) down to 1 mg every evening for 1 week and then discontinue it completely. If you notice return of evening anxiety/paranoia then this medication may need to be re-started.    3) For now, continue Donepezil (Aricept) but this could potentially be discontinued/decreased too in the future but we don't want to make too many changes at once    4) Continue great care and support which is obviously working well and follow up with me every 6-12 months  Call to schedule your follow up appointment: # [phone]  [facility]  [address]  [address]  [place] [address]          Thank you for this consultation!      92 minutes spent on the date of the encounter doing chart review, history and exam, counseling, documentation and further activities as noted above      Loretta Abernathy DO  [facility]

## 2022-08-01 DIAGNOSIS — G30.0 EARLY ONSET ALZHEIMER'S DEMENTIA WITHOUT BEHAVIORAL DISTURBANCE (H): ICD-10-CM

## 2022-08-01 DIAGNOSIS — J44.9 CHRONIC OBSTRUCTIVE PULMONARY DISEASE, UNSPECIFIED COPD TYPE (H): ICD-10-CM

## 2022-08-01 DIAGNOSIS — F33.42 MAJOR DEPRESSIVE DISORDER, RECURRENT EPISODE, IN FULL REMISSION (H): ICD-10-CM

## 2022-08-01 DIAGNOSIS — F43.21 ADJUSTMENT DISORDER WITH DEPRESSED MOOD: ICD-10-CM

## 2022-08-01 DIAGNOSIS — R53.83 OTHER FATIGUE: ICD-10-CM

## 2022-08-01 DIAGNOSIS — F02.80 EARLY ONSET ALZHEIMER'S DEMENTIA WITHOUT BEHAVIORAL DISTURBANCE (H): ICD-10-CM

## 2022-08-01 DIAGNOSIS — I10 ESSENTIAL HYPERTENSION: ICD-10-CM

## 2022-08-03 RX ORDER — DONEPEZIL HYDROCHLORIDE 10 MG/1
10 TABLET, FILM COATED ORAL AT BEDTIME
Qty: 90 TABLET | Refills: 1 | Status: SHIPPED | OUTPATIENT
Start: 2022-08-03 | End: 2022-12-08

## 2022-08-03 RX ORDER — AMLODIPINE BESYLATE 10 MG/1
10 TABLET ORAL DAILY
Qty: 90 TABLET | Refills: 3 | Status: SHIPPED | OUTPATIENT
Start: 2022-08-03 | End: 2023-06-13

## 2022-08-03 RX ORDER — TIOTROPIUM BROMIDE AND OLODATEROL 3.124; 2.736 UG/1; UG/1
SPRAY, METERED RESPIRATORY (INHALATION)
Qty: 4 G | Refills: 3 | Status: SHIPPED | OUTPATIENT
Start: 2022-08-03 | End: 2022-11-16

## 2022-08-03 RX ORDER — DULOXETIN HYDROCHLORIDE 20 MG/1
20 CAPSULE, DELAYED RELEASE ORAL 2 TIMES DAILY
Qty: 180 CAPSULE | Refills: 1 | Status: SHIPPED | OUTPATIENT
Start: 2022-08-03 | End: 2022-12-08

## 2022-08-03 NOTE — TELEPHONE ENCOUNTER
LOV 5/12/2022    Ok to fill Aricept        Routing refill request to provider for review/approval because:  Inhaled Steroids Protocol Failed 08/01/2022 02:05 PM   Protocol Details  Asthma control assessment score within normal limits in last 6 months      Calcium Channel Blockers Protocol  Failed 08/01/2022 02:05 PM   Protocol Details  Blood pressure under 140/90 in past 12 months          Serotonin-Norepinephrine Reuptake Inhibitors  Failed 08/01/2022 02:05 PM   Protocol Details  Blood pressure under 140/90 in past 12 months           please advise     Thank you     Lory Villela RN, BSN  [facility]

## 2022-10-25 DIAGNOSIS — J44.9 CHRONIC OBSTRUCTIVE PULMONARY DISEASE, UNSPECIFIED COPD TYPE (H): ICD-10-CM

## 2022-10-25 RX ORDER — ALBUTEROL SULFATE 90 UG/1
1-2 AEROSOL, METERED RESPIRATORY (INHALATION) EVERY 4 HOURS PRN
Qty: 8.5 G | Refills: 1 | Status: SHIPPED | OUTPATIENT
Start: 2022-10-25 | End: 2022-12-08

## 2022-10-25 NOTE — TELEPHONE ENCOUNTER
Daughter calling for refill of albuterol for her mom    Routing refill request to provider for review/approval because:  Drug does not pass the [facility] refill protocol      Asthma Maintenance Inhalers - Anticholinergics Failed 10/25/2022 09:12 AM   Protocol Details  Asthma control assessment score within normal limits in last 6 months    Recent (6 mo) or future (30 days) visit within the authorizing provider's specialty    Patient is age 12 years or older    Medication is active on med list   Short-Acting Beta Agonist Inhalers Protocol  Failed   Protocol Details  Asthma control assessment score within normal limits in last 6 months          Future Appointments 10/25/2022 - 4/23/2023      Date Visit Type Length Department Provider     12/8/2022  9:40 AM OFFICE VISIT 20 min [facility] Leann Gordon PA-C    Location Instructions:     [facility] is located at [address] [place] [address], along [address] Free parking is available; access the lot by turning north from [address] onto [address], then west onto [address].                 Thank you!  Karolina CHAVES RN   [facility] Triage

## 2022-11-16 DIAGNOSIS — J44.9 CHRONIC OBSTRUCTIVE PULMONARY DISEASE, UNSPECIFIED COPD TYPE (H): ICD-10-CM

## 2022-11-16 RX ORDER — TIOTROPIUM BROMIDE AND OLODATEROL 3.124; 2.736 UG/1; UG/1
SPRAY, METERED RESPIRATORY (INHALATION)
Qty: 4 G | Refills: 3 | Status: SHIPPED | OUTPATIENT
Start: 2022-11-16 | End: 2023-06-13

## 2022-11-16 NOTE — TELEPHONE ENCOUNTER
Prescription approved per [facility] Refill Protocol.  Used for COPD not asthma  Marya MADISON RN, BSN

## 2022-12-05 ENCOUNTER — OFFICE VISIT (OUTPATIENT)
Dept: FAMILY MEDICINE | Facility: CLINIC | Age: 87
End: 2022-12-05
Payer: COMMERCIAL

## 2022-12-05 ENCOUNTER — E-VISIT (OUTPATIENT)
Dept: FAMILY MEDICINE | Facility: CLINIC | Age: 87
End: 2022-12-05
Payer: COMMERCIAL

## 2022-12-05 ENCOUNTER — ANCILLARY PROCEDURE (OUTPATIENT)
Dept: GENERAL RADIOLOGY | Facility: CLINIC | Age: 87
End: 2022-12-05
Attending: NURSE PRACTITIONER
Payer: COMMERCIAL

## 2022-12-05 VITALS
WEIGHT: 122 LBS | DIASTOLIC BLOOD PRESSURE: 60 MMHG | HEIGHT: 62 IN | SYSTOLIC BLOOD PRESSURE: 138 MMHG | BODY MASS INDEX: 22.45 KG/M2 | OXYGEN SATURATION: 93 % | TEMPERATURE: 97.6 F | HEART RATE: 77 BPM

## 2022-12-05 DIAGNOSIS — J44.9 CHRONIC OBSTRUCTIVE PULMONARY DISEASE, UNSPECIFIED COPD TYPE (H): ICD-10-CM

## 2022-12-05 DIAGNOSIS — R05.1 ACUTE COUGH: ICD-10-CM

## 2022-12-05 DIAGNOSIS — R06.02 SOB (SHORTNESS OF BREATH): Primary | ICD-10-CM

## 2022-12-05 DIAGNOSIS — R91.8 LUNG FIELD ABNORMAL FINDING ON EXAMINATION: ICD-10-CM

## 2022-12-05 DIAGNOSIS — R35.0 URINARY FREQUENCY: ICD-10-CM

## 2022-12-05 DIAGNOSIS — R06.02 SOB (SHORTNESS OF BREATH): ICD-10-CM

## 2022-12-05 LAB
ALBUMIN UR-MCNC: NEGATIVE MG/DL
APPEARANCE UR: CLEAR
BACTERIA #/AREA URNS HPF: ABNORMAL /HPF
BILIRUB UR QL STRIP: NEGATIVE
COLOR UR AUTO: YELLOW
GLUCOSE UR STRIP-MCNC: NEGATIVE MG/DL
HGB UR QL STRIP: NEGATIVE
KETONES UR STRIP-MCNC: ABNORMAL MG/DL
LEUKOCYTE ESTERASE UR QL STRIP: NEGATIVE
MUCOUS THREADS #/AREA URNS LPF: PRESENT /LPF
NITRATE UR QL: NEGATIVE
PH UR STRIP: 6.5 [PH] (ref 5–7)
RBC #/AREA URNS AUTO: ABNORMAL /HPF
SP GR UR STRIP: 1.02 (ref 1–1.03)
SQUAMOUS #/AREA URNS AUTO: ABNORMAL /LPF
UROBILINOGEN UR STRIP-ACNC: 0.2 E.U./DL
WBC #/AREA URNS AUTO: ABNORMAL /HPF

## 2022-12-05 PROCEDURE — 96127 BRIEF EMOTIONAL/BEHAV ASSMT: CPT | Performed by: NURSE PRACTITIONER

## 2022-12-05 PROCEDURE — U0005 INFEC AGEN DETEC AMPLI PROBE: HCPCS | Performed by: NURSE PRACTITIONER

## 2022-12-05 PROCEDURE — U0003 INFECTIOUS AGENT DETECTION BY NUCLEIC ACID (DNA OR RNA); SEVERE ACUTE RESPIRATORY SYNDROME CORONAVIRUS 2 (SARS-COV-2) (CORONAVIRUS DISEASE [COVID-19]), AMPLIFIED PROBE TECHNIQUE, MAKING USE OF HIGH THROUGHPUT TECHNOLOGIES AS DESCRIBED BY CMS-2020-01-R: HCPCS | Performed by: NURSE PRACTITIONER

## 2022-12-05 PROCEDURE — 87086 URINE CULTURE/COLONY COUNT: CPT | Performed by: NURSE PRACTITIONER

## 2022-12-05 PROCEDURE — 99207 PR NON-BILLABLE SERV PER CHARTING: CPT | Performed by: PHYSICIAN ASSISTANT

## 2022-12-05 PROCEDURE — 81001 URINALYSIS AUTO W/SCOPE: CPT | Performed by: NURSE PRACTITIONER

## 2022-12-05 PROCEDURE — 99214 OFFICE O/P EST MOD 30 MIN: CPT | Mod: CS | Performed by: NURSE PRACTITIONER

## 2022-12-05 PROCEDURE — 71046 X-RAY EXAM CHEST 2 VIEWS: CPT | Mod: TC | Performed by: RADIOLOGY

## 2022-12-05 PROCEDURE — 93000 ELECTROCARDIOGRAM COMPLETE: CPT | Performed by: NURSE PRACTITIONER

## 2022-12-05 RX ORDER — BRINZOLAMIDE/BRIMONIDINE TARTRATE 10; 2 MG/ML; MG/ML
SUSPENSION/ DROPS OPHTHALMIC
COMMUNITY
Start: 2022-11-27

## 2022-12-05 RX ORDER — IPRATROPIUM BROMIDE AND ALBUTEROL SULFATE 2.5; .5 MG/3ML; MG/3ML
1 SOLUTION RESPIRATORY (INHALATION) EVERY 6 HOURS PRN
Qty: 90 ML | Refills: 3 | Status: SHIPPED | OUTPATIENT
Start: 2022-12-05 | End: 2024-03-15

## 2022-12-05 ASSESSMENT — PATIENT HEALTH QUESTIONNAIRE - PHQ9
SUM OF ALL RESPONSES TO PHQ QUESTIONS 1-9: 2
SUM OF ALL RESPONSES TO PHQ QUESTIONS 1-9: 2
10. IF YOU CHECKED OFF ANY PROBLEMS, HOW DIFFICULT HAVE THESE PROBLEMS MADE IT FOR YOU TO DO YOUR WORK, TAKE CARE OF THINGS AT HOME, OR GET ALONG WITH OTHER PEOPLE: NOT DIFFICULT AT ALL

## 2022-12-05 NOTE — TELEPHONE ENCOUNTER
Nita Arguelles will see the patient at 230pm today.     Called and informed patient's daughter that will bringing the patient, arrival time 210 for 230pm appointment today      Karolina CHAVES RN   [facility] Triage

## 2022-12-05 NOTE — PATIENT INSTRUCTIONS
Dear Gayla    I am so sorry to hear that you are not feeling well.  I would rather have you seen in the office to listen to your lungs/determine next steps.  I will ask my triage team to reach out to you right away today and try to get you in as soon as possible/hopefully today.    Thanks for choosing us as your health care partner,    Leann Gordon PA-C

## 2022-12-05 NOTE — TELEPHONE ENCOUNTER
Provider E-Visit time total (minutes): 3    Triage: Please call patient/family (patient does have some element of dementia) and do a second level triage/see if someone can see her ASAP today for suspected COPD exacerbation      Leann Gordon MBA, MS, PA-C  [facility]

## 2022-12-05 NOTE — PROGRESS NOTES
Assessment & Plan     SOB (shortness of breath)  Very reassuring exam no higher level of care felt to be needed.   CXR due to decreased base appears normal awaiting official read.   COVID pending would treat with Paxlovid if positive. If negative and symptoms persisit would recommend steroids.   Minimal phelgm without color change if worsens and purulent cover with antibiotics for COPD flare.   At home care including every 4-6 hour breathing treatments. At home pulse ox to monitor oxygen status.   Has upcoming appt with week with PCP will keep to reevaluate.   Gifty and her daughter verbalizes understanding of plan of care and is in agreement.      - EKG 12-lead complete w/read - Clinics  - Symptomatic; Yes; 12/3/2022 COVID-19 Virus (Coronavirus) by PCR Nose  - XR Chest 2 Views    Chronic obstructive pulmonary disease, unspecified COPD type (H)    - ipratropium - albuterol 0.5 mg/2.5 mg/3 mL (DUONEB) 0.5-2.5 (3) MG/3ML neb solution  Dispense: 90 mL; Refill: 3  - XR Chest 2 Views    Acute cough  - Symptomatic; Yes; 12/3/2022 COVID-19 Virus (Coronavirus) by PCR Nose  - XR Chest 2 Views    Chronic obstructive pulmonary disease, unspecified COPD type (H)     - ipratropium - albuterol 0.5 mg/2.5 mg/3 mL (DUONEB) 0.5-2.5 (3) MG/3ML neb solution  Dispense: 90 mL; Refill: 3  - XR Chest 2 Views    Lung field abnormal finding on examination    - XR Chest 2 Views    Urinary frequency    - UA with Microscopic - lab collect  - Urine Culture Aerobic Bacterial - lab collect  - Urine Microscopic Exam      Return in about 3 days (around 12/8/2022) for Recheck.      WILBERTO Chávez CNP  [facility] COREY Shukla is a 87 year old accompanied by her daughter, Chasidy presenting for the following health issues:  Cough and Shortness of Breath      History of Present Illness       Reason for visit:  Cough  Symptom onset:  1-3 days ago  Symptoms include:  Loose cough  Symptom intensity:   "Moderate  Symptom progression:  Staying the same  Had these symptoms before:  No  What makes it better:  Nebulizer    She eats 2-3 servings of fruits and vegetables daily.She consumes 0 sweetened beverage(s) daily.She exercises with enough effort to increase her heart rate 20 to 29 minutes per day.  She exercises with enough effort to increase her heart rate 3 or less days per week.   She is taking medications regularly.    Today's PHQ-9         PHQ-9 Total Score: 2    PHQ-9 Q9 Thoughts of better off dead/self-harm past 2 weeks :   Not at all    How difficult have these problems made it for you to do your work, take care of things at home, or get along with other people: Not difficult at all     NO fever chills body aches or malaise  Coughing up phlegm not a lot not super thick- unsure of color.  No fever, sore throat,   7 pounds down in last 6 months doesn't report abdominal pain, early satiety or bloating has some decreased appetite. Some frequency of urination  Coughing fit last few days.  Nebilizer used the two nights; only using it once.       Wt Readings from Last 5 Encounters:   12/05/22 55.3 kg (122 lb)   05/12/22 58.2 kg (128 lb 6.4 oz)   04/05/22 58 kg (127 lb 12.8 oz)   01/25/22 62.6 kg (138 lb)   12/14/21 56.7 kg (125 lb)       Review of Systems   Constitutional, HEENT, cardiovascular, pulmonary, GI, , musculoskeletal, neuro, skin, endocrine and psych systems are negative, except as otherwise noted in the HPI.      Objective    /60 (BP Location: Left arm, Patient Position: Chair, Cuff Size: Adult Regular)   Pulse 77   Temp 97.6  F (36.4  C) (Tympanic)   Ht 1.575 m (5' 2\")   Wt 55.3 kg (122 lb)   SpO2 93%   BMI 22.31 kg/m    Body mass index is 22.31 kg/m .  Physical Exam   GENERAL: healthy, alert and no distress  EYES: Eyes grossly normal to inspection, PERRL and conjunctivae and sclerae normal  HENT: ear canals and TM's normal, nose and mouth without ulcers or lesions  NECK: no adenopathy, " no asymmetry, masses, or scars and thyroid normal to palpation  RESP: lungs clear to auscultation - no rales, rhonchi; wheezes intermittent mostly expiratory throughout, mild decrease in RLL   CV: regular rate and rhythm, normal S1 S2, no S3 or S4, no murmur, click or rub, no peripheral edema and peripheral pulses strong  ABDOMEN: soft, nontender, no hepatosplenomegaly, no masses and bowel sounds normal  MS: no gross musculoskeletal defects noted, no edema  SKIN: no suspicious lesions or rashes  PSYCH: mentation appears normal, affect normal/bright  LYMPH: no cervical, supraclavicular, axillary, or inguinal adenopathy    Results for orders placed or performed in visit on 12/05/22   XR Chest 2 Views     Status: None    Narrative    CHEST TWO VIEWS  12/5/2022 3:21 PM     HISTORY: Shortness of breath. Acute cough.     COMPARISON: 10/14/2021.      Impression    IMPRESSION: Hyperinflation both lungs, likely related to COPD. The  lungs are otherwise clear. No pleural effusions. Aortic calcification.  Heart size and pulmonary vascularity are within normal limits.    CHRISTOPHER REHMAN MD         SYSTEM ID:  [id number]   Results for orders placed or performed in visit on 12/05/22   Symptomatic; Yes; 12/3/2022 COVID-19 Virus (Coronavirus) by PCR Nose     Status: Normal    Specimen: Nose; Swab   Result Value Ref Range    SARS CoV2 PCR Negative Negative    Narrative    Testing was performed using the Aptima SARS-CoV-2 Assay on the  [employer] Instrument System. Additional information about this  Emergency Use Authorization (EUA) assay can be found via the Lab  Guide. This test should be ordered for the detection of SARS-CoV-2 in  individuals who meet SARS-CoV-2 clinical and/or epidemiological  criteria. Test performance is unknown in asymptomatic patients. This  test is for in vitro diagnostic use under the FDA EUA for  laboratories certified under CLIA to perform high complexity testing.  This test has not been FDA cleared or  approved. A negative result  does not rule out the presence of PCR inhibitors in the specimen or  target RNA in concentration below the limit of detection for the  assay. The possibility of a false negative should be considered if  the patient's recent exposure or clinical presentation suggests  COVID-19. This test was validated by the [facility]. This laboratory is certified under  the Clinical Laboratory Improvement Amendments of 1988 (CLIA-88) as  qualified to perform high complexity laboratory testing.   UA with Microscopic - lab collect     Status: Abnormal   Result Value Ref Range    Color Urine Yellow Colorless, Straw, Light Yellow, Yellow    Appearance Urine Clear Clear    Glucose Urine Negative Negative mg/dL    Bilirubin Urine Negative Negative    Ketones Urine Trace (A) Negative mg/dL    Specific Gravity Urine 1.020 1.003 - 1.035    Blood Urine Negative Negative    pH Urine 6.5 5.0 - 7.0    Protein Albumin Urine Negative Negative mg/dL    Urobilinogen Urine 0.2 0.2, 1.0 E.U./dL    Nitrite Urine Negative Negative    Leukocyte Esterase Urine Negative Negative   Urine Microscopic Exam     Status: Abnormal   Result Value Ref Range    Bacteria Urine None Seen None Seen /HPF    RBC Urine 0-2 0-2 /HPF /HPF    WBC Urine 0-5 0-5 /HPF /HPF    Squamous Epithelials Urine Few (A) None Seen /LPF    Mucus Urine Present (A) None Seen /LPF   Urine Culture Aerobic Bacterial - lab collect     Status: None    Specimen: Urine, Midstream   Result Value Ref Range    Culture No Growth

## 2022-12-06 ENCOUNTER — TELEPHONE (OUTPATIENT)
Dept: FAMILY MEDICINE | Facility: CLINIC | Age: 87
End: 2022-12-06

## 2022-12-06 LAB — SARS-COV-2 RNA RESP QL NAA+PROBE: NEGATIVE

## 2022-12-06 NOTE — TELEPHONE ENCOUNTER
Called # [phone]   Advised patient of Chest Xray result note. Patient stated she is doing well. He breathing is improved. She will await the results of the Covid test.       Patti Barreto RN  [facility]

## 2022-12-06 NOTE — RESULT ENCOUNTER NOTE
Please call Gayla,    Here is a summary of your recent test results:    Chest x-ray looks good there is no pneumonia.  How did the night go with breathing treatments for her?   Urine looks good so far as well.   Still waiting for her COVID result. If COVID is positive I would like to get her on medication for that so will call her back then.     If she develops worsening sputum we can do some antibiotic coverage and we could give steroids if COVID is negative and symptoms are persisting.    Keep upcoming appointment on Thursday with Leann Gordon.    FILIBERTO Chávez  [facility]

## 2022-12-07 LAB — BACTERIA UR CULT: NO GROWTH

## 2022-12-08 ENCOUNTER — OFFICE VISIT (OUTPATIENT)
Dept: FAMILY MEDICINE | Facility: CLINIC | Age: 87
End: 2022-12-08
Payer: COMMERCIAL

## 2022-12-08 VITALS
DIASTOLIC BLOOD PRESSURE: 62 MMHG | OXYGEN SATURATION: 94 % | HEART RATE: 90 BPM | SYSTOLIC BLOOD PRESSURE: 136 MMHG | HEIGHT: 62 IN | BODY MASS INDEX: 22.65 KG/M2 | RESPIRATION RATE: 16 BRPM | WEIGHT: 123.1 LBS | TEMPERATURE: 96.9 F

## 2022-12-08 DIAGNOSIS — G30.0 EARLY ONSET ALZHEIMER'S DEMENTIA WITHOUT BEHAVIORAL DISTURBANCE (H): ICD-10-CM

## 2022-12-08 DIAGNOSIS — F43.21 ADJUSTMENT DISORDER WITH DEPRESSED MOOD: ICD-10-CM

## 2022-12-08 DIAGNOSIS — J44.9 CHRONIC OBSTRUCTIVE PULMONARY DISEASE, UNSPECIFIED COPD TYPE (H): Primary | ICD-10-CM

## 2022-12-08 DIAGNOSIS — R53.83 OTHER FATIGUE: ICD-10-CM

## 2022-12-08 DIAGNOSIS — F33.42 MAJOR DEPRESSIVE DISORDER, RECURRENT EPISODE, IN FULL REMISSION (H): ICD-10-CM

## 2022-12-08 DIAGNOSIS — M89.8X1 PAIN OF LEFT SCAPULA: ICD-10-CM

## 2022-12-08 DIAGNOSIS — F02.80 EARLY ONSET ALZHEIMER'S DEMENTIA WITHOUT BEHAVIORAL DISTURBANCE (H): ICD-10-CM

## 2022-12-08 PROCEDURE — 99214 OFFICE O/P EST MOD 30 MIN: CPT | Mod: 25 | Performed by: PHYSICIAN ASSISTANT

## 2022-12-08 PROCEDURE — 90662 IIV NO PRSV INCREASED AG IM: CPT | Performed by: PHYSICIAN ASSISTANT

## 2022-12-08 PROCEDURE — G0008 ADMIN INFLUENZA VIRUS VAC: HCPCS | Performed by: PHYSICIAN ASSISTANT

## 2022-12-08 RX ORDER — DONEPEZIL HYDROCHLORIDE 10 MG/1
10 TABLET, FILM COATED ORAL AT BEDTIME
Qty: 90 TABLET | Refills: 1 | Status: SHIPPED | OUTPATIENT
Start: 2022-12-08 | End: 2023-06-13

## 2022-12-08 RX ORDER — DULOXETIN HYDROCHLORIDE 20 MG/1
20 CAPSULE, DELAYED RELEASE ORAL 2 TIMES DAILY
Qty: 180 CAPSULE | Refills: 1 | Status: SHIPPED | OUTPATIENT
Start: 2022-12-08 | End: 2023-06-13

## 2022-12-08 RX ORDER — ACETAMINOPHEN 500 MG
1000 TABLET ORAL 3 TIMES DAILY PRN
Start: 2022-12-08

## 2022-12-08 RX ORDER — ALBUTEROL SULFATE 90 UG/1
1-2 AEROSOL, METERED RESPIRATORY (INHALATION) EVERY 4 HOURS PRN
Qty: 8.5 G | Refills: 1 | Status: SHIPPED | OUTPATIENT
Start: 2022-12-08 | End: 2023-06-13

## 2022-12-08 NOTE — RESULT ENCOUNTER NOTE
Dear Gayla,    Here is a summary of your recent test results:    COVID is negative.   Urine is good no infection.     For additional lab test information, labtestsonline.org is an excellent reference.    In addition, here is a list of due or overdue Health Maintenance reminders:    COPD Action Plan Never done  Zoster (Shingles) Vaccine(3 of 3) due on 02/21/2020  COVID-19 Vaccine(5 - Booster for Pfizer series) due on 05/31/2022    Please call us at [phone] (or use Spotjournal) to address the above recommendations if needed.    Thank you for choosing [facility].  It was an honor and a privilege to participate in your care.       Healthy regards,    Nita Arguelles, FILIBERTO  [facility]

## 2022-12-08 NOTE — PROGRESS NOTES
Assessment & Plan     Chronic obstructive pulmonary disease, unspecified COPD type (H)   Stable.  Improving.  Encouraged every 4 hours DuoNeb to help with secretions.  Mucinex twice daily also encouraged.  If symptoms seem to be worsening or not improving return to clinic.  - albuterol (PROAIR HFA) 108 (90 Base) MCG/ACT inhaler  Dispense: 8.5 g; Refill: 1    Major depressive disorder, recurrent episode, in full remission (H)  Adjustment disorder with depressed mood  Other fatigue  Stable.  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  Doing much better since she moved in with her daughter.  We will watch weight as she has lost some weight since changing to this living arrangement.  Continue current regimen.  Follow-up with geriatrics as scheduled.  - donepezil (ARICEPT) 10 MG tablet  Dispense: 90 tablet; Refill: 1    Pain of left scapula  Continue acetaminophen use as needed.  Gentle stretching.  Return to [facility] orthopedics if symptoms no longer responding to these conservative measures.  - acetaminophen (TYLENOL) 500 MG tablet        Return in about 6 months (around 6/8/2023) for Physical Exam, Fasting labs, Medication recheck.    Leann Gordon PA-C  [facility]    Clifford Shukla is a 87 year old accompanied by her daughter, presenting for the following health issues:  Recheck Medication      HPI     Left shoulder pain/ neck pain  Since last may- patient has had a steroid injection done at [facility] in October 2021 that was helpful.  Gabapentin did help with the pain but did not help her memory.  Does take Tylenol occasionally which is helpful.  MRI of cervical spine completed on 10/18/21.   IMPRESSION:  1. Severely motion-degraded examination. Repeat examination recommended.  2. No convincing cord signal abnormalities.  3. Moderate C5-C6 canal  stenosis.  4. Suboptimal evaluation of the neural foramina.    Depression and Anxiety Follow-Up  Duloxetine 20 mg twice daily.  Trazodone very rarely.      Hypertension Follow-up  Amlodipine 10 mg daily    Do you check your blood pressure regularly outside of the clinic? No     Are you following a low salt diet? Yes    Are your blood pressures ever more than 140 on the top number (systolic) OR more   than 90 on the bottom number (diastolic), for example 140/90? No    COPD Follow-Up  Recent viral illness.  Was seen by my partner on 12/5/2022 for shortness of breath/cough/COPD exacerbation.  COVID swab was negative.  Chest x-ray was normal.  Nebulizer treatment with DuoNeb recommended.  They just picked up this medication last night and have not utilized it yet.  Her cough is improving.  Continues using Stiolto every day.  Occasional use of albuterol.    Also has been take mucinex 600 mg BID.     Overall, how are your COPD symptoms since your last clinic visit?  No change    How much fatigue or shortness of breath do you have when you are walking?  More than usual    How much shortness of breath do you have when you are resting?  None    How often do you cough? Often    Have you noticed any change in your sputum/phlegm?  No    Have you experienced a recent fever? No    Please describe how far you can walk without stopping to rest:  The length of 1-2 rooms    How many flights of stairs are you able to walk up without stopping?  None    Have you had any Emergency Room Visits, Urgent Care Visits, or Hospital Admissions because of your COPD since your last office visit?  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  Follows with Dr. Loretta Abernathy with geriatrics.  Next appointment in January.  Moved out of assisted living as  "this was not a good fit for her and she was alone frequently.  Moved in with her daughter who was  earlier this year.  This is working well for them.  She finds her self not snacking as much so she has lost some weight.  This has stabilized and they are trying to be more intentional about meal planning.    Wt Readings from Last 5 Encounters:   12/08/22 55.8 kg (123 lb 1.6 oz)   12/05/22 55.3 kg (122 lb)   05/12/22 58.2 kg (128 lb 6.4 oz)   04/05/22 58 kg (127 lb 12.8 oz)   01/25/22 62.6 kg (138 lb)       Review of Systems   Constitutional, HEENT, cardiovascular, pulmonary, GI, , musculoskeletal, neuro, skin, endocrine and psych systems are negative, except as otherwise noted.      Objective    /62   Pulse 90   Temp 96.9  F (36.1  C) (Tympanic)   Resp 16   Ht 1.575 m (5' 2\")   Wt 55.8 kg (123 lb 1.6 oz)   SpO2 94%   BMI 22.52 kg/m    Body mass index is 22.52 kg/m .  Physical Exam   GENERAL: healthy, alert and no distress  EYES: Eyes grossly normal to inspection  RESP: lungs clear to auscultation - no rales, rhonchi or wheezes  CV: regular rate and rhythm, normal S1 S2, no S3 or S4, no murmur, click or rub, no peripheral edema and peripheral pulses strong  MS: Some tenderness to left periscapular musculature  SKIN: no suspicious lesions or rashes  NEURO: Normal strength and tone, mentation intact and speech normal  PSYCH: mentation appears normal, affect normal/bright                    "

## 2022-12-08 NOTE — PATIENT INSTRUCTIONS
Return to [facility] for injection (last injection Oct 2021) if shoulder right shoulderblade/neck region when pain worsens/no longer responds to tylenol

## 2023-02-02 ENCOUNTER — OFFICE VISIT (OUTPATIENT)
Dept: FAMILY MEDICINE | Facility: CLINIC | Age: 88
End: 2023-02-02
Payer: COMMERCIAL

## 2023-02-02 VITALS
HEART RATE: 68 BPM | RESPIRATION RATE: 16 BRPM | HEIGHT: 62 IN | OXYGEN SATURATION: 94 % | WEIGHT: 122.7 LBS | SYSTOLIC BLOOD PRESSURE: 136 MMHG | TEMPERATURE: 97.8 F | BODY MASS INDEX: 22.58 KG/M2 | DIASTOLIC BLOOD PRESSURE: 69 MMHG

## 2023-02-02 DIAGNOSIS — F41.9 ANXIETY: Primary | ICD-10-CM

## 2023-02-02 DIAGNOSIS — F02.80 ALZHEIMER'S DISEASE (H): ICD-10-CM

## 2023-02-02 DIAGNOSIS — G30.9 ALZHEIMER'S DISEASE (H): ICD-10-CM

## 2023-02-02 PROCEDURE — 99215 OFFICE O/P EST HI 40 MIN: CPT | Performed by: INTERNAL MEDICINE

## 2023-02-02 RX ORDER — ESCITALOPRAM OXALATE 5 MG/1
5 TABLET ORAL EVERY EVENING
Qty: 30 TABLET | Refills: 3 | Status: SHIPPED | OUTPATIENT
Start: 2023-02-02 | End: 2023-05-30

## 2023-02-02 ASSESSMENT — PAIN SCALES - GENERAL: PAINLEVEL: NO PAIN (0)

## 2023-02-02 NOTE — PROGRESS NOTES
"MEMORY EVALUATION CLINIC - FOLLOWUP    INTERVAL HISTORY: Gayla is here today accompanied by her daughter Chasidy in follow up of dementia. Last seen in memory clinic May 2022 and had at that time somewhat recently moved-in with her daughter Chasidy and son Jose G. We were able to wean off of Gabapentin and Abilify at that time as Abilify never really was that helpful in retrospect. Does have some depression/anxiety and chronic shoulder pain and remains on Cymbalta and also Donepezil for dementia. Has upcoming injection in her shoulder which will likely be helpful as its been in the past.     Its continued to be a good overall situation of Gayla living with her kids Chasidy and Jose G; son Pankaj lives nearby too and he and his wife Laila are also very involved. Gayla enjoys reading, spending time with the dogs/cats, light activities about the house and crosswords. Is sleeping a bit more but may be the winter weather. They have found keeping things simple is helpful (ex: less items to pick from in the shower to make it less overwhelming). Also Gayla briefly went to stay with Pankaj and Laila when Chasidy was out of town and that was overwhelming; if needed in the future they'll plan on having a caregiver come into the home to stay with Gayla rather than having her leave.    Nighttimes at times can be anxiety filled though sounds to be able to be redirected most of the time. Sometimes needs reminders that home is indeed home. No concerns of wandering. Also though has some daytime anxiety notable with finger fidgeting. Willing to try Lexapro as hasn't been on that before (years ago Celexa). Also helpful to have decaf coffee.        OBJECTIVE:  /69 (BP Location: Right arm, Patient Position: Sitting, Cuff Size: Adult Regular)   Pulse 68   Temp 97.8  F (36.6  C) (Tympanic)   Resp 16   Ht 1.575 m (5' 2\")   Wt 55.7 kg (122 lb 11.2 oz)   SpO2 94%   BMI 22.44 kg/m    Alert, nicely dressed, healthy appearing  Cheerful " "disposition, giggles and jokes with her daughter  Somewhat vague historian though and looks to her daughter for details  Picks at fingernails intermittently throughout visit  Walks independently    INVESTIGATIONS: Normal TSH and B12 on file, CT Head Nov 2021 noting \"mild diffuse parenchymal volume loss and white matter changes likely due to chronic microvascular ischemic disease.\"  MRI Brain Nov 2021: Small chronic lacunar infarcts in each basal ganglia. Age-related and chronic ischemic changes.     PRIOR TESTING:  Neuropsych testing Feb 2018: Dx major neurocognitive disorder (dementia) which is most likely of the Alzheimer's subtype though a vascular component also is a potential contributing factor  Abnormal MiniCog at April 2022 Wellness Visit      COGNITIVE MEDICATIONS: Donepezil (since early 2018)        ASSESSMENT/PLAN: Gifty \"Gayla\" Daisy is a pleasant 87yoF with previously diagnosed dementia in 2018, likely of the Alzheimer's subtype but also some potential vascular component as well along with comorbid anxiety. Benefiting greatly from the 24-7 support from her adult children and fully dependent on IADLs though mostly independent in ADLs at this time. They are in favor of continuing Donepezil at this time and adding Lexapro today for some anxiety that persists despite the great interventions/support in the home. Consider PRN Melatonin or Gabapentin at night as well to see how she responds to this. Follow up later on this springtime on how the Lexapro initiation has been.       Patient Instructions   Start Lexapro 5 mg every evening for anxiety; let me know if not helpful and/or side effect concerns    If extra anxiety in the evening, could consider an \"as needed\" dose of Gabapentin or Melatonin 3-5 mg    Keep cognitively stimulated with crosswords, socialization, reading and physical activity as able    Follow up this springtime        44 minutes spent on the date of the encounter doing chart review, " history and exam, counseling, documentation and further activities as noted above      Loretta Abernathy [facility]

## 2023-02-02 NOTE — PATIENT INSTRUCTIONS
"Start Lexapro 5 mg every evening for anxiety; let me know if not helpful and/or side effect concerns    If extra anxiety in the evening, could consider an \"as needed\" dose of Gabapentin or Melatonin 3-5 mg    Keep cognitively stimulated with crosswords, socialization, reading and physical activity as able    Follow up this springtime  "

## 2023-05-28 DIAGNOSIS — F41.9 ANXIETY: ICD-10-CM

## 2023-05-30 RX ORDER — ESCITALOPRAM OXALATE 5 MG/1
TABLET ORAL
Qty: 90 TABLET | Refills: 1 | Status: SHIPPED | OUTPATIENT
Start: 2023-05-30 | End: 2023-11-27

## 2023-05-30 NOTE — TELEPHONE ENCOUNTER
Started by memory specialist, Dr. Abernathy - defer refills to her.      Leann Gordon MBA, MS, PA-C  [facility] [place]

## 2023-06-02 ENCOUNTER — HEALTH MAINTENANCE LETTER (OUTPATIENT)
Age: 88
End: 2023-06-02

## 2023-06-09 NOTE — ED NOTES
Bed: ED14  Expected date:   Expected time:   Means of arrival:   Comments:  [place]1 86F back pain eta 2011   Jessica, Lucero Pa, Pham Villagran, Christel Crooks, Selene Roman, Stef Damico, Walter Aburto, Rochelle Heredia, Roberto Oh, Angela

## 2023-06-13 ENCOUNTER — OFFICE VISIT (OUTPATIENT)
Dept: FAMILY MEDICINE | Facility: CLINIC | Age: 88
End: 2023-06-13
Payer: COMMERCIAL

## 2023-06-13 VITALS
OXYGEN SATURATION: 93 % | RESPIRATION RATE: 16 BRPM | DIASTOLIC BLOOD PRESSURE: 68 MMHG | SYSTOLIC BLOOD PRESSURE: 132 MMHG | BODY MASS INDEX: 22.19 KG/M2 | WEIGHT: 120.6 LBS | TEMPERATURE: 96.8 F | HEART RATE: 78 BPM | HEIGHT: 62 IN

## 2023-06-13 DIAGNOSIS — Z13.0 SCREENING FOR DEFICIENCY ANEMIA: ICD-10-CM

## 2023-06-13 DIAGNOSIS — F33.42 MAJOR DEPRESSIVE DISORDER, RECURRENT EPISODE, IN FULL REMISSION (H): ICD-10-CM

## 2023-06-13 DIAGNOSIS — R79.0 LOW FERRITIN: ICD-10-CM

## 2023-06-13 DIAGNOSIS — I10 ESSENTIAL HYPERTENSION: ICD-10-CM

## 2023-06-13 DIAGNOSIS — G30.0 EARLY ONSET ALZHEIMER'S DEMENTIA WITHOUT BEHAVIORAL DISTURBANCE (H): ICD-10-CM

## 2023-06-13 DIAGNOSIS — R73.09 ELEVATED GLUCOSE: ICD-10-CM

## 2023-06-13 DIAGNOSIS — J44.9 CHRONIC OBSTRUCTIVE PULMONARY DISEASE, UNSPECIFIED COPD TYPE (H): ICD-10-CM

## 2023-06-13 DIAGNOSIS — F02.80 EARLY ONSET ALZHEIMER'S DEMENTIA WITHOUT BEHAVIORAL DISTURBANCE (H): ICD-10-CM

## 2023-06-13 DIAGNOSIS — Z00.00 ENCOUNTER FOR MEDICARE ANNUAL WELLNESS EXAM: Primary | ICD-10-CM

## 2023-06-13 DIAGNOSIS — K27.9 PEPTIC ULCER: ICD-10-CM

## 2023-06-13 DIAGNOSIS — H93.13 TINNITUS OF BOTH EARS: ICD-10-CM

## 2023-06-13 DIAGNOSIS — F41.9 ANXIETY: ICD-10-CM

## 2023-06-13 DIAGNOSIS — E78.5 HYPERLIPIDEMIA LDL GOAL <130: ICD-10-CM

## 2023-06-13 DIAGNOSIS — R79.89 LOW VITAMIN B12 LEVEL: ICD-10-CM

## 2023-06-13 DIAGNOSIS — G25.81 RESTLESS LEGS SYNDROME (RLS): ICD-10-CM

## 2023-06-13 PROBLEM — R53.83 OTHER FATIGUE: Status: RESOLVED | Noted: 2019-05-29 | Resolved: 2023-06-13

## 2023-06-13 PROBLEM — D58.2 ELEVATED HEMOGLOBIN (H): Status: RESOLVED | Noted: 2022-01-25 | Resolved: 2023-06-13

## 2023-06-13 PROBLEM — F43.21 ADJUSTMENT DISORDER WITH DEPRESSED MOOD: Status: RESOLVED | Noted: 2019-05-29 | Resolved: 2023-06-13

## 2023-06-13 PROBLEM — R39.15 URINARY URGENCY: Status: RESOLVED | Noted: 2019-12-11 | Resolved: 2023-06-13

## 2023-06-13 LAB
ALBUMIN SERPL BCG-MCNC: 4.6 G/DL (ref 3.5–5.2)
ALP SERPL-CCNC: 73 U/L (ref 35–104)
ALT SERPL W P-5'-P-CCNC: 21 U/L (ref 0–50)
ANION GAP SERPL CALCULATED.3IONS-SCNC: 12 MMOL/L (ref 7–15)
AST SERPL W P-5'-P-CCNC: 25 U/L (ref 0–45)
BILIRUB SERPL-MCNC: 0.3 MG/DL
BUN SERPL-MCNC: 14.5 MG/DL (ref 8–23)
CALCIUM SERPL-MCNC: 9.9 MG/DL (ref 8.8–10.2)
CHLORIDE SERPL-SCNC: 101 MMOL/L (ref 98–107)
CHOLEST SERPL-MCNC: 219 MG/DL
CREAT SERPL-MCNC: 0.78 MG/DL (ref 0.51–0.95)
CREAT UR-MCNC: 127 MG/DL
DEPRECATED HCO3 PLAS-SCNC: 26 MMOL/L (ref 22–29)
ERYTHROCYTE [DISTWIDTH] IN BLOOD BY AUTOMATED COUNT: 13.1 % (ref 10–15)
FERRITIN SERPL-MCNC: 246 NG/ML (ref 11–328)
GFR SERPL CREATININE-BSD FRML MDRD: 73 ML/MIN/1.73M2
GLUCOSE SERPL-MCNC: 98 MG/DL (ref 70–99)
HCT VFR BLD AUTO: 43.9 % (ref 35–47)
HDLC SERPL-MCNC: 61 MG/DL
HGB BLD-MCNC: 14.6 G/DL (ref 11.7–15.7)
LDLC SERPL CALC-MCNC: 118 MG/DL
MCH RBC QN AUTO: 31.3 PG (ref 26.5–33)
MCHC RBC AUTO-ENTMCNC: 33.3 G/DL (ref 31.5–36.5)
MCV RBC AUTO: 94 FL (ref 78–100)
MICROALBUMIN UR-MCNC: <12 MG/L
MICROALBUMIN/CREAT UR: NORMAL MG/G{CREAT}
NONHDLC SERPL-MCNC: 158 MG/DL
PLATELET # BLD AUTO: 238 10E3/UL (ref 150–450)
POTASSIUM SERPL-SCNC: 4.9 MMOL/L (ref 3.4–5.3)
PROT SERPL-MCNC: 7.4 G/DL (ref 6.4–8.3)
RBC # BLD AUTO: 4.67 10E6/UL (ref 3.8–5.2)
SODIUM SERPL-SCNC: 139 MMOL/L (ref 136–145)
TRIGL SERPL-MCNC: 200 MG/DL
VIT B12 SERPL-MCNC: 2307 PG/ML (ref 232–1245)
WBC # BLD AUTO: 8.4 10E3/UL (ref 4–11)

## 2023-06-13 PROCEDURE — 82728 ASSAY OF FERRITIN: CPT | Performed by: PHYSICIAN ASSISTANT

## 2023-06-13 PROCEDURE — 82043 UR ALBUMIN QUANTITATIVE: CPT | Performed by: PHYSICIAN ASSISTANT

## 2023-06-13 PROCEDURE — 36415 COLL VENOUS BLD VENIPUNCTURE: CPT | Performed by: PHYSICIAN ASSISTANT

## 2023-06-13 PROCEDURE — 99214 OFFICE O/P EST MOD 30 MIN: CPT | Mod: 25 | Performed by: PHYSICIAN ASSISTANT

## 2023-06-13 PROCEDURE — 82607 VITAMIN B-12: CPT | Performed by: PHYSICIAN ASSISTANT

## 2023-06-13 PROCEDURE — 85027 COMPLETE CBC AUTOMATED: CPT | Performed by: PHYSICIAN ASSISTANT

## 2023-06-13 PROCEDURE — G0439 PPPS, SUBSEQ VISIT: HCPCS | Performed by: PHYSICIAN ASSISTANT

## 2023-06-13 PROCEDURE — 82570 ASSAY OF URINE CREATININE: CPT | Performed by: PHYSICIAN ASSISTANT

## 2023-06-13 PROCEDURE — 80061 LIPID PANEL: CPT | Performed by: PHYSICIAN ASSISTANT

## 2023-06-13 PROCEDURE — 80053 COMPREHEN METABOLIC PANEL: CPT | Performed by: PHYSICIAN ASSISTANT

## 2023-06-13 PROCEDURE — 83036 HEMOGLOBIN GLYCOSYLATED A1C: CPT | Performed by: PHYSICIAN ASSISTANT

## 2023-06-13 RX ORDER — OMEPRAZOLE 40 MG/1
CAPSULE, DELAYED RELEASE ORAL
Qty: 90 CAPSULE | Refills: 1
Start: 2023-06-13 | End: 2023-06-13

## 2023-06-13 RX ORDER — DULOXETIN HYDROCHLORIDE 20 MG/1
20 CAPSULE, DELAYED RELEASE ORAL 2 TIMES DAILY
Qty: 180 CAPSULE | Refills: 3 | Status: SHIPPED | OUTPATIENT
Start: 2023-06-13 | End: 2024-07-03

## 2023-06-13 RX ORDER — LANOLIN ALCOHOL/MO/W.PET/CERES
1000 CREAM (GRAM) TOPICAL DAILY
Start: 2023-06-13 | End: 2023-06-14

## 2023-06-13 RX ORDER — TIOTROPIUM BROMIDE AND OLODATEROL 3.124; 2.736 UG/1; UG/1
SPRAY, METERED RESPIRATORY (INHALATION)
Qty: 4 G | Refills: 3 | Status: SHIPPED | OUTPATIENT
Start: 2023-06-13 | End: 2023-10-09

## 2023-06-13 RX ORDER — ALBUTEROL SULFATE 90 UG/1
1-2 AEROSOL, METERED RESPIRATORY (INHALATION) EVERY 4 HOURS PRN
Qty: 8.5 G | Refills: 11 | Status: SHIPPED | OUTPATIENT
Start: 2023-06-13 | End: 2024-07-03

## 2023-06-13 RX ORDER — BACILLUS COAGULANS 1B CELL
1 CAPSULE ORAL 2 TIMES DAILY WITH MEALS
Qty: 100 TABLET | Refills: 0
Start: 2023-06-13

## 2023-06-13 RX ORDER — AMLODIPINE BESYLATE 10 MG/1
10 TABLET ORAL DAILY
Qty: 90 TABLET | Refills: 3 | Status: SHIPPED | OUTPATIENT
Start: 2023-06-13 | End: 2024-07-19

## 2023-06-13 RX ORDER — DONEPEZIL HYDROCHLORIDE 10 MG/1
10 TABLET, FILM COATED ORAL AT BEDTIME
Qty: 90 TABLET | Refills: 3 | Status: SHIPPED | OUTPATIENT
Start: 2023-06-13 | End: 2023-06-15

## 2023-06-13 ASSESSMENT — ENCOUNTER SYMPTOMS
WEAKNESS: 0
HEMATURIA: 0
JOINT SWELLING: 0
NAUSEA: 0
FREQUENCY: 0
COUGH: 0
DIARRHEA: 1
MYALGIAS: 0
NERVOUS/ANXIOUS: 0
PALPITATIONS: 0
SORE THROAT: 0
HEMATOCHEZIA: 0
DYSURIA: 0
CHILLS: 0
HEARTBURN: 0
BREAST MASS: 0
SHORTNESS OF BREATH: 1
DIZZINESS: 0
CONSTIPATION: 0
PARESTHESIAS: 0
FEVER: 0
HEADACHES: 0
ABDOMINAL PAIN: 0
ARTHRALGIAS: 0
EYE PAIN: 0

## 2023-06-13 ASSESSMENT — ANXIETY QUESTIONNAIRES
GAD7 TOTAL SCORE: 1
IF YOU CHECKED OFF ANY PROBLEMS ON THIS QUESTIONNAIRE, HOW DIFFICULT HAVE THESE PROBLEMS MADE IT FOR YOU TO DO YOUR WORK, TAKE CARE OF THINGS AT HOME, OR GET ALONG WITH OTHER PEOPLE: NOT DIFFICULT AT ALL
GAD7 TOTAL SCORE: 1
6. BECOMING EASILY ANNOYED OR IRRITABLE: NOT AT ALL
7. FEELING AFRAID AS IF SOMETHING AWFUL MIGHT HAPPEN: NOT AT ALL
8. IF YOU CHECKED OFF ANY PROBLEMS, HOW DIFFICULT HAVE THESE MADE IT FOR YOU TO DO YOUR WORK, TAKE CARE OF THINGS AT HOME, OR GET ALONG WITH OTHER PEOPLE?: NOT DIFFICULT AT ALL
5. BEING SO RESTLESS THAT IT IS HARD TO SIT STILL: NOT AT ALL
GAD7 TOTAL SCORE: 1
1. FEELING NERVOUS, ANXIOUS, OR ON EDGE: NOT AT ALL
2. NOT BEING ABLE TO STOP OR CONTROL WORRYING: NOT AT ALL
3. WORRYING TOO MUCH ABOUT DIFFERENT THINGS: NOT AT ALL
4. TROUBLE RELAXING: SEVERAL DAYS
7. FEELING AFRAID AS IF SOMETHING AWFUL MIGHT HAPPEN: NOT AT ALL

## 2023-06-13 ASSESSMENT — ACTIVITIES OF DAILY LIVING (ADL)
CURRENT_FUNCTION: PREPARING MEALS REQUIRES ASSISTANCE
CURRENT_FUNCTION: SHOPPING REQUIRES ASSISTANCE
CURRENT_FUNCTION: TRANSPORTATION REQUIRES ASSISTANCE
CURRENT_FUNCTION: MONEY MANAGEMENT REQUIRES ASSISTANCE
CURRENT_FUNCTION: MEDICATION ADMINISTRATION REQUIRES ASSISTANCE
CURRENT_FUNCTION: HOUSEWORK REQUIRES ASSISTANCE

## 2023-06-13 ASSESSMENT — PATIENT HEALTH QUESTIONNAIRE - PHQ9
10. IF YOU CHECKED OFF ANY PROBLEMS, HOW DIFFICULT HAVE THESE PROBLEMS MADE IT FOR YOU TO DO YOUR WORK, TAKE CARE OF THINGS AT HOME, OR GET ALONG WITH OTHER PEOPLE: NOT DIFFICULT AT ALL
SUM OF ALL RESPONSES TO PHQ QUESTIONS 1-9: 3
SUM OF ALL RESPONSES TO PHQ QUESTIONS 1-9: 3

## 2023-06-13 NOTE — PATIENT INSTRUCTIONS
Patient Education   Personalized Prevention Plan  You are due for the preventive services outlined below.  Your care team is available to assist you in scheduling these services.  If you have already completed any of these items, please share that information with your care team to update in your medical record.  Health Maintenance Due   Topic Date Due     COPD Action Plan  Never done     Zoster (Shingles) Vaccine (3 of 3) 02/21/2020     COVID-19 Vaccine (5 - Pfizer series) 05/31/2022     ANNUAL REVIEW OF HM ORDERS  01/25/2023     Comprehensive Metabolic Panel  04/05/2023     Ferritin Lab  04/05/2023     Cholesterol Lab  04/05/2023     Kidney Microalbumin Urine Test  04/05/2023     Complete Blood Count  04/05/2023     Vitamin B12 Level  04/05/2023       Understanding USDA MyPlate  The USDA has guidelines to help you make healthy food choices. These are called MyPlate. MyPlate shows the food groups that make up healthy meals using the image of a place setting. Before you eat, think about the healthiest choices for what to put on your plate or in your cup or bowl. To learn more about building a healthy plate, visit www.choosemyplate.gov.     The food groups    Fruits. Any fruit or 100% fruit juice counts as part of the Fruit Group. Fruits may be fresh, canned, frozen, or dried, and may be whole, cut-up, or pureed. Make 1/2 of your plate fruits and vegetables.    Vegetables. Any vegetable or 100% vegetable juice counts as a member of the Vegetable Group. Vegetables may be fresh, frozen, canned, or dried. They can be served raw or cooked and may be whole, cut-up, or mashed. Make 1/2 of your plate fruits and vegetables.    Grains. All foods made from grains are part of the Grains Group. These include wheat, rice, oats, cornmeal, and barley. Grains are often used to make foods such as bread, pasta, oatmeal, cereal, tortillas, and grits. Grains should be no more than 1/4 of your plate. At least half of your grains should  be whole grains.    Protein. This group includes meat, poultry, seafood, beans and peas, eggs, processed soy products (such as tofu), nuts (including nut butters), and seeds. Make protein choices no more than 1/4 of your plate. Meat and poultry choices should be lean or low fat.    Dairy. The Dairy Group includes all fluid milk products and foods made from milk that contain calcium, such as yogurt and cheese. (Foods that have little calcium, such as cream, butter, and cream cheese, are not part of this group.) Most dairy choices should be low-fat or fat-free.    Oils. Oils aren't a food group, but they do contain essential nutrients. However it's important to watch your intake of oils. These are fats that are liquid at room temperature. They include canola, corn, olive, soybean, vegetable, and sunflower oil. Foods that are mainly oil include mayonnaise, certain salad dressings, and soft margarines. You likely already get your daily oil allowance from the foods you eat.  Things to limit  Eating healthy also means limiting these things in your diet:    Salt (sodium). Many processed foods have a lot of sodium. To keep sodium intake down, eat fresh vegetables, meats, poultry, and seafood when possible. Purchase low-sodium, reduced-sodium, or no-salt-added food products at the store. And don't add salt to your meals at home. Instead, season them with herbs and spices such as dill, oregano, cumin, and paprika. Or try adding flavor with lemon or lime zest and juice.    Saturated fat. Saturated fats are most often found in animal products such as beef, pork, and chicken. They are often solid at room temperature, such as butter. To reduce your saturated fat intake, choose leaner cuts of meat and poultry. And try healthier cooking methods such as grilling, broiling, roasting, or baking. For a simple lower-fat swap, use plain nonfat yogurt instead of mayonnaise when making potato salad or macaroni salad.    Added sugars. These  are sugars added to foods. They are in foods such as ice cream, candy, soda, fruit drinks, sports drinks, energy drinks, cookies, pastries, jams, and syrups. Cut down on added sugars by sharing sweet treats with a family member or friend. You can also choose fruit for dessert, and drink water or other unsweetened beverages.  [employer] last reviewed this educational content on 6/1/2020 2000-2022 [employer]. All rights reserved. This information is not intended as a substitute for professional medical care. Always follow your healthcare professional's instructions.        Activities of Daily Living    Your Health Risk Assessment indicates you have difficulties with activities of daily living such as housework, bathing, preparing meals, taking medication, etc. Please make a follow up appointment for us to address this issue in more detail.    Urinary Incontinence, Female (Adult)   Urinary incontinence means loss of bladder control. This problem affects many women, especially as they get older. If you have incontinence, you may be embarrassed to ask for help. But know that this problem can be treated.   Types of Incontinence  There are different types of incontinence. Two of the main types are described here. You can have more than one type.     Stress incontinence. With this type, urine leaks when pressure (stress) is put on the bladder. This may happen when you cough, sneeze, or laugh. Stress incontinence most often occurs because the pelvic floor muscles that support the bladder and urethra are weak. This can happen after pregnancy and vaginal childbirth or a hysterectomy. It can also be due to excess body weight or hormone changes.    Urge incontinence (also called overactive bladder). With this type, a sudden urge to urinate is felt often. This may happen even though there may not be much urine in the bladder. The need to urinate often during the night is common. Urge incontinence most often occurs  because of bladder spasms. This may be due to bladder irritation or infection. Damage to bladder nerves or pelvic muscles, constipation, and certain medicines can also lead to urge incontinence.  Treatment depends on the cause. Further evaluation is needed to find the type you have. This will likely include an exam and certain tests. Based on the results, you and your healthcare provider can then plan treatment. Until a diagnosis is made, the home care tips below can help ease symptoms.   Home care    Do pelvic floor muscle exercises, if they are prescribed. The pelvic floor muscles help support the bladder and urethra. Many women find that their symptoms improve when doing special exercises that strengthen these muscles. To do the exercises, contract the muscles you would use to stop your stream of urine. But do this when you re not urinating. Hold for 10 seconds, then relax. Repeat 10 to 20 times in a row, at least 3 times a day. Your healthcare provider may give you other instructions for how to do the exercises and how often.    Keep a bladder diary. This helps track how often and how much you urinate over a set period of time. Bring this diary with you to your next visit with the provider. The information can help your provider learn more about your bladder problem.    Lose weight, if advised to by your provider. Extra weight puts pressure on the bladder. Your provider can help you create a weight-loss plan that s right for you. This may include exercising more and making certain diet changes.    Don't have foods and drinks that may irritate the bladder. These can include alcohol and caffeinated drinks.    Quit smoking. Smoking and other tobacco use can lead to a long-term (chronic) cough that strains the pelvic floor muscles. Smoking may also damage the bladder and urethra. Talk with your provider about treatments or methods you can use to quit smoking.    If drinking large amounts of fluid makes you have  symptoms, you may be advised to limit your fluid intake. You may also be advised to drink most of your fluids during the day and to limit fluids at night.    If you re worried about urine leakage or accidents, you may wear absorbent pads to catch urine. Change the pads often. This helps reduce discomfort. It may also reduce the risk of skin or bladder infections.    Follow-up care  Follow up with your healthcare provider, or as directed. It may take some to find the right treatment for your problem. But healthy lifestyle changes can be made right away. These include such things as exercising on a regular basis, eating a healthy diet, losing weight (if needed), and quitting smoking. Your treatment plan may include special therapies or medicines. Certain procedures or surgery may also be options. Talk about any questions you have with your provider.   When to seek medical advice  Call the healthcare provider right away if any of these occur:    Fever of 100.4 F (38 C) or higher, or as directed by your provider    Bladder pain or fullness    Belly swelling    Nausea or vomiting    Back pain    Weakness, dizziness, or fainting  Richard last reviewed this educational content on 1/1/2020 2000-2022 [employer]. All rights reserved. This information is not intended as a substitute for professional medical care. Always follow your healthcare professional's instructions.

## 2023-06-13 NOTE — PROGRESS NOTES
The patient was counseled and encouraged to consider modifying their diet and eating habits. She was provided with information on recommended healthy diet options.  The patient reports that she has difficulty with activities of daily living. I have asked that the patient make a follow up appointment in 365 weeks where this issue will be further evaluated and addressed.  Information on urinary incontinence and treatment options given to patient.

## 2023-06-13 NOTE — PROGRESS NOTES
"SUBJECTIVE:   Gayla is a 88 year old who presents for Preventive Visit -present with daughter Chasidy      6/13/2023    10:43 AM   Additional Questions   Roomed by Sahra Burk CMA   Accompanied by Daughter     Are you in the first 12 months of your Medicare coverage?  No    Healthy Habits:     In general, how would you rate your overall health?  Excellent    Frequency of exercise:  2-3 days/week    Duration of exercise:  45-60 minutes    Do you usually eat at least 4 servings of fruit and vegetables a day, include whole grains    & fiber and avoid regularly eating high fat or \"junk\" foods?  No    Taking medications regularly:  Yes    Medication side effects:  None    Ability to successfully perform activities of daily living:  Transportation requires assistance, shopping requires assistance, preparing meals requires assistance, housework requires assistance, medication administration requires assistance and money management requires assistance    Home Safety:  Lack of grab bars in the bathroom    Hearing Impairment:  No hearing concerns    In the past 6 months, have you been bothered by leaking of urine? Yes    In general, how would you rate your overall mental or emotional health?  Excellent      PHQ-2 Total Score: 0    Additional concerns today:  No        Have you ever done Advance Care Planning? (For example, a Health Directive, POLST, or a discussion with a medical provider or your loved ones about your wishes): Yes, advance care planning is on file.       Fall risk  Fallen 2 or more times in the past year?: No  Any fall with injury in the past year?: No    Cognitive Screening   1) Repeat 3 items (Leader, Season, Table)    2) Clock draw: ABNORMAL - Known diagnosis of Alzheimer's  3) 3 item recall: Recalls NO objects   Results: 0 items recalled: PROBABLE COGNITIVE IMPAIRMENT, **INFORM PROVIDER**    Mini-CogTM Copyright GIL Butler. Licensed by the author for use in [facility]; reprinted with " "permission (soelizabeth@[facility]). All rights reserved.      Do you have sleep apnea, excessive snoring or daytime drowsiness?: yes    Reviewed and updated as needed this visit by clinical staff   Tobacco  Allergies  Meds  Problems  Med Hx  Surg Hx  Fam Hx          Reviewed and updated as needed this visit by Provider   Tobacco  Allergies  Meds  Problems  Med Hx  Surg Hx  Fam Hx         Social History     Tobacco Use     Smoking status: Former     Types: Cigarettes     Quit date: 1988     Years since quittin.9     Smokeless tobacco: Never     Tobacco comments:     Quit in    Vaping Use     Vaping status: Never Used   Substance Use Topics     Alcohol use: No     Comment: none             2023    10:59 AM   Alcohol Use   Prescreen: >3 drinks/day or >7 drinks/week? No          View : No data to display.              Do you have a current opioid prescription? No  Do you use any other controlled substances or medications that are not prescribed by a provider? None      COPD  Has recovered fully from flare from viral illness 2022.  Continues using Stiolto every day.  Occasional use of albuterol.       Tinnitus:  Patient notes she has had ringing in both her ears for the past year. She denies it getting better or worse. She has tried removing her aspirin, but this did not improve. She also tried flonase w/o any improvement.  We had referred her to ear nose and throat in 2019 and she states that she did complete this appointment, however, we never received records.  Release of information signed today.  She recalls that they told her there was \"nothing she can do \".       Early onset Alzheimer's dementia without behavioral disturbance (H)  She had a formal neuropsychological consultation by Ramonita Finch, phD, LP, ABPP at [facility] in [place] on 2018 and 2018.  The comprehensive assessment concluded that Hermes has major neurocognitive " "disorder secondary to Alzheimer's disease and some insufficiencies that may reflect mild small vessel ischemic disease secondary to history of tobacco use.  Started on donepezil 3/13/2018 and her family has found this to be helful.  She had a consultation by Dr. Magana in the [facility] who corroborated her diagnosis.  He recommended continuing her medication, physical activity, social activity and mental activity to allow her to live her best life.   Continues to follow with Dr. Loretta Abernathy who is a geriatrician/memory clinic specialist.  Last office visit 2/2/2023 with next follow-up scheduled 6/15/2023.    Gayla is doing quite well since she moved into a home with her daughter Chasidy and son Jose G.  She was able to wean off of gabapentin and Abilify and now seems to have rare sundowning events.  Chasidy seems to think that heat exposure may be an exacerbating factor for her sundowning.  Overall, things are \"much better \".  Gayla laughs much more and overall seems happier.  Gayla enjoys reading, spending time with the dogs/cats, light activities about the house and crosswords. Is sleeping a bit more but may be the winter weather. They have found keeping things simple is helpful (ex: less items to pick from in the shower to make it less overwhelming)     Nighttimes at times can be anxiety filled though sounds to be able to be redirected most of the time. Sometimes needs reminders that home is indeed home. No concerns of wandering. Also though has some daytime anxiety notable with finger fidgeting.  Continues on duloxetine 20 mg twice daily.  At February 2023 visit with Dr. Abernathy Lexapro was added in an effort to improve overall anxiety and fidgetiness.  Chasidy and Gayla both feel that this has been helpful.    Next follow-up plan was to consider an \"as needed\" dose of Gabapentin or Melatonin 3-5 mg if having nighttime issues.      Urge incontinence  Held Vesicare February 2022 due to anticholinergic activity " and memory concerns.  Overall doing quite well.  Does wear a pad.     Weight loss  Stable.  Doing much better since she moved in with her daughter.  We will watch weight as she has lost some weight since changing to this living arrangement.  Continue current regimen.  Follow-up with geriatrics as scheduled.  Wt Readings from Last 10 Encounters:   23 54.7 kg (120 lb 9.6 oz)   23 55.7 kg (122 lb 11.2 oz)   22 55.8 kg (123 lb 1.6 oz)   22 55.3 kg (122 lb)   22 58.2 kg (128 lb 6.4 oz)   22 58 kg (127 lb 12.8 oz)   22 62.6 kg (138 lb)   21 56.7 kg (125 lb)   11/15/21 65.1 kg (143 lb 8.3 oz)   10/04/21 64.9 kg (143 lb)       Hypertension Follow-up  Amlodipine 10 mg daily    Do you check your blood pressure regularly outside of the clinic? No     Are you following a low salt diet? Yes    Are your blood pressures ever more than 140 on the top number (systolic) OR more   than 90 on the bottom number (diastolic), for example 140/90? No    Depression and Anxiety Follow-Up  Duloxetine 20 mg twice daily in addition of escitalopram 10 mg as mentioned above      How are you doing with your depression since your last visit? No change    How are you doing with your anxiety since your last visit?  No change    Are you having other symptoms that might be associated with depression or anxiety? No    Have you had a significant life event? No     Do you have any concerns with your use of alcohol or other drugs? No    Social History     Tobacco Use     Smoking status: Former     Types: Cigarettes     Quit date: 1988     Years since quittin.9     Smokeless tobacco: Never     Tobacco comments:     Quit in    Vaping Use     Vaping status: Never Used   Substance Use Topics     Alcohol use: No     Comment: none     Drug use: No         2022     8:53 AM 2022    12:52 PM 2023    10:51 AM   PHQ   PHQ-9 Total Score 0 2 3   Q9: Thoughts of better off dead/self-harm past 2  weeks Not at all Not at all Not at all         1/25/2022     1:45 PM 4/5/2022     8:53 AM 6/13/2023    10:52 AM   FERNY-7 SCORE   Total Score  0 (minimal anxiety) 1 (minimal anxiety)   Total Score 2 0 1         6/13/2023    10:51 AM   Last PHQ-9   1.  Little interest or pleasure in doing things 0   2.  Feeling down, depressed, or hopeless 0   3.  Trouble falling or staying asleep, or sleeping too much 0   4.  Feeling tired or having little energy 1   5.  Poor appetite or overeating 0   6.  Feeling bad about yourself 1   7.  Trouble concentrating 0   8.  Moving slowly or restless 1   Q9: Thoughts of better off dead/self-harm past 2 weeks 0   PHQ-9 Total Score 3         6/13/2023    10:52 AM   FERNY-7    1. Feeling nervous, anxious, or on edge 0   2. Not being able to stop or control worrying 0   3. Worrying too much about different things 0   4. Trouble relaxing 1   5. Being so restless that it is hard to sit still 0   6. Becoming easily annoyed or irritable 0   7. Feeling afraid, as if something awful might happen 0   FERNY-7 Total Score 1   If you checked any problems, how difficult have they made it for you to do your work, take care of things at home, or get along with other people? Not difficult at all       Suicide Assessment Five-step Evaluation and Treatment (SAFE-T)      Current providers sharing in care for this patient include:   Patient Care Team:  Leann Gordon PA-C as PCP - General (Physician Assistant)  Ketty Lopez APRN CNP as Assigned PCP  Loretta Abernathy DO as MD (Geriatric Medicine)    The following health maintenance items are reviewed in Epic and correct as of today:  Health Maintenance   Topic Date Due     A1C  Never done     COPD ACTION PLAN  Never done     ZOSTER IMMUNIZATION (3 of 3) 02/21/2020     COVID-19 Vaccine (5 - Pfizer series) 05/31/2022     CMP  04/05/2023     FERRITIN  04/05/2023     LIPID  04/05/2023     MICROALBUMIN  04/05/2023     VITAMIN B12  04/05/2023     PHQ-9   12/13/2023     MEDICARE ANNUAL WELLNESS VISIT  06/13/2024     ANNUAL REVIEW OF HM ORDERS  06/13/2024     CBC  06/13/2024     FALL RISK ASSESSMENT  06/15/2024     ADVANCE CARE PLANNING  06/13/2028     DTAP/TDAP/TD IMMUNIZATION (2 - Td or Tdap) 10/08/2028     SPIROMETRY  Completed     DEPRESSION ACTION PLAN  Completed     INFLUENZA VACCINE  Completed     Pneumococcal Vaccine: 65+ Years  Completed     IPV IMMUNIZATION  Aged Out     MENINGITIS IMMUNIZATION  Aged Out     BP Readings from Last 3 Encounters:   06/13/23 132/68   02/02/23 136/69   12/08/22 136/62    Wt Readings from Last 3 Encounters:   06/13/23 54.7 kg (120 lb 9.6 oz)   02/02/23 55.7 kg (122 lb 11.2 oz)   12/08/22 55.8 kg (123 lb 1.6 oz)                  Patient Active Problem List   Diagnosis     Essential hypertension     Aftercare following joint replacement     HYPERLIPIDEMIA LDL GOAL <130     Advanced directives, counseling/discussion     Anxiety     Major depressive disorder, recurrent episode, in full remission (H)     Chronic obstructive pulmonary disease, unspecified COPD type (H) - followed by Dr. Alida PICHARDO Lung     Hypervitaminosis B6     Early onset Alzheimer's dementia without behavioral disturbance (H)     Peptic ulcer     Low ferritin     Low vitamin B12 level     Tinnitus of both ears     Elevated glucose     Restless legs syndrome (RLS)     Past Surgical History:   Procedure Laterality Date     ARTHROPLASTY PATELLO-FEMORAL (KNEE)  8/2010    Left TKA     CARDIAC CATH - HIM SCAN  2011    normal.  Done d/t abnormal stress test (false positive) - patient noted to have hypertensive heart disease     COLONOSCOPY  ~2003    No polyps     COLONOSCOPY N/A 10/10/2014    Procedure: COLONOSCOPY;  Surgeon: Chavez Wadr MD;  Location:  GI     ESOPHAGOSCOPY, GASTROSCOPY, DUODENOSCOPY (EGD), COMBINED  6/1/2016    Dr. Starla PARKS     ESOPHAGOSCOPY, GASTROSCOPY, DUODENOSCOPY (EGD), COMBINED N/A 6/1/2016    Procedure: COMBINED ESOPHAGOSCOPY, GASTROSCOPY,  DUODENOSCOPY (EGD), BIOPSY SINGLE OR MULTIPLE;  Surgeon: Jada Cha MD;  Location: [facility] GI     EXCISE GANGLION WRIST Left 2015    Procedure: EXCISE GANGLION WRIST;  Surgeon: Justin Lyons MD;  Location: [facility] OR     HYSTERECTOMY, PAP NO LONGER INDICATED       wisdom teeth       ZZC SPINE FUSION,ANTER,3 SGMTS  1992     ZZC TOTAL HIP ARTHROPLASTY      Hip Replacement, Total (Right)     ZZC VAGINAL HYSTERECTOMY      Hysterectomy, Vaginal w anterior repair       Social History     Tobacco Use     Smoking status: Former     Types: Cigarettes     Quit date: 1988     Years since quittin.9     Smokeless tobacco: Never     Tobacco comments:     Quit in    Vaping Use     Vaping status: Never Used   Substance Use Topics     Alcohol use: No     Comment: none     Family History   Problem Relation Age of Onset     Alzheimer Disease Mother          at 98,  of old age     Alzheimer Disease Father          at 78     C.A.D. Father      Alzheimer Disease Brother          age 82      Family History Negative Brother         Born 1931     Cancer Maternal Aunt         x'2 w Ca unknown etiology     Cancer Other 90        unknown cancer     Colon Cancer No family hx of          Current Outpatient Medications   Medication Sig Dispense Refill     acetaminophen (TYLENOL) 500 MG tablet Take 2 tablets (1,000 mg) by mouth 3 times daily as needed for mild pain       albuterol (PROAIR HFA) 108 (90 Base) MCG/ACT inhaler Inhale 1-2 puffs into the lungs every 4 hours as needed for shortness of breath or wheezing 8.5 g 11     amLODIPine (NORVASC) 10 MG tablet Take 1 tablet (10 mg) by mouth daily 90 tablet 3     calcium carbonate-vitamin D 500-400 MG-UNIT TABS per tablet Take 1 tablet by mouth 2 times daily (with meals) 180 tablet 3     cholecalciferol (VITAMIN D3) 5000 UNITS TABS tablet Take 1 tablet (5,000 Units) by mouth daily 100 tablet 1     cyanocobalamin (VITAMIN B-12) 1000 MCG tablet  Take 1 tablet (1,000 mcg) by mouth daily       donepezil (ARICEPT) 10 MG tablet Take 1 tablet (10 mg) by mouth At Bedtime 90 tablet 3     DULoxetine (CYMBALTA) 20 MG capsule Take 1 capsule (20 mg) by mouth 2 times daily 180 capsule 3     escitalopram (LEXAPRO) 5 MG tablet TAKE ONE TABLET BY MOUTH EVERY EVENING 90 tablet 1     ipratropium - albuterol 0.5 mg/2.5 mg/3 mL (DUONEB) 0.5-2.5 (3) MG/3ML neb solution Take 1 vial (3 mLs) by nebulization every 6 hours as needed for other (for severe COPD/breathing/wheezing flares) 90 mL 3     LUMIGAN 0.01 % SOLN ophthalmic solution INSTILL 1 DROP IN BOTH EYES AT BEDTIME       Multiple Vitamins-Minerals (PRESERVISION AREDS 2) CAPS Take 2 capsules by mouth daily       omeprazole (PRILOSEC) 20 MG DR capsule TAKE ONE CAPSULE BY MOUTH EVERY DAY 30 MINUTES BEFORE BREAKFAST       SIMBRINZA 1-0.2 % ophthalmic suspension INSTILL 1 DROP IN BOTH EYES TWO TIMES A DAY       tiotropium-olodaterol (STIOLTO RESPIMAT) 2.5-2.5 MCG/ACT AERS INHALE TWO PUFFS BY MOUTH EVERY DAY 4 g 3     VITRON-C  MG TABS tablet Take 1 tablet by mouth 2 times daily (with meals) 100 tablet 0         Mammogram Screening - Patient over age 75, has elected to discontinue screenings.  Pertinent mammograms are reviewed under the imaging tab.    Review of Systems   Constitutional: Negative for chills and fever.   HENT: Negative for congestion, ear pain, hearing loss and sore throat.    Eyes: Negative for pain and visual disturbance.   Respiratory: Positive for shortness of breath. Negative for cough.    Cardiovascular: Negative for chest pain, palpitations and peripheral edema.   Gastrointestinal: Positive for diarrhea. Negative for abdominal pain, constipation, heartburn, hematochezia and nausea.   Breasts:  Negative for tenderness, breast mass and discharge.   Genitourinary: Negative for dysuria, frequency, genital sores, hematuria, pelvic pain, urgency, vaginal bleeding and vaginal discharge.   Musculoskeletal:  "Negative for arthralgias, joint swelling and myalgias.   Skin: Negative for rash.   Neurological: Negative for dizziness, weakness, headaches and paresthesias.   Psychiatric/Behavioral: Positive for mood changes. The patient is not nervous/anxious.          OBJECTIVE:   /68   Pulse 78   Temp 96.8  F (36  C) (Tympanic)   Resp 16   Ht 1.575 m (5' 2\")   Wt 54.7 kg (120 lb 9.6 oz)   SpO2 93%   BMI 22.06 kg/m   Estimated body mass index is 22.06 kg/m  as calculated from the following:    Height as of this encounter: 1.575 m (5' 2\").    Weight as of this encounter: 54.7 kg (120 lb 9.6 oz). Answers for HPI/ROS submitted by the patient on 6/13/2023  If you checked off any problems, how difficult have these problems made it for you to do your work, take care of things at home, or get along with other people?: Not difficult at all  PHQ9 TOTAL SCORE: 3  FERNY 7 TOTAL SCORE: 1      Physical Exam  GENERAL: healthy, alert and no distress  EYES: Eyes grossly normal to inspection, PERRL and conjunctivae and sclerae normal  HENT: ear canals and TM's normal, nose and mouth without ulcers or lesions  NECK: no adenopathy, no asymmetry, masses, or scars and thyroid normal to palpation  RESP: lungs clear to auscultation - no rales, rhonchi or wheezes  CV: regular rate and rhythm, normal S1 S2, no S3 or S4, no murmur, click or rub, no peripheral edema and peripheral pulses strong  ABDOMEN: soft, nontender, no hepatosplenomegaly, no masses and bowel sounds normal  MS: no gross musculoskeletal defects noted, no edema  SKIN: no suspicious lesions or rashes  NEURO: Normal strength and tone, mentation intact and speech normal  PSYCH: mentation appears normal, affect normal/bright    Diagnostic Test Results:  Results for orders placed or performed in visit on 06/13/23   CBC with Platelets     Status: Normal   Result Value Ref Range    WBC Count 8.4 4.0 - 11.0 10e3/uL    RBC Count 4.67 3.80 - 5.20 10e6/uL    Hemoglobin 14.6 11.7 - " 15.7 g/dL    Hematocrit 43.9 35.0 - 47.0 %    MCV 94 78 - 100 fL    MCH 31.3 26.5 - 33.0 pg    MCHC 33.3 31.5 - 36.5 g/dL    RDW 13.1 10.0 - 15.0 %    Platelet Count 238 150 - 450 10e3/uL       ASSESSMENT / PLAN:   Gifty was seen today for physical.    Diagnoses and all orders for this visit:    Encounter for Medicare annual wellness exam  -     REVIEW OF HEALTH MAINTENANCE PROTOCOL ORDERS  -     PRIMARY CARE FOLLOW-UP SCHEDULING; Future    Early onset Alzheimer's dementia without behavioral disturbance (H)  Doing well.  Continue to follow closely with memory clinic.  -     donepezil (ARICEPT) 10 MG tablet; Take 1 tablet (10 mg) by mouth At Bedtime    Chronic obstructive pulmonary disease, unspecified COPD type (H)  Well-controlled.  Continue current regimen  -     tiotropium-olodaterol (STIOLTO RESPIMAT) 2.5-2.5 MCG/ACT AERS; INHALE TWO PUFFS BY MOUTH EVERY DAY  -     albuterol (PROAIR HFA) 108 (90 Base) MCG/ACT inhaler; Inhale 1-2 puffs into the lungs every 4 hours as needed for shortness of breath or wheezing    Major depressive disorder, recurrent episode, in full remission (H)  Anxiety  Doing well with addition of escitalopram.  Continue current regimen.  -     DULoxetine (CYMBALTA) 20 MG capsule; Take 1 capsule (20 mg) by mouth 2 times daily    Essential hypertension  Normotensive.  Continue current regimen  -     amLODIPine (NORVASC) 10 MG tablet; Take 1 tablet (10 mg) by mouth daily  -     COMPREHENSIVE METABOLIC PANEL  -     Albumin Random Urine Quantitative with Creat Ratio    Hyperlipidemia LDL goal <130  Managed with diet.  Labs for surveillance  -     Lipid panel reflex to direct LDL Non-fasting    Low vitamin B12 level  Continue supplementation.  Labs for surveillance  -     Vitamin B12  -     cyanocobalamin (VITAMIN B-12) 1000 MCG tablet; Take 1 tablet (1,000 mcg) by mouth daily    Low ferritin  Restless legs syndrome (RLS)  Continue supplementation.  Labs for surveillance  -     Ferritin  -      VITRON-C  MG TABS tablet; Take 1 tablet by mouth 2 times daily (with meals)    Tinnitus of both ears  No change.  We will continue to monitor.    Elevated glucose  Labs for surveillance  -     COMPREHENSIVE METABOLIC PANEL  -     Hemoglobin A1c; Future    Screening for deficiency anemia  Routine screening  -     CBC with Platelets    Peptic ulcer  Continues daily omeprazole over-the-counter.  Well-controlled.  -     omeprazole (PRILOSEC) 20 MG DR capsule; TAKE ONE CAPSULE BY MOUTH EVERY DAY 30 MINUTES BEFORE BREAKFAST      Patient has been advised of split billing requirements and indicates understanding: Yes      COUNSELING:  Reviewed preventive health counseling, as reflected in patient instructions       Regular exercise       Healthy diet/nutrition        She reports that she quit smoking about 34 years ago. She has never used smokeless tobacco.      Appropriate preventive services were discussed with this patient, including applicable screening as appropriate for cardiovascular disease, diabetes, osteopenia/osteoporosis, and glaucoma.  As appropriate for age/gender, discussed screening for colorectal cancer, prostate cancer, breast cancer, and cervical cancer. Checklist reviewing preventive services available has been given to the patient.    Reviewed patients plan of care and provided an AVS. The Intermediate Care Plan ( asthma action plan, low back pain action plan, and migraine action plan) for Gifty meets the Care Plan requirement. This Care Plan has been established and reviewed with the Patient and daughter.      Leann Gordon PA-C  [facility] [place]    Identified Health Risks:    I have reviewed Opioid Use Disorder and Substance Use Disorder risk factors and made any needed referrals.

## 2023-06-14 DIAGNOSIS — R79.89 LOW VITAMIN B12 LEVEL: ICD-10-CM

## 2023-06-14 LAB — HBA1C MFR BLD: 5.5 % (ref 0–5.6)

## 2023-06-14 RX ORDER — LANOLIN ALCOHOL/MO/W.PET/CERES
1000 CREAM (GRAM) TOPICAL EVERY OTHER DAY
Start: 2023-06-14

## 2023-06-14 NOTE — RESULT ENCOUNTER NOTE
Gayla  I have reviewed your recent test results:    -Normal red blood cell (hgb) levels, normal white blood cell count and normal platelet levels.  -LDL(bad) cholesterol level is elevated, and your triglycerides are elevated which can increase your heart disease risk.  A diet high in fat and simple carbohydrates, genetics and being overweight can contribute to this. ADVISE: His levels are pretty consistent with what they have been historically.  We will continue to monitor annually.  -Liver and gallbladder tests are normal (ALT,AST, Alk phos, bilirubin), kidney function is normal (Cr, GFR), sodium is normal, potassium is normal, calcium is normal, glucose is normal.  -A1C (diabetic test) is normal and indicates that your blood sugar has been in a normal range the last 3 months.  -Ferritin level is normal and shows adequate iron replacement.  Continue your iron supplementation.  -Microalbumin (urine protein) test is normal.  ADVISE: rechecking this annually.  -B12 level is on the high side of normal.  I would recommend changing your B12 supplement to every other day.    For additional lab test information, [email or web address] is an excellent reference.     If you have any questions please do not hesitate to contact our office via phone ([phone]) or MyChart.    Healthy regards,     Leann Gordon MBA, MS, PA-C  [facility]

## 2023-06-15 ENCOUNTER — OFFICE VISIT (OUTPATIENT)
Dept: FAMILY MEDICINE | Facility: CLINIC | Age: 88
End: 2023-06-15
Payer: COMMERCIAL

## 2023-06-15 VITALS
BODY MASS INDEX: 22.26 KG/M2 | RESPIRATION RATE: 16 BRPM | WEIGHT: 121 LBS | HEART RATE: 66 BPM | HEIGHT: 62 IN | DIASTOLIC BLOOD PRESSURE: 80 MMHG | SYSTOLIC BLOOD PRESSURE: 146 MMHG | OXYGEN SATURATION: 94 % | TEMPERATURE: 97.7 F

## 2023-06-15 DIAGNOSIS — F03.B4 MODERATE DEMENTIA WITH ANXIETY, UNSPECIFIED DEMENTIA TYPE (H): Primary | ICD-10-CM

## 2023-06-15 PROBLEM — G30.1 MODERATE LATE ONSET ALZHEIMER'S DEMENTIA WITH ANXIETY (H): Status: ACTIVE | Noted: 2018-03-13

## 2023-06-15 PROBLEM — F02.B4 MODERATE LATE ONSET ALZHEIMER'S DEMENTIA WITH ANXIETY (H): Status: ACTIVE | Noted: 2018-03-13

## 2023-06-15 PROCEDURE — 99215 OFFICE O/P EST HI 40 MIN: CPT | Performed by: INTERNAL MEDICINE

## 2023-06-15 RX ORDER — DONEPEZIL HYDROCHLORIDE 10 MG/1
10 TABLET, FILM COATED ORAL EVERY MORNING
Qty: 90 TABLET | Refills: 3 | Status: SHIPPED | OUTPATIENT
Start: 2023-06-15 | End: 2024-07-19

## 2023-06-15 ASSESSMENT — PATIENT HEALTH QUESTIONNAIRE - PHQ9
10. IF YOU CHECKED OFF ANY PROBLEMS, HOW DIFFICULT HAVE THESE PROBLEMS MADE IT FOR YOU TO DO YOUR WORK, TAKE CARE OF THINGS AT HOME, OR GET ALONG WITH OTHER PEOPLE: NOT DIFFICULT AT ALL
SUM OF ALL RESPONSES TO PHQ QUESTIONS 1-9: 1
SUM OF ALL RESPONSES TO PHQ QUESTIONS 1-9: 1

## 2023-06-15 ASSESSMENT — PAIN SCALES - GENERAL: PAINLEVEL: NO PAIN (0)

## 2023-06-15 NOTE — PATIENT INSTRUCTIONS
No Donepezil (Aricept) tonight    Then starting tomorrow, Donepezil (Aricept) will be a morning medication; this is to see if Gayla has less overnight awakenings / nightmares    Keep Lexapro (Escitalopram) every evening for anxiety    Continue the great support/care/laughter    Follow up in a year or sooner if questions  Call to schedule your follow up appointment: # [phone]  [facility]  [address]  [address]  [place] [address]

## 2023-06-15 NOTE — PROGRESS NOTES
"MEMORY EVALUATION CLINIC - FOLLOWUP    INTERVAL HISTORY: Gayla is here today accompanied by her adult children Chasidy and Jose G whom she lives with; son Pankaj lives nearby too and he and his wife Lalia are also very involved. Seen in clinic in Feb 2023 in which Lexapro was added for anxiety in combination with continued Donepezil.    The Lexapro maybe helped a little bit but admittedly hard to say; definitely no worse. She has h/o skin/nail picking and that does seem better; used to at times pick til fingers bled but that hasn't been happening. Gayla says \"I feel really safe\". Laughter is a big part of their lives and they continue to joke a lot with one another as they do during the visit today. Mornings have nice conversations together. Sometimes naps during the day. Nighttimes remain the toughest time of day and sometimes she wakes up several times per night and looking to make sure things have been done around the house as is a natural caregiver. Rare actual nightmares. Takes the Lexapro and the Donepezil both at night but willing to try Donepezil in AM to see if helps reduce nighttime awakenings. No wandering.  Overall, all agree the overall anxiety is dramatically improved compared to when she first moved in with Chasidy and Jose G over a year ago.      OBJECTIVE:  BP (!) 146/80 (BP Location: Left arm, Patient Position: Sitting, Cuff Size: Adult Regular)   Pulse 66   Temp 97.7  F (36.5  C) (Temporal)   Resp 16   Ht 1.575 m (5' 2\")   Wt 54.9 kg (121 lb)   SpO2 94%   BMI 22.13 kg/m    Alert, pleasant, no acute distress, nicely groomed  Joking frequently with one another  Ambulates independently        INVESTIGATIONS: Normal TSH and B12 on file, CT Head Nov 2021 noting \"mild diffuse parenchymal volume loss and white matter changes likely due to chronic microvascular ischemic disease.\"  MRI Brain Nov 2021: Small chronic lacunar infarcts in each basal ganglia. Age-related and chronic ischemic changes.     PRIOR " "TESTING:  Neuropsych testing Feb 2018: Dx major neurocognitive disorder (dementia) which is most likely of the Alzheimer's subtype though a vascular component also is a potential contributing factor  Abnormal MiniCog at April 2022 Wellness Visit       COGNITIVE MEDICATIONS: Donepezil (since early 2018)      ASSESSMENT/PLAN: Gifty \"Gayla\" Daisy is a pleasant 88yoF with previously diagnosed dementia in 2018, likely of the Alzheimer's subtype but also some potential vascular component as well along with comorbid anxiety. Benefiting greatly from the 24-7 support from her adult children and fully dependent on IADLs though mostly independent in ADLs at this time. Anxiety improved dramatically once she moved in with adult children; maybe slightly better yet with Lexapro added Feb 2023 - at least enough so willing to continue the medication. Will change Donepezil (Aricept) to AM dosing to see if helpful at reducing nighttime awakenings. Patient instructions reviewed; follow up in a year but sooner if questions/concerns.      Patient Instructions   No Donepezil (Aricept) tonight    Then starting tomorrow, Donepezil (Aricept) will be a morning medication; this is to see if Gayla has less overnight awakenings / nightmares    Keep Lexapro (Escitalopram) every evening for anxiety    Continue the great support/care/laughter    Follow up in a year or sooner if questions  Call to schedule your follow up appointment: # [phone]  [facility]  [address]  [address]  [place] [address]          44 minutes spent on the date of the encounter doing chart review, history and exam, counseling, documentation and further activities as noted above      Loretta Abernathy DO  [facility]  "

## 2023-06-20 ENCOUNTER — MYC MEDICAL ADVICE (OUTPATIENT)
Dept: FAMILY MEDICINE | Facility: CLINIC | Age: 88
End: 2023-06-20
Payer: COMMERCIAL

## 2023-09-17 ENCOUNTER — HEALTH MAINTENANCE LETTER (OUTPATIENT)
Age: 88
End: 2023-09-17

## 2023-10-08 DIAGNOSIS — J44.9 CHRONIC OBSTRUCTIVE PULMONARY DISEASE, UNSPECIFIED COPD TYPE (H): ICD-10-CM

## 2023-10-09 RX ORDER — TIOTROPIUM BROMIDE AND OLODATEROL 3.124; 2.736 UG/1; UG/1
SPRAY, METERED RESPIRATORY (INHALATION)
Qty: 4 G | Refills: 3 | Status: SHIPPED | OUTPATIENT
Start: 2023-10-09 | End: 2024-02-05

## 2023-11-25 DIAGNOSIS — F41.9 ANXIETY: ICD-10-CM

## 2023-11-27 RX ORDER — ESCITALOPRAM OXALATE 5 MG/1
TABLET ORAL
Qty: 90 TABLET | Refills: 1 | Status: SHIPPED | OUTPATIENT
Start: 2023-11-27 | End: 2024-05-30

## 2023-12-21 ENCOUNTER — NURSE TRIAGE (OUTPATIENT)
Dept: FAMILY MEDICINE | Facility: CLINIC | Age: 88
End: 2023-12-21
Payer: COMMERCIAL

## 2023-12-21 NOTE — TELEPHONE ENCOUNTER
Situation    Daughter calling for an appointment.   Background   Copd   Denied baseline cough   Baseline SOB- with walking ( moderate)     Assessment   Mom has a stuffy nose as of today.   Cough for a couple days- dry, coughing about every 1/2 hour.  Fever today 101   SOB moderate with walking - close to baseline   Fatigue- napping more often   No muscles aches   No sore throat   Has not tested for covid- doing right now. - negative today  Eating and drinking well.   No diarrhea or vomiting     Has been using albuterol 4 times a day   The other date had dizziness and got clammy while sitting on the toliet, resolved quickly and nothing since.     OTC Claritin and nasal spray   Tylenol as directed     Recommendations   RV, LV  Jay, andrzej, apple zehra, jojo, Meryl, and Joyce are all full today   Advised should be seen at  today-advised of locations.   Daughter is agreeable to plan.     Reason for Disposition   Fever > 101 F (38.3 C) and over 60 years of age   Known COPD or other severe lung disease (i.e., bronchiectasis, cystic fibrosis, lung surgery) and worsening symptoms (i.e., increased sputum purulence or amount, increased breathing difficulty)    Additional Information   Negative: Bluish (or gray) lips or face   Negative: SEVERE difficulty breathing (e.g., struggling for each breath, speaks in single words)   Negative: Rapid onset of cough and has hives   Negative: Coughing started suddenly after medicine, an allergic food or bee sting   Negative: Difficulty breathing after exposure to flames, smoke, or fumes   Negative: Sounds like a life-threatening emergency to the triager   Negative: Previous asthma attacks and this feels like asthma attack   Negative: Dry cough (non-productive; no sputum or minimal clear sputum) and within 14 days of COVID-19 Exposure   Negative: MODERATE difficulty breathing (e.g., speaks in phrases, SOB even at rest, pulse 100-120) and still present when not coughing    Negative: Chest pain present when not coughing   Negative: Passed out (i.e., fainted, collapsed and was not responding)   Negative: Patient sounds very sick or weak to the triager   Negative: Coughed up > 1 tablespoon (15 ml) blood (Exception: Blood-tinged sputum.)   Negative: Fever > 103 F (39.4 C)   Negative: Fever > 100.0 F (37.8 C) and has diabetes mellitus or a weak immune system (e.g., HIV positive, cancer chemotherapy, organ transplant, splenectomy, chronic steroids)   Negative: Fever > 100.0 F (37.8 C) and bedridden (e.g., CVA, chronic illness, recovering from surgery)   Negative: Increasing ankle swelling   Negative: Wheezing is present   Commented on: MILD difficulty breathing (e.g., minimal/no SOB at rest, SOB with walking, pulse <100) and still present when not coughing     Baseline moderate  SOB with walking.    Protocols used: Cough-A-OH

## 2024-02-03 DIAGNOSIS — J44.9 CHRONIC OBSTRUCTIVE PULMONARY DISEASE, UNSPECIFIED COPD TYPE (H): ICD-10-CM

## 2024-02-04 ENCOUNTER — HEALTH MAINTENANCE LETTER (OUTPATIENT)
Age: 89
End: 2024-02-04

## 2024-02-05 RX ORDER — TIOTROPIUM BROMIDE AND OLODATEROL 3.124; 2.736 UG/1; UG/1
SPRAY, METERED RESPIRATORY (INHALATION)
Qty: 4 G | Refills: 3 | Status: SHIPPED | OUTPATIENT
Start: 2024-02-05 | End: 2024-06-05

## 2024-02-27 NOTE — TELEPHONE ENCOUNTER
Letter/fax recieved from [facility] (in formulary folder/basket) states vesicare is not covered under patient's insurance.      Can medication be changed or should a PA be initiated?    (Provider may request we ask patient to check their formulary book or call their insurance company to find out what medications are on their formulary. Ask patient to call back within 2-3 days.  If they are not able to call back in this time frame this encounter will be closed.  Open new encounter when/if patient calls back.)    If PA call [phone]  Pt id [id number]        
Noted.   
Patient advised.  Patient will check with her insurance to see what alternatives would be covered and get back to us by 03/13/2017.  Filiberto NOVAK    
NO DME Recommended at this time

## 2024-02-29 ENCOUNTER — OFFICE VISIT (OUTPATIENT)
Dept: FAMILY MEDICINE | Facility: CLINIC | Age: 89
End: 2024-02-29
Payer: COMMERCIAL

## 2024-02-29 VITALS
DIASTOLIC BLOOD PRESSURE: 69 MMHG | WEIGHT: 127.9 LBS | SYSTOLIC BLOOD PRESSURE: 130 MMHG | BODY MASS INDEX: 23.53 KG/M2 | TEMPERATURE: 97.6 F | HEART RATE: 70 BPM | OXYGEN SATURATION: 95 % | HEIGHT: 62 IN | RESPIRATION RATE: 16 BRPM

## 2024-02-29 DIAGNOSIS — F02.B4 MODERATE LATE ONSET ALZHEIMER'S DEMENTIA WITH ANXIETY (H): Primary | ICD-10-CM

## 2024-02-29 DIAGNOSIS — G30.1 MODERATE LATE ONSET ALZHEIMER'S DEMENTIA WITH ANXIETY (H): Primary | ICD-10-CM

## 2024-02-29 PROCEDURE — 99214 OFFICE O/P EST MOD 30 MIN: CPT | Performed by: INTERNAL MEDICINE

## 2024-02-29 ASSESSMENT — PATIENT HEALTH QUESTIONNAIRE - PHQ9
SUM OF ALL RESPONSES TO PHQ QUESTIONS 1-9: 7
10. IF YOU CHECKED OFF ANY PROBLEMS, HOW DIFFICULT HAVE THESE PROBLEMS MADE IT FOR YOU TO DO YOUR WORK, TAKE CARE OF THINGS AT HOME, OR GET ALONG WITH OTHER PEOPLE: NOT DIFFICULT AT ALL

## 2024-02-29 NOTE — PROGRESS NOTES
"MEMORY EVALUATION CLINIC - FOLLOWUP    INTERVAL HISTORY: Gayla is here today accompanied by her adult children Chasidy and Jose G whom she lives with; son Pankaj lives nearby too and he and his wife Laila are also very involved.     It did help to change Donepezil from PM to AM at our last visit so less nighttime awakenings and actually sleeps well overnight now despite some napping. Trying to encourage more walks; breathing with COPD worse - more prolonged expiration. Trying to brainstorm more activities: does some coloring and crosswords as well as keyboard/piano. Did a volunteer decorating experience with Jose G at a [facility] that she enjoyed. Will look into adult day programs.    Weight up from low 120s back to 127; good appetite though enjoys lots of bananas and ice cream.    Needs help with bathing and dressing from Chasidy and needs reminders to wash hands after going to the bathroom as well as brushing teeth.    Meds are great as is per all! No changes to regimen desired today.      OBJECTIVE:  /69 (BP Location: Right arm, Patient Position: Sitting, Cuff Size: Adult Regular)   Pulse 70   Temp 97.6  F (36.4  C) (Tympanic)   Resp 16   Ht 1.575 m (5' 2\")   Wt 58 kg (127 lb 14.4 oz)   SpO2 95%   BMI 23.39 kg/m    Alert, pleasant, nicely groomed  Non-labored but pursed lip breathing noted  Great repoire demonstrated with kids  Slightly repetitive      INVESTIGATIONS: Normal TSH and B12 on file, CT Head Nov 2021 noting \"mild diffuse parenchymal volume loss and white matter changes likely due to chronic microvascular ischemic disease.\"  MRI Brain Nov 2021: Small chronic lacunar infarcts in each basal ganglia. Age-related and chronic ischemic changes.     PRIOR TESTING:  Neuropsych testing Feb 2018: Dx major neurocognitive disorder (dementia) which is most likely of the Alzheimer's subtype though a vascular component also is a potential contributing factor  Abnormal MiniCog at April 2022 Wellness Visit       COGNITIVE " "MEDICATIONS: Donepezil (since early 2018)      ASSESSMENT/PLAN: Gifty \"Gayla\" Daisy is a pleasant 89yoF with previously diagnosed dementia in 2018, likely of the Alzheimer's subtype but also some potential vascular component as well along with comorbid anxiety. Benefiting greatly from the 24-7 support from her adult children and fully dependent on IADLs and starting to need more help with ADLs as well (bathing, dressing). Very stable now on current meds in terms of anxiety control and able to sleep well as well as gain good nutritional weight. Discussed more ways to get cognitively engaged such as with volunteering together that she is interested in or adult day programs. Follow up later this year; sooner if questions/concerns arise in the meantime.      Patient Instructions   Look up \"adult day programs\" near you that may be fun for Gayla    More walking could be good with using your ProAir or nebulizer prior    Follow up 6-9 months  # [phone]  [facility]  [address]  [address]  [place] [address]        35 minutes spent on the date of the encounter doing chart review, history and exam, counseling, documentation and further activities as noted above      Loretta Abernathy,   [facility]  "

## 2024-02-29 NOTE — PATIENT INSTRUCTIONS
"Look up \"adult day programs\" near you that may be fun for Gayla    More walking could be good with using your ProAir or nebulizer prior    Follow up 6-9 months  # [phone]  [facility]  [address]  [address]  [place] [address]    "

## 2024-03-15 DIAGNOSIS — J44.9 CHRONIC OBSTRUCTIVE PULMONARY DISEASE, UNSPECIFIED COPD TYPE (H): ICD-10-CM

## 2024-03-15 RX ORDER — IPRATROPIUM BROMIDE AND ALBUTEROL SULFATE 2.5; .5 MG/3ML; MG/3ML
SOLUTION RESPIRATORY (INHALATION)
Qty: 90 ML | Refills: 0 | Status: SHIPPED | OUTPATIENT
Start: 2024-03-15 | End: 2024-04-02

## 2024-03-15 NOTE — TELEPHONE ENCOUNTER
Prescription approved per [facility] Refill Protocol.  Jenniffer Leigh, RN  [facility] Triage Nurse

## 2024-04-01 DIAGNOSIS — J44.9 CHRONIC OBSTRUCTIVE PULMONARY DISEASE, UNSPECIFIED COPD TYPE (H): ICD-10-CM

## 2024-04-02 RX ORDER — IPRATROPIUM BROMIDE AND ALBUTEROL SULFATE 2.5; .5 MG/3ML; MG/3ML
SOLUTION RESPIRATORY (INHALATION)
Qty: 90 ML | Refills: 0 | Status: SHIPPED | OUTPATIENT
Start: 2024-04-02 | End: 2024-04-17

## 2024-04-16 DIAGNOSIS — J44.9 CHRONIC OBSTRUCTIVE PULMONARY DISEASE, UNSPECIFIED COPD TYPE (H): ICD-10-CM

## 2024-04-17 RX ORDER — IPRATROPIUM BROMIDE AND ALBUTEROL SULFATE 2.5; .5 MG/3ML; MG/3ML
SOLUTION RESPIRATORY (INHALATION)
Qty: 90 ML | Refills: 0 | Status: SHIPPED | OUTPATIENT
Start: 2024-04-17 | End: 2024-04-23

## 2024-04-22 ASSESSMENT — PATIENT HEALTH QUESTIONNAIRE - PHQ9
10. IF YOU CHECKED OFF ANY PROBLEMS, HOW DIFFICULT HAVE THESE PROBLEMS MADE IT FOR YOU TO DO YOUR WORK, TAKE CARE OF THINGS AT HOME, OR GET ALONG WITH OTHER PEOPLE: NOT DIFFICULT AT ALL
SUM OF ALL RESPONSES TO PHQ QUESTIONS 1-9: 13
SUM OF ALL RESPONSES TO PHQ QUESTIONS 1-9: 13

## 2024-04-23 ENCOUNTER — VIRTUAL VISIT (OUTPATIENT)
Dept: FAMILY MEDICINE | Facility: CLINIC | Age: 89
End: 2024-04-23
Payer: COMMERCIAL

## 2024-04-23 DIAGNOSIS — F02.B4 MODERATE LATE ONSET ALZHEIMER'S DEMENTIA WITH ANXIETY (H): ICD-10-CM

## 2024-04-23 DIAGNOSIS — F33.42 MAJOR DEPRESSIVE DISORDER, RECURRENT EPISODE, IN FULL REMISSION (H): ICD-10-CM

## 2024-04-23 DIAGNOSIS — G30.1 MODERATE LATE ONSET ALZHEIMER'S DEMENTIA WITH ANXIETY (H): ICD-10-CM

## 2024-04-23 DIAGNOSIS — R68.89 DECREASED EXERCISE TOLERANCE: ICD-10-CM

## 2024-04-23 DIAGNOSIS — R06.02 SHORTNESS OF BREATH: ICD-10-CM

## 2024-04-23 DIAGNOSIS — I10 ESSENTIAL HYPERTENSION: ICD-10-CM

## 2024-04-23 DIAGNOSIS — M85.80 OSTEOPENIA, UNSPECIFIED LOCATION: ICD-10-CM

## 2024-04-23 DIAGNOSIS — R53.83 OTHER FATIGUE: ICD-10-CM

## 2024-04-23 DIAGNOSIS — J44.9 CHRONIC OBSTRUCTIVE PULMONARY DISEASE, UNSPECIFIED COPD TYPE (H): Primary | ICD-10-CM

## 2024-04-23 DIAGNOSIS — G47.19 EXCESSIVE DAYTIME SLEEPINESS: ICD-10-CM

## 2024-04-23 PROCEDURE — 99214 OFFICE O/P EST MOD 30 MIN: CPT | Mod: 95 | Performed by: PHYSICIAN ASSISTANT

## 2024-04-23 RX ORDER — IPRATROPIUM BROMIDE AND ALBUTEROL SULFATE 2.5; .5 MG/3ML; MG/3ML
1 SOLUTION RESPIRATORY (INHALATION) 4 TIMES DAILY
Qty: 90 ML | Refills: 2 | Status: SHIPPED | OUTPATIENT
Start: 2024-04-23 | End: 2024-06-10

## 2024-04-23 NOTE — PROGRESS NOTES
Gayla is a 89 year old who is being evaluated via a billable video visit.    How would you like to obtain your AVS? MyChart  If the video visit is dropped, the invitation should be resent by: Text to cell phone: [phone]  Will anyone else be joining your video visit? No      Assessment & Plan     Chronic obstructive pulmonary disease, unspecified COPD type (H)     Decreased exercise tolerance  Excessive daytime sleepiness  Essential hypertension  Osteopenia, unspecified location  Shortness of breath  Worsening shortness of breath x 6 months in the setting of COPD.  Broad differential.  Labs, EKG, chest x-ray, and vitals to be done this week.  Based on these results I will advise family of neck steps.  Proactively we will schedule a pulmonology consultation as they do book out quite a ways.  Okay to increase DuoNeb to 4 times daily to see if this helps symptoms.  Patient and family advised to keep me informed of any new symptom development or symptom worsening.  - Adult Pulmonary Medicine  Referral  - ipratropium - albuterol 0.5 mg/2.5 mg/3 mL (DUONEB) 0.5-2.5 (3) MG/3ML neb solution  Dispense: 90 mL; Refill: 2  - EKG 12-lead complete w/read - Clinics  - XR Chest 2 Views  - Vitamin D Deficiency  - Vitamin B12  - BNP-N terminal pro  - CBC with platelets and differential  - TSH with free T4 reflex  - ESR: Erythrocyte sedimentation rate  - CRP, inflammation  - Comprehensive metabolic panel (BMP + Alb, Alk Phos, ALT, AST, Total. Bili, TP)  - CK total    Major depressive disorder, recurrent episode, in full remission (H24)  Stable.  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  No change in medications/status.      Return in about 3 days (around 4/26/2024) for vitals, EKG, CXR, labs.      Leann Gordon MBA, MS, PA-C  [facility]   Gayla is a 89 year old, presenting for the following health  issues:  RECHECK        4/23/2024    11:01 AM   Additional Questions   Roomed by Anna NOVAK   Accompanied by Self       Video Start Time: 11:35 AM    History of Present Illness       COPD:  She presents for follow up of COPD.   Overall, COPD symptoms are much worse since last visit. She has a lot more than usual fatigue or shortness of breath with exertion and same as usual shortness of breath at rest.  She rarely coughs and does not have change in sputum. No recent fever. She can walk the length of 1-2 rooms without stopping to rest. She can not walk a flight of stairs without resting. The patient has had no ED, urgent care, or hospital admissions because of COPD since the last visit.     Reason for visit:  Breathing changes, nebulizer refills, sleeping a lot    She eats 2-3 servings of fruits and vegetables daily.She consumes 0 sweetened beverage(s) daily.She exercises with enough effort to increase her heart rate 9 or less minutes per day.  She exercises with enough effort to increase her heart rate 3 or less days per week.   She is taking medications regularly.     COPD  Last 6 months has had notable decrease in exercise tolerance/exertional ability.  Denies chest pain.  Stops and catches her breath even with short walking distances.  Consistent with using Stiolto every day.  Occasional use of albuterol.   For the last 2 months has been doing duoneb scheduled twice daily (morning and before bed).  Son is wondering about increasing this to 4 times daily.  Thinks it has been somewhat helpful.  Sleeping more both overnight and napping during the day.  Falling asleep/fatigue more during the day.  Going to sleep a lot earlier and taking frequent naps.  Denies leg swelling.  No illnesses proceeded these changes.  Eating well/weight stable.  Denies difficulty lying flat.  Does not snore per son-in-law Jose G (lives with daughter Chasidy and son-in-law Jose G).  No new environmental exposures.      Review of  Systems  Constitutional, HEENT, cardiovascular, pulmonary, GI, , musculoskeletal, neuro, skin, endocrine and psych systems are negative, except as otherwise noted.      Objective           Vitals:  No vitals were obtained today due to virtual visit.    Physical Exam   GENERAL: alert and no distress  EYES: Eyes grossly normal to inspection.  No discharge or erythema, or obvious scleral/conjunctival abnormalities.  HENT: Normal cephalic/atraumatic.  External ears, nose and mouth without ulcers or lesions.  No nasal drainage visible.  NECK: No asymmetry, visible masses or scars  RESP: No audible wheeze, cough, or visible cyanosis.    SKIN: Visible skin clear. No significant rash, abnormal pigmentation or lesions.  NEURO: Cranial nerves grossly intact.  Mentation and speech appropriate for age.  PSYCH: Appropriate affect, tone, and pace of words    No results found for any visits on 04/23/24.      Video-Visit Details    Type of service:  Video Visit   Video End Time:11:58 PM  Originating Location (pt. Location): Home    Distant Location (provider location):  On-site  Platform used for Video Visit: Radha  Signed Electronically by: Leann Gordon PA-C

## 2024-04-24 DIAGNOSIS — J44.9 CHRONIC OBSTRUCTIVE PULMONARY DISEASE, UNSPECIFIED COPD TYPE (H): Primary | ICD-10-CM

## 2024-04-24 NOTE — Clinical Note
Future Appointments 4/26/2024  11:10 AM   RVXR1                      RVXRA               RV 4/26/2024  11:45 AM   RV LAB                     RVLABR              RV 4/26/2024  1:30 PM    RV MA/LPN                  RVFP                RV 6/18/2024  1:40 PM    Leann Gordon PA-C  RVFP                RV 6/26/2024  12:15 PM   1,  Pulmonary Rehab      SHCR                [facility] 7/31/2024  1:00 PM    Fiorella Francis PA* CSPULM              CS 8/29/2024  2:00 PM    Loretta Abernathy DO      CSFPIM              CS

## 2024-04-26 ENCOUNTER — ANCILLARY PROCEDURE (OUTPATIENT)
Dept: GENERAL RADIOLOGY | Facility: CLINIC | Age: 89
End: 2024-04-26
Payer: COMMERCIAL

## 2024-04-26 ENCOUNTER — ALLIED HEALTH/NURSE VISIT (OUTPATIENT)
Dept: FAMILY MEDICINE | Facility: CLINIC | Age: 89
End: 2024-04-26
Payer: COMMERCIAL

## 2024-04-26 ENCOUNTER — LAB (OUTPATIENT)
Dept: LAB | Facility: CLINIC | Age: 89
End: 2024-04-26
Payer: COMMERCIAL

## 2024-04-26 VITALS
OXYGEN SATURATION: 95 % | DIASTOLIC BLOOD PRESSURE: 64 MMHG | WEIGHT: 128 LBS | HEART RATE: 94 BPM | HEIGHT: 62 IN | BODY MASS INDEX: 23.55 KG/M2 | TEMPERATURE: 97.4 F | RESPIRATION RATE: 18 BRPM | SYSTOLIC BLOOD PRESSURE: 122 MMHG

## 2024-04-26 DIAGNOSIS — R68.89 DECREASED EXERCISE TOLERANCE: ICD-10-CM

## 2024-04-26 DIAGNOSIS — G47.19 EXCESSIVE DAYTIME SLEEPINESS: ICD-10-CM

## 2024-04-26 DIAGNOSIS — I10 ESSENTIAL HYPERTENSION: ICD-10-CM

## 2024-04-26 DIAGNOSIS — R73.09 ELEVATED GLUCOSE: Primary | ICD-10-CM

## 2024-04-26 DIAGNOSIS — R06.02 SHORTNESS OF BREATH: ICD-10-CM

## 2024-04-26 DIAGNOSIS — M85.80 OSTEOPENIA, UNSPECIFIED LOCATION: ICD-10-CM

## 2024-04-26 DIAGNOSIS — J44.9 CHRONIC OBSTRUCTIVE PULMONARY DISEASE, UNSPECIFIED COPD TYPE (H): ICD-10-CM

## 2024-04-26 DIAGNOSIS — J44.9 CHRONIC OBSTRUCTIVE PULMONARY DISEASE, UNSPECIFIED COPD TYPE (H): Primary | ICD-10-CM

## 2024-04-26 LAB
BASOPHILS # BLD AUTO: 0 10E3/UL (ref 0–0.2)
BASOPHILS NFR BLD AUTO: 1 %
EOSINOPHIL # BLD AUTO: 0.2 10E3/UL (ref 0–0.7)
EOSINOPHIL NFR BLD AUTO: 3 %
ERYTHROCYTE [DISTWIDTH] IN BLOOD BY AUTOMATED COUNT: 12.7 % (ref 10–15)
ERYTHROCYTE [SEDIMENTATION RATE] IN BLOOD BY WESTERGREN METHOD: 12 MM/HR (ref 0–30)
HBA1C MFR BLD: 5.8 % (ref 0–5.6)
HCT VFR BLD AUTO: 43.1 % (ref 35–47)
HGB BLD-MCNC: 14.4 G/DL (ref 11.7–15.7)
IMM GRANULOCYTES # BLD: 0 10E3/UL
IMM GRANULOCYTES NFR BLD: 0 %
LYMPHOCYTES # BLD AUTO: 1.2 10E3/UL (ref 0.8–5.3)
LYMPHOCYTES NFR BLD AUTO: 17 %
MCH RBC QN AUTO: 31.2 PG (ref 26.5–33)
MCHC RBC AUTO-ENTMCNC: 33.4 G/DL (ref 31.5–36.5)
MCV RBC AUTO: 94 FL (ref 78–100)
MONOCYTES # BLD AUTO: 0.6 10E3/UL (ref 0–1.3)
MONOCYTES NFR BLD AUTO: 8 %
NEUTROPHILS # BLD AUTO: 5.2 10E3/UL (ref 1.6–8.3)
NEUTROPHILS NFR BLD AUTO: 71 %
PLATELET # BLD AUTO: 250 10E3/UL (ref 150–450)
RBC # BLD AUTO: 4.61 10E6/UL (ref 3.8–5.2)
WBC # BLD AUTO: 7.3 10E3/UL (ref 4–11)

## 2024-04-26 PROCEDURE — 36415 COLL VENOUS BLD VENIPUNCTURE: CPT

## 2024-04-26 PROCEDURE — 82306 VITAMIN D 25 HYDROXY: CPT

## 2024-04-26 PROCEDURE — 71046 X-RAY EXAM CHEST 2 VIEWS: CPT | Mod: TC | Performed by: RADIOLOGY

## 2024-04-26 PROCEDURE — 84443 ASSAY THYROID STIM HORMONE: CPT

## 2024-04-26 PROCEDURE — 85652 RBC SED RATE AUTOMATED: CPT

## 2024-04-26 PROCEDURE — 83880 ASSAY OF NATRIURETIC PEPTIDE: CPT

## 2024-04-26 PROCEDURE — 82550 ASSAY OF CK (CPK): CPT

## 2024-04-26 PROCEDURE — 82607 VITAMIN B-12: CPT

## 2024-04-26 PROCEDURE — 80053 COMPREHEN METABOLIC PANEL: CPT

## 2024-04-26 PROCEDURE — 86140 C-REACTIVE PROTEIN: CPT

## 2024-04-26 PROCEDURE — 99207 PR NO CHARGE LOS: CPT

## 2024-04-26 PROCEDURE — 85025 COMPLETE CBC W/AUTO DIFF WBC: CPT

## 2024-04-26 PROCEDURE — 83036 HEMOGLOBIN GLYCOSYLATED A1C: CPT

## 2024-04-27 LAB
ALBUMIN SERPL BCG-MCNC: 4.5 G/DL (ref 3.5–5.2)
ALP SERPL-CCNC: 78 U/L (ref 40–150)
ALT SERPL W P-5'-P-CCNC: 21 U/L (ref 0–50)
ANION GAP SERPL CALCULATED.3IONS-SCNC: 11 MMOL/L (ref 7–15)
AST SERPL W P-5'-P-CCNC: 20 U/L (ref 0–45)
BILIRUB SERPL-MCNC: 0.3 MG/DL
BUN SERPL-MCNC: 13.4 MG/DL (ref 8–23)
CALCIUM SERPL-MCNC: 9.5 MG/DL (ref 8.8–10.2)
CHLORIDE SERPL-SCNC: 104 MMOL/L (ref 98–107)
CK SERPL-CCNC: 52 U/L (ref 26–192)
CREAT SERPL-MCNC: 0.83 MG/DL (ref 0.51–0.95)
CRP SERPL-MCNC: <3 MG/L
DEPRECATED HCO3 PLAS-SCNC: 25 MMOL/L (ref 22–29)
EGFRCR SERPLBLD CKD-EPI 2021: 67 ML/MIN/1.73M2
GLUCOSE SERPL-MCNC: 85 MG/DL (ref 70–99)
NT-PROBNP SERPL-MCNC: 179 PG/ML (ref 0–1800)
POTASSIUM SERPL-SCNC: 4 MMOL/L (ref 3.4–5.3)
PROT SERPL-MCNC: 7.2 G/DL (ref 6.4–8.3)
SODIUM SERPL-SCNC: 140 MMOL/L (ref 135–145)
TSH SERPL DL<=0.005 MIU/L-ACNC: 1.91 UIU/ML (ref 0.3–4.2)
VIT B12 SERPL-MCNC: 2172 PG/ML (ref 232–1245)
VIT D+METAB SERPL-MCNC: 88 NG/ML (ref 20–50)

## 2024-04-30 ENCOUNTER — ORDERS ONLY (AUTO-RELEASED) (OUTPATIENT)
Dept: FAMILY MEDICINE | Facility: CLINIC | Age: 89
End: 2024-04-30
Payer: COMMERCIAL

## 2024-04-30 DIAGNOSIS — R06.02 SHORTNESS OF BREATH: ICD-10-CM

## 2024-04-30 DIAGNOSIS — R68.89 DECREASED EXERCISE TOLERANCE: ICD-10-CM

## 2024-04-30 DIAGNOSIS — G47.19 EXCESSIVE DAYTIME SLEEPINESS: ICD-10-CM

## 2024-04-30 NOTE — LETTER
[facility]  [address] [place] [address]  [phone]                    May 22, 2024    Gifty Villa  [address] [address]      Dear Gifty,    Here is a summary of your recent test results:Your heart monitor results are complete and there has been no abnormal heart rhythm noted.  The fatigue and shortness of breath are likely related to lung function declining.  Following up with pulmonology is the next step.  If anything changes in the interim before your July scheduled appointment please let me know.  If able to be on a wait list I'd encourage you to do this. If you have any questions please do not hesitate to contact our office via phone ([phone]) or [employer].Your test results are enclosed. Please contact me if you have any questions.In addition, here is a list of due or overdue Health Maintenance reminders.    Health Maintenance Due   Topic Date Due    RSV VACCINE (Pregnancy & 60+) (1 - 1-dose 60+ series) Never done    Zoster (Shingles) Vaccine (3 of 3) 02/21/2020    Osteoporosis Screening  03/29/2021    Annual Wellness Visit  06/13/2024    Ferritin Lab  06/13/2024    Cholesterol Lab  06/13/2024    Kidney Microalbumin Urine Test  06/13/2024    ANNUAL REVIEW OF HM ORDERS  06/13/2024    FALL RISK ASSESSMENT  06/15/2024   Please call us at [phone] (or use [employer]anchik) to address the above recommendations.Thank you very much for trusting [facility].   Healthy regards,      Leann Gordon PA-C

## 2024-05-01 NOTE — RESULT ENCOUNTER NOTE
Gayla  I have reviewed your recent test results:    -Normal red blood cell (hgb) levels, normal white blood cell count and normal platelet levels.  -A1C (diabetic test) is very modestly elevated and consistent with prediabetes but otherwise not overly concerning.  -TSH (thyroid stimulating hormone) level is normal which indicates normal thyroid function.  -Vitamin D level is high/normal.  Please decrease any supplement that you are taking to every other day  -B12 level is elevated, however, this is actually improved from what it was 10 months ago.  Please discontinue any B12 supplementation that you are taking.  -You have no evidence of heart strain (normal BNP)  -Inflammatory markers are normal (normal ESR/CRP)  -Muscle enzyme test is normal (CK)    -Your chest x-ray does not show any structural abnormalities or signs of infection.    I will have a 7-day heart monitor mailed to your home so that we can get a better picture of your heart rhythm and if that could possibly be contributing to your fatigue/drowsiness and shortness of breath.    For additional lab test information, www.testing.com is an excellent reference.     If you have any questions please do not hesitate to contact our office via phone ([phone]) or MyChart.    Healthy regards,     Leann Gordon MBA, MS, PA-C  [facility]

## 2024-05-01 NOTE — RESULT ENCOUNTER NOTE
Gayla  I have reviewed your recent test results:    Great news!  Your chest x-ray does not show any abnormality of your heart structurally.     If you have any questions please do not hesitate to contact our office via phone ([phone]) or [employer].    Healthy regards,     Leann Gordon MBA, MS, PA-C  [facility]

## 2024-05-21 PROCEDURE — 93244 EXT ECG>48HR<7D REV&INTERPJ: CPT | Performed by: INTERNAL MEDICINE

## 2024-05-22 NOTE — RESULT ENCOUNTER NOTE
Team: please mail result letter & full printout of ZIO Patch report    Gayla & family  I have reviewed your recent test results:    Your heart monitor results are complete and there has been no abnormal heart rhythm noted.  The fatigue and shortness of breath are likely related to lung function declining.  Following up with pulmonology is the next step.  If anything changes in the interim before your July scheduled appointment please let me know.  If able to be on a wait list I'd encourage you to do this.     If you have any questions please do not hesitate to contact our office via phone ([phone]) or MyChart.    Healthy regards,     Leann Gordon MBA, MS, PA-C  [facility]

## 2024-05-30 DIAGNOSIS — F41.9 ANXIETY: ICD-10-CM

## 2024-05-30 RX ORDER — ESCITALOPRAM OXALATE 5 MG/1
5 TABLET ORAL EVERY MORNING
Qty: 90 TABLET | Refills: 0 | Status: SHIPPED | OUTPATIENT
Start: 2024-05-30 | End: 2024-07-03

## 2024-06-05 DIAGNOSIS — J44.9 CHRONIC OBSTRUCTIVE PULMONARY DISEASE, UNSPECIFIED COPD TYPE (H): ICD-10-CM

## 2024-06-05 RX ORDER — TIOTROPIUM BROMIDE AND OLODATEROL 3.124; 2.736 UG/1; UG/1
SPRAY, METERED RESPIRATORY (INHALATION)
Qty: 4 G | Refills: 2 | Status: SHIPPED | OUTPATIENT
Start: 2024-06-05 | End: 2024-09-03

## 2024-06-07 DIAGNOSIS — J44.9 CHRONIC OBSTRUCTIVE PULMONARY DISEASE, UNSPECIFIED COPD TYPE (H): ICD-10-CM

## 2024-06-07 RX ORDER — IPRATROPIUM BROMIDE AND ALBUTEROL SULFATE 2.5; .5 MG/3ML; MG/3ML
SOLUTION RESPIRATORY (INHALATION)
Qty: 90 ML | Refills: 2 | OUTPATIENT
Start: 2024-06-07

## 2024-06-10 ENCOUNTER — MYC REFILL (OUTPATIENT)
Dept: FAMILY MEDICINE | Facility: CLINIC | Age: 89
End: 2024-06-10
Payer: COMMERCIAL

## 2024-06-10 DIAGNOSIS — J44.9 CHRONIC OBSTRUCTIVE PULMONARY DISEASE, UNSPECIFIED COPD TYPE (H): ICD-10-CM

## 2024-06-11 RX ORDER — IPRATROPIUM BROMIDE AND ALBUTEROL SULFATE 2.5; .5 MG/3ML; MG/3ML
1 SOLUTION RESPIRATORY (INHALATION) 4 TIMES DAILY
Qty: 90 ML | Refills: 2 | Status: SHIPPED | OUTPATIENT
Start: 2024-06-11 | End: 2024-07-03

## 2024-06-17 ENCOUNTER — TELEPHONE (OUTPATIENT)
Dept: FAMILY MEDICINE | Facility: CLINIC | Age: 89
End: 2024-06-17

## 2024-06-17 NOTE — TELEPHONE ENCOUNTER
Placed call to Chasidy to help reschedule 6/18/24 ANW. PCP booking out until Sept, and Chasidy doesn't want Gayla to wait that long - no appt made. Wants to stick with PCP. Can patient be seen sooner? Is so, please advise which slots to use.     Please call kev [place] on file

## 2024-06-17 NOTE — TELEPHONE ENCOUNTER
Pt scheduled.  Not sure if there is a lab here however pt did not want to wait another 2 months.  She was thankful for this appt.  Asked if she needs to do labs before her appt?    If its fasting, will need to find a morning appt.    Anna PICHARDO

## 2024-06-17 NOTE — TELEPHONE ENCOUNTER
OK to fit patient in any 40 minute spot for annual wellness visit      Leann Gordon MBA, MS, PA-C  [facility]

## 2024-06-26 ENCOUNTER — HOSPITAL ENCOUNTER (OUTPATIENT)
Dept: CARDIAC REHAB | Facility: CLINIC | Age: 89
Discharge: HOME | End: 2024-06-26
Attending: PHYSICIAN ASSISTANT
Payer: COMMERCIAL

## 2024-06-26 DIAGNOSIS — J44.9 CHRONIC OBSTRUCTIVE PULMONARY DISEASE, UNSPECIFIED COPD TYPE (H): ICD-10-CM

## 2024-06-26 PROCEDURE — 94729 DIFFUSING CAPACITY: CPT

## 2024-06-26 PROCEDURE — 94375 RESPIRATORY FLOW VOLUME LOOP: CPT

## 2024-06-26 PROCEDURE — 94726 PLETHYSMOGRAPHY LUNG VOLUMES: CPT

## 2024-06-28 LAB
DLCOUNC-%PRED-PRE: 47 %
DLCOUNC-PRE: 8.06 ML/MIN/MMHG
DLCOUNC-PRED: 16.84 ML/MIN/MMHG
ERV-%PRED-PRE: 85 %
ERV-PRE: 0.56 L
ERV-PRED: 0.65 L
EXPTIME-PRE: 16.56 SEC
FEF2575-%PRED-PRE: 12 %
FEF2575-PRE: 0.15 L/SEC
FEF2575-PRED: 1.22 L/SEC
FEFMAX-%PRED-PRE: 56 %
FEFMAX-PRE: 2.03 L/SEC
FEFMAX-PRED: 3.59 L/SEC
FEV1-%PRED-PRE: 33 %
FEV1-PRE: 0.51 L
FEV1FEV6-PRE: 35 %
FEV1FEV6-PRED: 76 %
FEV1FVC-PRE: 23 %
FEV1FVC-PRED: 77 %
FEV1SVC-PRE: 21 %
FEV1SVC-PRED: 58 %
FIFMAX-PRE: 2.9 L/SEC
FRCPLETH-%PRED-PRE: 195 %
FRCPLETH-PRE: 5.09 L
FRCPLETH-PRED: 2.6 L
FVC-%PRED-PRE: 112 %
FVC-PRE: 2.23 L
FVC-PRED: 1.98 L
IC-%PRED-PRE: 136 %
IC-PRE: 1.77 L
IC-PRED: 1.3 L
RVPLETH-%PRED-PRE: 199 %
RVPLETH-PRE: 4.49 L
RVPLETH-PRED: 2.26 L
TLCPLETH-%PRED-PRE: 150 %
TLCPLETH-PRE: 6.86 L
TLCPLETH-PRED: 4.57 L
VA-%PRED-PRE: 88 %
VA-PRE: 3.56 L
VC-%PRED-PRE: 90 %
VC-PRE: 2.37 L
VC-PRED: 2.61 L

## 2024-07-03 ENCOUNTER — OFFICE VISIT (OUTPATIENT)
Dept: FAMILY MEDICINE | Facility: CLINIC | Age: 89
End: 2024-07-03
Payer: COMMERCIAL

## 2024-07-03 VITALS
DIASTOLIC BLOOD PRESSURE: 60 MMHG | OXYGEN SATURATION: 92 % | BODY MASS INDEX: 23.24 KG/M2 | SYSTOLIC BLOOD PRESSURE: 120 MMHG | WEIGHT: 126.3 LBS | RESPIRATION RATE: 16 BRPM | TEMPERATURE: 98.4 F | HEIGHT: 62 IN | HEART RATE: 103 BPM

## 2024-07-03 DIAGNOSIS — F33.42 MAJOR DEPRESSIVE DISORDER, RECURRENT EPISODE, IN FULL REMISSION (H): ICD-10-CM

## 2024-07-03 DIAGNOSIS — G30.1 MODERATE LATE ONSET ALZHEIMER'S DEMENTIA WITH ANXIETY (H): ICD-10-CM

## 2024-07-03 DIAGNOSIS — R15.9 URINARY AND FECAL INCONTINENCE: ICD-10-CM

## 2024-07-03 DIAGNOSIS — R79.0 LOW FERRITIN: ICD-10-CM

## 2024-07-03 DIAGNOSIS — Z00.00 ENCOUNTER FOR MEDICARE ANNUAL WELLNESS EXAM: Primary | ICD-10-CM

## 2024-07-03 DIAGNOSIS — G25.81 RESTLESS LEGS SYNDROME (RLS): ICD-10-CM

## 2024-07-03 DIAGNOSIS — Z91.81 AT RISK FOR FALLS: ICD-10-CM

## 2024-07-03 DIAGNOSIS — F41.9 ANXIETY: ICD-10-CM

## 2024-07-03 DIAGNOSIS — R06.02 SHORTNESS OF BREATH: ICD-10-CM

## 2024-07-03 DIAGNOSIS — J44.9 CHRONIC OBSTRUCTIVE PULMONARY DISEASE, UNSPECIFIED COPD TYPE (H): ICD-10-CM

## 2024-07-03 DIAGNOSIS — K27.9 PEPTIC ULCER: ICD-10-CM

## 2024-07-03 DIAGNOSIS — G47.19 EXCESSIVE DAYTIME SLEEPINESS: ICD-10-CM

## 2024-07-03 DIAGNOSIS — R32 URINARY AND FECAL INCONTINENCE: ICD-10-CM

## 2024-07-03 DIAGNOSIS — I10 ESSENTIAL HYPERTENSION: ICD-10-CM

## 2024-07-03 DIAGNOSIS — M85.80 OSTEOPENIA, UNSPECIFIED LOCATION: ICD-10-CM

## 2024-07-03 DIAGNOSIS — F02.B4 MODERATE LATE ONSET ALZHEIMER'S DEMENTIA WITH ANXIETY (H): ICD-10-CM

## 2024-07-03 DIAGNOSIS — E78.5 HYPERLIPIDEMIA LDL GOAL <130: ICD-10-CM

## 2024-07-03 DIAGNOSIS — R79.89 LOW VITAMIN B12 LEVEL: ICD-10-CM

## 2024-07-03 DIAGNOSIS — R68.89 DECREASED EXERCISE TOLERANCE: ICD-10-CM

## 2024-07-03 PROCEDURE — G0439 PPPS, SUBSEQ VISIT: HCPCS | Performed by: PHYSICIAN ASSISTANT

## 2024-07-03 PROCEDURE — 99215 OFFICE O/P EST HI 40 MIN: CPT | Mod: 25 | Performed by: PHYSICIAN ASSISTANT

## 2024-07-03 RX ORDER — ALBUTEROL SULFATE 90 UG/1
1-2 AEROSOL, METERED RESPIRATORY (INHALATION) EVERY 4 HOURS PRN
Qty: 8.5 G | Refills: 11 | Status: SHIPPED | OUTPATIENT
Start: 2024-07-03

## 2024-07-03 RX ORDER — IPRATROPIUM BROMIDE AND ALBUTEROL SULFATE 2.5; .5 MG/3ML; MG/3ML
1 SOLUTION RESPIRATORY (INHALATION) 4 TIMES DAILY
Qty: 1080 ML | Refills: 1 | Status: SHIPPED | OUTPATIENT
Start: 2024-07-03 | End: 2024-10-01

## 2024-07-03 RX ORDER — ESCITALOPRAM OXALATE 5 MG/1
5 TABLET ORAL EVERY MORNING
Qty: 90 TABLET | Refills: 3 | Status: SHIPPED | OUTPATIENT
Start: 2024-07-03

## 2024-07-03 RX ORDER — DULOXETIN HYDROCHLORIDE 20 MG/1
20 CAPSULE, DELAYED RELEASE ORAL 2 TIMES DAILY
Qty: 180 CAPSULE | Refills: 3 | Status: SHIPPED | OUTPATIENT
Start: 2024-07-03

## 2024-07-03 SDOH — HEALTH STABILITY: PHYSICAL HEALTH
ON AVERAGE, HOW MANY DAYS PER WEEK DO YOU ENGAGE IN MODERATE TO STRENUOUS EXERCISE (LIKE A BRISK WALK)?: PATIENT DECLINED

## 2024-07-03 ASSESSMENT — PATIENT HEALTH QUESTIONNAIRE - PHQ9
SUM OF ALL RESPONSES TO PHQ QUESTIONS 1-9: 1
SUM OF ALL RESPONSES TO PHQ QUESTIONS 1-9: 1
10. IF YOU CHECKED OFF ANY PROBLEMS, HOW DIFFICULT HAVE THESE PROBLEMS MADE IT FOR YOU TO DO YOUR WORK, TAKE CARE OF THINGS AT HOME, OR GET ALONG WITH OTHER PEOPLE: NOT DIFFICULT AT ALL

## 2024-07-03 ASSESSMENT — ANXIETY QUESTIONNAIRES
GAD7 TOTAL SCORE: 2
1. FEELING NERVOUS, ANXIOUS, OR ON EDGE: SEVERAL DAYS
6. BECOMING EASILY ANNOYED OR IRRITABLE: NOT AT ALL
7. FEELING AFRAID AS IF SOMETHING AWFUL MIGHT HAPPEN: NOT AT ALL
3. WORRYING TOO MUCH ABOUT DIFFERENT THINGS: NOT AT ALL
8. IF YOU CHECKED OFF ANY PROBLEMS, HOW DIFFICULT HAVE THESE MADE IT FOR YOU TO DO YOUR WORK, TAKE CARE OF THINGS AT HOME, OR GET ALONG WITH OTHER PEOPLE?: NOT DIFFICULT AT ALL
IF YOU CHECKED OFF ANY PROBLEMS ON THIS QUESTIONNAIRE, HOW DIFFICULT HAVE THESE PROBLEMS MADE IT FOR YOU TO DO YOUR WORK, TAKE CARE OF THINGS AT HOME, OR GET ALONG WITH OTHER PEOPLE: NOT DIFFICULT AT ALL
7. FEELING AFRAID AS IF SOMETHING AWFUL MIGHT HAPPEN: NOT AT ALL
GAD7 TOTAL SCORE: 2
5. BEING SO RESTLESS THAT IT IS HARD TO SIT STILL: NOT AT ALL
4. TROUBLE RELAXING: NOT AT ALL
GAD7 TOTAL SCORE: 2
2. NOT BEING ABLE TO STOP OR CONTROL WORRYING: SEVERAL DAYS

## 2024-07-03 ASSESSMENT — SOCIAL DETERMINANTS OF HEALTH (SDOH): HOW OFTEN DO YOU GET TOGETHER WITH FRIENDS OR RELATIVES?: MORE THAN THREE TIMES A WEEK

## 2024-07-03 NOTE — PATIENT INSTRUCTIONS
Patient Education   Preventive Care Advice   This is general advice given by our system to help you stay healthy. However, your care team may have specific advice just for you. Please talk to your care team about your preventive care needs.  Nutrition  Eat 5 or more servings of fruits and vegetables each day.  Try wheat bread, brown rice and whole grain pasta (instead of white bread, rice, and pasta).  Get enough calcium and vitamin D. Check the label on foods and aim for 100% of the RDA (recommended daily allowance).  Lifestyle  Exercise at least 150 minutes each week  (30 minutes a day, 5 days a week).  Do muscle strengthening activities 2 days a week. These help control your weight and prevent disease.  No smoking.  Wear sunscreen to prevent skin cancer.  Have a dental exam and cleaning every 6 months.  Yearly exams  See your health care team every year to talk about:  Any changes in your health.  Any medicines your care team has prescribed.  Preventive care, family planning, and ways to prevent chronic diseases.  Shots (vaccines)   HPV shots (up to age 26), if you've never had them before.  Hepatitis B shots (up to age 59), if you've never had them before.  COVID-19 shot: Get this shot when it's due.  Flu shot: Get a flu shot every year.  Tetanus shot: Get a tetanus shot every 10 years.  Pneumococcal, hepatitis A, and RSV shots: Ask your care team if you need these based on your risk.  Shingles shot (for age 50 and up)  General health tests  Diabetes screening:  Starting at age 35, Get screened for diabetes at least every 3 years.  If you are younger than age 35, ask your care team if you should be screened for diabetes.  Cholesterol test: At age 39, start having a cholesterol test every 5 years, or more often if advised.  Bone density scan (DEXA): At age 50, ask your care team if you should have this scan for osteoporosis (brittle bones).  Hepatitis C: Get tested at least once in your life.  STIs (sexually  transmitted infections)  Before age 24: Ask your care team if you should be screened for STIs.  After age 24: Get screened for STIs if you're at risk. You are at risk for STIs (including HIV) if:  You are sexually active with more than one person.  You don't use condoms every time.  You or a partner was diagnosed with a sexually transmitted infection.  If you are at risk for HIV, ask about PrEP medicine to prevent HIV.  Get tested for HIV at least once in your life, whether you are at risk for HIV or not.  Cancer screening tests  Cervical cancer screening: If you have a cervix, begin getting regular cervical cancer screening tests starting at age 21.  Breast cancer scan (mammogram): If you've ever had breasts, begin having regular mammograms starting at age 40. This is a scan to check for breast cancer.  Colon cancer screening: It is important to start screening for colon cancer at age 45.  Have a colonoscopy test every 10 years (or more often if you're at risk) Or, ask your provider about stool tests like a FIT test every year or Cologuard test every 3 years.  To learn more about your testing options, visit:   .  For help making a decision, visit:   https://bit.ly/bi47700.  Prostate cancer screening test: If you have a prostate, ask your care team if a prostate cancer screening test (PSA) at age 55 is right for you.  Lung cancer screening: If you are a current or former smoker ages 50 to 80, ask your care team if ongoing lung cancer screenings are right for you.  For informational purposes only. Not to replace the advice of your health care provider. Copyright   2023 [facility]. All rights reserved. Clinically reviewed by the [facility]. [employer] 708284 - REV 01/24.  Learning About Activities of Daily Living  What are activities of daily living?     Activities of daily living (ADLs) are the basic self-care tasks you do every day. These include eating, bathing, dressing,  and moving around.  As you age, and if you have health problems, you may find that it's harder to do some of these tasks. If so, your doctor can suggest ideas that may help.  To measure what kind of help you may need, your doctor will ask how well you are able to do ADLs. Let your doctor know if there are any tasks that you are having trouble doing. This is an important first step to getting help. And when you have the help you need, you can stay as independent as possible.  How will a doctor assess your ADLs?  Asking about ADLs is part of a routine health checkup your doctor will likely do as you age. Your health check might be done in a doctor's office, in your home, or at a hospital. The goal is to find out if you are having any problems that could make it hard to care for yourself or that make it unsafe for you to be on your own.  To measure your ADLs, your doctor will ask how hard it is for you to do routine tasks. Your doctor may also want to know if you have changed the way you do a task because of a health problem. Your doctor may watch how you:  Walk back and forth.  Keep your balance while you stand or walk.  Move from sitting to standing or from a bed to a chair.  Button or unbutton a shirt or sweater.  Remove and put on your shoes.  It's common to feel a little worried or anxious if you find you can't do all the things you used to be able to do. Talking with your doctor about ADLs is a way to make sure you're as safe as possible and able to care for yourself as well as you can. You may want to bring a caregiver, friend, or family member to your checkup. They can help you talk to your doctor.  Follow-up care is a key part of your treatment and safety. Be sure to make and go to all appointments, and call your doctor if you are having problems. It's also a good idea to know your test results and keep a list of the medicines you take.  Current as of: October 24, 2023               Content Version: 14.0     5338-0374 [employer].   Care instructions adapted under license by your healthcare professional. If you have questions about a medical condition or this instruction, always ask your healthcare professional. [employer] disclaims any warranty or liability for your use of this information.      Preventing Falls: Care Instructions  Injuries and health problems such as trouble walking or poor eyesight can increase your risk of falling. So can some medicines. But there are things you can do to help prevent falls. You can exercise to get stronger. You can also arrange your home to make it safer.    Talk to your doctor about the medicines you take. Ask if any of them increase the risk of falls and whether they can be changed or stopped.   Try to exercise regularly. It can help improve your strength and balance. This can help lower your risk of falling.     Practice fall safety and prevention.    Wear low-heeled shoes that fit well and give your feet good support. Talk to your doctor if you have foot problems that make this hard.  Carry a cellphone or wear a medical alert device that you can use to call for help.  Use stepladders instead of chairs to reach high objects. Don't climb if you're at risk for falls. Ask for help, if needed.  Wear the correct eyeglasses, if you need them.    Make your home safer.    Remove rugs, cords, clutter, and furniture from walkways.  Keep your house well lit. Use night-lights in hallways and bathrooms.  Install and use sturdy handrails on stairways.  Wear nonskid footwear, even inside. Don't walk barefoot or in socks without shoes.    Be safe outside.    Use handrails, curb cuts, and ramps whenever possible.  Keep your hands free by using a shoulder bag or backpack.  Try to walk in well-lit areas. Watch out for uneven ground, changes in pavement, and debris.  Be careful in the winter. Walk on the grass or gravel when sidewalks are slippery. Use de-icer on steps  "and walkways. Add non-slip devices to shoes.    Put grab bars and nonskid mats in your shower or tub and near the toilet. Try to use a shower chair or bath bench when bathing.   Get into a tub or shower by putting in your weaker leg first. Get out with your strong side first. Have a phone or medical alert device in the bathroom with you.   Where can you learn more?  Go to https://www.[employer].net/patiented  Enter G117 in the search box to learn more about \"Preventing Falls: Care Instructions.\"  Current as of: July 17, 2023               Content Version: 14.0    5822-4954 [employer].   Care instructions adapted under license by your healthcare professional. If you have questions about a medical condition or this instruction, always ask your healthcare professional. [employer] disclaims any warranty or liability for your use of this information.      Bladder Training: Care Instructions  Your Care Instructions     Bladder training is used to treat urge incontinence and stress incontinence. Urge incontinence means that the need to urinate comes on so fast that you can't get to a toilet in time. Stress incontinence means that you leak urine because of pressure on your bladder. For example, it may happen when you laugh, cough, or lift something heavy.  Bladder training can increase how long you can wait before you have to urinate. It can also help your bladder hold more urine. And it can give you better control over the urge to urinate.  It is important to remember that bladder training takes a few weeks to a few months to make a difference. You may not see results right away, but don't give up.  Follow-up care is a key part of your treatment and safety. Be sure to make and go to all appointments, and call your doctor if you are having problems. It's also a good idea to know your test results and keep a list of the medicines you take.  How can you care for yourself at home?  Work with your " doctor to come up with a bladder training program that is right for you. You may use one or more of the following methods.  Delayed urination  In the beginning, try to keep from urinating for 5 minutes after you first feel the need to go.  While you wait, take deep, slow breaths to relax. Kegel exercises can also help you delay the need to go to the bathroom.  After some practice, when you can easily wait 5 minutes to urinate, try to wait 10 minutes before you urinate.  Slowly increase the waiting period until you are able to control when you have to urinate.  Scheduled urination  Empty your bladder when you first wake up in the morning.  Schedule times throughout the day when you will urinate.  Start by going to the bathroom every hour, even if you don't need to go.  Slowly increase the time between trips to the bathroom.  When you have found a schedule that works well for you, keep doing it.  If you wake up during the night and have to urinate, do it. Apply your schedule to waking hours only.  Kegel exercises  These tighten and strengthen pelvic muscles, which can help you control the flow of urine. (If doing these exercises causes pain, stop doing them and talk with your doctor.) To do Kegel exercises:  Squeeze your muscles as if you were trying not to pass gas. Or squeeze your muscles as if you were stopping the flow of urine. Your belly, legs, and buttocks shouldn't move.  Hold the squeeze for 3 seconds, then relax for 5 to 10 seconds.  Start with 3 seconds, then add 1 second each week until you are able to squeeze for 10 seconds.  Repeat the exercise 10 times a session. Do 3 to 8 sessions a day.  When should you call for help?  Watch closely for changes in your health, and be sure to contact your doctor if:    Your incontinence is getting worse.     You do not get better as expected.   Where can you learn more?  Go to https://www.healthwise.net/patiented  Enter V684 in the search box to learn more about  "\"Bladder Training: Care Instructions.\"  Current as of: November 15, 2023               Content Version: 14.0    9880-2934 [employer].   Care instructions adapted under license by your healthcare professional. If you have questions about a medical condition or this instruction, always ask your healthcare professional. [employer] disclaims any warranty or liability for your use of this information.         "

## 2024-07-03 NOTE — PROGRESS NOTES
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  Continue current regimen.  Follow-up with gerontology as scheduled 8/29/2024  - Home Care Referral  - escitalopram (LEXAPRO) 5 MG tablet  Dispense: 90 tablet; Refill: 3  - DULoxetine (CYMBALTA) 20 MG capsule  Dispense: 180 capsule; Refill: 3    Chronic obstructive pulmonary disease, unspecified COPD type (H) - previously followed by Dr. [facility]  Decreased exercise tolerance  Excessive daytime sleepiness  Shortness of breath  Slight improvement with scheduled 4 times daily DuoNebs.  Still using as needed albuterol twice daily for symptomatic shortness of breath.  Still quite fatigued.  Recent spirometry showed moderate obstruction and some air trapping.  Scheduled pulmonology consultation 7/31/2024.  Interested in discussing possible oxygen use at that time.  Patient significantly fatigued and exertional limits very taxing on caregivers.  Recommend home care evaluation to see what services are available for the patient.  Did discuss overnight oximetry but daughter and son-in-law do not feel that she would keep it on overnight.  Continue 4 times daily scheduled DuoNebs, as needed albuterol and controller inhaler.  Echocardiogram to evaluate for structural heart disease.  Last echocardiogram 2016  - ipratropium - albuterol 0.5 mg/2.5 mg/3 mL (DUONEB) 0.5-2.5 (3) MG/3ML neb solution  Dispense: 1080 mL; Refill: 1  - Home Care Referral  - Echocardiogram Complete  - albuterol (PROAIR HFA) 108 (90 Base) MCG/ACT inhaler  Dispense: 8.5 g; Refill: 11    Osteopenia, unspecified location - DEXA 3/29/2011  - repeat 2021  Will postpone ordering surveillance DEXA study due to current workup for exertional capacity/shortness of breath    Hyperlipidemia LDL goal <130  Managed with diet.  Will recheck labs with next routine blood work  - Lipid panel reflex to direct LDL Non-fasting  - Home Care Referral    Essential hypertension  Normotensive.  Continue current  - Albumin Random Urine Quantitative with Creat Ratio  - Home Care Referral    Restless legs syndrome (RLS)  Low ferritin  Continue iron supplementation.  - Ferritin  - Home Care Referral    Peptic ulcer  Continue daily omeprazole  - Home Care Referral  - omeprazole (PRILOSEC) 20 MG DR capsule    Low vitamin B12 level  Continue every other day B12 supplementation  - Home Care Referral    At risk for falls  Continued fall precautions.  Home care evaluation for safety  - Home Care Referral    Urinary and fecal incontinence - wears adult diapers   Wears adult diapers.  Does have multiple bowel movements daily.  Some darker stools since initiating iron.    Patient has been advised of split billing requirements and indicates understanding: Yes        Counseling  Appropriate preventive services were discussed with this patient, including applicable screening as appropriate for fall prevention, nutrition, physical activity, Tobacco-use cessation, weight loss and cognition.  Checklist reviewing preventive services available has been given to the patient.  Reviewed patient's diet, addressing concerns and/or questions.   Updated plan of care.  Patient reported difficulty with activities of daily living were addressed today.Information on urinary incontinence and treatment options given to patient.     48 minutes spent by me on the date of the encounter doing chart review, history and exam, documentation and further activities per the note OUTSIDE OF PREVENTATIVE CARE    Return in about 4 weeks (around 7/31/2024) for pulmonology.          Leann Gordon MBA, MS, PA-C  [facility]        Documentation of Face-to-Face and Certification for Home Health Services     Patient: Gifty Villa   YOB: 1935  MR Number: [id number]  Today's Date: 7/3/2024    I certify that patient: Gifty Villa is under my care and that I, or a nurse practitioner or physician's assistant working with me, had a face-to-face encounter that meets the physician face-to-face encounter requirements with this patient on: 7/3/2024.    This encounter with the patient was in whole, or in part, for the following medical condition, which is the primary reason for home health care: Home safety evaluation, bathing and ADL assistance.    I certify that, based on my findings, the following services are medically necessary home health services: Nursing, Occupational Therapy, and Physical Therapy.    My clinical findings support the need for the above services because: Nurse is needed: To provide assessment and oversight required in the home to assure adherence to the medical plan due to: Significant exertional limitations, fall risk, bathing assistance..., Occupational Therapy Services are needed to assess and treat cognitive ability and address ADL safety due to impairment in memory and ambulation limitations., and Physical Therapy Services are needed to assess and treat the following functional impairments: Significant fall risk due to exertional limitations/COPD.    Further, I certify that my clinical findings support that this patient is homebound (i.e. absences from home require considerable and taxing effort and are for medical reasons or [employer] services or infrequently or of short duration when for other reasons) because: Requires assistance of another person or specialized equipment to access medical services because patient: Is prone to wander/get lost without assistance., Is unable to operate assistive equipment on their own., and Requires supervision of another for safe  transfer...    Based on the above findings. I certify that this patient is confined to the home and needs intermittent skilled nursing care, physical therapy and/or speech therapy.  The patient is under my care, and I have initiated the establishment of the plan of care.  This patient will be followed by a physician who will periodically review the plan of care.  Physician/Provider to provide follow up care: Leann Gordon    Responsible Medicare certified PECOS Physician:   Leann Gordon MBA, MS, PA-C  [facility]    Physician Signature: See electronic signature associated with these discharge orders.  Date: 7/3/2024       Clifford Shukla is a 89 year old, presenting for the following:  Physical        7/3/2024     1:40 PM   Additional Questions   Roomed by Anna NOVAK   Accompanied by Daughters, Son     Health Care Directive  Patient has a Health Care Directive on file  Advance care planning document is on file and is current.    HPI    COPD/fatigue/shortness of breath  The patient has a history of COPD, experiencing worsening shortness of breath and fatigue over the past six months. She reports a significant decrease in exercise tolerance, needing to stop and catch her breath even with short walking distances.  Continues Stiolto daily.  At her last visit on 4/23/2024 we increased her DuoNebs to four times daily, which has shown some improvement in her breathing symptoms but no improvement in her fatigue.  They use albuterol twice daily as needed for symptomatic shortness of breath in addition to the a forementioned scheduled medications.  She experiences excessive daytime sleepiness, sleeps more at night, and takes frequent naps, though she denies chest pain, leg swelling, or difficulty lying flat.    A comprehensive workup, including labs, EKG, chest x-ray, and spirometry, was completed 4/30/2024, all labs were normal, and a 6-day ZIO patch showed no arrhythmia. A chest x-ray was  "normal, but complete spirometry completed 6/26/2024 indicated moderate airflow obstruction, air trapping with hyperinflation, and a moderate diffusion defect. The patient continues to experience significant fatigue and falls asleep with minimal activity. A pulmonology consultation is scheduled for July 31, 2024.  Family is inquiring about oxygen prescribing.  Also requesting an increase in the dispense quantity for the DuoNeb prescription due to the scheduled four times daily use  to avoid frequent pharmacy visits.    Caregiver burden  The patient's family is in attendance today and reports that currently living with daughter Chasidy and son-in-law Shaylee is her primary caregiver.  Thankfully, Hermes has not had any falls, however, this is due to the diligent watchful eye of Chasidy and Jose G.  She requires frequent hygiene/bathroom assistance with multiple bowel movements daily requiring diligent cleanup and has occasional nighttime waking occurrences that are typically associated with needing to void but this causes Sharif Araiza to be woken by monitors frequently which is wearing on them.  They are not overly eager to have help within the home but would be open to a home safety evaluation to determine what services they may qualify for.    Tinnitus:  Patient notes she has had ringing in both her ears for the past year. She denies it getting better or worse. She has tried removing her aspirin, but this did not improve. She also tried flonase w/o any improvement.  We had referred her to ear nose and throat in 2019 and she states that she did complete this appointment, however, we never received records.  Release of information signed today.  She recalls that they told her there was \"nothing she can do \".     Early onset Alzheimer's dementia without behavioral disturbance (H)  She had a formal neuropsychological consultation by Ramonita Finch, phD, LP, ABPP at [facility] in [place] on 2/7/2018 and 2/14/2018.  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  Started on donepezil 3/13/2018 and her family has found this to be helful.  She had a consultation by Dr. Magana in the [facility] who corroborated her diagnosis.  He recommended continuing her medication, physical activity, social activity and mental activity to allow her to live her best life.   Continues to follow with Dr. Loretta Abernathy who is a geriatrician/memory clinic specialist.  Last office visit 2/2/2023 with next follow-up scheduled 6/15/2023.     Gayla is doing quite well since she moved into a home with her daughter Chasidy and son Jose G.  She was able to wean off of gabapentin and Abilify and now seems to have rare sundowning events.  Chasidy seems to think that heat exposure may be an exacerbating factor for her sundowning.  Overall, things are \"much better \".  Gayla laughs much more and overall seems happier.  Gayla enjoys reading, spending time with the dogs/cats, light activities about the house and crosswords. Is sleeping a bit more but may be the winter weather. They have found keeping things simple is helpful (ex: less items to pick from in the shower to make it less overwhelming)     Nighttimes at times can be anxiety filled though sounds to be able to be redirected most of the time. Sometimes needs reminders that home is indeed home. No concerns of wandering. Also though has some daytime anxiety notable with finger fidgeting.  Continues on duloxetine 20 mg twice daily.  At February 2023 visit with Dr. Abernathy Lexapro was added in an effort to improve overall anxiety and fidgetiness.  Chasidy and Gayla both feel that this has been helpful.    Next follow-up plan was to consider an \"as needed\" dose of Gabapentin or Melatonin 3-5 mg if " having nighttime issues.      Urge incontinence  Held Vesicare 2022 due to anticholinergic activity and memory concerns.  Overall doing quite well.  Has both urinary and fecal incontinence.  Wears adult diapers.  Lots of      Weight loss  Stable.  Doing much better since she moved in with her daughter.  We will watch weight as she has lost some weight since changing to this living arrangement.  Continue current regimen.   Wt Readings from Last 10 Encounters:   24 57.3 kg (126 lb 4.8 oz)   24 58.1 kg (128 lb)   24 58 kg (127 lb 14.4 oz)   06/15/23 54.9 kg (121 lb)   23 54.7 kg (120 lb 9.6 oz)   23 55.7 kg (122 lb 11.2 oz)   22 55.8 kg (123 lb 1.6 oz)   22 55.3 kg (122 lb)   22 58.2 kg (128 lb 6.4 oz)   22 58 kg (127 lb 12.8 oz)       Hypertension Follow-up  Amlodipine 10 mg daily   Do you check your blood pressure regularly outside of the clinic? No   Are you following a low salt diet? Yes  Are your blood pressures ever more than 140 on the top number (systolic) OR more   than 90 on the bottom number (diastolic), for example 140/90? No    Depression and Anxiety   Duloxetine 20 mg twice daily in addition of escitalopram 10 mg as mentioned above     How are you doing with your depression since your last visit? No change  How are you doing with your anxiety since your last visit?  No change  Are you having other symptoms that might be associated with depression or anxiety? No  Have you had a significant life event? No   Do you have any concerns with your use of alcohol or other drugs? No      Social History     Tobacco Use    Smoking status: Former     Current packs/day: 0.00     Types: Cigarettes     Quit date: 1988     Years since quittin.0    Smokeless tobacco: Never    Tobacco comments:     Quit in    Vaping Use    Vaping status: Never Used   Substance Use Topics    Alcohol use: Yes     Comment: very rare    Drug use: No         2024      1:58 PM 4/22/2024     6:39 PM 7/3/2024     1:46 PM   PHQ   PHQ-9 Total Score 7 13 1   Q9: Thoughts of better off dead/self-harm past 2 weeks Not at all Not at all Not at all         4/5/2022     8:53 AM 6/13/2023    10:52 AM 7/3/2024     1:47 PM   FERNY-7 SCORE   Total Score 0 (minimal anxiety) 1 (minimal anxiety) 2 (minimal anxiety)   Total Score 0 1 2         7/3/2024     1:46 PM   Last PHQ-9   1.  Little interest or pleasure in doing things 0   2.  Feeling down, depressed, or hopeless 0   3.  Trouble falling or staying asleep, or sleeping too much 0   4.  Feeling tired or having little energy 1   5.  Poor appetite or overeating 0   6.  Feeling bad about yourself 0   7.  Trouble concentrating 0   8.  Moving slowly or restless 0   Q9: Thoughts of better off dead/self-harm past 2 weeks 0   PHQ-9 Total Score 1         7/3/2024     1:47 PM   FERNY-7    1. Feeling nervous, anxious, or on edge 1   2. Not being able to stop or control worrying 1   3. Worrying too much about different things 0   4. Trouble relaxing 0   5. Being so restless that it is hard to sit still 0   6. Becoming easily annoyed or irritable 0   7. Feeling afraid, as if something awful might happen 0   FERNY-7 Total Score 2   If you checked any problems, how difficult have they made it for you to do your work, take care of things at home, or get along with other people? Not difficult at all       Suicide Assessment Five-step Evaluation and Treatment (SAFE-T)          6/13/2023   General Health   How would you rate your overall physical health? Excellent            6/13/2023   Nutrition   At least 4 servings of fruits and vegetables/day No            6/13/2023   Exercise   Frequency of exercise: 2-3 days/week               6/13/2023   Activities of Daily Living- Home Safety   Needs help with the following daily activites Transportation    Shopping    Preparing meals    Housework    Medication administration    Money management   Safety concerns in the home  No grab bars in the bathroom       Multiple values from one day are sorted in reverse-chronological order          No data to display                  2023   Hearing Screening   Hearing concerns? No concerns               No data to display                   Today's PHQ-9 Score:       7/3/2024     1:46 PM   PHQ-9 SCORE   PHQ-9 Total Score MyChart 1 (Minimal depression)   PHQ-9 Total Score 1           2023   Substance Use   Alcohol more than 3/day or more than 7/wk No        Social History     Tobacco Use    Smoking status: Former     Current packs/day: 0.00     Types: Cigarettes     Quit date: 1988     Years since quittin.0    Smokeless tobacco: Never    Tobacco comments:     Quit in    Vaping Use    Vaping status: Never Used   Substance Use Topics    Alcohol use: Yes     Comment: very rare    Drug use: No          Mammogram Screening - After age 74- determine frequency with patient based on health status, life expectancy and patient goals          Reviewed and updated as needed this visit by Provider                    Past Medical History:   Diagnosis Date    Anxiety     Arthritis     Backache, unspecified     COPD (chronic obstructive pulmonary disease) (H)     Dementia (H)     Essential hypertension, benign     Frequency-urgency sydrome     Hyperlipidemia     Pulmonary nodule     f/b Dr. Irwin at [facility] - scan 2018 showed stability since     RLS (restless legs syndrome) 2009    noted on PSG    Uncomplicated asthma      Past Surgical History:   Procedure Laterality Date    ARTHROPLASTY PATELLO-FEMORAL (KNEE)  2010    Left TKA    CARDIAC CATH - HIM SCAN      normal.  Done d/t abnormal stress test (false positive) - patient noted to have hypertensive heart disease    COLONOSCOPY  ~    No polyps    COLONOSCOPY N/A 10/10/2014    Procedure: COLONOSCOPY;  Surgeon: Chavez Ward MD;  Location:  GI    ESOPHAGOSCOPY, GASTROSCOPY, DUODENOSCOPY (EGD), COMBINED  2016      [facility]    ESOPHAGOSCOPY, GASTROSCOPY, DUODENOSCOPY (EGD), COMBINED N/A 6/1/2016    Procedure: COMBINED ESOPHAGOSCOPY, GASTROSCOPY, DUODENOSCOPY (EGD), BIOPSY SINGLE OR MULTIPLE;  Surgeon: Jada Cha MD;  Location: [facility] GI    EXCISE GANGLION WRIST Left 7/7/2015    Procedure: EXCISE GANGLION WRIST;  Surgeon: Justin Lyons MD;  Location: [facility] OR    HYSTERECTOMY, PAP NO LONGER INDICATED      wisdom teeth      ZZC SPINE FUSION,ANTER,3 SGMTS  1992 1992    ZZC TOTAL HIP ARTHROPLASTY  1995    Hip Replacement, Total (Right)    ZZC VAGINAL HYSTERECTOMY  1978    Hysterectomy, Vaginal w anterior repair     Current providers sharing in care for this patient include:  Patient Care Team:  Leann Gordon PA-C as PCP - General (Physician Assistant)  Loretta Abernathy DO as MD (Geriatric Medicine)  Leann Gordon PA-C as Assigned PCP  Fiorella Francis PA-C as Physician Assistant (Pulmonary Disease)    The following health maintenance items are reviewed in Epic and correct as of today:  Health Maintenance   Topic Date Due    RSV VACCINE (Pregnancy & 60+) (1 - 1-dose 60+ series) Never done    ZOSTER IMMUNIZATION (3 of 3) 02/21/2020    FERRITIN  06/13/2024    LIPID  06/13/2024    MICROALBUMIN  06/13/2024    DEXA  07/03/2025 (Originally 3/29/2021)    COVID-19 Vaccine (6 - 2023-24 season) 07/20/2024    A1C  07/26/2024    INFLUENZA VACCINE (1) 09/01/2024    PHQ-9  01/03/2025    CMP  04/26/2025    CBC  04/26/2025    VITAMIN B12  04/26/2025    MEDICARE ANNUAL WELLNESS VISIT  07/03/2025    ANNUAL REVIEW OF HM ORDERS  07/03/2025    FALL RISK ASSESSMENT  07/03/2025    DTAP/TDAP/TD IMMUNIZATION (2 - Td or Tdap) 10/08/2028    ADVANCE CARE PLANNING  07/03/2029    SPIROMETRY  Completed    COPD ACTION PLAN  Completed    DEPRESSION ACTION PLAN  Completed    Pneumococcal Vaccine: 65+ Years  Completed    IPV IMMUNIZATION  Aged Out    HPV IMMUNIZATION  Aged Out    MENINGITIS IMMUNIZATION  Aged Out    RSV MONOCLONAL  "ANTIBODY  Aged Out         Review of Systems  Constitutional, HEENT, cardiovascular, pulmonary, GI, , musculoskeletal, neuro, skin, endocrine and psych systems are negative, except as otherwise noted.     Objective    Exam  /60   Pulse 103   Temp 98.4  F (36.9  C) (Tympanic)   Resp 16   Ht 1.568 m (5' 1.75\")   Wt 57.3 kg (126 lb 4.8 oz)   SpO2 92%   BMI 23.29 kg/m     Estimated body mass index is 23.29 kg/m  as calculated from the following:    Height as of this encounter: 1.568 m (5' 1.75\").    Weight as of this encounter: 57.3 kg (126 lb 4.8 oz).    Physical Exam  GENERAL: alert and no distress, drowsy during short interview  EYES: Eyes grossly normal to inspection, PERRL and conjunctivae and sclerae normal  HENT: ear canals and TM's normal, nose and mouth without ulcers or lesions  NECK: no adenopathy, no asymmetry, masses, or scars  RESP: pursed lip breathing, lungs clear to auscultation - no rales, rhonchi or wheezes  CV: regular rate and rhythm, normal S1 S2, no S3 or S4, no murmur, click or rub, no peripheral edema  ABDOMEN: soft, nontender, no hepatosplenomegaly, no masses and bowel sounds normal  MS: no gross musculoskeletal defects noted, no edema  SKIN: no suspicious lesions or rashes  NEURO: Normal strength and tone, mentation intact and speech normal  PSYCH: mentation appears normal, affect normal/bright        7/3/2024   Mini Cog   Mini-Cog Not Completed (choose reason) Known dementia                 Signed Electronically by: Leann Gordon PA-C    "

## 2024-07-08 PROBLEM — R06.02 SHORTNESS OF BREATH: Status: ACTIVE | Noted: 2024-07-08

## 2024-07-19 DIAGNOSIS — F03.B4 MODERATE DEMENTIA WITH ANXIETY, UNSPECIFIED DEMENTIA TYPE (H): ICD-10-CM

## 2024-07-19 DIAGNOSIS — I10 ESSENTIAL HYPERTENSION: ICD-10-CM

## 2024-07-19 RX ORDER — DONEPEZIL HYDROCHLORIDE 10 MG/1
10 TABLET, FILM COATED ORAL
Qty: 90 TABLET | Refills: 2 | Status: SHIPPED | OUTPATIENT
Start: 2024-07-19

## 2024-07-19 RX ORDER — AMLODIPINE BESYLATE 10 MG/1
10 TABLET ORAL DAILY
Qty: 90 TABLET | Refills: 2 | Status: SHIPPED | OUTPATIENT
Start: 2024-07-19

## 2024-07-20 DIAGNOSIS — F03.B4 MODERATE DEMENTIA WITH ANXIETY, UNSPECIFIED DEMENTIA TYPE (H): ICD-10-CM

## 2024-07-22 RX ORDER — DONEPEZIL HYDROCHLORIDE 10 MG/1
10 TABLET, FILM COATED ORAL
Qty: 90 TABLET | Refills: 3 | OUTPATIENT
Start: 2024-07-22

## 2024-07-29 ENCOUNTER — HOSPITAL ENCOUNTER (OUTPATIENT)
Dept: CARDIOLOGY | Facility: CLINIC | Age: 89
Discharge: HOME | End: 2024-07-29
Attending: PHYSICIAN ASSISTANT | Admitting: PHYSICIAN ASSISTANT
Payer: COMMERCIAL

## 2024-07-29 DIAGNOSIS — G47.19 EXCESSIVE DAYTIME SLEEPINESS: ICD-10-CM

## 2024-07-29 DIAGNOSIS — J44.9 CHRONIC OBSTRUCTIVE PULMONARY DISEASE, UNSPECIFIED COPD TYPE (H): ICD-10-CM

## 2024-07-29 DIAGNOSIS — R06.02 SHORTNESS OF BREATH: ICD-10-CM

## 2024-07-29 DIAGNOSIS — R68.89 DECREASED EXERCISE TOLERANCE: ICD-10-CM

## 2024-07-29 LAB — LVEF ECHO: NORMAL

## 2024-07-29 PROCEDURE — 93306 TTE W/DOPPLER COMPLETE: CPT

## 2024-07-29 PROCEDURE — 93306 TTE W/DOPPLER COMPLETE: CPT | Mod: 26 | Performed by: INTERNAL MEDICINE

## 2024-07-31 NOTE — RESULT ENCOUNTER NOTE
Gayla  I have reviewed your recent test results:    Great news!  The echocardiogram is normal.  It doesn't appear to be a structural heart issue causing your significant fatigue.  Plan to see pulmonology as scheduled tomorrow to see if they can help to improve your energy level.  If they do not, seeing cardiology to see if functional heart testing would be advised would be the next step.     If you have any questions please do not hesitate to contact our office via phone ([phone]) or [employer].    Healthy regards,     Leann Gordon MBA, MS, PA-C  [facility]

## 2024-08-16 DIAGNOSIS — R06.02 SHORTNESS OF BREATH: ICD-10-CM

## 2024-08-16 DIAGNOSIS — G47.19 EXCESSIVE DAYTIME SLEEPINESS: ICD-10-CM

## 2024-08-16 DIAGNOSIS — J44.9 CHRONIC OBSTRUCTIVE PULMONARY DISEASE, UNSPECIFIED COPD TYPE (H): ICD-10-CM

## 2024-08-16 DIAGNOSIS — R68.89 DECREASED EXERCISE TOLERANCE: ICD-10-CM

## 2024-08-16 RX ORDER — ALBUTEROL SULFATE 90 UG/1
AEROSOL, METERED RESPIRATORY (INHALATION)
Qty: 8.5 G | Refills: 11 | OUTPATIENT
Start: 2024-08-16

## 2024-08-18 ENCOUNTER — ANCILLARY PROCEDURE (OUTPATIENT)
Dept: RADIOLOGY | Facility: CLINIC | Age: 89
End: 2024-08-18
Payer: COMMERCIAL

## 2024-08-27 ENCOUNTER — DOCUMENTATION ONLY (OUTPATIENT)
Dept: OTHER | Facility: CLINIC | Age: 89
End: 2024-08-27
Payer: COMMERCIAL

## 2024-08-29 ENCOUNTER — OFFICE VISIT (OUTPATIENT)
Dept: FAMILY MEDICINE | Facility: CLINIC | Age: 89
End: 2024-08-29
Payer: COMMERCIAL

## 2024-08-29 VITALS
BODY MASS INDEX: 24.45 KG/M2 | RESPIRATION RATE: 10 BRPM | DIASTOLIC BLOOD PRESSURE: 82 MMHG | HEIGHT: 61 IN | SYSTOLIC BLOOD PRESSURE: 131 MMHG | OXYGEN SATURATION: 90 % | WEIGHT: 129.5 LBS | TEMPERATURE: 97.1 F | HEART RATE: 50 BPM

## 2024-08-29 DIAGNOSIS — F02.B4 MODERATE LATE ONSET ALZHEIMER'S DEMENTIA WITH ANXIETY (H): Primary | ICD-10-CM

## 2024-08-29 DIAGNOSIS — G30.1 MODERATE LATE ONSET ALZHEIMER'S DEMENTIA WITH ANXIETY (H): Primary | ICD-10-CM

## 2024-08-29 PROCEDURE — G2211 COMPLEX E/M VISIT ADD ON: HCPCS | Performed by: INTERNAL MEDICINE

## 2024-08-29 PROCEDURE — 99214 OFFICE O/P EST MOD 30 MIN: CPT | Performed by: INTERNAL MEDICINE

## 2024-08-29 ASSESSMENT — PATIENT HEALTH QUESTIONNAIRE - PHQ9
10. IF YOU CHECKED OFF ANY PROBLEMS, HOW DIFFICULT HAVE THESE PROBLEMS MADE IT FOR YOU TO DO YOUR WORK, TAKE CARE OF THINGS AT HOME, OR GET ALONG WITH OTHER PEOPLE: NOT DIFFICULT AT ALL
SUM OF ALL RESPONSES TO PHQ QUESTIONS 1-9: 9
SUM OF ALL RESPONSES TO PHQ QUESTIONS 1-9: 9

## 2024-08-29 ASSESSMENT — PAIN SCALES - GENERAL: PAINLEVEL: NO PAIN (0)

## 2024-08-29 NOTE — PATIENT INSTRUCTIONS
Continue all the wonderful things you are doing!!     Follow up every 6-12 months  Call to schedule your follow up appointment: # [phone]  [facility]  [address]  [address]  [place] [address]

## 2024-08-29 NOTE — PROGRESS NOTES
"MEMORY EVALUATION CLINIC - FOLLOWUP    INTERVAL HISTORY: Gayla is here today accompanied by her adult children Chasidy and Jose G whom she lives with; son Pankaj lives nearby too and he and his wife Laila are also very involved. Last visit with me was Feb 2024 in follow up of dementia with anxiety improved with meds/support. Recently in ER for fall with hip pain but imaging of hip and head negative for acute findings. Still moving a bit slow with limp; working on physical therapy and using wheelchair for longer distances. Has a monitor in her room in case recurrent falls. Weight up a few more pounds since last visit which is great; eats well. Has enjoyed sitting outside this summer and watering flowers a little bit. Goes on some errands with kids. Enjoys reading a little bit, card games, crosswords, piano. Per family, seems relatively stable cognitively over this past year overall.    Paid independent caregiver/friend Collette comes in now about 3 days a week for about 4 hours each which is helpful. Gayla feels \"really well cared for\". Admits \"mind is blank\" and laughs. All feel meds though good; confirmed she actually takes Donepezil in AM rather than PM which helps avoid insomnia. She continues to joke well with her family.    Has been following up with PCP on COPD; does have oximeter and not yet qualifying for O2. Nebulizers 4x/day.      OBJECTIVE:  /82 (BP Location: Left arm, Patient Position: Sitting, Cuff Size: Adult Regular)   Pulse 50   Temp 97.1  F (36.2  C) (Tympanic)   Resp 10   Ht 1.549 m (5' 1\")   Wt 58.7 kg (129 lb 8 oz)   SpO2 90%   BMI 24.47 kg/m    Alert, pleasant, nicely groomed, manicure/pedicure  Expiratory phase prolonged but breathing non-labored   Jokes throughout the visit with her kids  Limp to get from chair to wheelchair      INVESTIGATIONS: Normal TSH and B12 on file, CT Head Nov 2021 noting \"mild diffuse parenchymal volume loss and white matter changes likely due to chronic " "microvascular ischemic disease.\"  MRI Brain Nov 2021: Small chronic lacunar infarcts in each basal ganglia. Age-related and chronic ischemic changes.  CT Head Aug 2024 per [facility] after a fall with acute findings; +generalized cerebral volume loss and chronic microangiopathy.     PRIOR TESTING:  Neuropsych testing Feb 2018: Dx major neurocognitive disorder (dementia) which is most likely of the Alzheimer's subtype though a vascular component also is a potential contributing factor  Abnormal MiniCog at April 2022 Wellness Visit       COGNITIVE MEDICATIONS: Donepezil (since early 2018)        ASSESSMENT/PLAN: Gifty \"Gayla\" Daisy is a pleasant 89yoF with previously diagnosed dementia in 2018, likely of the Alzheimer's subtype but also some potential vascular component as well along with comorbid anxiety. Benefiting greatly from the 24-7 support from her adult children and fully dependent on IADLs and starting to need more help with ADLs as well (bathing, dressing). Very stable now on current meds in terms of anxiety control and able to sleep well as well as gain good nutritional weight. Talked about continued wonderful things they as a family are doing to support Gayla; all feel overall cognitive stability. Follow up every 6-12 months or sooner if questions arise in the interim.      Patient Instructions   Continue all the wonderful things you are doing!!     Follow up every 6-12 months  Call to schedule your follow up appointment: # [phone]  [facility]  [address]  [address]  [place] [address]      The longitudinal plan of care for the diagnosis(es)/condition(s) as documented were addressed during this visit. Due to the added complexity in care, I will continue to support Gayla in the subsequent management and with ongoing continuity of care.      38 minutes spent on the date of the encounter doing chart review, history and exam, counseling, documentation " and further activities as noted above      Loretta Abernathy, [facility]

## 2024-09-01 ENCOUNTER — HEALTH MAINTENANCE LETTER (OUTPATIENT)
Age: 89
End: 2024-09-01

## 2024-09-01 DIAGNOSIS — J44.9 CHRONIC OBSTRUCTIVE PULMONARY DISEASE, UNSPECIFIED COPD TYPE (H): ICD-10-CM

## 2024-09-03 ENCOUNTER — TELEPHONE (OUTPATIENT)
Dept: PULMONOLOGY | Facility: CLINIC | Age: 89
End: 2024-09-03

## 2024-09-03 ENCOUNTER — VIRTUAL VISIT (OUTPATIENT)
Dept: PULMONOLOGY | Facility: CLINIC | Age: 89
End: 2024-09-03
Attending: PHYSICIAN ASSISTANT
Payer: COMMERCIAL

## 2024-09-03 VITALS
WEIGHT: 129 LBS | BODY MASS INDEX: 24.35 KG/M2 | DIASTOLIC BLOOD PRESSURE: 82 MMHG | HEIGHT: 61 IN | SYSTOLIC BLOOD PRESSURE: 131 MMHG

## 2024-09-03 DIAGNOSIS — J43.2 CENTRILOBULAR EMPHYSEMA (H): ICD-10-CM

## 2024-09-03 DIAGNOSIS — J44.9 COPD, GROUP E, BY GOLD 2023 CLASSIFICATION (H): Primary | ICD-10-CM

## 2024-09-03 PROCEDURE — 99204 OFFICE O/P NEW MOD 45 MIN: CPT | Mod: 95 | Performed by: NURSE PRACTITIONER

## 2024-09-03 RX ORDER — BUDESONIDE 0.5 MG/2ML
0.5 INHALANT ORAL 2 TIMES DAILY
Qty: 360 ML | Refills: 11 | Status: SHIPPED | OUTPATIENT
Start: 2024-09-03

## 2024-09-03 RX ORDER — TIOTROPIUM BROMIDE AND OLODATEROL 3.124; 2.736 UG/1; UG/1
SPRAY, METERED RESPIRATORY (INHALATION)
Qty: 4 G | Refills: 2 | Status: SHIPPED | OUTPATIENT
Start: 2024-09-03

## 2024-09-03 ASSESSMENT — PAIN SCALES - GENERAL: PAINLEVEL: NO PAIN (0)

## 2024-09-03 NOTE — PATIENT INSTRUCTIONS
It was a pleasure to see you via video today.   Here is what we discussed:    Stop Stiolto.  Start budesonide nebulized twice daily, rinse/gargle after use.  Continue Duonebs four times daily.  Have the overnight oximetry study done, to check for low oxygen levels at night.   Call my nurse, Tyrell ([phone]) with any change or worsening of your breathing.  Follow-up in one month.    Jada Bojorquez, CNP  Pulmonary Medicine  [facility]  [phone]

## 2024-09-03 NOTE — PROGRESS NOTES
Virtual Visit Details    Type of service:  Video Visit   Video Start Time:  3:15pm  Video End Time: 3:45pm    Originating Location (pt. Location): Home    Distant Location (provider location):  On-site  Platform used for Video Visit: [facility]     Pulmonary Clinic Consultation          Assessment/Plan:     89 year old female with a history of COPD, emphysema, pulmonary nodule, HTN, dementia - presenting for evaluation of COPD.      COPD - GOLD E  Emphysema  Former smoker, quit in 1988.  She was previously followed by Dr Irwin at [facility].  Spirometry in past has shown moderate airway obstruction (FEV1 53% in 2019).  She has been on Stiolto for many years.  Chest CTs have shown emphysematous changes, last scan in 2018.  CXR in April that was clear.  She is presenting over video today as her son has COVID, who she lives with.  They report that over the past 6 months, she has had an increase in shortness of breath with exertion.  No daily cough or mucous production.  She had an echocardiogram in July that was normal.  Holter monitor showed no arrhythmias.  Pulmonary function testing was performed in June, which showed progression of her airway obstruction (FEV1 33% predicted), air-trapping and hyperinflation, and moderate diffusion capacity defect (DLCO 47% predicted)- although this was not corrected for hemoglobin.  Recently they have scheduled Duonebs four times daily, and found this to be beneficial.   Most likely her symptoms are related to her emphysema and progression of her COPD.  Deconditioning, age, and possible nocturnal hypoxemia could be contributing as well.     Plan:  - reviewed PFT results with patient and family members today.  - it is possible that inhaler regimen is not adequate for patient anymore (possible difficulty with administration), so we will advance regimen to triple inhaled therapy, via nebulizer.  - start budesonide nebulized BID, rinse/gargle after use.  - continue Duonebs QID.    - stop  Stiolto.  - overnight oximetry study, to check for nocturnal hypoxemia.  ADDENDUM:  >1 hour spent with oxygen saturations <88%.  Ordering oxygen 2L at night, see documentation below.  - consider chest CT to evaluate lung parenchyma, no CT since 2018.  - stay UTD with respiratory vaccines.  - Action plan: prednisone 20mg x5 days, + azithromycin x5 days    Follow-up:  - one month in person      aJda Bojorquez CNP  Pulmonary Medicine  [facility]  [phone]    Oxygen Documentation  I certify that this patient, Gifty Villa has been under my care (or a nurse practitioner or physican's assistant working with me). This is the face-to-face encounter for oxygen medical necessity.    At the time of this encounter, I have reviewed the qualifying testing and have determined that supplemental oxygen is reasonable and necessary and is expected to improve the patient's condition in a home setting.       Patient has continued oxygen desaturation due to COPD J44.9  Emphysema J43.9.  If portability is ordered, is the patient mobile within the home? yes  Was this visit performed as a telehealth visit: Yes: What is the reason an in-person visit could not be done: mobility/transportation issues  DME TeleHealth Reminder (Optional) For telehealth visits to be accepted, there must be a valid reason documented as to why an in-person visit could not be completed. If O2 Sat testing is needed, pt must have an in-person visit with nurse or provider.: For telehealth visits to be accepted, there must be a valid reason documented as to why an in-person visit could not be completed. If O2 Sat testing is needed, pt must have an in-person visit with nurse or provider.        CC:     Worsening SOB     HPI:     89 year old female with a history of COPD, emphysema, pulmonary nodule, HTN, dementia - presenting for evaluation of COPD.      On stiolto at least 7 years.   Not sure if it ever made a  difference, not a major help.   Son says her albuterol use decreased when starting stiolto.  Breathing has been more difficult the past 6 months.  Increased duonebs to 4 times/day.  Seems to help.   More winded than in past.  Now having to use wheelchair.   No cough or mucous production.   No frequent exacerbations.   No SOB in middle of night.   Has monitor with camera in room.  No struggling breathing at night.     Medical records reviewed for this visit include PCP notes.    Patient supplied answers from flow sheet for:  COPD Assessment Test (CAT)  2009 [facility]. All rights reserved.      9/3/2024     3:02 PM   COPD assessment test (CAT)   Cough 2   Phlegm 1   Chest tightness 0   Walk up hill 5   Limited activities 5   Leaving my home 0   Sleep 0   Energy 3   Total Score 16      CAT Key:  The CAT consist of 8 items which are each scored 0-5. The total score ranges from 0-40 with higher scores representing a poorer health status. When interpreting CAT scores, the individual s disease severity should be considered.   Low impact  (1-9)  Medium impact  (10-20)  High impact  (21-30)  Very high impact  (31-40)         ROS:     A 12-system review was obtained and was negative with the exception of the symptoms endorsed in the HPI.       Medical history:       PMH:  Past Medical History:   Diagnosis Date    Anxiety     Arthritis     Backache, unspecified     COPD (chronic obstructive pulmonary disease) (H)     Dementia (H)     Essential hypertension, benign     Frequency-urgency sydrome     Hyperlipidemia     Pulmonary nodule     f/b Dr. Irwin at [facility] - scan 1/2018 showed stability since 2016    RLS (restless legs syndrome) 2009    noted on PSG    Uncomplicated asthma        PSH:  Past Surgical History:   Procedure Laterality Date    ARTHROPLASTY PATELLO-FEMORAL (KNEE)  8/2010    Left TKA    CARDIAC CATH - HIM SCAN  2011    normal.  Done d/t abnormal stress test (false positive) - patient noted to have hypertensive heart  disease    COLONOSCOPY  ~    No polyps    COLONOSCOPY N/A 10/10/2014    Procedure: COLONOSCOPY;  Surgeon: Chavez Ward MD;  Location: [facility]    ESOPHAGOSCOPY, GASTROSCOPY, DUODENOSCOPY (EGD), COMBINED  2016    Dr. Starla PARKS    ESOPHAGOSCOPY, GASTROSCOPY, DUODENOSCOPY (EGD), COMBINED N/A 2016    Procedure: COMBINED ESOPHAGOSCOPY, GASTROSCOPY, DUODENOSCOPY (EGD), BIOPSY SINGLE OR MULTIPLE;  Surgeon: Jada Cha MD;  Location: [facility]    EXCISE GANGLION WRIST Left 2015    Procedure: EXCISE GANGLION WRIST;  Surgeon: Justin Lyons MD;  Location: [facility]    HYSTERECTOMY, PAP NO LONGER INDICATED      wisdom teeth      ZZC SPINE FUSION,ANTER,3 SGMTS  1992    ZZC TOTAL HIP ARTHROPLASTY      Hip Replacement, Total (Right)    ZZC VAGINAL HYSTERECTOMY      Hysterectomy, Vaginal w anterior repair       Allergies:  No Known Allergies    Family Hx:  Family History   Problem Relation Age of Onset    Alzheimer Disease Mother          at 98,  of old age    Alzheimer Disease Father          at 78    C.A.D. Father     Alzheimer Disease Brother          age 82     Family History Negative Brother         Born 1931    Cancer Maternal Aunt         x'2 w Ca unknown etiology    Cancer Other 90        unknown cancer    Colon Cancer No family hx of        Social Hx:  Social History     Socioeconomic History    Marital status:      Spouse name:     Number of children: 3    Years of education: Not on file    Highest education level: Not on file   Occupational History    Occupation: office work     Employer: RETIRED   Tobacco Use    Smoking status: Former     Current packs/day: 0.00     Types: Cigarettes     Quit date: 1988     Years since quittin.1    Smokeless tobacco: Never    Tobacco comments:     Quit in    Vaping Use    Vaping status: Never Used   Substance and Sexual Activity    Alcohol use: Yes     Comment: very rare    Drug use: No    Sexual  activity: Not Currently     Partners: Male     Birth control/protection: Abstinence, Surgical   Other Topics Concern    Parent/sibling w/ CABG, MI or angioplasty before 65F 55M? Not Asked     Service Not Asked    Blood Transfusions Not Asked    Caffeine Concern Yes     Comment: 4-6 daily    Occupational Exposure Not Asked    Hobby Hazards Not Asked    Sleep Concern No    Stress Concern Not Asked    Weight Concern Not Asked    Special Diet No    Back Care Not Asked    Exercise No    Bike Helmet Not Asked    Seat Belt Not Asked    Self-Exams Not Asked   Social History Narrative    Has had success in past following Medif[employer] eating plan.     Social Determinants of Health     Financial Resource Strain: Low Risk  (7/3/2024)    Financial Resource Strain     Within the past 12 months, have you or your family members you live with been unable to get utilities (heat, electricity) when it was really needed?: No   Food Insecurity: Low Risk  (7/3/2024)    Food Insecurity     Within the past 12 months, did you worry that your food would run out before you got money to buy more?: No     Within the past 12 months, did the food you bought just not last and you didn t have money to get more?: No   Transportation Needs: Low Risk  (7/3/2024)    Transportation Needs     Within the past 12 months, has lack of transportation kept you from medical appointments, getting your medicines, non-medical meetings or appointments, work, or from getting things that you need?: No   Physical Activity: Unknown (7/3/2024)    Exercise Vital Sign     Days of Exercise per Week: Patient declined     Minutes of Exercise per Session: Not on file   Stress: No Stress Concern Present (7/3/2024)    [employer] [place] [employer] - Occupational Stress Questionnaire     Feeling of Stress : Not at all   Social Connections: Unknown (7/3/2024)    Social Connection and Isolation Panel [NHANES]     Frequency of Communication with Friends and Family:  Not on file     Frequency of Social Gatherings with Friends and Family: More than three times a week     Attends [employer]ism Services: Not on file     Active Member of Clubs or Organizations: Not on file     Attends Club or Organization Meetings: Not on file     Marital Status: Not on file   Interpersonal Safety: Not on file   Housing Stability: Low Risk  (7/3/2024)    Housing Stability     Do you have housing? : Yes     Are you worried about losing your housing?: No       Current Meds:  Current Outpatient Medications   Medication Sig Dispense Refill    acetaminophen (TYLENOL) 500 MG tablet Take 2 tablets (1,000 mg) by mouth 3 times daily as needed for mild pain      albuterol (PROAIR HFA) 108 (90 Base) MCG/ACT inhaler Inhale 1-2 puffs into the lungs every 4 hours as needed for shortness of breath or wheezing 8.5 g 11    amLODIPine (NORVASC) 10 MG tablet TAKE ONE TABLET BY MOUTH EVERY DAY 90 tablet 2    calcium carbonate-vitamin D 500-400 MG-UNIT TABS per tablet Take 1 tablet by mouth 2 times daily (with meals) 180 tablet 3    cyanocobalamin (VITAMIN B-12) 1000 MCG tablet Take 1 tablet (1,000 mcg) by mouth every other day      donepezil (ARICEPT) 10 MG tablet TAKE ONE TABLET BY MOUTH AT BEDTIME 90 tablet 2    DULoxetine (CYMBALTA) 20 MG capsule Take 1 capsule (20 mg) by mouth 2 times daily 180 capsule 3    escitalopram (LEXAPRO) 5 MG tablet Take 1 tablet (5 mg) by mouth every morning 90 tablet 3    ipratropium - albuterol 0.5 mg/2.5 mg/3 mL (DUONEB) 0.5-2.5 (3) MG/3ML neb solution Take 1 vial (3 mLs) by nebulization 4 times daily for 90 days 1080 mL 1    LUMIGAN 0.01 % SOLN ophthalmic solution INSTILL 1 DROP IN BOTH EYES AT BEDTIME      Multiple Vitamins-Minerals (PRESERVISION AREDS 2) CAPS Take 2 capsules by mouth daily      omeprazole (PRILOSEC) 20 MG DR capsule TAKE ONE CAPSULE BY MOUTH EVERY DAY 30 MINUTES BEFORE BREAKFAST      SIMBRINZA 1-0.2 % ophthalmic suspension INSTILL 1 DROP IN BOTH EYES TWO TIMES A DAY       STIOLTO RESPIMAT 2.5-2.5 MCG/ACT AERS INHALE TWO PUFFS BY MOUTH EVERY DAY 4 g 2    Vitamin D3 (CHOLECALCIFEROL) 125 MCG (5000 UT) tablet Take 1 tablet (125 mcg) by mouth every other day      VITRON-C  MG TABS tablet Take 1 tablet by mouth 2 times daily (with meals) 100 tablet 0          Physical Exam:     There were no vitals taken for this visit.  Gen: elderly female, appears in NAD, family present.  Resp: Respirations even and unlabored.  On RA.   Neuro: alert and answering questions appropriately.  Family assisting with history.       Data:     Labs:  reviewed    Imaging studies:  I have personally reviewed all pertinent imaging studies and PFT results unless otherwise noted.    CHEST TWO VIEWS   4/26/2024 11:09 AM      HISTORY: Worsening shortness of breath/decreased exercise  tolerance/excessive daytime drowsiness x 6 months.  Known COPD.  Chronic obstructive pulmonary disease, unspecified COPD type (H).  Decreased exercise tolerance. Excessive daytime sleepiness.     COMPARISON: 12/5/2022.                                                                      IMPRESSION: No acute cardiopulmonary disease.     Pulmonary Function Testing    6/26/24:  IMPRESSION:   The study should be interpreted with caution given the reported difficulties with testing.   Moderate Airflow Obstruction   There is air trapping with hyperinflation.   Moderate diffusion defect.    The diffusing capacity was not corrected for the patient's hemoglobin.         2019:        2017:

## 2024-09-03 NOTE — TELEPHONE ENCOUNTER
[facility] Call Center    Phone Message    May a detailed message be left on voicemail: yes     Reason for Call: Pt is scheduled to establish care with Jada Bojorquez later today (9/3/24) @ 3:15 PM. Call received from daughter Chasidy reporting that she recently tested positive for COVID, and has already been started on Paxlovid. Her and her mother do live together, but her mother has continued to test negative so far.   Chasidy is wondering if it would be ok for pt to come in for her appt (her brother would be bringing their mom to this appt)? Otherwise, she states that her mother would be ok to do a virtual appt as well if that would be more appropriate.   Please call nash Umaña back with any questions/recommendations.     Action Taken: Other: Pulm    Travel Screening: Not Applicable

## 2024-09-03 NOTE — NURSING NOTE
Current patient location: [address] [place] [id number]    Is the patient currently in the state of [place]? YES    Visit mode:VIDEO    If the visit is dropped, the patient can be reconnected by: VIDEO VISIT: Send to e-mail at: [email or web address]    Will anyone else be joining the visit? Chasidy, daughter, is present  (If patient encounters technical issues they should call [phone]    How would you like to obtain your AVS? MyChart    Are changes needed to the allergy or medication list? Pt stated no changes to allergies and Pt stated no med changes    Are refills needed on medications prescribed by this physician? NO    Rooming Documentation:  Questionnaire(s) completed    Reason for visit: CONSULT    Thelma VALDOVINOS

## 2024-09-04 ENCOUNTER — NURSE TRIAGE (OUTPATIENT)
Dept: FAMILY MEDICINE | Facility: CLINIC | Age: 89
End: 2024-09-04

## 2024-09-04 ENCOUNTER — VIRTUAL VISIT (OUTPATIENT)
Dept: FAMILY MEDICINE | Facility: CLINIC | Age: 89
End: 2024-09-04
Payer: COMMERCIAL

## 2024-09-04 DIAGNOSIS — U07.1 INFECTION DUE TO 2019 NOVEL CORONAVIRUS: Primary | ICD-10-CM

## 2024-09-04 DIAGNOSIS — J44.9 CHRONIC OBSTRUCTIVE PULMONARY DISEASE, UNSPECIFIED COPD TYPE (H): ICD-10-CM

## 2024-09-04 PROCEDURE — G2211 COMPLEX E/M VISIT ADD ON: HCPCS | Mod: 95 | Performed by: PHYSICIAN ASSISTANT

## 2024-09-04 PROCEDURE — 99214 OFFICE O/P EST MOD 30 MIN: CPT | Mod: 95 | Performed by: PHYSICIAN ASSISTANT

## 2024-09-04 NOTE — PATIENT INSTRUCTIONS
While on Paxlovig hold on transitioning from Stiolto Respimat to budesonide twice daily nebulizers - ok to make this change after 5 days course of Paxlovig.    While on Paxlovig decrease amlodipine (blood pressure medication) to half a tablet once daily (5 mg) as the combination can amplify the antihypertensive effects of amlodipine and cause Gayla to feel dizzy or lightheaded.  If still experiencing dizziness/lightheadedness despite the half tablet simply discontinue this medication entirely for the 5-day duration of Paxlovig.

## 2024-09-04 NOTE — TELEPHONE ENCOUNTER
Chasidy calling (ok per ctc) stating patient just tested positive for covid.  Both caregivers in family have covid as well.      S-(situation): covid-wanting treatment    B-(background): hx of COPD-sats running 91 % which is normal for patient. Sob not worse than normal    A-(assessment): symptoms started yesterday with a cough and fatigue along with chest tightness.  Fever this am of 102, gradually went down to normal.  Cough much looser and productive this am. Chest tightness after coughing located in upper chest and throat and lasts a couple of minutes and then settles down.  Denied chest pain, sweating, dizziness, nausea, vomiting.  Denied worsening with exertion.       R-(recommendations): patient has a virtual appointment at 8:30 pm tonight   Please review and advise if patient should be seen verses virtual UC for treatment.  Thanks!      Reason for Disposition   Possible COVID-19 symptoms and triager concerned about severity of symptoms or other causes    Additional Information   Negative: SEVERE difficulty breathing (e.g., struggling for each breath, speaks in single words)   Negative: Difficult to awaken or acting confused (e.g., disoriented, slurred speech)   Negative: Bluish (or gray) lips or face now   Negative: Shock suspected (e.g., cold/pale/clammy skin, too weak to stand, low BP, rapid pulse)   Negative: Sounds like a life-threatening emergency to the triager   Negative: Followed an injury to chest   Negative: SEVERE chest pain   Negative: Pain also in shoulder(s) or arm(s) or jaw   Negative: Difficulty breathing   Negative: Cocaine use within last 3 days   Negative: Major surgery in the past month   Negative: Hip or leg fracture (broken bone) in past month (or had cast on leg or ankle in past month)   Negative: Illness requiring prolonged bedrest in past month (e.g., immobilization, long hospital stay)   Negative: Long-distance travel in past month (e.g., car, bus, train, plane; with trip lasting 6 or  "more hours)   Negative: History of prior 'blood clot' in leg or lungs (i.e., deep vein thrombosis, pulmonary embolism)   Negative: History of inherited increased risk of blood clots (e.g., Factor 5 Leiden, Anti-thrombin 3, Protein C or Protein S deficiency, Prothrombin mutation)   Negative: Cancer treatment in the past two months (or has cancer now)   Negative: Heart beating irregularly or very rapidly    Answer Assessment - Initial Assessment Questions  1. COVID-19 DIAGNOSIS: \"How do you know that you have COVID?\" (e.g., positive lab test or self-test, diagnosed by doctor or NP/PA, symptoms after exposure).      Home test  2. COVID-19 EXPOSURE: \"Was there any known exposure to COVID before the symptoms began?\" CDC Definition of close contact: within 6 feet (2 meters) for a total of 15 minutes or more over a 24-hour period.       Yes, both caregivers have covid  3. ONSET: \"When did the COVID-19 symptoms start?\"       Cough started yesterday  4. WORST SYMPTOM: \"What is your worst symptom?\" (e.g., cough, fever, shortness of breath, muscle aches)      Chest tightness  5. COUGH: \"Do you have a cough?\" If Yes, ask: \"How bad is the cough?\"        Yes-moderate  6. FEVER: \"Do you have a fever?\" If Yes, ask: \"What is your temperature, how was it measured, and when did it start?\"      Fever this am.  Was 102 orally.    7. RESPIRATORY STATUS: \"Describe your breathing?\" (e.g., normal; shortness of breath, wheezing, unable to speak)       Has severe copd-not worse than normal  8. BETTER-SAME-WORSE: \"Are you getting better, staying the same or getting worse compared to yesterday?\"  If getting worse, ask, \"In what way?\"      Worsening cough  9. OTHER SYMPTOMS: \"Do you have any other symptoms?\"  (e.g., chills, fatigue, headache, loss of smell or taste, muscle pain, sore throat)      fatigue  10. HIGH RISK DISEASE: \"Do you have any chronic medical problems?\" (e.g., asthma, heart or lung disease, weak immune system, obesity, etc.)    " "    Yes, copd  11. VACCINE: \"Have you had the COVID-19 vaccine?\" If Yes, ask: \"Which one, how many shots, when did you get it?\"        Yes, has had the booster  12. PREGNANCY: \"Is there any chance you are pregnant?\" \"When was your last menstrual period?\"        no  13. O2 SATURATION MONITOR:  \"Do you use an oxygen saturation monitor (pulse oximeter) at home?\" If Yes, ask \"What is your reading (oxygen level) today?\" \"What is your usual oxygen saturation reading?\" (e.g., 95%)        Has been running 91%-which is normal for patient with COPD    Answer Assessment - Initial Assessment Questions  1. LOCATION: \"Where does it hurt?\"        Chest tightness in upper chest and neck after coughing  2. RADIATION: \"Does the pain go anywhere else?\" (e.g., into neck, jaw, arms, back)      Neck and throat area   3. ONSET: \"When did the chest pain begin?\" (Minutes, hours or days)       yesterday  4. PATTERN: \"Does the pain come and go, or has it been constant since it started?\"  \"Does it get worse with exertion?\"       Intermittent-not worse with exertion  5. DURATION: \"How long does it last\" (e.g., seconds, minutes, hours)      Few minutes  6. SEVERITY: \"How bad is the pain?\"  (e.g., Scale 1-10; mild, moderate, or severe)     - MILD (1-3): doesn't interfere with normal activities      - MODERATE (4-7): interferes with normal activities or awakens from sleep     - SEVERE (8-10): excruciating pain, unable to do any normal activities        mild  7. CARDIAC RISK FACTORS: \"Do you have any history of heart problems or risk factors for heart disease?\" (e.g., angina, prior heart attack; diabetes, high blood pressure, high cholesterol, smoker, or strong family history of heart disease)      HTN  8. PULMONARY RISK FACTORS: \"Do you have any history of lung disease?\"  (e.g., blood clots in lung, asthma, emphysema, birth control pills)      COPD  9. CAUSE: \"What do you think is causing the chest pain?\"      coughing  10. OTHER SYMPTOMS: \"Do you " "have any other symptoms?\" (e.g., dizziness, nausea, vomiting, sweating, fever, difficulty breathing, cough)        Fever, cough, difficulty breathing  11. PREGNANCY: \"Is there any chance you are pregnant?\" \"When was your last menstrual period?\"        No    Protocols used: Coronavirus (COVID-19) Diagnosed or [id number], Chest Pain-A-OH    "

## 2024-09-04 NOTE — PROGRESS NOTES
Gayla is a 89 year old who is being evaluated via a billable video visit.    How would you like to obtain your AVS? MyChart  If the video visit is dropped, the invitation should be resent by: Text to cell phone: [phone]  Will anyone else be joining your video visit? No      Assessment & Plan     Infection due to 2019 novel coronavirus  Chronic obstructive pulmonary disease, unspecified COPD type (H) - followed by [facility]   COVID-19 in high risk patient with underlying COPD.  Adjust medications per patient instructions below.  Advised of warning signs and when to seek urgent care.  Encourage proning for decreased pressure on lungs.  All questions answered to the patient's and family satisfaction  - nirmatrelvir and ritonavir (PAXLOVID) 300 mg/100 mg therapy pack  Dispense: 1 each; Refill: 0      Patient Instructions   While on Paxlovig hold on transitioning from Stiolto Respimat to budesonide twice daily nebulizers - ok to make this change after 5 days course of Paxlovig.    While on Paxlovig decrease amlodipine (blood pressure medication) to half a tablet once daily (5 mg) as the combination can amplify the antihypertensive effects of amlodipine and cause Gayla to feel dizzy or lightheaded.  If still experiencing dizziness/lightheadedness despite the half tablet simply discontinue this medication entirely for the 5-day dura    The longitudinal plan of care for the diagnosis(es)/condition(s) as documented were addressed during this visit. Due to the added complexity in care, I will continue to support Gayla in the subsequent management and with ongoing continuity of care.tion of Paxlovig.          Return in about 5 days (around 9/9/2024) for Evaluation of worsening or not improving.      Leann Gordon MBA, MS, PA-C  [facility]   Gayla is a 89 year old, presenting for the following health issues:  COVID Treatment        9/4/2024      3:26 PM   Additional Questions   Roomed by Anna NOVAK   Accompanied by Self       Video Start Time: 3:40 PM    HPI       COVID-19 Symptom Review  How many days ago did these symptoms start? 9/4/2024    Are any of the following symptoms significant for you?  New or worsening difficulty breathing? No  Worsening cough? Yes, I am coughing up mucus.  Fever or chills? No  Headache: No  Sore throat: No  Chest pain: No  Diarrhea: No  Body aches? No    What treatments has patient tried? None   Does patient live in a nursing home, group home, or shelter? No  Does patient have a way to get food/medications during quarantined? Yes, I have a friend or family member who can help me.            Review of Systems  Constitutional, HEENT, cardiovascular, pulmonary, GI, , musculoskeletal, neuro, skin, endocrine and psych systems are negative, except as otherwise noted.      Objective             Physical Exam   GENERAL: alert and no distress  EYES: Eyes grossly normal to inspection.  No discharge or erythema, or obvious scleral/conjunctival abnormalities.  HENT: Normal cephalic/atraumatic.  External ears, nose and mouth without ulcers or lesions.  No nasal drainage visible.  NECK: No asymmetry, visible masses or scars  RESP: No audible wheeze, cough, or visible cyanosis.    SKIN: Visible skin clear. No significant rash, abnormal pigmentation or lesions.  NEURO: Cranial nerves grossly intact.  Mentation and speech appropriate for age.  PSYCH: Appropriate affect, tone, and pace of words        Video-Visit Details    Type of service:  Video Visit   Video End Time:3:53 PM  Originating Location (pt. Location): Home    Distant Location (provider location):  On-site  Platform used for Video Visit: Radha  Signed Electronically by: Leann Gordon PA-C

## 2024-09-04 NOTE — TELEPHONE ENCOUNTER
Called patient and spoke with daughter and assisted in scheduling appointment and canceled the appointment for this evening.      Next 5 appointments (look out 90 days)      Sep 04, 2024 3:40 PM  (Arrive by 3:35 PM)  Provider Visit with Leann Gordon PA-C  [facility] ([facility] ) [address] [place] [address] [id number] [phone]

## 2024-09-09 ENCOUNTER — TELEPHONE (OUTPATIENT)
Dept: FAMILY MEDICINE | Facility: CLINIC | Age: 89
End: 2024-09-09
Payer: COMMERCIAL

## 2024-09-09 NOTE — TELEPHONE ENCOUNTER
Called daughter and advised of providers note.     Daughter states understanding and is agreeable to plan.

## 2024-09-09 NOTE — TELEPHONE ENCOUNTER
O2 sats reassuring.  This strain of COVID has been associated with fairly significant fatigue so not overly concerned knowing she has COVID and underlying COPD.  Continue to monitor and this should gradually improve with time.  If any new symptoms or regression of progress contact the clinic.      Leann Gordon MBA, MS, PA-C  [facility]

## 2024-09-09 NOTE — TELEPHONE ENCOUNTER
Patient's daughter calls, CTC is on file.  Daughter wanted to update PCP on Covid symptoms, patient was seen last week on 9/4/24 by PCP [facility].  Patient's whole family has Covid currently.  Is still taking Paxlovid.    Congestion remains, No fever. O2 Currently is 92%, has ranged from 90-94% on Room air.  No fever.  Patient is very Somnolent, is easily awakened,  but has been sleeping 20 hours a day. Will be up for an hour or so, then want to go back to sleep.

## 2024-09-12 ENCOUNTER — DOCUMENTATION ONLY (OUTPATIENT)
Dept: PULMONOLOGY | Facility: CLINIC | Age: 89
End: 2024-09-12
Payer: COMMERCIAL

## 2024-09-12 NOTE — PROGRESS NOTES
Faxed orders for JESSE testing to [facility]. Informed daughter , Chasidy, to reach out to [facility] tomorrow to set up testing.

## 2024-09-17 ENCOUNTER — TRANSFERRED RECORDS (OUTPATIENT)
Dept: HEALTH INFORMATION MANAGEMENT | Facility: CLINIC | Age: 89
End: 2024-09-17
Payer: COMMERCIAL

## 2024-09-20 ENCOUNTER — TELEPHONE (OUTPATIENT)
Dept: PULMONOLOGY | Facility: CLINIC | Age: 89
End: 2024-09-20
Payer: COMMERCIAL

## 2024-09-20 NOTE — TELEPHONE ENCOUNTER
Called and spoke with patient's daughter, Chasidy.  Explained that we do not have results of JESSE testing back yet.  Daughter states she returned the device on wed 9/18.      Also explained to her that to get portable oxygen, we would need to do face-to-face visit with Jada and also do some walk testing to see if she qualifies before orders can be written.  All of that can be done at her upcoming appt with Jada Bojorquez NP.

## 2024-09-20 NOTE — TELEPHONE ENCOUNTER
[facility] Call Center    Phone Message    May a detailed message be left on voicemail: yes     Reason for Call: Other: Pt's daughter Chasidy is calling to speak to someone about JESSE results. She is also wondering about starting the process to get an "[employer] machine. Please call Chasidy back at ph: [phone].       Action Taken: Message routed to:  Other: [employer]     Travel Screening: Not Applicable     Date of Service: 9/20

## 2024-09-24 ENCOUNTER — TELEPHONE (OUTPATIENT)
Dept: PULMONOLOGY | Facility: CLINIC | Age: 89
End: 2024-09-24
Payer: COMMERCIAL

## 2024-09-24 DIAGNOSIS — J43.2 CENTRILOBULAR EMPHYSEMA (H): ICD-10-CM

## 2024-09-24 DIAGNOSIS — J44.9 COPD, GROUP E, BY GOLD 2023 CLASSIFICATION (H): Primary | ICD-10-CM

## 2024-09-24 NOTE — TELEPHONE ENCOUNTER
Called daughter Chasidy to discuss results of JESSE.  >1 hour spent with oxygen saturations less than 89%.  I will be ordering nocturnal oxygen at 2L.  We had a long discussion regarding patients cognitive status and safety issues with oxygen tubing at night.  Oxygen will be used as needed when patient is supervised (patient has dementia, lives with daughter).  Will also order 6MWT.

## 2024-09-24 NOTE — TELEPHONE ENCOUNTER
DATE: 09/24/2024  DME PROVIDER: Bruce   SUPPLY ORDERED: nocturnal oxygen-see order  PROVIDER: [place]    Called customer service and spoke to Matilda Vazquez LPN

## 2024-10-04 ENCOUNTER — OFFICE VISIT (OUTPATIENT)
Dept: PULMONOLOGY | Facility: CLINIC | Age: 89
End: 2024-10-04
Attending: NURSE PRACTITIONER
Payer: COMMERCIAL

## 2024-10-04 VITALS
BODY MASS INDEX: 23.92 KG/M2 | HEART RATE: 81 BPM | SYSTOLIC BLOOD PRESSURE: 126 MMHG | WEIGHT: 126.6 LBS | DIASTOLIC BLOOD PRESSURE: 58 MMHG | OXYGEN SATURATION: 95 %

## 2024-10-04 DIAGNOSIS — J44.9 COPD, GROUP E, BY GOLD 2023 CLASSIFICATION (H): Primary | ICD-10-CM

## 2024-10-04 DIAGNOSIS — G47.34 NOCTURNAL HYPOXEMIA: ICD-10-CM

## 2024-10-04 DIAGNOSIS — J43.2 CENTRILOBULAR EMPHYSEMA (H): ICD-10-CM

## 2024-10-04 PROCEDURE — 99214 OFFICE O/P EST MOD 30 MIN: CPT | Performed by: NURSE PRACTITIONER

## 2024-10-04 NOTE — PROGRESS NOTES
Pulmonary Clinic Follow-Up            Assessment/Plan:     89 year old female with a history of COPD, emphysema, pulmonary nodule, HTN, dementia - presenting for one month follow-up.      COPD - GOLD E  Emphysema  Nocturnal hypoxemia  Former smoker, quit in 1988.  She was previously followed by Dr Irwin at [facility].  Spirometry in past has shown moderate airway obstruction (FEV1 53% in 2019).  She had been on Stiolto for many years.  Chest CTs have shown emphysematous changes, last scan in 2018.  CXR in April that was clear.  She reported over the past 6 months, she has had an increase in shortness of breath with exertion.  No daily cough or mucous production.  She had an echocardiogram in July that was normal.  Holter monitor showed no arrhythmias.  Pulmonary function testing was performed in 6/2024, which showed progression of her airway obstruction (FEV1 33% predicted), air-trapping and hyperinflation, and moderate diffusion capacity defect (DLCO 47% predicted)- although this was not corrected for hemoglobin.  JESSE shows >1 hours with oxygen saturations <89%, she was started on 2L oxygen at night.  Walk test in clinic without desaturations today.  Last visit we switched her to an all nebulized regimen.  They report improvement in breathing since that time.     Plan:  - continue budesonide nebulized BID, rinse/gargle after use.  - continue Duonebs QID.    - continue oxygen 2L at night.  Family has put this behind bed so she is not likely to trip on tubing during the night.   - if any worsening, order 6MWT.  - Recommended for this fall:  annual influenza vaccine, COVID booster, RSV vaccine.  - Action plan: prednisone 20mg x5 days, + azithromycin x5 days    Follow-up:  - 6 months, okay for virtual, they live about an hour away.      Jada Bojorquez, CNP  Pulmonary Medicine  [facility]  [phone]    Oxygen Documentation  I certify that this patient, Gifty Villa has been  under my care (or a nurse practitioner or physican's assistant working with me). This is the face-to-face encounter for oxygen medical necessity.    At the time of this encounter, I have reviewed the qualifying testing and have determined that supplemental oxygen is reasonable and necessary and is expected to improve the patient's condition in a home setting.       Patient has continued oxygen desaturation due to COPD J44.9  Emphysema J43.9.  If portability is ordered, is the patient mobile within the home? yes  Was this visit performed as a telehealth visit: Yes: What is the reason an in-person visit could not be done: mobility/transportation issues  DME TeleHealth Reminder (Optional) For telehealth visits to be accepted, there must be a valid reason documented as to why an in-person visit could not be completed. If O2 Sat testing is needed, pt must have an in-person visit with nurse or provider.: For telehealth visits to be accepted, there must be a valid reason documented as to why an in-person visit could not be completed. If O2 Sat testing is needed, pt must have an in-person visit with nurse or provider.        CC:     COPD follow-up     HPI:     89 year old female with a history of COPD, emphysema, pulmonary nodule, HTN, dementia - presenting for one month follow-up.      Since last visit, breathing has improved with new nebulized regimen.  She continues to have SOB with exertion.       Previous HPI:  On stiolto at least 7 years.   Not sure if it ever made a difference, not a major help.   Son says her albuterol use decreased when starting stiolto.  Breathing has been more difficult the past 6 months.  Increased duonebs to 4 times/day.  Seems to help.   More winded than in past.  Now having to use wheelchair.   No cough or mucous production.   No frequent exacerbations.   No SOB in middle of night.   Has monitor with camera in room.  No struggling breathing at night.       Patient supplied answers from flow  sheet for:  COPD Assessment Test (CAT)  2009 [facility]. All rights reserved.      9/3/2024     3:02 PM 10/4/2024    12:37 PM   COPD assessment test (CAT)   Cough 2 3   Phlegm 1 2   Chest tightness 0 2   Walk up hill 5 5   Limited activities 5 5   Leaving my home 0 0   Sleep 0 2   Energy 3 4   Total Score 16 23      CAT Key:  The CAT consist of 8 items which are each scored 0-5. The total score ranges from 0-40 with higher scores representing a poorer health status. When interpreting CAT scores, the individual s disease severity should be considered.   Low impact  (1-9)  Medium impact  (10-20)  High impact  (21-30)  Very high impact  (31-40)         ROS:     A 6-system review was obtained and was negative with the exception of the symptoms endorsed in the HPI.       Medical history:       PMH:  Past Medical History:   Diagnosis Date    Anxiety     Arthritis     Backache, unspecified     COPD (chronic obstructive pulmonary disease) (H)     Dementia (H)     Essential hypertension, benign     Frequency-urgency sydrome     Hyperlipidemia     Pulmonary nodule     f/b Dr. Irwin at [facility] - scan 1/2018 showed stability since 2016    RLS (restless legs syndrome) 2009    noted on PSG    Uncomplicated asthma        PSH:  Past Surgical History:   Procedure Laterality Date    ARTHROPLASTY PATELLO-FEMORAL (KNEE)  8/2010    Left TKA    CARDIAC CATH - HIM SCAN  2011    normal.  Done d/t abnormal stress test (false positive) - patient noted to have hypertensive heart disease    COLONOSCOPY  ~2003    No polyps    COLONOSCOPY N/A 10/10/2014    Procedure: COLONOSCOPY;  Surgeon: Chavez Ward MD;  Location:  GI    ESOPHAGOSCOPY, GASTROSCOPY, DUODENOSCOPY (EGD), COMBINED  6/1/2016    Dr. Cha [facility]    ESOPHAGOSCOPY, GASTROSCOPY, DUODENOSCOPY (EGD), COMBINED N/A 6/1/2016    Procedure: COMBINED ESOPHAGOSCOPY, GASTROSCOPY, DUODENOSCOPY (EGD), BIOPSY SINGLE OR MULTIPLE;  Surgeon: Jada Cha MD;  Location:  GI    EXCISE  GANGLION WRIST Left 2015    Procedure: EXCISE GANGLION WRIST;  Surgeon: Justin Lyons MD;  Location: [facility] OR    HYSTERECTOMY, PAP NO LONGER INDICATED      wisdom teeth      ZZC SPINE FUSION,ANTER,3 SGMTS  1992    ZZC TOTAL HIP ARTHROPLASTY      Hip Replacement, Total (Right)    ZZC VAGINAL HYSTERECTOMY      Hysterectomy, Vaginal w anterior repair       Allergies:  No Known Allergies    Family Hx:  Family History   Problem Relation Age of Onset    Alzheimer Disease Mother          at 98,  of old age    Alzheimer Disease Father          at 78    C.A.D. Father     Alzheimer Disease Brother          age 82     Family History Negative Brother         Born 1931    Cancer Maternal Aunt         x'2 w Ca unknown etiology    Cancer Other 90        unknown cancer    Colon Cancer No family hx of        Social Hx:  Social History     Socioeconomic History    Marital status:      Spouse name:     Number of children: 3    Years of education: Not on file    Highest education level: Not on file   Occupational History    Occupation: office work     Employer: RETIRED   Tobacco Use    Smoking status: Former     Current packs/day: 0.00     Types: Cigarettes     Quit date: 1988     Years since quittin.2     Passive exposure: Never    Smokeless tobacco: Never    Tobacco comments:     Quit in    Vaping Use    Vaping status: Never Used   Substance and Sexual Activity    Alcohol use: Yes     Comment: very rare    Drug use: No    Sexual activity: Not Currently     Partners: Male     Birth control/protection: Abstinence, Surgical   Other Topics Concern    Parent/sibling w/ CABG, MI or angioplasty before 65F 55M? Not Asked     Service Not Asked    Blood Transfusions Not Asked    Caffeine Concern Yes     Comment: 4-6 daily    Occupational Exposure Not Asked    Hobby Hazards Not Asked    Sleep Concern No    Stress Concern Not Asked    Weight Concern Not Asked    Special Diet  No    Back Care Not Asked    Exercise No    Bike Helmet Not Asked    Seat Belt Not Asked    Self-Exams Not Asked   Social History Narrative    Has had success in past following Medif[employer] eating plan.     Social Determinants of Health     Financial Resource Strain: Low Risk  (7/3/2024)    Financial Resource Strain     Within the past 12 months, have you or your family members you live with been unable to get utilities (heat, electricity) when it was really needed?: No   Food Insecurity: Low Risk  (7/3/2024)    Food Insecurity     Within the past 12 months, did you worry that your food would run out before you got money to buy more?: No     Within the past 12 months, did the food you bought just not last and you didn t have money to get more?: No   Transportation Needs: Low Risk  (7/3/2024)    Transportation Needs     Within the past 12 months, has lack of transportation kept you from medical appointments, getting your medicines, non-medical meetings or appointments, work, or from getting things that you need?: No   Physical Activity: Unknown (7/3/2024)    Exercise Vital Sign     Days of Exercise per Week: Patient declined     Minutes of Exercise per Session: Not on file   Stress: No Stress Concern Present (7/3/2024)    [employer] [place] [employer] - Occupational Stress Questionnaire     Feeling of Stress : Not at all   Social Connections: Unknown (7/3/2024)    Social Connection and Isolation Panel [NHANES]     Frequency of Communication with Friends and Family: Not on file     Frequency of Social Gatherings with Friends and Family: More than three times a week     Attends Sikhism Services: Not on file     Active Member of Clubs or Organizations: Not on file     Attends Club or Organization Meetings: Not on file     Marital Status: Not on file   Interpersonal Safety: Not on file   Housing Stability: Low Risk  (7/3/2024)    Housing Stability     Do you have housing? : Yes     Are you worried about losing  your housing?: No       Current Meds:  Current Outpatient Medications   Medication Sig Dispense Refill    acetaminophen (TYLENOL) 500 MG tablet Take 2 tablets (1,000 mg) by mouth 3 times daily as needed for mild pain      albuterol (PROAIR HFA) 108 (90 Base) MCG/ACT inhaler Inhale 1-2 puffs into the lungs every 4 hours as needed for shortness of breath or wheezing 8.5 g 11    amLODIPine (NORVASC) 10 MG tablet TAKE ONE TABLET BY MOUTH EVERY DAY 90 tablet 2    budesonide (PULMICORT) 0.5 MG/2ML neb solution Take 2 mLs (0.5 mg) by nebulization 2 times daily. 360 mL 11    calcium carbonate-vitamin D 500-400 MG-UNIT TABS per tablet Take 1 tablet by mouth 2 times daily (with meals) 180 tablet 3    cyanocobalamin (VITAMIN B-12) 1000 MCG tablet Take 1 tablet (1,000 mcg) by mouth every other day      donepezil (ARICEPT) 10 MG tablet TAKE ONE TABLET BY MOUTH AT BEDTIME 90 tablet 2    DULoxetine (CYMBALTA) 20 MG capsule Take 1 capsule (20 mg) by mouth 2 times daily 180 capsule 3    escitalopram (LEXAPRO) 5 MG tablet Take 1 tablet (5 mg) by mouth every morning 90 tablet 3    LUMIGAN 0.01 % SOLN ophthalmic solution INSTILL 1 DROP IN BOTH EYES AT BEDTIME      Multiple Vitamins-Minerals (PRESERVISION AREDS 2) CAPS Take 2 capsules by mouth daily      omeprazole (PRILOSEC) 20 MG DR capsule TAKE ONE CAPSULE BY MOUTH EVERY DAY 30 MINUTES BEFORE BREAKFAST      SIMBRINZA 1-0.2 % ophthalmic suspension INSTILL 1 DROP IN BOTH EYES TWO TIMES A DAY      Vitamin D3 (CHOLECALCIFEROL) 125 MCG (5000 UT) tablet Take 1 tablet (125 mcg) by mouth every other day      VITRON-C  MG TABS tablet Take 1 tablet by mouth 2 times daily (with meals) 100 tablet 0    ipratropium - albuterol 0.5 mg/2.5 mg/3 mL (DUONEB) 0.5-2.5 (3) MG/3ML neb solution Take 1 vial (3 mLs) by nebulization 4 times daily for 90 days 1080 mL 1    STIOLTO RESPIMAT 2.5-2.5 MCG/ACT AERS INHALE TWO PUFFS BY MOUTH EVERY DAY (Patient not taking: Reported on 9/4/2024) 4 g 2           Physical Exam:     /58 (BP Location: Left arm, Patient Position: Sitting, Cuff Size: Adult Regular)   Pulse 81   Wt 57.4 kg (126 lb 9.6 oz)   SpO2 95%   BMI 23.92 kg/m    Gen: elderly female, appears in NAD.  Family present.  HEENT: clear conjunctivae, moist mucous membranes  CV: RRR, no M/G/R  Resp: CTAB, no focal crackles or wheezes.  Respirations even and unlabored.  On RA.   Skin: no apparent rashes on visible skin  Ext: no cyanosis, clubbing or edema  Neuro: alert and answering questions appropriately         Data:     Labs:  reviewed    Imaging studies:  I have personally reviewed all pertinent imaging studies and PFT results unless otherwise noted.    CHEST TWO VIEWS   4/26/2024 11:09 AM      HISTORY: Worsening shortness of breath/decreased exercise  tolerance/excessive daytime drowsiness x 6 months.  Known COPD.  Chronic obstructive pulmonary disease, unspecified COPD type (H).  Decreased exercise tolerance. Excessive daytime sleepiness.     COMPARISON: 12/5/2022.                                                                      IMPRESSION: No acute cardiopulmonary disease.     Pulmonary Function Testing    6/26/24:  IMPRESSION:   The study should be interpreted with caution given the reported difficulties with testing.   Moderate Airflow Obstruction   There is air trapping with hyperinflation.   Moderate diffusion defect.    The diffusing capacity was not corrected for the patient's hemoglobin.         2019:        2017:

## 2024-10-04 NOTE — PROGRESS NOTES
Patient oxygen saturation on RA at rest is 94%.  Oxygen saturation after ambulating 400 feet on RA is 93%. Heart rate: 118    Patient is ambulatory within his/her home.     Polo PICHARDO CMA, M [facility]

## 2024-10-04 NOTE — PATIENT INSTRUCTIONS
It was a pleasure to see you in clinic today.   Here is what we discussed:    Continue budesonide nebulized twice daily, rinse/gargle after use.   Continue Duonebs four times daily.  Continue oxygen 2L at night.  Recommended for this fall:  annual influenza vaccine, COVID booster, RSV vaccine.  Call my nurse, Tyrell ([phone]) with any change or worsening of your breathing.  Follow-up in 6 months.    Jada Bojorquez, CNP  Pulmonary Medicine  [facility]  [phone]

## 2024-11-11 NOTE — MR AVS SNAPSHOT
After Visit Summary   10/8/2018    Gifty Villa    MRN: [id number]           Patient Information     Date Of Birth          1935        Visit Information        Provider Department      10/8/2018 8:20 AM Leann Gordon PA-C [facility]        Today's Diagnoses     Leg cramps    -  1    Other fatigue        Adjustment disorder with depressed mood        Early onset Alzheimer's dementia without behavioral disturbance        Need for Tdap vaccination        Urinary urgency        Dysfunction of both eustachian tubes        Tinnitus, bilateral           Follow-ups after your visit        Additional Services     CARE COORDINATION REFERRAL       Services are provided by a Care Coordinator for people with complex needs such as: medical, social, or financial troubles.  The Care Coordinator works with the patient and their Primary Care Provider to determine health goals, obtain resources, achieve outcomes, and develop care plans that help coordinate the patient's care.     Reason for Referral: Mental Wellness (Health) (Mental Illness/Chemical Depedency): Help finding local activities for her - likes sewing    Additional pertinent details:  Daughter in law has CTC - contact her    Clinical Staff have discussed the Care Coordination Referral with the patient and/or caregiver: yes                  Follow-up notes from your care team     Return in about 6 months (around 4/8/2019) for Routine Visit, Lab Work.      Who to contact     If you have questions or need follow up information about today's clinic visit or your schedule please contact [facility] directly at [phone].  Normal or non-critical lab and imaging results will be communicated to you by MyChart, letter or phone within 4 business days after the clinic has received the results. If you do not hear from us within 7 days, please contact the clinic through MyChart or phone. If you have a critical or  "abnormal lab result, we will notify you by phone as soon as possible.  Submit refill requests through [employer] or call your pharmacy and they will forward the refill request to us. Please allow 3 business days for your refill to be completed.          Additional Information About Your Visit        [employer] Information     [employer] gives you secure access to your electronic health record. If you see a primary care provider, you can also send messages to your care team and make appointments. If you have questions, please call your primary care clinic.  If you do not have a primary care provider, please call [phone] and they will assist you.        Care EveryWhere ID     This is your Care EveryWhere ID. This could be used by other organizations to access your [facility] medical records  [id number]        Your Vitals Were     Pulse Temperature Height Pulse Oximetry BMI (Body Mass Index)       109 97.1  F (36.2  C) (Tympanic) 5' 3\" (1.6 m) 97% 22.72 kg/m2        Blood Pressure from Last 3 Encounters:   10/08/18 112/76   08/15/18 124/80   08/12/18 142/62    Weight from Last 3 Encounters:   10/08/18 128 lb 4 oz (58.2 kg)   08/15/18 127 lb 6 oz (57.8 kg)   08/12/18 120 lb (54.4 kg)              We Performed the Following     CARE COORDINATION REFERRAL     CBC with platelets     CK total     Comprehensive metabolic panel     Ferritin     Magnesium     TDAP, IM (10 - 64 YRS) - Adacel     TSH with free T4 reflex     Vitamin B12     Vitamin D Deficiency          Today's Medication Changes          These changes are accurate as of 10/8/18  9:10 AM.  If you have any questions, ask your nurse or doctor.               Start taking these medicines.        Dose/Directions    fluticasone 50 MCG/ACT spray   Commonly known as:  FLONASE   Used for:  Dysfunction of both eustachian tubes, Tinnitus, bilateral   Started by:  Leann Gordon PA-C        Dose:  2 spray   Spray 2 sprays into both nostrils daily   Quantity:  1 Bottle "   Refills:  1            Where to get your medicines      These medications were sent to [facility] [address]  [place] - [address] [facility]  [address] [place] [address]     Phone:  [phone]     donepezil 5 MG tablet    DULoxetine 20 MG EC capsule    fluticasone 50 MCG/ACT spray    solifenacin 10 MG tablet                Primary Care Provider Office Phone # Fax #    Leann Gordon PA-C [phone]       [address] [place] [address]        Equal Access to Services     [facility]: Hadii aad ku hadasho Soomaali, waaxda luqadaha, qaybta kaalmada adeegyada, cathy concepcion haysherri adefareed moran . So [facility] [phone].    ATENCIÓN: Si habla español, tiene a robertson disposición servicios gratuitos de asistencia lingüística. [facility] [phone].    We comply with applicable federal civil rights laws and [place] laws. We do not discriminate on the basis of race, color, national origin, age, disability, sex, sexual orientation, or gender identity.            Thank you!     Thank you for choosing [facility]  for your care. Our goal is always to provide you with excellent care. Hearing back from our patients is one way we can continue to improve our services. Please take a few minutes to complete the written survey that you may receive in the mail after your visit with us. Thank you!             Your Updated Medication List - Protect others around you: Learn how to safely use, store and throw away your medicines at www.disposemymeds.org.          This list is accurate as of 10/8/18  9:10 AM.  Always use your most recent med list.                   Brand Name Dispense Instructions for use Diagnosis    amLODIPine 10 MG tablet    NORVASC    90 tablet    Take 1 tablet (10 mg) by mouth daily    Essential hypertension       aspirin 81 MG tablet     0    ONE DAILY    Unspecified essential hypertension       calcium carbonate-vitamin D 500-400 MG-UNIT Tabs per  Negative tablet     180 tablet    Take 1 tablet by mouth 2 times daily (with meals)    Post menopausal problems       cetirizine 10 MG tablet    zyrTEC    90 tablet    Take 1 tablet (10 mg) by mouth every evening    Seasonal allergic rhinitis, unspecified chronicity, unspecified trigger       cholecalciferol 5000 units Tabs tablet    vitamin D3    100 tablet    Take 1 tablet (5,000 Units) by mouth daily    Other fatigue       donepezil 5 MG tablet    ARICEPT    180 tablet    Take 2 tablets (10 mg) by mouth At Bedtime    Early onset Alzheimer's dementia without behavioral disturbance       DULoxetine 20 MG EC capsule    CYMBALTA    60 capsule    Take 1 capsule (20 mg) by mouth 2 times daily    Other fatigue, Adjustment disorder with depressed mood       fluticasone 50 MCG/ACT spray    FLONASE    1 Bottle    Spray 2 sprays into both nostrils daily    Dysfunction of both eustachian tubes, Tinnitus, bilateral       MUCINEX D MAX STRENGTH 120-1200 MG Tb12   Generic drug:  PseudoePHEDrine-guaiFENesin       Other fatigue, Cognitive changes       omeprazole 40 MG capsule    priLOSEC    90 capsule    Take 1 capsule (40 mg) by mouth daily 30 minutes before breakfast    Peptic ulcer       PROAIR  (90 Base) MCG/ACT inhaler   Generic drug:  albuterol      Inhale 2 puffs into the lungs as needed for shortness of breath / dyspnea or wheezing        solifenacin 10 MG tablet    VESICARE    90 tablet    Take 1 tablet (10 mg) by mouth every other day    Urinary urgency       tiotropium-olodaterol 2.5-2.5 MCG/ACT Aers    STIOLTO RESPIMAT    4 g    Inhale 2 puffs into the lungs daily    Chronic obstructive pulmonary disease with acute exacerbation (H)       UNABLE TO FIND      2 times daily MEDICATION NAME: AREDS 2    Leg cramps       VITRON-C  MG Tabs tablet   Generic drug:  ferrous fumarate 65 mg ([place]. FE)-Vitamin C 125 mg     100 tablet    Take 1 tablet by mouth 2 times daily (with meals)    Restless legs syndrome (RLS), Low  ferritin

## 2024-12-25 DIAGNOSIS — J44.9 CHRONIC OBSTRUCTIVE PULMONARY DISEASE, UNSPECIFIED COPD TYPE (H): ICD-10-CM

## 2024-12-25 DIAGNOSIS — R68.89 DECREASED EXERCISE TOLERANCE: ICD-10-CM

## 2024-12-25 DIAGNOSIS — G47.19 EXCESSIVE DAYTIME SLEEPINESS: ICD-10-CM

## 2024-12-25 DIAGNOSIS — R06.02 SHORTNESS OF BREATH: ICD-10-CM

## 2024-12-26 RX ORDER — IPRATROPIUM BROMIDE AND ALBUTEROL SULFATE 2.5; .5 MG/3ML; MG/3ML
SOLUTION RESPIRATORY (INHALATION)
Qty: 1080 ML | Refills: 1 | Status: SHIPPED | OUTPATIENT
Start: 2024-12-26

## 2025-01-04 ENCOUNTER — HEALTH MAINTENANCE LETTER (OUTPATIENT)
Age: 89
End: 2025-01-04

## 2025-04-19 ENCOUNTER — HEALTH MAINTENANCE LETTER (OUTPATIENT)
Age: 89
End: 2025-04-19

## 2025-04-21 DIAGNOSIS — I10 ESSENTIAL HYPERTENSION: ICD-10-CM

## 2025-04-21 DIAGNOSIS — F03.B4 MODERATE DEMENTIA WITH ANXIETY, UNSPECIFIED DEMENTIA TYPE (H): ICD-10-CM

## 2025-04-21 RX ORDER — DONEPEZIL HYDROCHLORIDE 10 MG/1
10 TABLET, FILM COATED ORAL
Qty: 90 TABLET | Refills: 1 | Status: SHIPPED | OUTPATIENT
Start: 2025-04-21

## 2025-04-21 RX ORDER — AMLODIPINE BESYLATE 10 MG/1
10 TABLET ORAL DAILY
Qty: 90 TABLET | Refills: 1 | Status: SHIPPED | OUTPATIENT
Start: 2025-04-21

## 2025-04-22 DIAGNOSIS — F03.B4 MODERATE DEMENTIA WITH ANXIETY, UNSPECIFIED DEMENTIA TYPE (H): ICD-10-CM

## 2025-04-22 RX ORDER — DONEPEZIL HYDROCHLORIDE 10 MG/1
10 TABLET, FILM COATED ORAL
Qty: 90 TABLET | Refills: 2 | OUTPATIENT
Start: 2025-04-22

## 2025-04-23 DIAGNOSIS — F03.B4 MODERATE DEMENTIA WITH ANXIETY, UNSPECIFIED DEMENTIA TYPE (H): ICD-10-CM

## 2025-04-23 RX ORDER — DONEPEZIL HYDROCHLORIDE 10 MG/1
10 TABLET, FILM COATED ORAL
Qty: 90 TABLET | Refills: 2 | OUTPATIENT
Start: 2025-04-23

## 2025-05-22 ENCOUNTER — OFFICE VISIT (OUTPATIENT)
Dept: FAMILY MEDICINE | Facility: CLINIC | Age: 89
End: 2025-05-22
Payer: COMMERCIAL

## 2025-05-22 VITALS
OXYGEN SATURATION: 100 % | BODY MASS INDEX: 23.75 KG/M2 | SYSTOLIC BLOOD PRESSURE: 120 MMHG | WEIGHT: 125.8 LBS | RESPIRATION RATE: 12 BRPM | HEART RATE: 86 BPM | DIASTOLIC BLOOD PRESSURE: 60 MMHG | HEIGHT: 61 IN | TEMPERATURE: 97.7 F

## 2025-05-22 DIAGNOSIS — R55 RECURRENT SYNCOPE: Primary | ICD-10-CM

## 2025-05-22 DIAGNOSIS — J44.9 CHRONIC OBSTRUCTIVE PULMONARY DISEASE, UNSPECIFIED COPD TYPE (H): ICD-10-CM

## 2025-05-22 DIAGNOSIS — G47.00 INSOMNIA, UNSPECIFIED TYPE: ICD-10-CM

## 2025-05-22 DIAGNOSIS — F33.42 MAJOR DEPRESSIVE DISORDER, RECURRENT EPISODE, IN FULL REMISSION: ICD-10-CM

## 2025-05-22 DIAGNOSIS — R53.81 DECLINING FUNCTIONAL STATUS: ICD-10-CM

## 2025-05-22 DIAGNOSIS — F41.9 ANXIETY: ICD-10-CM

## 2025-05-22 DIAGNOSIS — F33.1 MODERATE RECURRENT MAJOR DEPRESSION (H): ICD-10-CM

## 2025-05-22 DIAGNOSIS — K27.9 PEPTIC ULCER: ICD-10-CM

## 2025-05-22 DIAGNOSIS — R19.7 DIARRHEA, UNSPECIFIED TYPE: ICD-10-CM

## 2025-05-22 DIAGNOSIS — R19.5 DARK STOOLS: ICD-10-CM

## 2025-05-22 PROCEDURE — 99214 OFFICE O/P EST MOD 30 MIN: CPT | Performed by: PHYSICIAN ASSISTANT

## 2025-05-22 PROCEDURE — 3078F DIAST BP <80 MM HG: CPT | Performed by: PHYSICIAN ASSISTANT

## 2025-05-22 PROCEDURE — 3074F SYST BP LT 130 MM HG: CPT | Performed by: PHYSICIAN ASSISTANT

## 2025-05-22 PROCEDURE — 96127 BRIEF EMOTIONAL/BEHAV ASSMT: CPT | Performed by: PHYSICIAN ASSISTANT

## 2025-05-22 PROCEDURE — G2211 COMPLEX E/M VISIT ADD ON: HCPCS | Performed by: PHYSICIAN ASSISTANT

## 2025-05-22 RX ORDER — DULOXETIN HYDROCHLORIDE 20 MG/1
20 CAPSULE, DELAYED RELEASE ORAL 2 TIMES DAILY
Qty: 180 CAPSULE | Refills: 3 | Status: SHIPPED | OUTPATIENT
Start: 2025-05-22

## 2025-05-22 RX ORDER — OMEPRAZOLE 20 MG/1
CAPSULE, DELAYED RELEASE ORAL
COMMUNITY
Start: 2025-05-22

## 2025-05-22 RX ORDER — ESCITALOPRAM OXALATE 5 MG/1
5 TABLET ORAL EVERY MORNING
Qty: 90 TABLET | Refills: 3 | Status: SHIPPED | OUTPATIENT
Start: 2025-05-22 | End: 2025-05-28

## 2025-05-22 RX ORDER — LOPERAMIDE HYDROCHLORIDE 2 MG/1
TABLET ORAL
COMMUNITY
Start: 2025-05-22

## 2025-05-22 RX ORDER — TRAZODONE HYDROCHLORIDE 50 MG/1
25 TABLET ORAL AT BEDTIME
Qty: 90 TABLET | Refills: 0 | Status: SHIPPED | OUTPATIENT
Start: 2025-05-22

## 2025-05-22 ASSESSMENT — ANXIETY QUESTIONNAIRES
GAD7 TOTAL SCORE: 3
8. IF YOU CHECKED OFF ANY PROBLEMS, HOW DIFFICULT HAVE THESE MADE IT FOR YOU TO DO YOUR WORK, TAKE CARE OF THINGS AT HOME, OR GET ALONG WITH OTHER PEOPLE?: NOT DIFFICULT AT ALL
1. FEELING NERVOUS, ANXIOUS, OR ON EDGE: MORE THAN HALF THE DAYS
GAD7 TOTAL SCORE: 3
5. BEING SO RESTLESS THAT IT IS HARD TO SIT STILL: NOT AT ALL
3. WORRYING TOO MUCH ABOUT DIFFERENT THINGS: NOT AT ALL
7. FEELING AFRAID AS IF SOMETHING AWFUL MIGHT HAPPEN: NOT AT ALL
GAD7 TOTAL SCORE: 3
IF YOU CHECKED OFF ANY PROBLEMS ON THIS QUESTIONNAIRE, HOW DIFFICULT HAVE THESE PROBLEMS MADE IT FOR YOU TO DO YOUR WORK, TAKE CARE OF THINGS AT HOME, OR GET ALONG WITH OTHER PEOPLE: NOT DIFFICULT AT ALL
7. FEELING AFRAID AS IF SOMETHING AWFUL MIGHT HAPPEN: NOT AT ALL
6. BECOMING EASILY ANNOYED OR IRRITABLE: NOT AT ALL
2. NOT BEING ABLE TO STOP OR CONTROL WORRYING: SEVERAL DAYS
4. TROUBLE RELAXING: NOT AT ALL

## 2025-05-22 ASSESSMENT — PATIENT HEALTH QUESTIONNAIRE - PHQ9
SUM OF ALL RESPONSES TO PHQ QUESTIONS 1-9: 10
SUM OF ALL RESPONSES TO PHQ QUESTIONS 1-9: 10
10. IF YOU CHECKED OFF ANY PROBLEMS, HOW DIFFICULT HAVE THESE PROBLEMS MADE IT FOR YOU TO DO YOUR WORK, TAKE CARE OF THINGS AT HOME, OR GET ALONG WITH OTHER PEOPLE: NOT DIFFICULT AT ALL

## 2025-05-22 NOTE — PROGRESS NOTES
Assessment & Plan     Elevated glucose  ***    Essential hypertension  ***          MED REC REQUIRED{TIP  Click the link below to document or use med rec list, use list to pull in response :519302}  Post Medication Reconciliation Status:  Discharge medications reconciled, continue medications without change    No follow-ups on fileCyn Shukla is a 89 year old, presenting for the following health issues:  Hospital F/U and ER F/U        5/22/2025     4:13 PM   Additional Questions   Roomed by Anna NOVAK   Accompanied by Daughter Daughter in law     HPI      {MA/LPN/RN Pre-Provider Visit Orders- hCG/UA/Strep (Optional):630808}    Hospital Follow-up Visit:    Hospital/Nursing Home/IP Rehab Facility: [facility]  Most Recent Admission Date: 4/7/2025  Most Recent Admission Diagnosis:      Most Recent Discharge Date: 4/8/2025  Most Recent Discharge Diagnosis: Syncope, unspecified syncope type (Primary Dx);   Chronic obstructive pulmonary disease, unspecified COPD type (HC)   Was the patient in the ICU or did the patient experience delirium during hospitalization?  No  Do you have any other stressors you would like to discuss with your provider? No    Problems taking medications regularly:  None  Medication changes since discharge: None  Problems adhering to non-medication therapy:  None    Summary of hospitalization:  CareEverywhere information obtained and reviewed    **Hospital Admission:**  - **Date:** April 7, 2025  - **Hospital:** [facility]  - **Discharge Date:** April 8, 2025  - **Reason for Admission:** Syncope    **Hospital Course:**  Gifty Villa, an 89-year-old female, was admitted due to a syncopal episode. History was provided by family and ED physicians due to cognitive impairment. She experienced a witnessed loss of consciousness for approximately 10 minutes while seated, with no recent fever, chills, nausea, vomiting, diarrhea, chest  pain, shortness of breath, or palpitations. Initial workup included normal CT head and chest X-ray, with a minimally elevated troponin level. The syncope was suspected to be orthostatic/vasovagal in nature, possibly due to reduced oral intake. She was discharged with recommendations to continue her calcium channel blocker and to start home oxygen therapy due to baseline hypoxia related to COPD.    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    **Follow-Up Recommendations:**  - **Primary Care Provider:** HONEY Jackson  - **Follow-Up Appointment:** Within 1 to 5 days  - **Specific Follow-Up Needs:**    - COPD management    - Syncope evaluation    - Dementia and goals of care discussion    - Consideration for palliative care referral    **Diet and Activity Recommendations:**  - Regular diet with a focus on a variety of foods and healthy fats.  - Gradual increase in activity level, starting with short durations.    **Discharge Instructions:**  - Monitor for worsening symptoms such as increased shortness of breath, signs of infection, or severe pain.  - Emergency guidance provided for potential complications.    ---    **Emergency Room Visit:**  - **Date:** May 20, 2025  - **Reason for Visit:** Syncope    **ER  "Course:**  Gifty Villa presented to the emergency department after experiencing an unresponsive syncopal event at home, witnessed by family. Upon arrival, she was found to have low blood pressure (initially 93/50) and was placed on oxygen (4L initially, then titrated to 2L). Her symptoms improved with fluid administration.     **Findings:**  - **EKG:** Sinus bradycardia with right bundle branch block; no acute changes.  - **Labs:** Slight leukocytosis, venous blood gas indicating slight hypercarbia.  - **Urinalysis:** Showed bacteria but no significant infection symptoms.  - **Assessment:** Suspected orthostatic hypotension or vasovagal syncope possibly related to dehydration or brief period without oxygen.    **Plan:**  - Maintain hydration.  - Monitor blood pressures.  - Consider holding amlodipine due to low blood pressure concerns.  - Discharged with instructions to follow up with primary care.    This summary provides an overview of the recent hospital admission and subsequent ER visit, detailing her treatment and follow-up care plan.  Diagnostic Tests/Treatments reviewed.  Follow up needed: {:553103:}  Other Healthcare Providers Involved in Patient s Care:         {those currently involved after discharge:804031::\"None\"}  Update since discharge: {:143488} {TIP  Include information from family/caregivers, SNF, Care Coordination :795506}        Plan of care communicated with {:457545}     {Reference  Coding guidelines- Moderate Complexity F2F/Video within 7 - 14 days of discharge [id number], High Complexity F2F/Video within 7 days [id number] or rrxedy21 days [id number] :139347}      ED/UC Followup:    Facility:  [facility]  Date of visit: 5/20/2025  Reason for visit: Syncope   Current Status: Patient is weak, tried. Patient is using oxygen more though out the day.  {additonal problems for provider to add (Optional):209508}    {ROS Picklists (Optional):155065}      Objective  " "  /60   Pulse 86   Temp 97.7  F (36.5  C) (Tympanic)   Resp 12   Ht 1.549 m (5' 1\")   Wt 57.1 kg (125 lb 12.8 oz)   SpO2 100%   BMI 23.77 kg/m    Body mass index is 23.77 kg/m .  Physical Exam   {Exam List (Optional):619403}    {Diagnostic Test Results (Optional):348134}        Signed Electronically by: Leann Gordon PA-C  {Email feedback regarding this note to [email or web address]   :229310}  " recent fever, chills, nausea, vomiting, diarrhea, chest pain, shortness of breath, or palpitations. Initial workup included normal CT head and chest X-ray, with a minimally elevated troponin level. The syncope was suspected to be orthostatic/vasovagal in nature, possibly due to reduced oral intake. She was discharged with recommendations to continue her calcium channel blocker and to start home oxygen therapy due to baseline hypoxia related to COPD.    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    **Follow-Up Recommendations:**  - **Primary Care Provider:** HONEY Jackson  - **Follow-Up Appointment:** Within 1 to 5 days  - **Specific Follow-Up Needs:**    - COPD management    - Syncope evaluation    - Dementia and goals of care discussion    - Consideration for palliative care referral    **Diet and Activity Recommendations:**  - Regular diet with a focus on a variety of foods and healthy fats.  - Gradual increase in activity level, starting with short durations.    **Discharge Instructions:**  - Monitor for worsening symptoms such as increased shortness of breath, signs of infection, or severe pain.  - Emergency guidance provided for potential complications.    ---    **Emergency Room Visit:**  - **Date:**  May 20, 2025  - **Reason for Visit:** Syncope    **ER Course:**  Gifty Villa presented to the emergency department after experiencing an unresponsive syncopal event at home, witnessed by family. Upon arrival, she was found to have low blood pressure (initially 93/50) and was placed on oxygen (4L initially, then titrated to 2L). Her symptoms improved with fluid administration.     **Findings:**  - **EKG:** Sinus bradycardia with right bundle branch block; no acute changes.  - **Labs:** Slight leukocytosis, venous blood gas indicating slight hypercarbia.  - **Urinalysis:** Showed bacteria but no significant infection symptoms.  - **Assessment:** Suspected orthostatic hypotension or vasovagal syncope possibly related to dehydration or brief period without oxygen.    **Plan:**  - Maintain hydration.  - Monitor blood pressures.  - Consider holding amlodipine due to low blood pressure concerns.  - Discharged with instructions to follow up with primary care.    This summary provides an overview of the recent hospital admission and subsequent ER visit, detailing her treatment and follow-up care plan.  Diagnostic Tests/Treatments reviewed.  Follow up needed: hospice care  Other Healthcare Providers Involved in Patient s Care:         Hospice and Care Coordination  Update since discharge: fluctuating course.     **Syncope and Hospital Admissions**    Hermes, an 89-year-old female, presented for a follow-up after multiple episodes of syncope and recent hospital visits. She has a history of syncope, initially hospitalized at [facility] from April 7 to April 8, 2025. During this admission, she was discharged with home oxygen and advised to consider palliative care. She experienced another syncope episode on May 20, 2025, requiring ER attention at [place]. During this episode, she was found unresponsive with low blood pressure at home, initially 55/30, and improved to 84/unknown with fluid administration in the  ambulance. She was given oxygen, starting at 4 liters, then titrated to 2 liters. In the ER, she received a total of 1.5 bags of fluids. Diagnostic tests showed sinus bradycardia with a right frontal branch block, slight leukocytosis, and slight hypercarbia, with a urinalysis indicating bacteria but no significant infection symptoms. The suspicion was orthostatic hypotension and vasovagal syncope, likely related to dehydration or brief oxygen deprivation.    **Home Oxygen Management and Palliative Care**    Hermes is currently on home oxygen therapy, initially recommended at 1 liter continuously and 2 liters with activity. The family has adjusted to using 1 to 1.5 liters while sitting and 2 liters when Hermes is short of breath or based on her oxygen level. They possess a concentrator and oxygen tanks. The family is considering palliative care for additional support, particularly for hygiene and bathing needs, as caregivers Shaylee have physical limitations. There is an interest in exploring resources for home care assistance to ensure Hermes remains comfortable and safe at home. The current plan includes maintaining hydration and considering hospice consultation for additional resources, including potential home IV fluids for dehydration management.    **Medication Management**    Hermes's current medication regimen includes daily omeprazole for heartburn and reflux. She is also taking donepezil in the morning. The family was advised to discontinue B12 and vitamin D supplements, as these are covered in her current regimen with Vitron C, which contains iron. Hermes occasionally uses an as-needed inhaler and has not used trazodone recently. Due to poor sleep, which has worsened over the last three years, trazodone will be restarted at a low dose to improve nighttime rest. The family is exploring environmental adjustments, such as TV timers and white noise, to aid sleep.    **Gastrointestinal Concerns**    Hermes has  "been experiencing loose, dark stools for some time. The stools are occasionally solid but often loose, and there is no use of Pepto-Bismol or significant dietary changes noted. The family provides yogurt, fruit, egg sandwiches, and cottage cheese. There is consideration of a potential GI bleed, given the stool color and consistency, and a FIT test for occult blood in the stool is recommended. The family noted that previous discussions suggested the stool color could be related to Vitron C.    **Peripheral Edema**    Hermes exhibits peripheral edema, particularly in the feet, which appear swollen and shiny at night. The edema improves by morning. The discontinuation of amlodipine has been noted, and the family is advised to elevate her feet and consider compression stockings to manage swelling. Despite the edema, Hermes prefers sitting at the table for activities, and adjustments may be needed to accommodate her preferences while managing her condition.      Plan of care communicated with patient and family           ED/UC Followup:    Facility:  [facility]  Date of visit: 5/20/2025  Reason for visit: Syncope   Current Status: Patient is weak, tried. Patient is using oxygen more though out the day.        Review of Systems  Constitutional, HEENT, cardiovascular, pulmonary, GI, , musculoskeletal, neuro, skin, endocrine and psych systems are negative, except as otherwise noted.      Objective    /60   Pulse 86   Temp 97.7  F (36.5  C) (Tympanic)   Resp 12   Ht 1.549 m (5' 1\")   Wt 57.1 kg (125 lb 12.8 oz)   SpO2 100%   BMI 23.77 kg/m    Body mass index is 23.77 kg/m .  Physical Exam   GENERAL: alert and no distress, on 3L O2 via NC  EYES: Eyes grossly normal to inspection, PERRL and conjunctivae and sclerae normal  RESP: lungs clear to auscultation - no rales, rhonchi or wheezes  CV: regular rate and rhythm, normal S1 S2, no S3 or S4, no murmur, click or rub, no peripheral " edema  MS: no gross musculoskeletal defects noted, no edema  SKIN: no suspicious lesions or rashes  NEURO: Normal strength and tone, mentation intact and speech normal  PSYCH: mentation appears confused, affect appears depressed,     No results found for any visits on 05/22/25.        Signed Electronically by: Leann Gordon PA-C

## 2025-05-23 ENCOUNTER — TELEPHONE (OUTPATIENT)
Dept: FAMILY MEDICINE | Facility: CLINIC | Age: 89
End: 2025-05-23
Payer: COMMERCIAL

## 2025-05-23 PROBLEM — F33.1 MODERATE RECURRENT MAJOR DEPRESSION (H): Status: ACTIVE | Noted: 2025-05-23

## 2025-05-23 NOTE — TELEPHONE ENCOUNTER
Home Health Care    Reason for call:      Home health care was referred to this pt - however [facility] does not work out of [place] - please refer elsewhere.    Orders are needed for this patient.  [facility] does not service [place] - referral needs to go somewhere else    Pt Provider: Evan FERNANDEZ    Phone Number Homecare Nurse can be reached at: The callers number is  - Abby      Could we send this information to you in [employer] or would you prefer to receive a phone call?:   javier PICHARDO

## 2025-05-24 ENCOUNTER — MYC MEDICAL ADVICE (OUTPATIENT)
Dept: FAMILY MEDICINE | Facility: CLINIC | Age: 89
End: 2025-05-24
Payer: COMMERCIAL

## 2025-05-29 NOTE — TELEPHONE ENCOUNTER
Care coordination referral placed to find hospice care in [place]      Leann Gordon MBA, MS, PA-C  [facility]

## 2025-06-03 ENCOUNTER — PATIENT OUTREACH (OUTPATIENT)
Dept: CARE COORDINATION | Facility: CLINIC | Age: 89
End: 2025-06-03
Payer: COMMERCIAL

## 2025-06-03 ENCOUNTER — MEDICAL CORRESPONDENCE (OUTPATIENT)
Dept: HEALTH INFORMATION MANAGEMENT | Facility: CLINIC | Age: 89
End: 2025-06-03
Payer: COMMERCIAL

## 2025-06-17 ENCOUNTER — PATIENT OUTREACH (OUTPATIENT)
Dept: CARE COORDINATION | Facility: CLINIC | Age: 89
End: 2025-06-17
Payer: COMMERCIAL